# Patient Record
Sex: FEMALE | Race: WHITE | NOT HISPANIC OR LATINO | Employment: OTHER | URBAN - METROPOLITAN AREA
[De-identification: names, ages, dates, MRNs, and addresses within clinical notes are randomized per-mention and may not be internally consistent; named-entity substitution may affect disease eponyms.]

---

## 2020-06-10 ENCOUNTER — RECORDS - HEALTHEAST (OUTPATIENT)
Dept: LAB | Facility: CLINIC | Age: 65
End: 2020-06-10

## 2020-06-10 LAB
ALBUMIN SERPL-MCNC: 3.1 G/DL (ref 3.5–5)
ANION GAP SERPL CALCULATED.3IONS-SCNC: 10 MMOL/L (ref 5–18)
BUN SERPL-MCNC: 22 MG/DL (ref 8–22)
CALCIUM SERPL-MCNC: 8.8 MG/DL (ref 8.5–10.5)
CHLORIDE BLD-SCNC: 108 MMOL/L (ref 98–107)
CO2 SERPL-SCNC: 22 MMOL/L (ref 22–31)
CREAT SERPL-MCNC: 1.52 MG/DL (ref 0.6–1.1)
GFR SERPL CREATININE-BSD FRML MDRD: 34 ML/MIN/1.73M2
GLUCOSE BLD-MCNC: 105 MG/DL (ref 70–125)
PHOSPHATE SERPL-MCNC: 3.7 MG/DL (ref 2.5–4.5)
POTASSIUM BLD-SCNC: 4.4 MMOL/L (ref 3.5–5)
SODIUM SERPL-SCNC: 140 MMOL/L (ref 136–145)

## 2020-06-23 ENCOUNTER — RECORDS - HEALTHEAST (OUTPATIENT)
Dept: LAB | Facility: CLINIC | Age: 65
End: 2020-06-23

## 2020-06-23 LAB
ALBUMIN SERPL-MCNC: 3.6 G/DL (ref 3.5–5)
ANION GAP SERPL CALCULATED.3IONS-SCNC: 15 MMOL/L (ref 5–18)
BUN SERPL-MCNC: 31 MG/DL (ref 8–22)
CALCIUM SERPL-MCNC: 9.5 MG/DL (ref 8.5–10.5)
CHLORIDE BLD-SCNC: 108 MMOL/L (ref 98–107)
CO2 SERPL-SCNC: 18 MMOL/L (ref 22–31)
CREAT SERPL-MCNC: 1.4 MG/DL (ref 0.6–1.1)
GFR SERPL CREATININE-BSD FRML MDRD: 38 ML/MIN/1.73M2
GLUCOSE BLD-MCNC: 152 MG/DL (ref 70–125)
PHOSPHATE SERPL-MCNC: 4.2 MG/DL (ref 2.5–4.5)
POTASSIUM BLD-SCNC: 4.3 MMOL/L (ref 3.5–5)
SODIUM SERPL-SCNC: 141 MMOL/L (ref 136–145)

## 2020-12-16 ENCOUNTER — RECORDS - HEALTHEAST (OUTPATIENT)
Dept: LAB | Facility: CLINIC | Age: 65
End: 2020-12-16

## 2020-12-16 LAB
ALBUMIN SERPL-MCNC: 4.1 G/DL (ref 3.5–5)
ANION GAP SERPL CALCULATED.3IONS-SCNC: 14 MMOL/L (ref 5–18)
BUN SERPL-MCNC: 32 MG/DL (ref 8–22)
CALCIUM SERPL-MCNC: 9.7 MG/DL (ref 8.5–10.5)
CHLORIDE BLD-SCNC: 106 MMOL/L (ref 98–107)
CO2 SERPL-SCNC: 20 MMOL/L (ref 22–31)
CREAT SERPL-MCNC: 1.72 MG/DL (ref 0.6–1.1)
CREAT UR-MCNC: 100.8 MG/DL
CREAT UR-MCNC: 100.8 MG/DL
GFR SERPL CREATININE-BSD FRML MDRD: 30 ML/MIN/1.73M2
GLUCOSE BLD-MCNC: 158 MG/DL (ref 70–125)
MICROALBUMIN UR-MCNC: 50.52 MG/DL (ref 0–1.99)
MICROALBUMIN/CREAT UR: 501.2 MG/G
PHOSPHATE SERPL-MCNC: 4.6 MG/DL (ref 2.5–4.5)
POTASSIUM BLD-SCNC: 5 MMOL/L (ref 3.5–5)
PROTEIN, RANDOM URINE - HISTORICAL: 91 MG/DL
PROTEIN/CREAT RATIO, RANDOM UR: 0.9
PTH-INTACT SERPL-MCNC: 115 PG/ML (ref 10–86)
SODIUM SERPL-SCNC: 140 MMOL/L (ref 136–145)

## 2020-12-17 LAB — 25(OH)D3 SERPL-MCNC: 27.4 NG/ML (ref 30–80)

## 2021-04-15 ENCOUNTER — RECORDS - HEALTHEAST (OUTPATIENT)
Dept: LAB | Facility: CLINIC | Age: 66
End: 2021-04-15

## 2021-04-15 LAB
CHOLEST SERPL-MCNC: 181 MG/DL
FASTING STATUS PATIENT QL REPORTED: ABNORMAL
HDLC SERPL-MCNC: 39 MG/DL
LDLC SERPL CALC-MCNC: 99 MG/DL
TRIGL SERPL-MCNC: 214 MG/DL

## 2021-04-29 ENCOUNTER — RECORDS - HEALTHEAST (OUTPATIENT)
Dept: LAB | Facility: CLINIC | Age: 66
End: 2021-04-29

## 2021-04-29 LAB
ALBUMIN SERPL-MCNC: 3.8 G/DL (ref 3.5–5)
ANION GAP SERPL CALCULATED.3IONS-SCNC: 15 MMOL/L (ref 5–18)
BUN SERPL-MCNC: 27 MG/DL (ref 8–22)
CALCIUM SERPL-MCNC: 9.6 MG/DL (ref 8.5–10.5)
CHLORIDE BLD-SCNC: 107 MMOL/L (ref 98–107)
CO2 SERPL-SCNC: 18 MMOL/L (ref 22–31)
CREAT SERPL-MCNC: 1.23 MG/DL (ref 0.6–1.1)
CREAT UR-MCNC: 95.7 MG/DL
GFR SERPL CREATININE-BSD FRML MDRD: 44 ML/MIN/1.73M2
GLUCOSE BLD-MCNC: 117 MG/DL (ref 70–125)
PHOSPHATE SERPL-MCNC: 4.2 MG/DL (ref 2.5–4.5)
POTASSIUM BLD-SCNC: 4.5 MMOL/L (ref 3.5–5)
PROTEIN, RANDOM URINE - HISTORICAL: 104 MG/DL
PROTEIN/CREAT RATIO, RANDOM UR: 1.09
PTH-INTACT SERPL-MCNC: 76 PG/ML (ref 10–86)
SODIUM SERPL-SCNC: 140 MMOL/L (ref 136–145)

## 2021-04-30 LAB — 25(OH)D3 SERPL-MCNC: 30.7 NG/ML (ref 30–80)

## 2022-05-17 ENCOUNTER — LAB REQUISITION (OUTPATIENT)
Dept: LAB | Facility: CLINIC | Age: 67
End: 2022-05-17

## 2022-05-17 ENCOUNTER — TRANSFERRED RECORDS (OUTPATIENT)
Dept: HEALTH INFORMATION MANAGEMENT | Facility: CLINIC | Age: 67
End: 2022-05-17

## 2022-05-17 DIAGNOSIS — E78.2 MIXED HYPERLIPIDEMIA: ICD-10-CM

## 2022-05-17 DIAGNOSIS — I10 ESSENTIAL (PRIMARY) HYPERTENSION: ICD-10-CM

## 2022-05-17 LAB
ALBUMIN SERPL-MCNC: 3.9 G/DL (ref 3.5–5)
ALP SERPL-CCNC: 110 U/L (ref 45–120)
ALT SERPL W P-5'-P-CCNC: 14 U/L (ref 0–45)
ANION GAP SERPL CALCULATED.3IONS-SCNC: 8 MMOL/L (ref 5–18)
AST SERPL W P-5'-P-CCNC: 20 U/L (ref 0–40)
BILIRUB SERPL-MCNC: 0.5 MG/DL (ref 0–1)
BUN SERPL-MCNC: 33 MG/DL (ref 8–22)
CALCIUM SERPL-MCNC: 9.7 MG/DL (ref 8.5–10.5)
CHLORIDE BLD-SCNC: 108 MMOL/L (ref 98–107)
CHOLEST SERPL-MCNC: 218 MG/DL
CO2 SERPL-SCNC: 24 MMOL/L (ref 22–31)
CREAT SERPL-MCNC: 1.71 MG/DL (ref 0.6–1.1)
GFR SERPL CREATININE-BSD FRML MDRD: 32 ML/MIN/1.73M2
GLUCOSE BLD-MCNC: 135 MG/DL (ref 70–125)
HDLC SERPL-MCNC: 39 MG/DL
LDLC SERPL CALC-MCNC: 130 MG/DL
POTASSIUM BLD-SCNC: 5.3 MMOL/L (ref 3.5–5)
PROT SERPL-MCNC: 7.3 G/DL (ref 6–8)
SODIUM SERPL-SCNC: 140 MMOL/L (ref 136–145)
TRIGL SERPL-MCNC: 244 MG/DL

## 2022-05-17 PROCEDURE — 80061 LIPID PANEL: CPT | Performed by: PHYSICIAN ASSISTANT

## 2022-05-17 PROCEDURE — 80053 COMPREHEN METABOLIC PANEL: CPT | Performed by: PHYSICIAN ASSISTANT

## 2023-01-19 ENCOUNTER — TRANSFERRED RECORDS (OUTPATIENT)
Dept: HEALTH INFORMATION MANAGEMENT | Facility: CLINIC | Age: 68
End: 2023-01-19
Payer: COMMERCIAL

## 2023-01-19 ENCOUNTER — LAB REQUISITION (OUTPATIENT)
Dept: LAB | Facility: CLINIC | Age: 68
End: 2023-01-19

## 2023-01-19 DIAGNOSIS — N18.31 CHRONIC KIDNEY DISEASE, STAGE 3A (H): ICD-10-CM

## 2023-01-19 DIAGNOSIS — R60.0 LOCALIZED EDEMA: ICD-10-CM

## 2023-01-19 LAB
ALBUMIN SERPL BCG-MCNC: 3.9 G/DL (ref 3.5–5.2)
ALP SERPL-CCNC: 138 U/L (ref 35–104)
ALT SERPL W P-5'-P-CCNC: 11 U/L (ref 10–35)
ANION GAP SERPL CALCULATED.3IONS-SCNC: 17 MMOL/L (ref 7–15)
AST SERPL W P-5'-P-CCNC: 19 U/L (ref 10–35)
BASOPHILS # BLD AUTO: 0.1 10E3/UL (ref 0–0.2)
BASOPHILS NFR BLD AUTO: 1 %
BILIRUB SERPL-MCNC: 0.5 MG/DL
BUN SERPL-MCNC: 16 MG/DL (ref 8–23)
CALCIUM SERPL-MCNC: 9.4 MG/DL (ref 8.8–10.2)
CHLORIDE SERPL-SCNC: 109 MMOL/L (ref 98–107)
CREAT SERPL-MCNC: 1.62 MG/DL (ref 0.51–0.95)
DEPRECATED HCO3 PLAS-SCNC: 17 MMOL/L (ref 22–29)
EOSINOPHIL # BLD AUTO: 0.2 10E3/UL (ref 0–0.7)
EOSINOPHIL NFR BLD AUTO: 2 %
ERYTHROCYTE [DISTWIDTH] IN BLOOD BY AUTOMATED COUNT: 15.9 % (ref 10–15)
GFR SERPL CREATININE-BSD FRML MDRD: 34 ML/MIN/1.73M2
GLUCOSE SERPL-MCNC: 81 MG/DL (ref 70–99)
HCT VFR BLD AUTO: 36.2 % (ref 35–47)
HGB BLD-MCNC: 10.5 G/DL (ref 11.7–15.7)
IMM GRANULOCYTES # BLD: 0 10E3/UL
IMM GRANULOCYTES NFR BLD: 0 %
LYMPHOCYTES # BLD AUTO: 1.2 10E3/UL (ref 0.8–5.3)
LYMPHOCYTES NFR BLD AUTO: 11 %
MCH RBC QN AUTO: 26.1 PG (ref 26.5–33)
MCHC RBC AUTO-ENTMCNC: 29 G/DL (ref 31.5–36.5)
MCV RBC AUTO: 90 FL (ref 78–100)
MONOCYTES # BLD AUTO: 1 10E3/UL (ref 0–1.3)
MONOCYTES NFR BLD AUTO: 9 %
NEUTROPHILS # BLD AUTO: 8.1 10E3/UL (ref 1.6–8.3)
NEUTROPHILS NFR BLD AUTO: 77 %
NRBC # BLD AUTO: 0 10E3/UL
NRBC BLD AUTO-RTO: 0 /100
NT-PROBNP SERPL-MCNC: 4666 PG/ML (ref 0–900)
PLATELET # BLD AUTO: 413 10E3/UL (ref 150–450)
POTASSIUM SERPL-SCNC: 4.6 MMOL/L (ref 3.4–5.3)
PROT SERPL-MCNC: 6.6 G/DL (ref 6.4–8.3)
RBC # BLD AUTO: 4.02 10E6/UL (ref 3.8–5.2)
SODIUM SERPL-SCNC: 143 MMOL/L (ref 136–145)
WBC # BLD AUTO: 10.6 10E3/UL (ref 4–11)

## 2023-01-19 PROCEDURE — 85025 COMPLETE CBC W/AUTO DIFF WBC: CPT | Performed by: FAMILY MEDICINE

## 2023-01-19 PROCEDURE — 83880 ASSAY OF NATRIURETIC PEPTIDE: CPT | Performed by: FAMILY MEDICINE

## 2023-01-19 PROCEDURE — 80053 COMPREHEN METABOLIC PANEL: CPT | Performed by: FAMILY MEDICINE

## 2023-01-26 ENCOUNTER — TRANSFERRED RECORDS (OUTPATIENT)
Dept: HEALTH INFORMATION MANAGEMENT | Facility: CLINIC | Age: 68
End: 2023-01-26
Payer: COMMERCIAL

## 2023-01-30 ENCOUNTER — TRANSFERRED RECORDS (OUTPATIENT)
Dept: HEALTH INFORMATION MANAGEMENT | Facility: CLINIC | Age: 68
End: 2023-01-30
Payer: COMMERCIAL

## 2023-01-30 ENCOUNTER — MEDICAL CORRESPONDENCE (OUTPATIENT)
Dept: HEALTH INFORMATION MANAGEMENT | Facility: CLINIC | Age: 68
End: 2023-01-30
Payer: COMMERCIAL

## 2023-01-30 LAB — EJECTION FRACTION: NORMAL %

## 2023-01-31 ENCOUNTER — MEDICAL CORRESPONDENCE (OUTPATIENT)
Dept: HEALTH INFORMATION MANAGEMENT | Facility: CLINIC | Age: 68
End: 2023-01-31
Payer: COMMERCIAL

## 2023-02-01 ENCOUNTER — TRANSCRIBE ORDERS (OUTPATIENT)
Dept: OTHER | Age: 68
End: 2023-02-01

## 2023-02-01 DIAGNOSIS — K82.8 PORCELAIN GALLBLADDER: Primary | ICD-10-CM

## 2023-02-08 ENCOUNTER — PREP FOR PROCEDURE (OUTPATIENT)
Dept: CARDIOLOGY | Facility: CLINIC | Age: 68
End: 2023-02-08

## 2023-02-08 ENCOUNTER — HOSPITAL ENCOUNTER (INPATIENT)
Facility: HOSPITAL | Age: 68
LOS: 4 days | Discharge: HOME | DRG: 286 | End: 2023-02-12
Attending: INTERNAL MEDICINE | Admitting: HOSPITALIST
Payer: COMMERCIAL

## 2023-02-08 ENCOUNTER — OFFICE VISIT (OUTPATIENT)
Dept: CARDIOLOGY | Facility: CLINIC | Age: 68
End: 2023-02-08
Payer: COMMERCIAL

## 2023-02-08 VITALS
SYSTOLIC BLOOD PRESSURE: 120 MMHG | WEIGHT: 239 LBS | HEART RATE: 74 BPM | DIASTOLIC BLOOD PRESSURE: 60 MMHG | RESPIRATION RATE: 16 BRPM

## 2023-02-08 DIAGNOSIS — I27.20 PULMONARY HYPERTENSION (H): ICD-10-CM

## 2023-02-08 DIAGNOSIS — E66.09 CLASS 2 OBESITY DUE TO EXCESS CALORIES WITHOUT SERIOUS COMORBIDITY IN ADULT, UNSPECIFIED BMI: ICD-10-CM

## 2023-02-08 DIAGNOSIS — N18.31 STAGE 3A CHRONIC KIDNEY DISEASE (H): ICD-10-CM

## 2023-02-08 DIAGNOSIS — I50.31 ACUTE HEART FAILURE WITH PRESERVED EJECTION FRACTION (HFPEF) (H): ICD-10-CM

## 2023-02-08 DIAGNOSIS — I50.31 ACUTE HEART FAILURE WITH PRESERVED EJECTION FRACTION (HFPEF) (H): Primary | ICD-10-CM

## 2023-02-08 DIAGNOSIS — I34.2 NONRHEUMATIC MITRAL VALVE STENOSIS: ICD-10-CM

## 2023-02-08 DIAGNOSIS — I10 BENIGN ESSENTIAL HYPERTENSION: Primary | ICD-10-CM

## 2023-02-08 DIAGNOSIS — E11.00 TYPE 2 DIABETES MELLITUS WITH HYPEROSMOLARITY WITHOUT COMA, WITH LONG-TERM CURRENT USE OF INSULIN (H): ICD-10-CM

## 2023-02-08 DIAGNOSIS — Z79.4 TYPE 2 DIABETES MELLITUS WITH HYPEROSMOLARITY WITHOUT COMA, WITH LONG-TERM CURRENT USE OF INSULIN (H): ICD-10-CM

## 2023-02-08 DIAGNOSIS — I35.0 NONRHEUMATIC AORTIC (VALVE) STENOSIS: ICD-10-CM

## 2023-02-08 DIAGNOSIS — G47.33 OSA (OBSTRUCTIVE SLEEP APNEA): ICD-10-CM

## 2023-02-08 DIAGNOSIS — E66.812 CLASS 2 OBESITY DUE TO EXCESS CALORIES WITHOUT SERIOUS COMORBIDITY IN ADULT, UNSPECIFIED BMI: ICD-10-CM

## 2023-02-08 DIAGNOSIS — I10 BENIGN ESSENTIAL HYPERTENSION: ICD-10-CM

## 2023-02-08 PROBLEM — R60.0 PERIPHERAL EDEMA: Status: ACTIVE | Noted: 2023-02-08

## 2023-02-08 PROBLEM — E11.9 TYPE 2 DIABETES MELLITUS, WITH LONG-TERM CURRENT USE OF INSULIN (H): Status: ACTIVE | Noted: 2023-02-08

## 2023-02-08 PROBLEM — Z98.890 STATUS POST CORONARY ANGIOGRAM: Status: ACTIVE | Noted: 2023-02-08

## 2023-02-08 LAB
ABO/RH(D): NORMAL
ANION GAP SERPL CALCULATED.3IONS-SCNC: 13 MMOL/L (ref 7–15)
ANTIBODY SCREEN: NEGATIVE
ATRIAL RATE - MUSE: 72 BPM
BASE EXCESS BLDA CALC-SCNC: 4.5 MMOL/L
BASE EXCESS BLDV CALC-SCNC: 5.4 MMOL/L
BASE EXCESS BLDV CALC-SCNC: 5.7 MMOL/L
BUN SERPL-MCNC: 16.8 MG/DL (ref 8–23)
CALCIUM SERPL-MCNC: 9.1 MG/DL (ref 8.8–10.2)
CHLORIDE SERPL-SCNC: 105 MMOL/L (ref 98–107)
COHGB MFR BLD: 92.5 % (ref 95–96)
CREAT SERPL-MCNC: 1.68 MG/DL (ref 0.51–0.95)
DEPRECATED HCO3 PLAS-SCNC: 25 MMOL/L (ref 22–29)
DIASTOLIC BLOOD PRESSURE - MUSE: NORMAL MMHG
ERYTHROCYTE [DISTWIDTH] IN BLOOD BY AUTOMATED COUNT: 16.1 % (ref 10–15)
GFR SERPL CREATININE-BSD FRML MDRD: 33 ML/MIN/1.73M2
GLUCOSE BLDC GLUCOMTR-MCNC: 73 MG/DL (ref 70–99)
GLUCOSE BLDC GLUCOMTR-MCNC: 88 MG/DL (ref 70–99)
GLUCOSE SERPL-MCNC: 91 MG/DL (ref 70–99)
HBA1C MFR BLD: 6.2 %
HCO3 BLDA-SCNC: 28 MMOL/L (ref 23–29)
HCO3 BLDV-SCNC: 28 MMOL/L (ref 24–30)
HCO3 BLDV-SCNC: 29 MMOL/L (ref 24–30)
HCT VFR BLD AUTO: 33.2 % (ref 35–47)
HGB BLD-MCNC: 10.4 G/DL (ref 11.7–15.7)
INTERPRETATION ECG - MUSE: NORMAL
MCH RBC QN AUTO: 26.3 PG (ref 26.5–33)
MCHC RBC AUTO-ENTMCNC: 31.3 G/DL (ref 31.5–36.5)
MCV RBC AUTO: 84 FL (ref 78–100)
P AXIS - MUSE: 48 DEGREES
PCO2 BLDA: 35 MM HG (ref 35–45)
PCO2 BLDV: 39 MM HG (ref 35–50)
PCO2 BLDV: 39 MM HG (ref 35–50)
PH BLDA: 7.5 [PH] (ref 7.37–7.44)
PH BLDV: 7.48 [PH] (ref 7.35–7.45)
PH BLDV: 7.48 [PH] (ref 7.35–7.45)
PLATELET # BLD AUTO: 358 10E3/UL (ref 150–450)
PO2 BLDA: 58 MM HG (ref 75–85)
PO2 BLDV: 32 MM HG (ref 25–47)
PO2 BLDV: 32 MM HG (ref 25–47)
POTASSIUM SERPL-SCNC: 3.6 MMOL/L (ref 3.4–5.3)
PR INTERVAL - MUSE: 196 MS
QRS DURATION - MUSE: 86 MS
QT - MUSE: 450 MS
QTC - MUSE: 492 MS
R AXIS - MUSE: 58 DEGREES
RBC # BLD AUTO: 3.96 10E6/UL (ref 3.8–5.2)
SATV LHE POCT: 61.7 % (ref 70–75)
SATV LHE POCT: 63.4 % (ref 70–75)
SODIUM SERPL-SCNC: 143 MMOL/L (ref 136–145)
SPECIMEN EXPIRATION DATE: NORMAL
SYSTOLIC BLOOD PRESSURE - MUSE: NORMAL MMHG
T AXIS - MUSE: 41 DEGREES
VENTRICULAR RATE- MUSE: 72 BPM
WBC # BLD AUTO: 10.2 10E3/UL (ref 4–11)

## 2023-02-08 PROCEDURE — 99205 OFFICE O/P NEW HI 60 MIN: CPT | Performed by: INTERNAL MEDICINE

## 2023-02-08 PROCEDURE — 250N000011 HC RX IP 250 OP 636: Performed by: HOSPITALIST

## 2023-02-08 PROCEDURE — 4A023N8 MEASUREMENT OF CARDIAC SAMPLING AND PRESSURE, BILATERAL, PERCUTANEOUS APPROACH: ICD-10-PCS | Performed by: INTERNAL MEDICINE

## 2023-02-08 PROCEDURE — 250N000013 HC RX MED GY IP 250 OP 250 PS 637: Performed by: INTERNAL MEDICINE

## 2023-02-08 PROCEDURE — C1769 GUIDE WIRE: HCPCS | Performed by: INTERNAL MEDICINE

## 2023-02-08 PROCEDURE — 83036 HEMOGLOBIN GLYCOSYLATED A1C: CPT | Performed by: HOSPITALIST

## 2023-02-08 PROCEDURE — 99223 1ST HOSP IP/OBS HIGH 75: CPT | Mod: AI | Performed by: HOSPITALIST

## 2023-02-08 PROCEDURE — 36415 COLL VENOUS BLD VENIPUNCTURE: CPT | Performed by: INTERNAL MEDICINE

## 2023-02-08 PROCEDURE — C1894 INTRO/SHEATH, NON-LASER: HCPCS | Performed by: INTERNAL MEDICINE

## 2023-02-08 PROCEDURE — 272N000001 HC OR GENERAL SUPPLY STERILE: Performed by: INTERNAL MEDICINE

## 2023-02-08 PROCEDURE — 210N000001 HC R&B IMCU HEART CARE

## 2023-02-08 PROCEDURE — 86901 BLOOD TYPING SEROLOGIC RH(D): CPT | Performed by: INTERNAL MEDICINE

## 2023-02-08 PROCEDURE — 86850 RBC ANTIBODY SCREEN: CPT | Performed by: INTERNAL MEDICINE

## 2023-02-08 PROCEDURE — 93453 R&L HRT CATH W/VENTRICLGRPHY: CPT | Mod: 26 | Performed by: INTERNAL MEDICINE

## 2023-02-08 PROCEDURE — 93453 R&L HRT CATH W/VENTRICLGRPHY: CPT | Performed by: INTERNAL MEDICINE

## 2023-02-08 PROCEDURE — 82805 BLOOD GASES W/O2 SATURATION: CPT

## 2023-02-08 PROCEDURE — C1751 CATH, INF, PER/CENT/MIDLINE: HCPCS | Performed by: INTERNAL MEDICINE

## 2023-02-08 PROCEDURE — 80048 BASIC METABOLIC PNL TOTAL CA: CPT | Performed by: INTERNAL MEDICINE

## 2023-02-08 PROCEDURE — 93010 ELECTROCARDIOGRAM REPORT: CPT | Performed by: INTERNAL MEDICINE

## 2023-02-08 PROCEDURE — 258N000003 HC RX IP 258 OP 636: Performed by: INTERNAL MEDICINE

## 2023-02-08 PROCEDURE — 250N000011 HC RX IP 250 OP 636: Performed by: INTERNAL MEDICINE

## 2023-02-08 PROCEDURE — 85014 HEMATOCRIT: CPT | Performed by: INTERNAL MEDICINE

## 2023-02-08 PROCEDURE — 93005 ELECTROCARDIOGRAM TRACING: CPT

## 2023-02-08 RX ORDER — NYSTATIN AND TRIAMCINOLONE ACETONIDE 100000; 1 [USP'U]/G; MG/G
CREAM TOPICAL 4 TIMES DAILY
Status: DISCONTINUED | OUTPATIENT
Start: 2023-02-08 | End: 2023-02-12 | Stop reason: HOSPADM

## 2023-02-08 RX ORDER — TRAZODONE HYDROCHLORIDE 100 MG/1
100 TABLET ORAL AT BEDTIME
COMMUNITY

## 2023-02-08 RX ORDER — FENTANYL CITRATE 50 UG/ML
25 INJECTION, SOLUTION INTRAMUSCULAR; INTRAVENOUS
Status: DISCONTINUED | OUTPATIENT
Start: 2023-02-08 | End: 2023-02-08

## 2023-02-08 RX ORDER — HYDRALAZINE HYDROCHLORIDE 50 MG/1
50 TABLET, FILM COATED ORAL 3 TIMES DAILY
Status: ON HOLD | COMMUNITY
Start: 2021-05-05 | End: 2023-02-12

## 2023-02-08 RX ORDER — OXYCODONE HYDROCHLORIDE 5 MG/1
5 TABLET ORAL EVERY 4 HOURS PRN
Status: DISCONTINUED | OUTPATIENT
Start: 2023-02-08 | End: 2023-02-12 | Stop reason: HOSPADM

## 2023-02-08 RX ORDER — ALPRAZOLAM 0.5 MG
0.5 TABLET ORAL SEE ADMIN INSTRUCTIONS
COMMUNITY
Start: 2023-02-07 | End: 2023-06-21

## 2023-02-08 RX ORDER — ONDANSETRON 4 MG/1
4 TABLET, ORALLY DISINTEGRATING ORAL EVERY 6 HOURS PRN
Status: DISCONTINUED | OUTPATIENT
Start: 2023-02-08 | End: 2023-02-12 | Stop reason: HOSPADM

## 2023-02-08 RX ORDER — NALOXONE HYDROCHLORIDE 0.4 MG/ML
0.4 INJECTION, SOLUTION INTRAMUSCULAR; INTRAVENOUS; SUBCUTANEOUS
Status: ACTIVE | OUTPATIENT
Start: 2023-02-08 | End: 2023-02-08

## 2023-02-08 RX ORDER — PANTOPRAZOLE SODIUM 40 MG/1
40 TABLET, DELAYED RELEASE ORAL
Status: DISCONTINUED | OUTPATIENT
Start: 2023-02-09 | End: 2023-02-12 | Stop reason: HOSPADM

## 2023-02-08 RX ORDER — ASPIRIN 325 MG
325 TABLET ORAL ONCE
Status: CANCELLED | OUTPATIENT
Start: 2023-02-08 | End: 2023-02-08

## 2023-02-08 RX ORDER — DEXTROSE MONOHYDRATE 25 G/50ML
25-50 INJECTION, SOLUTION INTRAVENOUS
Status: DISCONTINUED | OUTPATIENT
Start: 2023-02-08 | End: 2023-02-12 | Stop reason: HOSPADM

## 2023-02-08 RX ORDER — ACETAMINOPHEN 325 MG/1
650 TABLET ORAL EVERY 6 HOURS PRN
Status: DISCONTINUED | OUTPATIENT
Start: 2023-02-08 | End: 2023-02-12 | Stop reason: HOSPADM

## 2023-02-08 RX ORDER — NICOTINE POLACRILEX 4 MG
15-30 LOZENGE BUCCAL
Status: DISCONTINUED | OUTPATIENT
Start: 2023-02-08 | End: 2023-02-12 | Stop reason: HOSPADM

## 2023-02-08 RX ORDER — METOPROLOL SUCCINATE 200 MG/1
200 TABLET, EXTENDED RELEASE ORAL DAILY
Status: ON HOLD | COMMUNITY
End: 2023-12-08

## 2023-02-08 RX ORDER — FENTANYL CITRATE 50 UG/ML
25 INJECTION, SOLUTION INTRAMUSCULAR; INTRAVENOUS
Status: CANCELLED | OUTPATIENT
Start: 2023-02-08

## 2023-02-08 RX ORDER — OXYCODONE HYDROCHLORIDE 5 MG/1
10 TABLET ORAL EVERY 4 HOURS PRN
Status: DISCONTINUED | OUTPATIENT
Start: 2023-02-08 | End: 2023-02-12 | Stop reason: HOSPADM

## 2023-02-08 RX ORDER — ASPIRIN 325 MG
325 TABLET ORAL ONCE
Status: COMPLETED | OUTPATIENT
Start: 2023-02-08 | End: 2023-02-08

## 2023-02-08 RX ORDER — ATROPINE SULFATE 0.1 MG/ML
0.5 INJECTION INTRAVENOUS
Status: ACTIVE | OUTPATIENT
Start: 2023-02-08 | End: 2023-02-08

## 2023-02-08 RX ORDER — LOPERAMIDE HCL 2 MG
2 CAPSULE ORAL
Status: ON HOLD | COMMUNITY
End: 2023-12-08

## 2023-02-08 RX ORDER — SODIUM CHLORIDE 9 MG/ML
INJECTION, SOLUTION INTRAVENOUS CONTINUOUS
Status: CANCELLED | OUTPATIENT
Start: 2023-02-08

## 2023-02-08 RX ORDER — ACETAMINOPHEN 325 MG/1
650 TABLET ORAL EVERY 4 HOURS PRN
Status: DISCONTINUED | OUTPATIENT
Start: 2023-02-08 | End: 2023-02-08

## 2023-02-08 RX ORDER — TRAZODONE HYDROCHLORIDE 50 MG/1
100 TABLET, FILM COATED ORAL
Status: DISCONTINUED | OUTPATIENT
Start: 2023-02-08 | End: 2023-02-12 | Stop reason: HOSPADM

## 2023-02-08 RX ORDER — AMLODIPINE BESYLATE 10 MG/1
10 TABLET ORAL DAILY
Status: ON HOLD | COMMUNITY
End: 2023-02-12

## 2023-02-08 RX ORDER — METOPROLOL SUCCINATE 100 MG/1
200 TABLET, EXTENDED RELEASE ORAL DAILY
Status: DISCONTINUED | OUTPATIENT
Start: 2023-02-09 | End: 2023-02-12 | Stop reason: HOSPADM

## 2023-02-08 RX ORDER — ACETAMINOPHEN 650 MG/1
650 SUPPOSITORY RECTAL EVERY 6 HOURS PRN
Status: DISCONTINUED | OUTPATIENT
Start: 2023-02-08 | End: 2023-02-12 | Stop reason: HOSPADM

## 2023-02-08 RX ORDER — LANOLIN ALCOHOL/MO/W.PET/CERES
6 CREAM (GRAM) TOPICAL
COMMUNITY

## 2023-02-08 RX ORDER — CHLORAL HYDRATE 500 MG
1000 CAPSULE ORAL DAILY
COMMUNITY

## 2023-02-08 RX ORDER — DOCUSATE SODIUM 100 MG/1
100 CAPSULE, LIQUID FILLED ORAL 2 TIMES DAILY PRN
Status: DISCONTINUED | OUTPATIENT
Start: 2023-02-08 | End: 2023-02-12 | Stop reason: HOSPADM

## 2023-02-08 RX ORDER — ISOSORBIDE DINITRATE 10 MG/1
20 TABLET ORAL
Status: DISCONTINUED | OUTPATIENT
Start: 2023-02-08 | End: 2023-02-09

## 2023-02-08 RX ORDER — HEPARIN SODIUM 5000 [USP'U]/.5ML
5000 INJECTION, SOLUTION INTRAVENOUS; SUBCUTANEOUS EVERY 12 HOURS
Status: DISCONTINUED | OUTPATIENT
Start: 2023-02-08 | End: 2023-02-12 | Stop reason: HOSPADM

## 2023-02-08 RX ORDER — ESCITALOPRAM OXALATE 10 MG/1
20 TABLET ORAL DAILY
Status: DISCONTINUED | OUTPATIENT
Start: 2023-02-09 | End: 2023-02-12 | Stop reason: HOSPADM

## 2023-02-08 RX ORDER — ESCITALOPRAM OXALATE 20 MG/1
20 TABLET ORAL DAILY
COMMUNITY

## 2023-02-08 RX ORDER — ONDANSETRON 2 MG/ML
4 INJECTION INTRAMUSCULAR; INTRAVENOUS EVERY 6 HOURS PRN
Status: DISCONTINUED | OUTPATIENT
Start: 2023-02-08 | End: 2023-02-12 | Stop reason: HOSPADM

## 2023-02-08 RX ORDER — SODIUM CHLORIDE 9 MG/ML
INJECTION, SOLUTION INTRAVENOUS CONTINUOUS
Status: DISCONTINUED | OUTPATIENT
Start: 2023-02-08 | End: 2023-02-08 | Stop reason: HOSPADM

## 2023-02-08 RX ORDER — HYDRALAZINE HYDROCHLORIDE 50 MG/1
100 TABLET, FILM COATED ORAL 3 TIMES DAILY
Status: DISCONTINUED | OUTPATIENT
Start: 2023-02-08 | End: 2023-02-12 | Stop reason: HOSPADM

## 2023-02-08 RX ORDER — ASPIRIN 81 MG/1
243 TABLET, CHEWABLE ORAL ONCE
Status: CANCELLED | OUTPATIENT
Start: 2023-02-08

## 2023-02-08 RX ORDER — BUMETANIDE 1 MG/1
3 TABLET ORAL
Status: ON HOLD | COMMUNITY
End: 2023-02-12

## 2023-02-08 RX ORDER — FENTANYL CITRATE 50 UG/ML
25 INJECTION, SOLUTION INTRAMUSCULAR; INTRAVENOUS
Status: DISCONTINUED | OUTPATIENT
Start: 2023-02-08 | End: 2023-02-08 | Stop reason: HOSPADM

## 2023-02-08 RX ORDER — MULTIVITAMIN
1 TABLET ORAL DAILY
COMMUNITY

## 2023-02-08 RX ORDER — CALCIUM CARBONATE 500 MG/1
1000 TABLET, CHEWABLE ORAL 4 TIMES DAILY PRN
Status: DISCONTINUED | OUTPATIENT
Start: 2023-02-08 | End: 2023-02-12 | Stop reason: HOSPADM

## 2023-02-08 RX ORDER — LIDOCAINE 40 MG/G
CREAM TOPICAL
Status: DISCONTINUED | OUTPATIENT
Start: 2023-02-08 | End: 2023-02-12 | Stop reason: HOSPADM

## 2023-02-08 RX ORDER — ATORVASTATIN CALCIUM 40 MG/1
80 TABLET, FILM COATED ORAL DAILY
Status: DISCONTINUED | OUTPATIENT
Start: 2023-02-09 | End: 2023-02-12 | Stop reason: HOSPADM

## 2023-02-08 RX ORDER — ESCITALOPRAM OXALATE 10 MG/1
10 TABLET ORAL DAILY
Status: ON HOLD | COMMUNITY
End: 2023-02-08

## 2023-02-08 RX ORDER — HYDRALAZINE HYDROCHLORIDE 20 MG/ML
10 INJECTION INTRAMUSCULAR; INTRAVENOUS
Status: DISCONTINUED | OUTPATIENT
Start: 2023-02-08 | End: 2023-02-12 | Stop reason: HOSPADM

## 2023-02-08 RX ORDER — BUMETANIDE 0.25 MG/ML
1 INJECTION INTRAMUSCULAR; INTRAVENOUS EVERY 12 HOURS
Status: DISCONTINUED | OUTPATIENT
Start: 2023-02-08 | End: 2023-02-09

## 2023-02-08 RX ORDER — VITAMIN B COMPLEX
25 TABLET ORAL DAILY
Status: DISCONTINUED | OUTPATIENT
Start: 2023-02-09 | End: 2023-02-12 | Stop reason: HOSPADM

## 2023-02-08 RX ORDER — INSULIN GLARGINE 100 [IU]/ML
20 INJECTION, SOLUTION SUBCUTANEOUS EVERY MORNING
Status: ON HOLD | COMMUNITY
End: 2023-12-08

## 2023-02-08 RX ORDER — FLUMAZENIL 0.1 MG/ML
0.2 INJECTION, SOLUTION INTRAVENOUS
Status: ACTIVE | OUTPATIENT
Start: 2023-02-08 | End: 2023-02-08

## 2023-02-08 RX ORDER — BISACODYL 10 MG
10 SUPPOSITORY, RECTAL RECTAL DAILY PRN
Status: DISCONTINUED | OUTPATIENT
Start: 2023-02-08 | End: 2023-02-12 | Stop reason: HOSPADM

## 2023-02-08 RX ORDER — LIDOCAINE 40 MG/G
CREAM TOPICAL
Status: CANCELLED | OUTPATIENT
Start: 2023-02-08

## 2023-02-08 RX ORDER — CARBOXYMETHYLCELLULOSE SODIUM 5 MG/ML
1 SOLUTION/ DROPS OPHTHALMIC 3 TIMES DAILY PRN
Status: DISCONTINUED | OUTPATIENT
Start: 2023-02-08 | End: 2023-02-12 | Stop reason: HOSPADM

## 2023-02-08 RX ORDER — TRAZODONE HYDROCHLORIDE 50 MG/1
50 TABLET, FILM COATED ORAL
Status: ON HOLD | COMMUNITY
End: 2023-02-08

## 2023-02-08 RX ORDER — ASPIRIN 81 MG/1
243 TABLET, CHEWABLE ORAL ONCE
Status: COMPLETED | OUTPATIENT
Start: 2023-02-08 | End: 2023-02-08

## 2023-02-08 RX ORDER — NALOXONE HYDROCHLORIDE 0.4 MG/ML
0.2 INJECTION, SOLUTION INTRAMUSCULAR; INTRAVENOUS; SUBCUTANEOUS
Status: ACTIVE | OUTPATIENT
Start: 2023-02-08 | End: 2023-02-08

## 2023-02-08 RX ORDER — ATORVASTATIN CALCIUM 80 MG/1
80 TABLET, FILM COATED ORAL DAILY
COMMUNITY

## 2023-02-08 RX ORDER — LIDOCAINE 40 MG/G
CREAM TOPICAL
Status: DISCONTINUED | OUTPATIENT
Start: 2023-02-08 | End: 2023-02-08 | Stop reason: HOSPADM

## 2023-02-08 RX ADMIN — ASPIRIN 325 MG ORAL TABLET 325 MG: 325 PILL ORAL at 13:20

## 2023-02-08 RX ADMIN — HYDRALAZINE HYDROCHLORIDE 100 MG: 50 TABLET, FILM COATED ORAL at 20:34

## 2023-02-08 RX ADMIN — HEPARIN SODIUM 5000 UNITS: 10000 INJECTION, SOLUTION INTRAVENOUS; SUBCUTANEOUS at 20:37

## 2023-02-08 RX ADMIN — BUMETANIDE 1 MG: 0.25 INJECTION INTRAMUSCULAR; INTRAVENOUS at 20:37

## 2023-02-08 RX ADMIN — NYSTATIN, TRIAMCINOLONE ACETONIDE: 100000; 1 CREAM TOPICAL at 20:43

## 2023-02-08 RX ADMIN — SODIUM CHLORIDE: 9 INJECTION, SOLUTION INTRAVENOUS at 13:21

## 2023-02-08 RX ADMIN — ISOSORBIDE DINITRATE 20 MG: 10 TABLET ORAL at 20:34

## 2023-02-08 ASSESSMENT — ACTIVITIES OF DAILY LIVING (ADL)
WALKING_OR_CLIMBING_STAIRS_DIFFICULTY: YES
HEARING_DIFFICULTY_OR_DEAF: NO
ADLS_ACUITY_SCORE: 35
ADLS_ACUITY_SCORE: 31
ADLS_ACUITY_SCORE: 35
TOILETING_ISSUES: NO
DIFFICULTY_COMMUNICATING: NO
WEAR_GLASSES_OR_BLIND: YES
TRANSFERRING: 0-->ASSISTANCE NEEDED (DEVELOPMENTALLY APPROPRIATE)
VISION_MANAGEMENT: GLASSES
DOING_ERRANDS_INDEPENDENTLY_DIFFICULTY: NO
CHANGE_IN_FUNCTIONAL_STATUS_SINCE_ONSET_OF_CURRENT_ILLNESS/INJURY: NO
CONCENTRATING,_REMEMBERING_OR_MAKING_DECISIONS_DIFFICULTY: NO
WALKING_OR_CLIMBING_STAIRS: AMBULATION DIFFICULTY, ASSISTANCE 1 PERSON;STAIR CLIMBING DIFFICULTY, ASSISTANCE 1 PERSON;TRANSFERRING DIFFICULTY, ASSISTANCE 1 PERSON
TRANSFERRING: 1-->ASSISTANCE (EQUIPMENT/PERSON) NEEDED
DRESSING/BATHING_DIFFICULTY: NO
ADLS_ACUITY_SCORE: 22
DIFFICULTY_EATING/SWALLOWING: NO
ADLS_ACUITY_SCORE: 35
FALL_HISTORY_WITHIN_LAST_SIX_MONTHS: NO
ADLS_ACUITY_SCORE: 35

## 2023-02-08 ASSESSMENT — EJECTION FRACTION: EF_VALUE: .3

## 2023-02-08 NOTE — LETTER
2/8/2023    Flora Betts MD  [address] [place] [address]    RE: Indu Felix       Dear Colleague,     I had the pleasure of seeing Indu Felix in the [facility].    [facility] Office Consult     Assessment:   (I50.31) Acute heart failure with preserved ejection fraction (HFpEF) (H)  (primary encounter diagnosis)  Comment: Based on history acute heart failure in the setting of preserved ejection fraction of 60 to 65%.  Now on Bumex 2 mg by mouth twice a day but concerned with chronic kidney disease.  Ideally would like to know volume status while undergoing aggressive diuretic, right heart cath with left ventricular end-diastolic pressure would be very reasonable.  Would like to set this up as well as monitor pulmonary hypertension, suspect admission to hospital would be the best thing to do and will try to arrange that for today with aggressive IV diuresis.  N-terminal proBNP is 4666.    (I34.2) Nonrheumatic mitral valve stenosis  Comment: Mild to moderate with mean gradient of 6.5 mmHg.  Suspect this is a chronic, slow developing process, would simply recheck echo in a year.    (I35.0) Nonrheumatic aortic (valve) stenosis  Comment: Mild, peak velocity 1.7 m/s with mean gradient of 16 mmHg.  Not an urgent issue as mitral stenosis but would recheck echo in a year.    (N18.31) Stage 3a chronic kidney disease (H)  Comment: GFR diminished to 34 with a creatinine of 1.62 down from 1.71 in May.  Suspect it might improve with diuretic but ideally would like to give IV diuresis while monitoring renal function, thus reason for hospitalization.    (I10) Benign essential hypertension  Comment: Elevated today but came down once I checked it, on hydralazine and metoprolol and amlodipine.    (E11.00,  Z79.4) Type 2 diabetes mellitus with hyperosmolarity without coma, with long-term current use of insulin (H)  Comment: No hemoglobin A1c available.    (E66.09) Class 2 obesity due to  excess calories without serious comorbidity in adult, unspecified BMI  Comment: Needs to work on weight loss, in addition strongly recommend ruling out sleep apnea.    Pulm hypertension-at least moderate with estimated pressures of 66 mmHg.  Could be WHO class II secondary to heart failure but also could be WHO class III secondary to sleep apnea.  May benefit from bosentan but would work on diuresis and right heart cath as above.     Plan:   1.  Admit to the hospital possible with IV diuretic and right heart catheterization.  2.  Monitor renal function based on this.  3.  Yearly echocardiogram for mitral and aortic stenosis.  4.  Outpatient sleep evaluation as well as sleep study.  5.  Work on weight loss.  6.  Follow-up with me thereafter.    History of Present Illness:   Thank you for asking the [facility] Care team to see Indu Felix a 68 year old  female  in consultation  to evaluate heart failure.   Patient has been in her usual state of health till the last 2 to 4 weeks when apparently she has developed increased bipedal edema as well as abdominal bloating as well as upper extremity bloating.  She does not check her weight but it seems her weight is up.  She is also noted to have some shortness of breath with some PND and orthopnea requiring her to sleep in a recliner.  She was seen by her primary, underwent echocardiogram showing preserved ejection fraction with mild mitral and aortic gnosis and was referred to the rapid access clinic.    Past Medical History:   Chronic kidney disease  Diabetes mellitus on insulin  Benign essential hypertension  Obesity    Past history is negative for cancer, tuberculosis, myocardial infarction,  rheumatic fever, cerebrovascular accident, chronic kidney disease, peptic ulcer disease, chronic obstructive pulmonary disease, or thyroid disorder  and no lipid disorder.      Past Surgical History:   No past surgical history on file.    Family History:   Family history  negative for coronary artery disease    Social History:   She lives at home independently with her , is retired , denies any alcohol use.  Reports that she has never smoked. She has never used smokeless tobacco. The primary care physician is Flora Betts    Meds:   Scheduled Meds:  Current Outpatient Medications   Medication Sig Dispense Refill     ALPRAZolam (XANAX) 0.5 MG tablet 1 tab(s) orally 30min -1 hr prior to appointment       amLODIPine (NORVASC) 10 MG tablet 1 tablet Orally Once a day       atorvastatin (LIPITOR) 80 MG tablet 1 tablet Orally Once a day       blood glucose (CONTOUR NEXT TEST) test strip USE TO TEST TWICE DAILY for 75       bumetanide (BUMEX) 1 MG tablet 3 tablet Orally 2 times a day for 30 days       cholecalciferol 25 MCG (1000 UT) TABS Take 1 tablet by mouth       escitalopram (LEXAPRO) 10 MG tablet Take 10 mg by mouth       escitalopram (LEXAPRO) 20 MG tablet Take 1 tablet by mouth daily       fish oil-omega-3 fatty acids 1000 MG capsule Take 1,000 mg by mouth       hydrALAZINE (APRESOLINE) 50 MG tablet 1 tablet with food Orally Three times a day       insulin glargine (LANTUS SOLOSTAR) 100 UNIT/ML pen INJECT 16 UNITS SUBCUTANEOUSLY EVERY MORNING for 90       loperamide (IMODIUM) 2 MG capsule 1 cap(s) orally 1-2 times daily for 30       melatonin 3 MG tablet 2 tabs orally once a day (at bedtime)       metoprolol succinate ER (TOPROL XL) 200 MG 24 hr tablet 1 tablet Orally Once a day       multivitamin (ONE-DAILY) tablet Take 1 tablet by mouth       omeprazole (PRILOSEC) 20 MG DR capsule 1 cap(s) orally once a day       traZODone (DESYREL) 100 MG tablet TAKE 1 TABLET BYMOUTH AT BEDTIME ONCE A DAY       traZODone (DESYREL) 50 MG tablet Take 50 mg by mouth         PRN Meds:.    Allergies:   Lisinopril    Objective:      Physical Exam  108.4 kg (239 lb)     There is no height or weight on file to calculate BMI.  /60 (BP Location: Left arm, Patient Position:  Sitting, Cuff Size: Adult Large)   Pulse 74   Resp 16   Wt 108.4 kg (239 lb)     General Appearance:   Alert, cooperative and in no acute distress.   HEENT:  No scleral icterus; the mucous membranes were pink and moist.   Neck: JVP to jaw at 90 degrees. No thyromegaly. No HJR   Chest: The spine was straight. The chest was symmetric.   Lungs:   Respirations unlabored; the lungs are clear to auscultation.   Cardiovascular:   S1 and S2 without murmur, clicks or rubs. Brachial, radial, carotid and posterior tibial pulses are intact and symetrical.  No carotid bruits noted   Abdomen:  No organomegaly, masses, bruits, or tenderness. Bowels sounds are present   Extremities: No cyanosis, clubbing, 3/4 bipedal edema.   Skin: No xanthelasma.   Neurologic: Mood and affect are appropriate.         Lab Reviewed Personally by myself  Lab Results   Component Value Date     01/19/2023    CO2 17 01/19/2023    CO2 24 05/17/2022    BUN 16.0 01/19/2023    BUN 33 05/17/2022     Lab Results   Component Value Date    WBC 10.6 01/19/2023    HGB 10.5 01/19/2023    HCT 36.2 01/19/2023    MCV 90 01/19/2023     01/19/2023     Lab Results   Component Value Date    CHOL 218 05/17/2022    TRIG 244 05/17/2022    HDL 39 05/17/2022     No results found for: BNP    ECG personally reviewed by myself shows not available         Review of Systems:     Review of Systems:   Enc Vitals  BP: 120/60  Pulse: 74  Resp: 16  Weight: 108.4 kg (239 lb)              Thank you for allowing me to participate in the care of your patient.      Sincerely,     ZACH SMART MD     [facility]  cc:   No referring provider defined for this encounter.

## 2023-02-08 NOTE — PROGRESS NOTES
[facility] Care Office Consult     Assessment:   (I50.31) Acute heart failure with preserved ejection fraction (HFpEF) (H)  (primary encounter diagnosis)  Comment: Based on history acute heart failure in the setting of preserved ejection fraction of 60 to 65%.  Now on Bumex 2 mg by mouth twice a day but concerned with chronic kidney disease.  Ideally would like to know volume status while undergoing aggressive diuretic, right heart cath with left ventricular end-diastolic pressure would be very reasonable.  Would like to set this up as well as monitor pulmonary hypertension, suspect admission to hospital would be the best thing to do and will try to arrange that for today with aggressive IV diuresis.  N-terminal proBNP is 4666.    (I34.2) Nonrheumatic mitral valve stenosis  Comment: Mild to moderate with mean gradient of 6.5 mmHg.  Suspect this is a chronic, slow developing process, would simply recheck echo in a year.    (I35.0) Nonrheumatic aortic (valve) stenosis  Comment: Mild, peak velocity 1.7 m/s with mean gradient of 16 mmHg.  Not an urgent issue as mitral stenosis but would recheck echo in a year.    (N18.31) Stage 3a chronic kidney disease (H)  Comment: GFR diminished to 34 with a creatinine of 1.62 down from 1.71 in May.  Suspect it might improve with diuretic but ideally would like to give IV diuresis while monitoring renal function, thus reason for hospitalization.    (I10) Benign essential hypertension  Comment: Elevated today but came down once I checked it, on hydralazine and metoprolol and amlodipine.    (E11.00,  Z79.4) Type 2 diabetes mellitus with hyperosmolarity without coma, with long-term current use of insulin (H)  Comment: No hemoglobin A1c available.    (E66.09) Class 2 obesity due to excess calories without serious comorbidity in adult, unspecified BMI  Comment: Needs to work on weight loss, in addition strongly recommend ruling out sleep apnea.    Pulm hypertension-at least  moderate with estimated pressures of 66 mmHg.  Could be WHO class II secondary to heart failure but also could be WHO class III secondary to sleep apnea.  May benefit from bosentan but would work on diuresis and right heart cath as above.     Plan:   1.  Admit to the hospital possible with IV diuretic and right heart catheterization.  2.  Monitor renal function based on this.  3.  Yearly echocardiogram for mitral and aortic stenosis.  4.  Outpatient sleep evaluation as well as sleep study.  5.  Work on weight loss.  6.  Follow-up with me thereafter.    History of Present Illness:   Thank you for asking the [facility] Care team to see Indu Felix a 68 year old  female  in consultation  to evaluate heart failure.   Patient has been in her usual state of health till the last 2 to 4 weeks when apparently she has developed increased bipedal edema as well as abdominal bloating as well as upper extremity bloating.  She does not check her weight but it seems her weight is up.  She is also noted to have some shortness of breath with some PND and orthopnea requiring her to sleep in a recliner.  She was seen by her primary, underwent echocardiogram showing preserved ejection fraction with mild mitral and aortic gnosis and was referred to the rapid access clinic.    Past Medical History:   Chronic kidney disease  Diabetes mellitus on insulin  Benign essential hypertension  Obesity    Past history is negative for cancer, tuberculosis, myocardial infarction,  rheumatic fever, cerebrovascular accident, chronic kidney disease, peptic ulcer disease, chronic obstructive pulmonary disease, or thyroid disorder  and no lipid disorder.      Past Surgical History:   No past surgical history on file.    Family History:   Family history negative for coronary artery disease    Social History:   She lives at home independently with her , is retired , denies any alcohol use.  Reports that she has never  smoked. She has never used smokeless tobacco. The primary care physician is Flora Betts    Meds:   Scheduled Meds:  Current Outpatient Medications   Medication Sig Dispense Refill     ALPRAZolam (XANAX) 0.5 MG tablet 1 tab(s) orally 30min -1 hr prior to appointment       amLODIPine (NORVASC) 10 MG tablet 1 tablet Orally Once a day       atorvastatin (LIPITOR) 80 MG tablet 1 tablet Orally Once a day       blood glucose (CONTOUR NEXT TEST) test strip USE TO TEST TWICE DAILY for 75       bumetanide (BUMEX) 1 MG tablet 3 tablet Orally 2 times a day for 30 days       cholecalciferol 25 MCG (1000 UT) TABS Take 1 tablet by mouth       escitalopram (LEXAPRO) 10 MG tablet Take 10 mg by mouth       escitalopram (LEXAPRO) 20 MG tablet Take 1 tablet by mouth daily       fish oil-omega-3 fatty acids 1000 MG capsule Take 1,000 mg by mouth       hydrALAZINE (APRESOLINE) 50 MG tablet 1 tablet with food Orally Three times a day       insulin glargine (LANTUS SOLOSTAR) 100 UNIT/ML pen INJECT 16 UNITS SUBCUTANEOUSLY EVERY MORNING for 90       loperamide (IMODIUM) 2 MG capsule 1 cap(s) orally 1-2 times daily for 30       melatonin 3 MG tablet 2 tabs orally once a day (at bedtime)       metoprolol succinate ER (TOPROL XL) 200 MG 24 hr tablet 1 tablet Orally Once a day       multivitamin (ONE-DAILY) tablet Take 1 tablet by mouth       omeprazole (PRILOSEC) 20 MG DR capsule 1 cap(s) orally once a day       traZODone (DESYREL) 100 MG tablet TAKE 1 TABLET BYMOUTH AT BEDTIME ONCE A DAY       traZODone (DESYREL) 50 MG tablet Take 50 mg by mouth         PRN Meds:.    Allergies:   Lisinopril    Objective:      Physical Exam  108.4 kg (239 lb)     There is no height or weight on file to calculate BMI.  /60 (BP Location: Left arm, Patient Position: Sitting, Cuff Size: Adult Large)   Pulse 74   Resp 16   Wt 108.4 kg (239 lb)     General Appearance:   Alert, cooperative and in no acute distress.   HEENT:  No scleral icterus; the mucous  membranes were pink and moist.   Neck: JVP to jaw at 90 degrees. No thyromegaly. No HJR   Chest: The spine was straight. The chest was symmetric.   Lungs:   Respirations unlabored; the lungs are clear to auscultation.   Cardiovascular:   S1 and S2 without murmur, clicks or rubs. Brachial, radial, carotid and posterior tibial pulses are intact and symetrical.  No carotid bruits noted   Abdomen:  No organomegaly, masses, bruits, or tenderness. Bowels sounds are present   Extremities: No cyanosis, clubbing, 3/4 bipedal edema.   Skin: No xanthelasma.   Neurologic: Mood and affect are appropriate.         Lab Reviewed Personally by myself  Lab Results   Component Value Date     01/19/2023    CO2 17 01/19/2023    CO2 24 05/17/2022    BUN 16.0 01/19/2023    BUN 33 05/17/2022     Lab Results   Component Value Date    WBC 10.6 01/19/2023    HGB 10.5 01/19/2023    HCT 36.2 01/19/2023    MCV 90 01/19/2023     01/19/2023     Lab Results   Component Value Date    CHOL 218 05/17/2022    TRIG 244 05/17/2022    HDL 39 05/17/2022     No results found for: BNP    ECG personally reviewed by myself shows not available         Review of Systems:     Review of Systems:   Enc Vitals  BP: 120/60  Pulse: 74  Resp: 16  Weight: 108.4 kg (239 lb)

## 2023-02-08 NOTE — PHARMACY-ADMISSION MEDICATION HISTORY
Pharmacy Note - Admission Medication History    Pertinent Provider Information: Escitalopram and trazodone doses recently increased.   ______________________________________________________________________    Prior To Admission (PTA) med list completed and updated in EMR.       PTA Med List   Medication Sig Last Dose     ALPRAZolam (XANAX) 0.5 MG tablet Take 0.5 mg by mouth See Admin Instructions One tab orally 30 min - 1 hour prior to appointments Unknown     amLODIPine (NORVASC) 10 MG tablet Take 10 mg by mouth daily 2/8/2023     atorvastatin (LIPITOR) 80 MG tablet Take 80 mg by mouth daily 2/8/2023     bumetanide (BUMEX) 1 MG tablet Take 3 mg by mouth 2 times daily 2/7/2023     cholecalciferol 25 MCG (1000 UT) TABS Take 1 tablet by mouth daily 2/8/2023     escitalopram (LEXAPRO) 20 MG tablet Take 20 mg by mouth daily 2/8/2023 at am     fish oil-omega-3 fatty acids 1000 MG capsule Take 1,000 mg by mouth daily 2/7/2023     hydrALAZINE (APRESOLINE) 50 MG tablet Take 50 mg by mouth 3 times daily 2/8/2023     insulin glargine (LANTUS SOLOSTAR) 100 UNIT/ML pen 16 Units every morning 2/8/2023     loperamide (IMODIUM) 2 MG capsule Take 2 mg by mouth 2 times daily as needed for diarrhea 2/8/2023     melatonin 3 MG tablet Take 6 mg by mouth nightly as needed for sleep Unknown     metoprolol succinate ER (TOPROL XL) 200 MG 24 hr tablet 200 mg daily 2/8/2023     multivitamin (ONE-DAILY) tablet Take 1 tablet by mouth daily 2/7/2023     omeprazole (PRILOSEC) 20 MG DR capsule 20 mg daily before breakfast 2/8/2023     traZODone (DESYREL) 100 MG tablet Take 100 mg by mouth At Bedtime 2/7/2023       Information source(s): Patient and CareEverywhere/SureScripts    Method of interview communication: in-person    Patient was asked about OTC/herbal products specifically.  PTA med list reflects this.    Based on the pharmacist's assessment, the PTA med list information appears reliable    Medication Affordability: no issues reported        Allergies were reviewed, assessed, and updated with the patient.      Patient does not use any multi-dose medications prior to admission.     Thank you for the opportunity to participate in the care of this patient.      Taina Mendoza RPH     2/8/2023     5:46 PM

## 2023-02-08 NOTE — PRE-PROCEDURE
GENERAL PRE-PROCEDURE:   Procedure:  Right and left heart catheterization  Date/Time:  2/8/2023 12:54 PM    Written consent obtained?: Yes    Risks and benefits: Risks, benefits and alternatives were discussed    Consent given by:  Patient  Patient states understanding of procedure being performed: Yes    Patient's understanding of procedure matches consent: Yes    Procedure consent matches procedure scheduled: Yes    Expected level of sedation:  Moderate  Appropriately NPO:  Yes  ASA Class:  4 (HFpEF, nonrheumatic mitral valve stenosis, nonrheumatic aortic valve stenosis, HTN, DM type II, CKD stage IIIa, class III obesity; BMI 43.90 kg/m , moderate pulmonary hypertension; WHO class II)  Mallampati  :  Grade 3- soft palate visible, posterior pharyngeal wall not visible  Lungs:  Lungs clear with good breath sounds bilaterally  Heart:  Systolic murmur  History & Physical reviewed:  History and physical reviewed and no updates needed  Statement of review:  I have reviewed the lab findings, diagnostic data, medications, and the plan for sedation

## 2023-02-08 NOTE — H&P
[facility]    History and Physical - Hospitalist Service       Date of Admission:  2023  Indu Felix,  1955, MRN [id number]  PCP: Flora Betts    Assessment & Plan      Indu Felix is a 68 year old female admitted on 2023. She has history of obesity, hypertension, CKD 3, diabetes, mitral stenosis, aortic stenosis, pulmonary hypertension, CHF presents for admission, she was sent in urgently from the cardiology clinic for right heart cath    #Acute diastolic heart failure  Patient has recently been started on oral diuretics with benefit, but continues with orthopnea, significant edema up to her abdomen.  Seen today in rapid access cardiology clinic, concerned that she urgently needs IV diuresis with monitoring of renal function given CKD.  She underwent right heart cath today to assess volume status better.  Seems grossly volume overloaded.  Currently with IV Bumex ordered twice daily.  Monitor intake and output.  Daily weights.  Cardiology managing, appreciate assistance  Needs outpatient sleep study- high likelihood of untreated DIANA contributing to cardiac issues    #Peripheral edema  2+ edema up to her abdomen.  Having some scaly/weeping skin secondary to skin tension  Patient finds compression stockings too tight to apply.  May benefit from referral to the lymphedema clinic at discharge  We will ask lymphedema therapist to see her here    #Chronic diarrhea  Patient has urgent loose bowel movements after almost every meal.  Has been restricting her oral intake due to this.  She is not sure if she is willing to work with physical therapy since she is worried she will have stool incontinence.  Imodium as needed  Daughter reports patient had benefit from Metamucil in the past.  We will restart her on Metamucil.    #Deconditioning  PT and OT  Strongly encouraged patient to work with rehab services    #CKD 3  Cr today is 1.68, similar to recent baseline  Monitor Cr with  "diuresis    #IDDM  Home regimen is Lantus 16 units QAM  Lantus 10 units for now and novolog sliding scale and monitor BG trend  Check A1c as none available in our system    #HTN  Home amlodipine, hydralazine, metoprolol    #Mitral stenosis  #Aortic stenosis  Cardiology following and recommending yearly echo    #Anxiety, home Xanax  #Mood, home Lexapro  #HLD, home atorvastatin  #GERD, home PPI  #Insomnia, home trazodone         DVT Prophylaxis: Moderate risk. SQH  Diet: Advance Diet as Tolerated: Clear Liquid Diet    Holley Catheter: Not present  Lines: None     Cardiac Monitoring: ACTIVE order. Indication: Acute decompensated heart failure (48 hours)  Code Status: Full Code    Clinically Significant Risk Factors Present on Admission                  # Hypertension: home medication list includes antihypertensive(s)   # Acute Respiratory Failure: based on blood gas results.  Continue supplemental oxygen as needed     # Severe Obesity: Estimated body mass index is 43.9 kg/m  as calculated from the following:    Height as of this encounter: 1.575 m (5' 2\").    Weight as of this encounter: 108.9 kg (240 lb).           Disposition Plan      Expected Discharge Date: 02/10/2023                The patient's care was discussed with the Patient and Patient's Family.    Martina Sales MD  Hospitalist Service  [facility]  Securely message with [employer] (more info)  Text page via [employer] Paging/Directory     ______________________________________________________________________    Chief Complaint   Heart failure    History is obtained from the patient and her daughter Solange    History of Present Illness   Indu Felix is a 68 year old female who was seen today in the cardiology rapid access clinic for progressive orthopnea and edema despite initiation of oral diuresis.  Patient reports she was recently started on Bumex, due to inadequate symptom control dose was increased to 3 mg twice daily.  She has been " noticing improvement in her edema with the higher dose.  Today she was seen by cardiology who are concerned about her volume status and sent her in for right heart cath and initiation of IV diuresis.  Patient notes she has been struggling to take diuretic, she will skip doses when she has appointments etc.  Continues to have significant tense skin edema of the lower extremity up into her abdomen.  Abdominal edema has limited her ability to get in and out of the car and lean forward in her chair etc.  Overall she has been extremely sedentary.  She is also sedentary due to chronic issues with incontinent stool.  Daughter notes that patient used to take Metamucil with benefit, patient is reluctant to resume this but we discussed she absolutely needs to participate with therapy during hospital stay to avoid deconditioning and weakness.      Past Medical History    Past Medical History:   Diagnosis Date     Aortic stenosis      Benign essential hypertension      Chronic heart failure with preserved ejection fraction (HFpEF) (H)      CKD (chronic kidney disease) stage 3, GFR 30-59 ml/min (H)      Diabetes mellitus (H)      Mitral stenosis      Obesity      Pulmonary hypertension (H)        Past Surgical History   History reviewed. No pertinent surgical history.    Prior to Admission Medications   Prior to Admission Medications   Prescriptions Last Dose Informant Patient Reported? Taking?   ALPRAZolam (XANAX) 0.5 MG tablet Unknown  Yes Yes   Si tab(s) orally 30min -1 hr prior to appointment   amLODIPine (NORVASC) 10 MG tablet 2023  Yes Yes   Si tablet Orally Once a day   atorvastatin (LIPITOR) 80 MG tablet 2023  Yes Yes   Si tablet Orally Once a day   blood glucose (CONTOUR NEXT TEST) test strip   Yes No   Sig: USE TO TEST TWICE DAILY for 75   bumetanide (BUMEX) 1 MG tablet 2023  Yes Yes   Sig: 3 tablet Orally 2 times a day for 30 days   cholecalciferol 25 MCG (1000 UT) TABS 2023  Yes Yes   Sig:  Take 1 tablet by mouth   escitalopram (LEXAPRO) 10 MG tablet 2023  Yes Yes   Sig: Take 10 mg by mouth   escitalopram (LEXAPRO) 20 MG tablet   Yes No   Sig: Take 1 tablet by mouth daily   fish oil-omega-3 fatty acids 1000 MG capsule 2023  Yes Yes   Sig: Take 1,000 mg by mouth   hydrALAZINE (APRESOLINE) 50 MG tablet 2023  Yes Yes   Si tablet with food Orally Three times a day   insulin glargine (LANTUS SOLOSTAR) 100 UNIT/ML pen 2023  Yes Yes   Sig: INJECT 16 UNITS SUBCUTANEOUSLY EVERY MORNING for 90   loperamide (IMODIUM) 2 MG capsule 2023  Yes Yes   Si cap(s) orally 1-2 times daily for 30   melatonin 3 MG tablet   Yes No   Si tabs orally once a day (at bedtime)   metoprolol succinate ER (TOPROL XL) 200 MG 24 hr tablet 2023  Yes Yes   Si tablet Orally Once a day   multivitamin (ONE-DAILY) tablet 2023  Yes Yes   Sig: Take 1 tablet by mouth   omeprazole (PRILOSEC) 20 MG DR capsule 2023  Yes Yes   Si cap(s) orally once a day   traZODone (DESYREL) 100 MG tablet 2023  Yes Yes   Sig: TAKE 1 TABLET BYMOUTH AT BEDTIME ONCE A DAY   traZODone (DESYREL) 50 MG tablet   Yes No   Sig: Take 50 mg by mouth      Facility-Administered Medications: None        Physical Exam   Vital Signs: Temp: 98.2  F (36.8  C) Temp src: Oral BP: (!) 155/68 Pulse: 77   Resp: 20 SpO2: 96 % O2 Device: Nasal cannula Oxygen Delivery: 2 LPM  Weight: 240 lbs 0 oz  General: in no apparent distress, non-toxic and alert female lying in hospital bed oriented x3  HEENT: Head normocephalic atraumatic, oral mucosa moist. Sclerae anicteric  CV: Regular rhythm, normal rate, systolic crescendo/decrescendo murmur heard throughout the precordium  Resp: No wheezes, no rales or rhonchi, no focal consolidations  GI: Belly soft, nondistended, nontender, bowel sounds present  Skin: scaly skin BLE  Extremities: 2+ pitting edema bilateral ankles  Psych: Normal affect, mood euthymic  Neuro: CNII-XII grossly intact, moving  all 4 extremities    Medical Decision Making               Data   Imaging results reviewed over the past 24 hrs:   Recent Results (from the past 24 hour(s))   Cardiac Catheterization    Narrative      Exertional dyspnea and acute heart failure in an obese diabetic woman   with renal insufficiency    Elevated filling pressures extending back from the left ventricle, see   numbers below.    AV gradient 14 mmHg by pullback    No significant LV-PCWP gradient        Recent Results (from the past 12 hour(s))   Basic metabolic panel    Collection Time: 02/08/23  1:12 PM   Result Value Ref Range    Sodium 143 136 - 145 mmol/L    Potassium 3.6 3.4 - 5.3 mmol/L    Chloride 105 98 - 107 mmol/L    Carbon Dioxide (CO2) 25 22 - 29 mmol/L    Anion Gap 13 7 - 15 mmol/L    Urea Nitrogen 16.8 8.0 - 23.0 mg/dL    Creatinine 1.68 (H) 0.51 - 0.95 mg/dL    Calcium 9.1 8.8 - 10.2 mg/dL    Glucose 91 70 - 99 mg/dL    GFR Estimate 33 (L) >60 mL/min/1.73m2   CBC with platelets    Collection Time: 02/08/23  1:12 PM   Result Value Ref Range    WBC Count 10.2 4.0 - 11.0 10e3/uL    RBC Count 3.96 3.80 - 5.20 10e6/uL    Hemoglobin 10.4 (L) 11.7 - 15.7 g/dL    Hematocrit 33.2 (L) 35.0 - 47.0 %    MCV 84 78 - 100 fL    MCH 26.3 (L) 26.5 - 33.0 pg    MCHC 31.3 (L) 31.5 - 36.5 g/dL    RDW 16.1 (H) 10.0 - 15.0 %    Platelet Count 358 150 - 450 10e3/uL   Adult Type and Screen    Collection Time: 02/08/23  1:12 PM   Result Value Ref Range    ABO/RH(D) A POS     Antibody Screen Negative Negative    SPECIMEN EXPIRATION DATE [id number]    ECG 12-Lead with [facility]inger - Hospital locations:  [facility], [place]    Collection Time: 02/08/23  1:34 PM   Result Value Ref Range    Systolic Blood Pressure  mmHg    Diastolic Blood Pressure  mmHg    Ventricular Rate 72 BPM    Atrial Rate 72 BPM    SC Interval 196 ms    QRS Duration 86 ms     ms    QTc 492 ms    P Axis 48 degrees    R AXIS 58 degrees    T Axis 41 degrees    Interpretation ECG       Sinus rhythm  Prolonged  QT  Abnormal ECG  No previous ECGs available     Blood Gas Arterial POCT    Collection Time: 02/08/23  2:19 PM   Result Value Ref Range    pH Arterial POCT 7.50 (H) 7.37 - 7.44    pCO2 Arterial POCT 35 35 - 45 mm Hg    pO2 Arterial POCT 58 (L) 75 - 85 mm Hg    Bicarbonate Arterial POCT 28 23 - 29 mmol/L    Base Excess/Deficit (+/-) POCT 4.5   mmol/L    O2 Sat, Arterial POCT 92.5 (L) 95.0 - 96.0 %   Blood gas venous POCT    Collection Time: 02/08/23  2:21 PM   Result Value Ref Range    pH Venous POCT 7.48 (H) 7.35 - 7.45    pCO2 Venous POCT 39 35 - 50 mm Hg    pO2 Venous POCT 32 25 - 47 mm Hg    Bicarbonate Venous POCT 28 24 - 30 mmol/L    Base Excess/Deficit (+/-) POCT 5.4   mmol/L    O2 Sat, Venous POCT 63.4 (L) 70.0 - 75.0 %   Blood gas venous POCT    Collection Time: 02/08/23  2:25 PM   Result Value Ref Range    pH Venous POCT 7.48 (H) 7.35 - 7.45    pCO2 Venous POCT 39 35 - 50 mm Hg    pO2 Venous POCT 32 25 - 47 mm Hg    Bicarbonate Venous POCT 29 24 - 30 mmol/L    Base Excess/Deficit (+/-) POCT 5.7   mmol/L    O2 Sat, Venous POCT 61.7 (L) 70.0 - 75.0 %

## 2023-02-09 ENCOUNTER — APPOINTMENT (OUTPATIENT)
Dept: PHYSICAL THERAPY | Facility: HOSPITAL | Age: 68
DRG: 286 | End: 2023-02-09
Attending: HOSPITALIST
Payer: COMMERCIAL

## 2023-02-09 ENCOUNTER — APPOINTMENT (OUTPATIENT)
Dept: OCCUPATIONAL THERAPY | Facility: HOSPITAL | Age: 68
DRG: 286 | End: 2023-02-09
Attending: HOSPITALIST
Payer: COMMERCIAL

## 2023-02-09 LAB
ANION GAP SERPL CALCULATED.3IONS-SCNC: 10 MMOL/L (ref 7–15)
BUN SERPL-MCNC: 13.8 MG/DL (ref 8–23)
CALCIUM SERPL-MCNC: 8.6 MG/DL (ref 8.8–10.2)
CHLORIDE SERPL-SCNC: 107 MMOL/L (ref 98–107)
CREAT SERPL-MCNC: 1.57 MG/DL (ref 0.51–0.95)
DEPRECATED HCO3 PLAS-SCNC: 26 MMOL/L (ref 22–29)
ERYTHROCYTE [DISTWIDTH] IN BLOOD BY AUTOMATED COUNT: 16.2 % (ref 10–15)
GFR SERPL CREATININE-BSD FRML MDRD: 35 ML/MIN/1.73M2
GLUCOSE BLDC GLUCOMTR-MCNC: 66 MG/DL (ref 70–99)
GLUCOSE BLDC GLUCOMTR-MCNC: 66 MG/DL (ref 70–99)
GLUCOSE BLDC GLUCOMTR-MCNC: 82 MG/DL (ref 70–99)
GLUCOSE BLDC GLUCOMTR-MCNC: 87 MG/DL (ref 70–99)
GLUCOSE BLDC GLUCOMTR-MCNC: 89 MG/DL (ref 70–99)
GLUCOSE BLDC GLUCOMTR-MCNC: 94 MG/DL (ref 70–99)
GLUCOSE SERPL-MCNC: 71 MG/DL (ref 70–99)
HCT VFR BLD AUTO: 30.9 % (ref 35–47)
HGB BLD-MCNC: 9.2 G/DL (ref 11.7–15.7)
MAGNESIUM SERPL-MCNC: 1.8 MG/DL (ref 1.7–2.3)
MCH RBC QN AUTO: 26 PG (ref 26.5–33)
MCHC RBC AUTO-ENTMCNC: 29.8 G/DL (ref 31.5–36.5)
MCV RBC AUTO: 87 FL (ref 78–100)
PLATELET # BLD AUTO: 301 10E3/UL (ref 150–450)
POTASSIUM SERPL-SCNC: 3.3 MMOL/L (ref 3.4–5.3)
RBC # BLD AUTO: 3.54 10E6/UL (ref 3.8–5.2)
SODIUM SERPL-SCNC: 143 MMOL/L (ref 136–145)
WBC # BLD AUTO: 8.1 10E3/UL (ref 4–11)

## 2023-02-09 PROCEDURE — 80048 BASIC METABOLIC PNL TOTAL CA: CPT | Performed by: INTERNAL MEDICINE

## 2023-02-09 PROCEDURE — 36415 COLL VENOUS BLD VENIPUNCTURE: CPT | Performed by: INTERNAL MEDICINE

## 2023-02-09 PROCEDURE — 99233 SBSQ HOSP IP/OBS HIGH 50: CPT | Performed by: INTERNAL MEDICINE

## 2023-02-09 PROCEDURE — 250N000011 HC RX IP 250 OP 636: Performed by: INTERNAL MEDICINE

## 2023-02-09 PROCEDURE — 250N000013 HC RX MED GY IP 250 OP 250 PS 637: Performed by: INTERNAL MEDICINE

## 2023-02-09 PROCEDURE — 85027 COMPLETE CBC AUTOMATED: CPT | Performed by: INTERNAL MEDICINE

## 2023-02-09 PROCEDURE — 250N000012 HC RX MED GY IP 250 OP 636 PS 637: Performed by: HOSPITALIST

## 2023-02-09 PROCEDURE — 97116 GAIT TRAINING THERAPY: CPT | Mod: GP

## 2023-02-09 PROCEDURE — 97165 OT EVAL LOW COMPLEX 30 MIN: CPT | Mod: GO

## 2023-02-09 PROCEDURE — 97530 THERAPEUTIC ACTIVITIES: CPT | Mod: GP

## 2023-02-09 PROCEDURE — 97535 SELF CARE MNGMENT TRAINING: CPT | Mod: GO

## 2023-02-09 PROCEDURE — 97162 PT EVAL MOD COMPLEX 30 MIN: CPT | Mod: GP

## 2023-02-09 PROCEDURE — 210N000001 HC R&B IMCU HEART CARE

## 2023-02-09 PROCEDURE — 83735 ASSAY OF MAGNESIUM: CPT | Performed by: INTERNAL MEDICINE

## 2023-02-09 PROCEDURE — 250N000013 HC RX MED GY IP 250 OP 250 PS 637: Performed by: HOSPITALIST

## 2023-02-09 PROCEDURE — 99207 PR CDG-CUT & PASTE-POTENTIAL IMPACT ON LEVEL: CPT | Performed by: INTERNAL MEDICINE

## 2023-02-09 PROCEDURE — 250N000011 HC RX IP 250 OP 636: Performed by: HOSPITALIST

## 2023-02-09 RX ORDER — POTASSIUM CHLORIDE 1500 MG/1
40 TABLET, EXTENDED RELEASE ORAL ONCE
Status: COMPLETED | OUTPATIENT
Start: 2023-02-09 | End: 2023-02-09

## 2023-02-09 RX ORDER — BUMETANIDE 0.25 MG/ML
1 INJECTION INTRAMUSCULAR; INTRAVENOUS EVERY 8 HOURS
Status: COMPLETED | OUTPATIENT
Start: 2023-02-09 | End: 2023-02-11

## 2023-02-09 RX ORDER — ISOSORBIDE DINITRATE 10 MG/1
40 TABLET ORAL
Status: DISCONTINUED | OUTPATIENT
Start: 2023-02-09 | End: 2023-02-12 | Stop reason: HOSPADM

## 2023-02-09 RX ADMIN — NYSTATIN, TRIAMCINOLONE ACETONIDE: 100000; 1 CREAM TOPICAL at 12:05

## 2023-02-09 RX ADMIN — Medication 25 MCG: at 08:05

## 2023-02-09 RX ADMIN — PANTOPRAZOLE SODIUM 40 MG: 40 TABLET, DELAYED RELEASE ORAL at 06:11

## 2023-02-09 RX ADMIN — ATORVASTATIN CALCIUM 80 MG: 40 TABLET, FILM COATED ORAL at 08:04

## 2023-02-09 RX ADMIN — BUMETANIDE 1 MG: 0.25 INJECTION INTRAMUSCULAR; INTRAVENOUS at 15:12

## 2023-02-09 RX ADMIN — POTASSIUM CHLORIDE 40 MEQ: 1500 TABLET, EXTENDED RELEASE ORAL at 08:28

## 2023-02-09 RX ADMIN — BUMETANIDE 1 MG: 0.25 INJECTION INTRAMUSCULAR; INTRAVENOUS at 21:10

## 2023-02-09 RX ADMIN — ACETAMINOPHEN 650 MG: 325 TABLET ORAL at 21:10

## 2023-02-09 RX ADMIN — BUMETANIDE 1 MG: 0.25 INJECTION INTRAMUSCULAR; INTRAVENOUS at 06:11

## 2023-02-09 RX ADMIN — TRAZODONE HYDROCHLORIDE 100 MG: 50 TABLET ORAL at 21:10

## 2023-02-09 RX ADMIN — SALINE NASAL SPRAY 1 SPRAY: 1.5 SOLUTION NASAL at 08:28

## 2023-02-09 RX ADMIN — ISOSORBIDE DINITRATE 40 MG: 10 TABLET ORAL at 12:05

## 2023-02-09 RX ADMIN — NYSTATIN, TRIAMCINOLONE ACETONIDE: 100000; 1 CREAM TOPICAL at 17:05

## 2023-02-09 RX ADMIN — HYDRALAZINE HYDROCHLORIDE 100 MG: 50 TABLET, FILM COATED ORAL at 21:10

## 2023-02-09 RX ADMIN — ISOSORBIDE DINITRATE 40 MG: 10 TABLET ORAL at 17:05

## 2023-02-09 RX ADMIN — ACETAMINOPHEN 650 MG: 325 TABLET ORAL at 08:05

## 2023-02-09 RX ADMIN — Medication 1 MG: at 21:10

## 2023-02-09 RX ADMIN — HEPARIN SODIUM 5000 UNITS: 10000 INJECTION, SOLUTION INTRAVENOUS; SUBCUTANEOUS at 06:11

## 2023-02-09 RX ADMIN — ESCITALOPRAM 20 MG: 10 TABLET, FILM COATED ORAL at 08:05

## 2023-02-09 RX ADMIN — SALINE NASAL SPRAY 1 SPRAY: 1.5 SOLUTION NASAL at 15:22

## 2023-02-09 RX ADMIN — METOPROLOL SUCCINATE 200 MG: 100 TABLET, EXTENDED RELEASE ORAL at 08:05

## 2023-02-09 RX ADMIN — HEPARIN SODIUM 5000 UNITS: 10000 INJECTION, SOLUTION INTRAVENOUS; SUBCUTANEOUS at 19:17

## 2023-02-09 RX ADMIN — EMPAGLIFLOZIN 10 MG: 10 TABLET, FILM COATED ORAL at 12:05

## 2023-02-09 RX ADMIN — HYDRALAZINE HYDROCHLORIDE 100 MG: 50 TABLET, FILM COATED ORAL at 08:04

## 2023-02-09 RX ADMIN — NYSTATIN, TRIAMCINOLONE ACETONIDE: 100000; 1 CREAM TOPICAL at 09:19

## 2023-02-09 RX ADMIN — INSULIN GLARGINE 10 UNITS: 100 INJECTION, SOLUTION SUBCUTANEOUS at 09:19

## 2023-02-09 RX ADMIN — ISOSORBIDE DINITRATE 20 MG: 10 TABLET ORAL at 08:04

## 2023-02-09 RX ADMIN — HYDRALAZINE HYDROCHLORIDE 100 MG: 50 TABLET, FILM COATED ORAL at 15:12

## 2023-02-09 RX ADMIN — NYSTATIN, TRIAMCINOLONE ACETONIDE: 100000; 1 CREAM TOPICAL at 21:10

## 2023-02-09 ASSESSMENT — ACTIVITIES OF DAILY LIVING (ADL)
ADLS_ACUITY_SCORE: 22
PREVIOUS_RESPONSIBILITIES: SHOPPING;MEDICATION MANAGEMENT;FINANCES
ADLS_ACUITY_SCORE: 22

## 2023-02-09 NOTE — PROGRESS NOTES
"  HEART CARE CONSULTATON NOTE        Assessment/Recommendations   Assessment:  1.  Acute on chronic heart failure with preserved ejection fraction  2.  Pulmonary hypertension who class II  3.  Valvular heart disease with mild aortic stenosis and moderate mitral stenosis  4.  Chronic kidney disease  5.  Morbid obesity    Plan:  1.  Start Jardiance  2.  Continue IV Lasix  3.  Strict I's and O's, daily weight  4.  Outpatient sleep study evaluation  5.  Consider adding spironolactone       History of Present Illness/Subjective    Responding very well to IV diuresis still very volume overloaded       Physical Examination  Review of Systems   VITALS: /64 (BP Location: Right arm)   Pulse 70   Temp 98  F (36.7  C) (Oral)   Resp 20   Ht 1.575 m (5' 2\")   Wt 108.5 kg (239 lb 3.2 oz)   SpO2 95%   BMI 43.75 kg/m    BMI: Body mass index is 43.75 kg/m .  Wt Readings from Last 3 Encounters:   02/09/23 108.5 kg (239 lb 3.2 oz)   02/08/23 108.4 kg (239 lb)       Intake/Output Summary (Last 24 hours) at 2/9/2023 1245  Last data filed at 2/9/2023 1000  Gross per 24 hour   Intake 790 ml   Output 1750 ml   Net -960 ml     General Appearance:   no distress, normal body habitus   ENT/Mouth: membranes moist, no oral lesions or bleeding gums.      EYES:  no scleral icterus, normal conjunctivae   Neck: no carotid bruits or thyromegaly   Chest/Lungs:    Decreased breath sounds at the bases   Cardiovascular:   Regular. Normal first and second heart sounds with no murmurs  +++ edema bilaterally        Extremities: no cyanosis or clubbing   Skin: no xanthelasma, warm.    Neurologic: normal  bilateral, no tremors     Psychiatric: alert and oriented x3, calm     Review Of Systems  Skin: negative  Eyes: negative  Ears/Nose/Throat: negative  Respiratory: No shortness of breath, dyspnea on exertion, cough, or hemoptysis  Cardiovascular: negative  Gastrointestinal: negative  Genitourinary: negative  Musculoskeletal: " negative  Neurologic: negative  Psychiatric: negative  Hematologic/Lymphatic/Immunologic: negative  Endocrine: negative          Lab Results    Chemistry/lipid CBC Cardiac Enzymes/BNP/TSH/INR   Recent Labs   Lab Test 05/17/22  1417   CHOL 218*   HDL 39*   *   TRIG 244*     Recent Labs   Lab Test 05/17/22  1417 04/15/21  1029   * 99     Recent Labs   Lab Test 02/09/23  1145 02/09/23  0800 02/09/23  0527   NA  --   --  143   POTASSIUM  --   --  3.3*   CHLORIDE  --   --  107   CO2  --   --  26   GLC 94   < > 71   BUN  --   --  13.8   CR  --   --  1.57*   GFRESTIMATED  --   --  35*   JARETT  --   --  8.6*    < > = values in this interval not displayed.     Recent Labs   Lab Test 02/09/23  0527 02/08/23  1312 01/19/23  1403   CR 1.57* 1.68* 1.62*     Recent Labs   Lab Test 02/08/23  1312   A1C 6.2*          Recent Labs   Lab Test 02/09/23 0527   WBC 8.1   HGB 9.2*   HCT 30.9*   MCV 87        Recent Labs   Lab Test 02/09/23  0527 02/08/23  1312 01/19/23  1403   HGB 9.2* 10.4* 10.5*    No results for input(s): TROPONINI in the last 24650 hours.  Recent Labs   Lab Test 01/19/23  1403   NTBNP 4,666*     No results for input(s): TSH in the last 29357 hours.  No results for input(s): INR in the last 11546 hours.     Medical History  Surgical History Family History Social History   Past Medical History:   Diagnosis Date     Aortic stenosis      Benign essential hypertension      Chronic heart failure with preserved ejection fraction (HFpEF) (H)      CKD (chronic kidney disease) stage 3, GFR 30-59 ml/min (H)      Diabetes mellitus (H)      Mitral stenosis      Obesity      Pulmonary hypertension (H)      History reviewed. No pertinent surgical history.  History reviewed. No pertinent family history.     Social History     Socioeconomic History     Marital status:      Spouse name: Not on file     Number of children: Not on file     Years of education: Not on file     Highest education level: Not on file    Occupational History     Not on file   Tobacco Use     Smoking status: Never     Smokeless tobacco: Never   Substance and Sexual Activity     Alcohol use: Never     Drug use: Never     Sexual activity: Not Currently   Other Topics Concern     Not on file   Social History Narrative     Not on file     Social Determinants of Health     Financial Resource Strain: Not on file   Food Insecurity: Not on file   Transportation Needs: Not on file   Physical Activity: Not on file   Stress: Not on file   Social Connections: Not on file   Intimate Partner Violence: Not on file   Housing Stability: Not on file         Medications  Allergies   No current outpatient medications on file.        Allergies   Allergen Reactions     Lisinopril Other (See Comments) and Unknown         Amanda Pedro MD

## 2023-02-09 NOTE — PROGRESS NOTES
Physical Therapy Discharge Summary    Reason for therapy discharge:    Pt demonstrates functional mobility with SBA/CGA. Will defer further mobility to OT per CHF protocol.    Progress towards therapy goal(s). See goals on Care Plan in [facility] electronic health record for goal details.  Goals met       02/09/23 9810   Appointment Info   Signing Clinician's Name / Credentials (PT) Catherine Mckinney DPT   Living Environment   People in Home spouse   Current Living Arrangements house   Home Accessibility stairs to enter home;stairs within home   Number of Stairs, Main Entrance 1   Stair Railings, Main Entrance none   Number of Stairs, Within Home, Primary four   Stair Railings, Within Home, Primary railings safe and in good condition   Transportation Anticipated family or friend will provide;health plan transportation   Self-Care   Usual Activity Tolerance moderate   Current Activity Tolerance moderate   Equipment Currently Used at Home none   General Information   Onset of Illness/Injury or Date of Surgery 02/08/23   Referring Physician Martina Sales MD   Patient/Family Therapy Goals Statement (PT) return home   Pertinent History of Current Problem (include personal factors and/or comorbidities that impact the POC) 68 year old female admitted on 2/8/2023. She has history of obesity, hypertension, CKD 3, diabetes, mitral stenosis, aortic stenosis, pulmonary hypertension, CHF presents for admission, she was sent in urgently from the cardiology clinic for right heart cath   Strength (Manual Muscle Testing)   Strength (Manual Muscle Testing) strength is WFL   Bed Mobility   Bed Mobility sit-supine   Sit-Supine [place] (Bed Mobility) supervision   Assistive Device (Bed Mobility) bed rails   Transfers   Transfers sit-stand transfer   Sit-Stand Transfer   Sit-Stand [place] (Transfers) supervision;verbal cues   Assistive Device (Sit-Stand Transfers) walker, front-wheeled   Gait/Stairs (Locomotion)   [place] Level  (Gait) contact guard   Assistive Device (Gait) walker, front-wheeled   Distance in Feet 50'   Distance in Feet (Gait) 125'   Pattern (Gait) step-through   Deviations/Abnormal Patterns (Gait) gait speed decreased   Clinical Impression   Criteria for Skilled Therapeutic Intervention Yes, treatment indicated   PT Diagnosis (PT) Impaired functional mobility   Influenced by the following impairments Fatigue, SOB   Functional limitations due to impairments Impaired transfers, gait   Clinical Presentation (PT Evaluation Complexity) Stable/Uncomplicated   Clinical Presentation Rationale Presents as diagnosed   Clinical Decision Making (Complexity) moderate complexity   Planned Therapy Interventions (PT) bed mobility training;gait training;transfer training;stair training   Anticipated Equipment Needs at Discharge (PT) walker, rolling   Risk & Benefits of therapy have been explained patient   PT Total Evaluation Time   PT Eval, Moderate Complexity Minutes (91791) 10   Physical Therapy Goals   PT Frequency One time eval and treatment only   PT Predicted Duration/Target Date for Goal Attainment 02/09/23   PT Goals Bed Mobility;Transfers;Gait   PT: Bed Mobility Supervision/stand-by assist;Supine to/from sit;Goal Met   PT: Transfers Supervision/stand-by assist;Sit to/from stand;Bed to/from chair;Assistive device;Goal Met   PT: Gait Rolling walker;150 feet;Goal Met  (CGA)   Interventions   Interventions Quick Adds Gait Training;Therapeutic Activity   Therapeutic Activity   Therapeutic Activities: dynamic activities to improve functional performance Minutes (13218) 8   Symptoms Noted During/After Treatment Fatigue   Treatment Detail/Skilled Intervention Pt performed toilet transfer and derik-cares with SBA. Cues for safety and use of grab bar. Educated pt on PLB technique prior to ambulation due to SOB with mobility.   Gait Training   Gait Training Minutes (59823) 10   Symptoms Noted During/After Treatment (Gait Training)  fatigue;shortness of breath   Treatment Detail/Skilled Intervention Pt dem slow, but steady gait with FWW. Cues for safety during turns. Sats 86% on 2L, HR 77.   [place] Level (Gait Training) contact guard   Physical Assistance Level (Gait Training) verbal cues;1 person assist   Weight Bearing (Gait Training) weight-bearing as tolerated   Assistive Device (Gait Training) rolling walker   Gait Analysis Deviations decreased petey   Impairments (Gait Analysis/Training)   (endurance)   PT Discharge Planning   PT Plan Will defer to OT per CHF protocol   PT Discharge Recommendation (DC Rec) home with assist   PT Rationale for DC Rec Pt demonstrated functional mobility with SBA/CGA. Will defer further mobiltiy to OT per CHF protocol.   PT Brief overview of current status Amb 175' with CGA and FWW.   Total Session Time   Timed Code Treatment Minutes 18   Total Session Time (sum of timed and untimed services) 28       Catherine Mckinney, PT, DPT  2/9/2023

## 2023-02-09 NOTE — PLAN OF CARE
"Goal Outcome Evaluation:      Plan of Care Reviewed With: patient           BP (!) 150/77 (BP Location: Right arm)   Pulse 61   Temp 98.2  F (36.8  C) (Oral)   Resp 20   Ht 1.575 m (5' 2\")   Wt 108.9 kg (240 lb)   SpO2 92%   BMI 43.90 kg/m       Denies pain, right heart cath, groin sites are CDI no hematoma noted. IV bumex. Cards following. On 2 liters of oxygen tried to wean and unable to. Bilat leg edema. Red in the folds. NSR. Refused to have juice and snack. Sonia Jackson RN   "

## 2023-02-09 NOTE — PLAN OF CARE
Goal Outcome Evaluation:      Plan of Care Reviewed With: patient        Vitals:    02/08/23 1700 02/08/23 1730 02/08/23 1830 02/08/23 1932   BP: (!) 151/60 (!) 147/67 (!) 159/69 (!) 168/74   BP Location: Right arm   Right arm   Patient Position: Supine      Pulse: 74 71 70 74   Resp:    20   Temp:    97.8  F (36.6  C)   TempSrc:    Oral   SpO2: (!) 89%  92% 92%   Weight:       Height:               On 2 liters nasal cannula. Denies pain. Nystatin ordered for folds clean and applied. A-1. Blood sugar 88. Right groin site CDI no hematoma. IV bumex. Sonia Jackson RN

## 2023-02-09 NOTE — PROGRESS NOTES
Daily Progress Note        CODE STATUS:  Full Code    02/09/23  Assessment/Plan:  Indu Felix is a 68 year old female admitted on 2/8/2023. She has history of obesity, hypertension, CKD 3, diabetes, mitral stenosis, aortic stenosis, pulmonary hypertension, CHF presents for admission, she was sent in urgently from the cardiology clinic for right heart cath     #Acute diastolic heart failure  -- Patient has recently been started on oral diuretics with benefit, but continues with orthopnea, significant edema up to her abdomen.  Seen on 2/8/23 in rapid access cardiology clinic, concerned that she urgently needs IV diuresis with monitoring of renal function given CKD.    --S/P right heart cath 2/8/23 to assess volume status. Seems grossly volume overloaded.  -- Currently on IV Bumex ordered twice daily.  Monitor intake and output.  Daily weights.  -- Cardiology managing, appreciate assistance  -- Jardiance added per cardiology  -- Needs outpatient sleep study- high likelihood of untreated DIANA contributing to cardiac issues     #Peripheral edema  -- 2+ edema up to her abdomen.  Having some scaly/weeping skin secondary to skin tension  -- Lymphedema PT consult. Legs are wrapped today.     #Chronic diarrhea  -- Patient has urgent loose bowel movements after almost every meal.  Has been restricting her oral intake due to this.  She is not sure if she is willing to work with physical therapy since she is worried she will have stool incontinence.  Imodium as needed  -- Daughter reports patient had benefit from Metamucil in the past. Restarted her on Metamucil.     #Deconditioning  -- PT and OT  -- Strongly encouraged patient to work with rehab services     #CKD 3  -- Cr 1.68 on admission, similar to recent baseline  -- Monitor Cr with diuresis     #IDDM  -- Home regimen is Lantus 16 units QAM. Lantus 10 units for now and novolog sliding scale and monitor BG trend  -- A1c 6.2     #HTN  -- Home amlodipine, hydralazine,  metoprolol     #Mitral stenosis  #Aortic stenosis  -- Cardiology following and recommending yearly echo     #Anxiety, home Xanax  #Mood, home Lexapro  #HLD, home atorvastatin  #GERD, home PPI  #Insomnia, home trazodone     DVT Prophylaxis: Moderate risk. SQH  Diet: Advance Diet as Tolerated: Clear Liquid Diet    Holley Catheter: Not present  Lines: None     Cardiac Monitoring: ACTIVE order. Indication: Acute decompensated heart failure (48 hours)  Code Status: Full Code      Disposition; TBD  Barrier to discharge; IV diuresis     LOS: 1 day     Subjective:  Interval History: Patient seen and examined. Notes, labs, imaging reports personally reviewed. Patient is new to me today. Doing a little better. States she was unable to sleep in bed at home due to orthopnea. Used to sleep in a recliner.     Review of Systems:   As mentioned in subjective.    Patient Active Problem List   Diagnosis     Acute heart failure with preserved ejection fraction (HFpEF) (H)     Stage 3a chronic kidney disease (H)     Benign essential hypertension     Type 2 diabetes mellitus, with long-term current use of insulin (H)     Class 2 obesity due to excess calories in adult     Nonrheumatic mitral valve stenosis     Nonrheumatic aortic (valve) stenosis     Status post coronary angiogram     Peripheral edema     Pulmonary hypertension (H)       Scheduled Meds:    atorvastatin  80 mg Oral Daily     bumetanide  1 mg Intravenous Q8H     empagliflozin  10 mg Oral Daily     escitalopram  20 mg Oral Daily     heparin ANTICOAGULANT  5,000 Units Subcutaneous Q12H     hydrALAZINE  100 mg Oral TID     insulin aspart  1-7 Units Subcutaneous TID AC     insulin glargine  10 Units Subcutaneous QAM AC     isosorbide dinitrate  40 mg Oral TID     metoprolol succinate ER  200 mg Oral Daily     nystatin-triamcinolone   Topical 4x Daily     pantoprazole  40 mg Oral QAM AC     psyllium  1 packet Oral Daily     sodium chloride (PF)  3 mL Intracatheter Q8H      Vitamin D3  25 mcg Oral Daily     Continuous Infusions:  PRN Meds:.acetaminophen **OR** acetaminophen, bisacodyl, calcium carbonate, carboxymethylcellulose PF, glucose **OR** dextrose **OR** glucagon, docusate sodium, HOLD MEDICATION, hydrALAZINE, lidocaine 4%, lidocaine (buffered or not buffered), melatonin, ondansetron **OR** ondansetron, oxyCODONE **OR** oxyCODONE, sodium chloride, sodium chloride (PF), traZODone    Objective:  Vital signs in last 24 hours:  Temp:  [97.6  F (36.4  C)-98.2  F (36.8  C)] 97.6  F (36.4  C)  Pulse:  [59-75] 67  Resp:  [20] 20  BP: (136-173)/(60-77) 140/65  SpO2:  [89 %-95 %] 92 %        Intake/Output Summary (Last 24 hours) at 2/9/2023 1627  Last data filed at 2/9/2023 1622  Gross per 24 hour   Intake 790 ml   Output 1950 ml   Net -1160 ml       Physical Exam:    General: Not in obvious distress. Obese  HEENT: NC, AT   Chest: Diminished breath sounds bilaterally  Heart: S1S2 normal, regular. No M/R/G  Abdomen: Soft. NT, ND. Bowel sounds- active.  Extremities: 2+ edema. Legs wrapped  Neuro: Alert and awake, grossly non-focal      Lab Results:(I have personally reviewed the results)    Recent Results (from the past 24 hour(s))   Glucose by meter    Collection Time: 02/08/23  5:32 PM   Result Value Ref Range    GLUCOSE BY METER POCT 73 70 - 99 mg/dL   Glucose by meter    Collection Time: 02/08/23  8:46 PM   Result Value Ref Range    GLUCOSE BY METER POCT 88 70 - 99 mg/dL   Basic metabolic panel    Collection Time: 02/09/23  5:27 AM   Result Value Ref Range    Sodium 143 136 - 145 mmol/L    Potassium 3.3 (L) 3.4 - 5.3 mmol/L    Chloride 107 98 - 107 mmol/L    Carbon Dioxide (CO2) 26 22 - 29 mmol/L    Anion Gap 10 7 - 15 mmol/L    Urea Nitrogen 13.8 8.0 - 23.0 mg/dL    Creatinine 1.57 (H) 0.51 - 0.95 mg/dL    Calcium 8.6 (L) 8.8 - 10.2 mg/dL    Glucose 71 70 - 99 mg/dL    GFR Estimate 35 (L) >60 mL/min/1.73m2   CBC with platelets    Collection Time: 02/09/23  5:27 AM   Result Value Ref  Range    WBC Count 8.1 4.0 - 11.0 10e3/uL    RBC Count 3.54 (L) 3.80 - 5.20 10e6/uL    Hemoglobin 9.2 (L) 11.7 - 15.7 g/dL    Hematocrit 30.9 (L) 35.0 - 47.0 %    MCV 87 78 - 100 fL    MCH 26.0 (L) 26.5 - 33.0 pg    MCHC 29.8 (L) 31.5 - 36.5 g/dL    RDW 16.2 (H) 10.0 - 15.0 %    Platelet Count 301 150 - 450 10e3/uL   Magnesium    Collection Time: 02/09/23  5:27 AM   Result Value Ref Range    Magnesium 1.8 1.7 - 2.3 mg/dL   Glucose by meter    Collection Time: 02/09/23  8:00 AM   Result Value Ref Range    GLUCOSE BY METER POCT 66 (L) 70 - 99 mg/dL   Glucose by meter    Collection Time: 02/09/23  9:41 AM   Result Value Ref Range    GLUCOSE BY METER POCT 87 70 - 99 mg/dL   Glucose by meter    Collection Time: 02/09/23 11:45 AM   Result Value Ref Range    GLUCOSE BY METER POCT 94 70 - 99 mg/dL       All laboratory and imaging data in the past 24 hours reviewed  Serum Glucose range:   Recent Labs   Lab 02/09/23  1145 02/09/23  0941 02/09/23  0800 02/09/23  0527   GLC 94 87 66* 71     ABG:   Recent Labs   Lab 02/08/23  1419   PH 7.50*   PCO2 35   PO2 58*   HCO3 28     CBC:   Recent Labs   Lab 02/09/23  0527 02/08/23  1312   WBC 8.1 10.2   HGB 9.2* 10.4*   HCT 30.9* 33.2*   MCV 87 84    358     Chemistry:   Recent Labs   Lab 02/09/23  0527 02/08/23  1312    143   POTASSIUM 3.3* 3.6   CHLORIDE 107 105   CO2 26 25   BUN 13.8 16.8   CR 1.57* 1.68*   GFRESTIMATED 35* 33*   JARETT 8.6* 9.1   MAG 1.8  --      Coags:  No results for input(s): INR, PROTIME, PTT in the last 168 hours.    Invalid input(s): APTT  Cardiac Markers:  No results for input(s): CKTOTAL, TROPONINI in the last 168 hours.       Cardiac Catheterization    Result Date: 2/8/2023    Exertional dyspnea and acute heart failure in an obese diabetic woman with renal insufficiency   Elevated filling pressures extending back from the left ventricle, see numbers below.   AV gradient 14 mmHg by pullback   No significant LV-PCWP gradient        Latest radiology  report personally reviewed.    Note created using dragon voice recognition software so sounds alike errors may have escaped editing.      02/09/2023   Donnell Rosenberg MD  Hospitalist, [facility]  Pager: [phone]

## 2023-02-09 NOTE — CONSULTS
NUTRITION EDUCATION      REASON FOR ASSESSMENT:  Consulted to educate on heart healthy diet    NUTRITION HISTORY:  Information obtained from pt    Pt states she eats small meals.  Her  does the cooking. She does occasionally have ham or 1 sausage link. She otherwise states she does not eat more than 3-4 CHO choices with meals    CURRENT DIET:  Low saturated fat < 2400 mg Na    NUTRITION DIAGNOSIS:  Food- and nutrition-related knowledge deficit R/t mitral and aortic stenosis and acute diastolic heart failure as evidenced by need for therapeutic diet    INTERVENTIONS:    Nutrition Prescription:  Heart healthy (consistent CHO, low sodium)    Implementation:      *  Nutrition Education (Content):   A)  Provided handout Food choice guidelines for heart healthy eating with diabetes,heart healthy eating nutrition therapy and placemat ( food groups and serving sizes)   B)  Discussed decreasing total fat, saturate fat and trans fats, increasing monounsat fat and omega 3 Fatty acids, decreasing simple CHO, getting adequate fiber, low sodium and consistent CHO.      *  Nutrition Education (Application):   A)  Discussed current eating habits and recommended alternative food choices      *  Anticipate good compliance      *  Diet Education - refer to Education Flowsheet    Goals:      *  Patient will verbalize understanding of diet met:  Pt asked a number of good questions      *  All of the above goals met during the education session    Follow Up/Monitoring:      *  Provided RD contact information for future questions      *  Recommended Out-Patient Nutrition Referral, if further diet instructions are needed

## 2023-02-09 NOTE — PLAN OF CARE
Problem: Plan of Care - These are the overarching goals to be used throughout the patient stay.    Goal: Optimal Comfort and Wellbeing  Outcome: Progressing   Goal Outcome Evaluation:    Up to chair, calm and pleasant, participating in therapy. Complained of nose congestion, saline nasal spray ordered. IV bumex started.

## 2023-02-09 NOTE — PROGRESS NOTES
Lymphedema Services       02/09/23 0900   Appointment Info   Signing Clinician's Name / Credentials (OT) Lilia Murray OTR/L   Quick Adds   Quick Adds Edema   Living Environment   Current Living Arrangements house   Living Environment Comments Patient reports being independent at baseline   Self-Care   Equipment Currently Used at Home none   Fall history within last six months no   General Information   Onset of Illness/Injury or Date of Surgery 02/08/23   Referring Physician Martina Sales MD   Patient/Family Therapy Goal Statement (OT) none stated   Cognitive Status Examination   Orientation Status orientation to person, place and time   Edema General Information   Onset of Edema   (Reports swelling for ~1 year)   Affected Body Part(s) Left LE;Right LE   Edema Etiology   (Heart Failure, kidney disease)   Edema Precautions Cardiac Edema/CHF   General Comments/Previous Edema Treatment/Edema Equipment Educated patient on lymphedema services and risk of continue edema with wounds, infection, etc   Edema Examination/Assessment   Skin Condition Pitting;Hemosiderin deposits;Dryness  (very dry)   Ulcerations No   Stemmer Sign Positive   Pitting Assessment 2+   Fibrosis Assessment Legs are fibrotic, pitting in some areas   Edema Assessment Comments Patient reports she was trying to wrap her legs at home (has not recieved home care or outpatient lymphedema services) did not tolerate wrapping self as wraps were too tight (sounds like patient was uisng ACE wraps)   Clinical Impression   Criteria for Skilled Therapeutic Interventions Met (OT) Yes, treatment indicated   Edema: Patient Presentation Edema   Edema: Planned Interventions Fit for compression garment;Precautions to prevent infection/exacerbation;Education   Clinical Decision Making Complexity (OT) low complexity   Risk & Benefits of therapy have been explained evaluation/treatment results reviewed;care plan/treatment goals reviewed   OT Goals   Therapy Frequency  (OT) Daily   OT Predicted Duration/Target Date for Goal Attainment 02/16/23   OT Goals Edema   OT: Edema education to increase ability to manage edema after discharge from the hospital Patient;Verbalize;wear schedule;garrnet/bandage care;signs/symptoms of intolerance   OT: Management of edema bandages Patient;Verbalize;quick wrap   OT: Functional edema exercise program to reduce limb volume, increase activity tolerance and improve independence with ADL Patient;[place];HEP   Self-Care/Home Management   Self-Care/Home Mgmt/ADL, Compensatory, Meal Prep Minutes ([id number]) 24   Treatment Detail/Skilled Intervention Lymph: Washed/dried/applied lotion to bilateral lower extremities. Wrapped with SSB, 25-50% overlap, medium compression, herringbone technique.(artiflex/stockinette base layers) Educated on indications for removal w/ patient and RN.   OT Discharge Planning   OT Plan Lymph: rewrap, if tolerate may go longer between checks, may not need to be seen on weekend?   OT Discharge Recommendation (DC Rec)   (patient will likely  need continued lymphedema care at discharge)   OT Brief overview of current status Patient currently with bilateral lower extremity edema, would benefit from continued lymphedema management to reduce edema and provide education for long-term compression use.   Total Session Time   Timed Code Treatment Minutes 24   Total Session Time (sum of timed and untimed services) 24

## 2023-02-09 NOTE — PLAN OF CARE
Goal Outcome Evaluation:                    Pt is alert and oriented x4 and able to make needs known.  Pt is is on bedrest and verbalizing understanding.  Clear liquid diet advanced to low fat, low sodium diet.  Groin site is wdl, cms intact.  Pt nervous about her bowel movements and urine habits.  Reassured patient to use her call light and we will get her to the bathroom with her stating being nervous if she has an accident in her bed, again reassured that she would be ok.  She is resting in bed, appears comfortable, will continue to monitor.

## 2023-02-10 ENCOUNTER — APPOINTMENT (OUTPATIENT)
Dept: OCCUPATIONAL THERAPY | Facility: HOSPITAL | Age: 68
DRG: 286 | End: 2023-02-10
Payer: COMMERCIAL

## 2023-02-10 LAB
ANION GAP SERPL CALCULATED.3IONS-SCNC: 11 MMOL/L (ref 7–15)
BUN SERPL-MCNC: 12.5 MG/DL (ref 8–23)
CALCIUM SERPL-MCNC: 8.7 MG/DL (ref 8.8–10.2)
CHLORIDE SERPL-SCNC: 105 MMOL/L (ref 98–107)
CREAT SERPL-MCNC: 1.55 MG/DL (ref 0.51–0.95)
DEPRECATED HCO3 PLAS-SCNC: 29 MMOL/L (ref 22–29)
GFR SERPL CREATININE-BSD FRML MDRD: 36 ML/MIN/1.73M2
GLUCOSE BLDC GLUCOMTR-MCNC: 104 MG/DL (ref 70–99)
GLUCOSE BLDC GLUCOMTR-MCNC: 79 MG/DL (ref 70–99)
GLUCOSE BLDC GLUCOMTR-MCNC: 91 MG/DL (ref 70–99)
GLUCOSE BLDC GLUCOMTR-MCNC: 96 MG/DL (ref 70–99)
GLUCOSE SERPL-MCNC: 93 MG/DL (ref 70–99)
POTASSIUM SERPL-SCNC: 3.8 MMOL/L (ref 3.4–5.3)
SODIUM SERPL-SCNC: 145 MMOL/L (ref 136–145)

## 2023-02-10 PROCEDURE — 250N000013 HC RX MED GY IP 250 OP 250 PS 637: Performed by: INTERNAL MEDICINE

## 2023-02-10 PROCEDURE — 210N000001 HC R&B IMCU HEART CARE

## 2023-02-10 PROCEDURE — 80048 BASIC METABOLIC PNL TOTAL CA: CPT | Performed by: INTERNAL MEDICINE

## 2023-02-10 PROCEDURE — 36415 COLL VENOUS BLD VENIPUNCTURE: CPT | Performed by: INTERNAL MEDICINE

## 2023-02-10 PROCEDURE — 250N000011 HC RX IP 250 OP 636: Performed by: HOSPITALIST

## 2023-02-10 PROCEDURE — 250N000013 HC RX MED GY IP 250 OP 250 PS 637: Performed by: HOSPITALIST

## 2023-02-10 PROCEDURE — 250N000011 HC RX IP 250 OP 636: Performed by: INTERNAL MEDICINE

## 2023-02-10 PROCEDURE — 99207 PR CDG-CUT & PASTE-POTENTIAL IMPACT ON LEVEL: CPT | Performed by: INTERNAL MEDICINE

## 2023-02-10 PROCEDURE — 97110 THERAPEUTIC EXERCISES: CPT | Mod: GO

## 2023-02-10 PROCEDURE — 99231 SBSQ HOSP IP/OBS SF/LOW 25: CPT | Performed by: INTERNAL MEDICINE

## 2023-02-10 PROCEDURE — 97535 SELF CARE MNGMENT TRAINING: CPT | Mod: GO

## 2023-02-10 PROCEDURE — 99233 SBSQ HOSP IP/OBS HIGH 50: CPT | Performed by: INTERNAL MEDICINE

## 2023-02-10 RX ORDER — SPIRONOLACTONE 25 MG/1
25 TABLET ORAL DAILY
Status: DISCONTINUED | OUTPATIENT
Start: 2023-02-10 | End: 2023-02-12 | Stop reason: HOSPADM

## 2023-02-10 RX ADMIN — BUMETANIDE 1 MG: 0.25 INJECTION INTRAMUSCULAR; INTRAVENOUS at 06:40

## 2023-02-10 RX ADMIN — Medication 25 MCG: at 08:28

## 2023-02-10 RX ADMIN — EMPAGLIFLOZIN 10 MG: 10 TABLET, FILM COATED ORAL at 08:28

## 2023-02-10 RX ADMIN — Medication 1 MG: at 21:01

## 2023-02-10 RX ADMIN — TRAZODONE HYDROCHLORIDE 100 MG: 50 TABLET ORAL at 21:01

## 2023-02-10 RX ADMIN — ISOSORBIDE DINITRATE 40 MG: 10 TABLET ORAL at 16:13

## 2023-02-10 RX ADMIN — ISOSORBIDE DINITRATE 40 MG: 10 TABLET ORAL at 08:28

## 2023-02-10 RX ADMIN — BUMETANIDE 1 MG: 0.25 INJECTION INTRAMUSCULAR; INTRAVENOUS at 13:59

## 2023-02-10 RX ADMIN — HEPARIN SODIUM 5000 UNITS: 10000 INJECTION, SOLUTION INTRAVENOUS; SUBCUTANEOUS at 18:24

## 2023-02-10 RX ADMIN — HYDRALAZINE HYDROCHLORIDE 100 MG: 50 TABLET, FILM COATED ORAL at 20:46

## 2023-02-10 RX ADMIN — NYSTATIN, TRIAMCINOLONE ACETONIDE: 100000; 1 CREAM TOPICAL at 08:29

## 2023-02-10 RX ADMIN — NYSTATIN, TRIAMCINOLONE ACETONIDE: 100000; 1 CREAM TOPICAL at 20:47

## 2023-02-10 RX ADMIN — ISOSORBIDE DINITRATE 40 MG: 10 TABLET ORAL at 11:17

## 2023-02-10 RX ADMIN — HEPARIN SODIUM 5000 UNITS: 10000 INJECTION, SOLUTION INTRAVENOUS; SUBCUTANEOUS at 06:44

## 2023-02-10 RX ADMIN — HYDRALAZINE HYDROCHLORIDE 100 MG: 50 TABLET, FILM COATED ORAL at 08:28

## 2023-02-10 RX ADMIN — PANTOPRAZOLE SODIUM 40 MG: 40 TABLET, DELAYED RELEASE ORAL at 06:48

## 2023-02-10 RX ADMIN — SPIRONOLACTONE 25 MG: 25 TABLET, FILM COATED ORAL at 11:17

## 2023-02-10 RX ADMIN — ESCITALOPRAM 20 MG: 10 TABLET, FILM COATED ORAL at 08:28

## 2023-02-10 RX ADMIN — NYSTATIN, TRIAMCINOLONE ACETONIDE: 100000; 1 CREAM TOPICAL at 16:13

## 2023-02-10 RX ADMIN — PSYLLIUM HUSK 1 PACKET: 3.4 POWDER ORAL at 08:29

## 2023-02-10 RX ADMIN — METOPROLOL SUCCINATE 200 MG: 100 TABLET, EXTENDED RELEASE ORAL at 08:28

## 2023-02-10 RX ADMIN — NYSTATIN, TRIAMCINOLONE ACETONIDE: 100000; 1 CREAM TOPICAL at 13:58

## 2023-02-10 RX ADMIN — ATORVASTATIN CALCIUM 80 MG: 40 TABLET, FILM COATED ORAL at 08:28

## 2023-02-10 RX ADMIN — BUMETANIDE 1 MG: 0.25 INJECTION INTRAMUSCULAR; INTRAVENOUS at 20:53

## 2023-02-10 RX ADMIN — HYDRALAZINE HYDROCHLORIDE 100 MG: 50 TABLET, FILM COATED ORAL at 13:59

## 2023-02-10 RX ADMIN — INSULIN GLARGINE 10 UNITS: 100 INJECTION, SOLUTION SUBCUTANEOUS at 08:29

## 2023-02-10 ASSESSMENT — ACTIVITIES OF DAILY LIVING (ADL)
ADLS_ACUITY_SCORE: 22
ADLS_ACUITY_SCORE: 23
ADLS_ACUITY_SCORE: 26
ADLS_ACUITY_SCORE: 28
ADLS_ACUITY_SCORE: 28
ADLS_ACUITY_SCORE: 22
ADLS_ACUITY_SCORE: 28
ADLS_ACUITY_SCORE: 22
ADLS_ACUITY_SCORE: 22
ADLS_ACUITY_SCORE: 28

## 2023-02-10 NOTE — PROGRESS NOTES
Care Management Follow Up    Length of Stay (days): 2    Expected Discharge Date: 02/10/2023     Concerns to be Addressed:       Patient plan of care discussed at interdisciplinary rounds: Yes    Anticipated Discharge Disposition:  Home     Anticipated Discharge Services:  None  Anticipated Discharge DME:  Per treatment team    Patient/family educated on Medicare website which has current facility and service quality ratings:  NA  Education Provided on the Discharge Plan:  Yes  Patient/Family in Agreement with the Plan:  Yes    Referrals Placed by CM/SW:    Private pay costs discussed: Not applicable    Additional Information:  Patient lives with  and is independent at baseline.Therapy recommends home with assist.     Damaris Joya

## 2023-02-10 NOTE — PLAN OF CARE
Problem: Heart Failure Comorbidity  Goal: Maintenance of Heart Failure Symptom Control  Outcome: Progressing    Problem: Heart Failure  Goal: Fluid and Electrolyte Balance  Outcome: Progressing      Goal Outcome Evaluation:    Vital signs stable. Requiring 0.5L O2 to maintain saturations >90%. Denied pain. Up SBA to bathroom. Voiding without difficulty. Lymph wraps changed today. Continue with IV Bumex BID.

## 2023-02-10 NOTE — PROGRESS NOTES
"  HEART CARE CONSULTATON NOTE        Assessment/Recommendations   Assessment:  1.  Acute on chronic heart failure with preserved ejection fraction responding well to diuresis  2.  Pulmonary hypertension who class II  3.  Valvular heart disease with mild aortic stenosis and moderate mitral stenosis  4.  Chronic kidney disease  5.  Morbid obesity     Plan:  1.  Continue Jardiance  2.  Continue IV Lasix  3.  Strict I's and O's, daily weight  4.  Outpatient sleep study evaluation  5.   Start spironolactone       History of Present Illness/Subjective    Feeling better with IV diuresis on Bumex.  Lower extremity edema improving.  Less short of breath     Physical Examination  Review of Systems   VITALS: BP (!) 151/68 (BP Location: Right arm, Patient Position: Semi-Anne's)   Pulse 70   Temp 98.1  F (36.7  C) (Oral)   Resp 18   Ht 1.575 m (5' 2\")   Wt 107.3 kg (236 lb 8.9 oz)   SpO2 93%   BMI 43.27 kg/m    BMI: Body mass index is 43.27 kg/m .  Wt Readings from Last 3 Encounters:   02/10/23 107.3 kg (236 lb 8.9 oz)   02/08/23 108.4 kg (239 lb)       Intake/Output Summary (Last 24 hours) at 2/10/2023 1302  Last data filed at 2/10/2023 1022  Gross per 24 hour   Intake --   Output 2450 ml   Net -2450 ml     General Appearance:   no distress, normal body habitus   ENT/Mouth: membranes moist, no oral lesions or bleeding gums.      EYES:  no scleral icterus, normal conjunctivae   Neck: no carotid bruits or thyromegaly   Chest/Lungs:   lungs are clear to auscultation   Cardiovascular:   Regular. Normal first and second heart sounds with no murmurs ++ edema   Abdomen:  no organomegaly, masses, bruits, or tenderness; bowel sounds are present   Extremities: no cyanosis or clubbing   Skin: no xanthelasma, warm.    Neurologic: normal  bilateral, no tremors     Psychiatric: alert and oriented x3, calm     Review Of Systems  Skin: negative  Eyes: negative  Ears/Nose/Throat: negative  Respiratory: No shortness of breath, " dyspnea on exertion, cough, or hemoptysis  Cardiovascular: negative  Gastrointestinal: negative  Genitourinary: negative  Musculoskeletal: negative  Neurologic: negative  Psychiatric: negative  Hematologic/Lymphatic/Immunologic: negative  Endocrine: negative          Lab Results    Chemistry/lipid CBC Cardiac Enzymes/BNP/TSH/INR   Recent Labs   Lab Test 05/17/22  1417   CHOL 218*   HDL 39*   *   TRIG 244*     Recent Labs   Lab Test 05/17/22  1417 04/15/21  1029   * 99     Recent Labs   Lab Test 02/10/23  0740 02/10/23  0450   NA  --  145   POTASSIUM  --  3.8   CHLORIDE  --  105   CO2  --  29   GLC 96 93   BUN  --  12.5   CR  --  1.55*   GFRESTIMATED  --  36*   JARETT  --  8.7*     Recent Labs   Lab Test 02/10/23  0450 02/09/23  0527 02/08/23  1312   CR 1.55* 1.57* 1.68*     Recent Labs   Lab Test 02/08/23  1312   A1C 6.2*          Recent Labs   Lab Test 02/09/23 0527   WBC 8.1   HGB 9.2*   HCT 30.9*   MCV 87        Recent Labs   Lab Test 02/09/23  0527 02/08/23  1312 01/19/23  1403   HGB 9.2* 10.4* 10.5*    No results for input(s): TROPONINI in the last 44118 hours.  Recent Labs   Lab Test 01/19/23  1403   NTBNP 4,666*     No results for input(s): TSH in the last 09804 hours.  No results for input(s): INR in the last 86008 hours.     Medical History  Surgical History Family History Social History   Past Medical History:   Diagnosis Date     Aortic stenosis      Benign essential hypertension      Chronic heart failure with preserved ejection fraction (HFpEF) (H)      CKD (chronic kidney disease) stage 3, GFR 30-59 ml/min (H)      Diabetes mellitus (H)      Mitral stenosis      Obesity      Pulmonary hypertension (H)      Past Surgical History:   Procedure Laterality Date     CV LEFT HEART CATH N/A 2/8/2023    Procedure: Left Heart Catheterization;  Surgeon: Yolanda Ramirez MD;  Location: [facility] CATH LAB CV     CV RIGHT HEART CATH MEASUREMENTS RECORDED N/A 2/8/2023    Procedure: Right Heart  Catheterization;  Surgeon: Yolanda Ramirez MD;  Location: [facility] LAB CV     History reviewed. No pertinent family history.     Social History     Socioeconomic History     Marital status:      Spouse name: Not on file     Number of children: Not on file     Years of education: Not on file     Highest education level: Not on file   Occupational History     Not on file   Tobacco Use     Smoking status: Never     Smokeless tobacco: Never   Substance and Sexual Activity     Alcohol use: Never     Drug use: Never     Sexual activity: Not Currently   Other Topics Concern     Not on file   Social History Narrative     Not on file     Social Determinants of Health     Financial Resource Strain: Not on file   Food Insecurity: Not on file   Transportation Needs: Not on file   Physical Activity: Not on file   Stress: Not on file   Social Connections: Not on file   Intimate Partner Violence: Not on file   Housing Stability: Not on file         Medications  Allergies   No current outpatient medications on file.        Allergies   Allergen Reactions     Lisinopril Other (See Comments) and Unknown         Amanda Pedro MD

## 2023-02-10 NOTE — PROGRESS NOTES
Daily Progress Note        CODE STATUS:  Full Code    02/09/23  Assessment/Plan:  Indu Felix is a 68 year old female admitted on 2/8/2023. She has history of obesity, hypertension, CKD 3, diabetes, mitral stenosis, aortic stenosis, pulmonary hypertension, CHF presents for admission, she was sent in urgently from the cardiology clinic for right heart cath     #Acute diastolic heart failure  -- Patient has recently been started on oral diuretics with benefit, but continues with orthopnea, significant edema up to her abdomen.  Seen on 2/8/23 in rapid access cardiology clinic, concerned that she urgently needs IV diuresis with monitoring of renal function given CKD.    --S/P right heart cath 2/8/23 to assess volume status. Seems grossly volume overloaded.  -- Currently on IV Bumex ordered twice daily.  Monitor intake and output.  Daily weights.  -- Cardiology managing, appreciate assistance  -- Jardiance added per cardiology  -- Needs outpatient sleep study- high likelihood of untreated DIANA contributing to cardiac issues     #Peripheral edema  -- 2+ edema up to her abdomen.  Having some scaly/weeping skin secondary to skin tension  -- Lymphedema PT consult. Legs are wrapped 2/9/23     #Chronic diarrhea  -- Patient has urgent loose bowel movements after almost every meal.  Has been restricting her oral intake due to this.  She is not sure if she is willing to work with physical therapy since she is worried she will have stool incontinence.  Imodium as needed  -- Daughter reports patient had benefit from Metamucil in the past. Restarted her on Metamucil.     #Deconditioning  -- PT and OT  -- Strongly encouraged patient to work with rehab services     #CKD 3  -- Cr 1.68 on admission, similar to recent baseline  -- Monitor Cr with diuresis     #IDDM  -- Home regimen is Lantus 16 units QAM. Lantus 10 units for now and novolog sliding scale and monitor BG trend  -- A1c 6.2     #HTN  -- Home amlodipine, hydralazine,  metoprolol     #Mitral stenosis  #Aortic stenosis  -- Cardiology following and recommending yearly echo     #Anxiety, home Xanax  #Mood, home Lexapro  #HLD, home atorvastatin  #GERD, home PPI  #Insomnia, home trazodone     DVT Prophylaxis: Moderate risk. SQH  Diet: Advance Diet as Tolerated: Clear Liquid Diet    Holley Catheter: Not present  Lines: None     Cardiac Monitoring: ACTIVE order. Indication: Acute decompensated heart failure (48 hours)  Code Status: Full Code      Disposition; TBD  Barrier to discharge; IV diuresis     LOS: 1 day     Subjective:  Interval History: Patient reports doing better. Voiding a lot. States she could sleep in bed for the first time in many months. No chest pain. No shortness of breath at rest.     Review of Systems:   As mentioned in subjective.    Patient Active Problem List   Diagnosis     Acute heart failure with preserved ejection fraction (HFpEF) (H)     Stage 3a chronic kidney disease (H)     Benign essential hypertension     Type 2 diabetes mellitus, with long-term current use of insulin (H)     Class 2 obesity due to excess calories in adult     Nonrheumatic mitral valve stenosis     Nonrheumatic aortic (valve) stenosis     Status post coronary angiogram     Peripheral edema     Pulmonary hypertension (H)       Scheduled Meds:    atorvastatin  80 mg Oral Daily     bumetanide  1 mg Intravenous Q8H     empagliflozin  10 mg Oral Daily     escitalopram  20 mg Oral Daily     heparin ANTICOAGULANT  5,000 Units Subcutaneous Q12H     hydrALAZINE  100 mg Oral TID     insulin aspart  1-7 Units Subcutaneous TID AC     insulin glargine  10 Units Subcutaneous QAM AC     isosorbide dinitrate  40 mg Oral TID     metoprolol succinate ER  200 mg Oral Daily     nystatin-triamcinolone   Topical 4x Daily     pantoprazole  40 mg Oral QAM AC     psyllium  1 packet Oral Daily     sodium chloride (PF)  3 mL Intracatheter Q8H     spironolactone  25 mg Oral Daily     Vitamin D3  25 mcg Oral Daily      Continuous Infusions:  PRN Meds:.acetaminophen **OR** acetaminophen, bisacodyl, calcium carbonate, carboxymethylcellulose PF, glucose **OR** dextrose **OR** glucagon, docusate sodium, HOLD MEDICATION, hydrALAZINE, lidocaine 4%, lidocaine (buffered or not buffered), melatonin, ondansetron **OR** ondansetron, oxyCODONE **OR** oxyCODONE, sodium chloride, sodium chloride (PF), traZODone    Objective:  Vital signs in last 24 hours:  Temp:  [97.7  F (36.5  C)-98.1  F (36.7  C)] 98.1  F (36.7  C)  Pulse:  [69-74] 70  Resp:  [18-20] 18  BP: (144-165)/(63-74) 147/64  SpO2:  [85 %-95 %] 93 %        Intake/Output Summary (Last 24 hours) at 2/9/2023 1627  Last data filed at 2/9/2023 1622  Gross per 24 hour   Intake 790 ml   Output 1950 ml   Net -1160 ml       Physical Exam:    General: Not in obvious distress. Obese  HEENT: NC, AT   Chest: Diminished breath sounds bilaterally  Heart: S1S2 normal, regular. No M/R/G  Abdomen: Soft. NT, ND. Bowel sounds- active.  Extremities: 2+ edema. Legs wrapped  Neuro: Alert and awake, grossly non-focal      Lab Results:(I have personally reviewed the results)    Recent Results (from the past 24 hour(s))   Glucose by meter    Collection Time: 02/09/23  4:46 PM   Result Value Ref Range    GLUCOSE BY METER POCT 66 (L) 70 - 99 mg/dL   Glucose by meter    Collection Time: 02/09/23  5:56 PM   Result Value Ref Range    GLUCOSE BY METER POCT 82 70 - 99 mg/dL   Glucose by meter    Collection Time: 02/09/23  8:37 PM   Result Value Ref Range    GLUCOSE BY METER POCT 89 70 - 99 mg/dL   Basic metabolic panel    Collection Time: 02/10/23  4:50 AM   Result Value Ref Range    Sodium 145 136 - 145 mmol/L    Potassium 3.8 3.4 - 5.3 mmol/L    Chloride 105 98 - 107 mmol/L    Carbon Dioxide (CO2) 29 22 - 29 mmol/L    Anion Gap 11 7 - 15 mmol/L    Urea Nitrogen 12.5 8.0 - 23.0 mg/dL    Creatinine 1.55 (H) 0.51 - 0.95 mg/dL    Calcium 8.7 (L) 8.8 - 10.2 mg/dL    Glucose 93 70 - 99 mg/dL    GFR Estimate 36 (L)  >60 mL/min/1.73m2   Glucose by meter    Collection Time: 02/10/23  7:40 AM   Result Value Ref Range    GLUCOSE BY METER POCT 96 70 - 99 mg/dL   Glucose by meter    Collection Time: 02/10/23  1:47 PM   Result Value Ref Range    GLUCOSE BY METER POCT 91 70 - 99 mg/dL       All laboratory and imaging data in the past 24 hours reviewed  Serum Glucose range:   Recent Labs   Lab 02/10/23  1347 02/10/23  0740 02/10/23  0450 02/09/23  2037   GLC 91 96 93 89     ABG:   Recent Labs   Lab 02/08/23  1419   PH 7.50*   PCO2 35   PO2 58*   HCO3 28     CBC:   Recent Labs   Lab 02/09/23  0527 02/08/23  1312   WBC 8.1 10.2   HGB 9.2* 10.4*   HCT 30.9* 33.2*   MCV 87 84    358     Chemistry:   Recent Labs   Lab 02/10/23  0450 02/09/23  0527 02/08/23  1312    143 143   POTASSIUM 3.8 3.3* 3.6   CHLORIDE 105 107 105   CO2 29 26 25   BUN 12.5 13.8 16.8   CR 1.55* 1.57* 1.68*   GFRESTIMATED 36* 35* 33*   JARETT 8.7* 8.6* 9.1   MAG  --  1.8  --      Coags:  No results for input(s): INR, PROTIME, PTT in the last 168 hours.    Invalid input(s): APTT  Cardiac Markers:  No results for input(s): CKTOTAL, TROPONINI in the last 168 hours.       Cardiac Catheterization    Result Date: 2/8/2023    Exertional dyspnea and acute heart failure in an obese diabetic woman with renal insufficiency   Elevated filling pressures extending back from the left ventricle, see numbers below.   AV gradient 14 mmHg by pullback   No significant LV-PCWP gradient        Latest radiology report personally reviewed.    Note created using dragon voice recognition software so sounds alike errors may have escaped editing.      02/10/2023   Donnell Rosenberg MD  Hospitalist, [facility]  Pager: [phone]

## 2023-02-10 NOTE — PLAN OF CARE
Problem: Plan of Care - These are the overarching goals to be used throughout the patient stay.    Goal: Optimal Comfort and Wellbeing  2/9/2023 2221 by Pawel Horvath, RN  Outcome: Progressing  2/9/2023 1504 by Pawel Horvath, RN  Outcome: Progressing   Goal Outcome Evaluation:    Calm and pleasant, family visiting. NSR. On 1L oxygen via nasal cannula. IV Bumex given. Purewick in place at night, due to Bumex being given. Otherwise, she has been standby assist to bathroom.

## 2023-02-10 NOTE — PLAN OF CARE
Heart Failure Care Map  GOALS TO BE MET BEFORE DISCHARGE:    1. Decrease congestion and/or edema with diuretic therapy to achieve near optimal volume status.     Dyspnea improved: yes.   Edema improved: Has lymphedema wraps on.        Last 24 hour I/O:   Intake/Output Summary (Last 24 hours) at 2/10/2023 0522  Last data filed at 2/10/2023 0418  Gross per 24 hour   Intake 490 ml   Output 2650 ml   Net -2160 ml           Net I/O and Weights since admission:   01/11 0700 - 02/10 0659  In: 790 [P.O.:790]  Out: 3150 [Urine:3150]  Net: -2360     Vitals:    02/08/23 1245 02/09/23 0600   Weight: 108.9 kg (240 lb) 108.5 kg (239 lb 3.2 oz)       2.  O2 sats > 90% on room air, or at prior home O2 therapy level.      Able to wean O2 this shift to keep sats above 90%?: No, desats to mid 80's on room air while sleeping.   Does patient use Home O2? No          Current oxygenation status:   SpO2: 95 %     O2 Device: Nasal cannula, Oxygen Delivery: 1 LPM    3.  Tolerates ambulation and mobility near baseline.     Ambulation: Not applicable this shift.   Times patient ambulated with staff this shift: 0         Getting IV vumex. Has purewick in place for overnight.    Please review the Heart Failure Care Map for additional HF goal outcomes.    Melissa Portillo RN  2/10/2023                           No

## 2023-02-11 ENCOUNTER — APPOINTMENT (OUTPATIENT)
Dept: OCCUPATIONAL THERAPY | Facility: HOSPITAL | Age: 68
DRG: 286 | End: 2023-02-11
Payer: COMMERCIAL

## 2023-02-11 LAB
ANION GAP SERPL CALCULATED.3IONS-SCNC: 9 MMOL/L (ref 7–15)
BUN SERPL-MCNC: 13.2 MG/DL (ref 8–23)
CALCIUM SERPL-MCNC: 8.7 MG/DL (ref 8.8–10.2)
CHLORIDE SERPL-SCNC: 105 MMOL/L (ref 98–107)
CREAT SERPL-MCNC: 1.72 MG/DL (ref 0.51–0.95)
DEPRECATED HCO3 PLAS-SCNC: 31 MMOL/L (ref 22–29)
GFR SERPL CREATININE-BSD FRML MDRD: 32 ML/MIN/1.73M2
GLUCOSE BLDC GLUCOMTR-MCNC: 106 MG/DL (ref 70–99)
GLUCOSE BLDC GLUCOMTR-MCNC: 89 MG/DL (ref 70–99)
GLUCOSE BLDC GLUCOMTR-MCNC: 91 MG/DL (ref 70–99)
GLUCOSE BLDC GLUCOMTR-MCNC: 97 MG/DL (ref 70–99)
GLUCOSE SERPL-MCNC: 87 MG/DL (ref 70–99)
PLATELET # BLD AUTO: 289 10E3/UL (ref 150–450)
POTASSIUM SERPL-SCNC: 3.6 MMOL/L (ref 3.4–5.3)
SODIUM SERPL-SCNC: 145 MMOL/L (ref 136–145)

## 2023-02-11 PROCEDURE — 210N000001 HC R&B IMCU HEART CARE

## 2023-02-11 PROCEDURE — 250N000011 HC RX IP 250 OP 636: Performed by: HOSPITALIST

## 2023-02-11 PROCEDURE — 99207 PR CDG-CUT & PASTE-POTENTIAL IMPACT ON LEVEL: CPT | Performed by: INTERNAL MEDICINE

## 2023-02-11 PROCEDURE — 250N000013 HC RX MED GY IP 250 OP 250 PS 637: Performed by: INTERNAL MEDICINE

## 2023-02-11 PROCEDURE — 250N000013 HC RX MED GY IP 250 OP 250 PS 637: Performed by: HOSPITALIST

## 2023-02-11 PROCEDURE — 80048 BASIC METABOLIC PNL TOTAL CA: CPT | Performed by: INTERNAL MEDICINE

## 2023-02-11 PROCEDURE — 85049 AUTOMATED PLATELET COUNT: CPT | Performed by: HOSPITALIST

## 2023-02-11 PROCEDURE — 250N000011 HC RX IP 250 OP 636: Performed by: INTERNAL MEDICINE

## 2023-02-11 PROCEDURE — 97110 THERAPEUTIC EXERCISES: CPT | Mod: GO

## 2023-02-11 PROCEDURE — 36415 COLL VENOUS BLD VENIPUNCTURE: CPT | Performed by: HOSPITALIST

## 2023-02-11 PROCEDURE — 99231 SBSQ HOSP IP/OBS SF/LOW 25: CPT | Performed by: INTERNAL MEDICINE

## 2023-02-11 PROCEDURE — 97535 SELF CARE MNGMENT TRAINING: CPT | Mod: GO

## 2023-02-11 PROCEDURE — 99232 SBSQ HOSP IP/OBS MODERATE 35: CPT | Performed by: INTERNAL MEDICINE

## 2023-02-11 RX ORDER — BUMETANIDE 2 MG/1
4 TABLET ORAL
Status: DISCONTINUED | OUTPATIENT
Start: 2023-02-12 | End: 2023-02-12 | Stop reason: HOSPADM

## 2023-02-11 RX ADMIN — ISOSORBIDE DINITRATE 40 MG: 10 TABLET ORAL at 12:42

## 2023-02-11 RX ADMIN — ISOSORBIDE DINITRATE 40 MG: 10 TABLET ORAL at 08:27

## 2023-02-11 RX ADMIN — Medication 25 MCG: at 08:27

## 2023-02-11 RX ADMIN — ATORVASTATIN CALCIUM 80 MG: 40 TABLET, FILM COATED ORAL at 08:27

## 2023-02-11 RX ADMIN — INSULIN GLARGINE 10 UNITS: 100 INJECTION, SOLUTION SUBCUTANEOUS at 08:28

## 2023-02-11 RX ADMIN — BUMETANIDE 1 MG: 0.25 INJECTION INTRAMUSCULAR; INTRAVENOUS at 07:09

## 2023-02-11 RX ADMIN — HYDRALAZINE HYDROCHLORIDE 100 MG: 50 TABLET, FILM COATED ORAL at 14:48

## 2023-02-11 RX ADMIN — HYDRALAZINE HYDROCHLORIDE 100 MG: 50 TABLET, FILM COATED ORAL at 20:25

## 2023-02-11 RX ADMIN — ESCITALOPRAM 20 MG: 10 TABLET, FILM COATED ORAL at 08:27

## 2023-02-11 RX ADMIN — HEPARIN SODIUM 5000 UNITS: 10000 INJECTION, SOLUTION INTRAVENOUS; SUBCUTANEOUS at 18:07

## 2023-02-11 RX ADMIN — METOPROLOL SUCCINATE 200 MG: 100 TABLET, EXTENDED RELEASE ORAL at 08:26

## 2023-02-11 RX ADMIN — ISOSORBIDE DINITRATE 40 MG: 10 TABLET ORAL at 17:00

## 2023-02-11 RX ADMIN — PANTOPRAZOLE SODIUM 40 MG: 40 TABLET, DELAYED RELEASE ORAL at 07:06

## 2023-02-11 RX ADMIN — HEPARIN SODIUM 5000 UNITS: 10000 INJECTION, SOLUTION INTRAVENOUS; SUBCUTANEOUS at 07:06

## 2023-02-11 RX ADMIN — NYSTATIN, TRIAMCINOLONE ACETONIDE: 100000; 1 CREAM TOPICAL at 08:35

## 2023-02-11 RX ADMIN — HYDRALAZINE HYDROCHLORIDE 100 MG: 50 TABLET, FILM COATED ORAL at 08:26

## 2023-02-11 RX ADMIN — BUMETANIDE 1 MG: 0.25 INJECTION INTRAMUSCULAR; INTRAVENOUS at 14:48

## 2023-02-11 RX ADMIN — PSYLLIUM HUSK 1 PACKET: 3.4 POWDER ORAL at 08:27

## 2023-02-11 RX ADMIN — SPIRONOLACTONE 25 MG: 25 TABLET, FILM COATED ORAL at 08:27

## 2023-02-11 RX ADMIN — EMPAGLIFLOZIN 10 MG: 10 TABLET, FILM COATED ORAL at 08:27

## 2023-02-11 ASSESSMENT — ACTIVITIES OF DAILY LIVING (ADL)
ADLS_ACUITY_SCORE: 28
ADLS_ACUITY_SCORE: 28
ADLS_ACUITY_SCORE: 25
ADLS_ACUITY_SCORE: 28
ADLS_ACUITY_SCORE: 28
ADLS_ACUITY_SCORE: 25
ADLS_ACUITY_SCORE: 28
ADLS_ACUITY_SCORE: 25
ADLS_ACUITY_SCORE: 28
ADLS_ACUITY_SCORE: 28
ADLS_ACUITY_SCORE: 25
ADLS_ACUITY_SCORE: 28

## 2023-02-11 NOTE — PLAN OF CARE
Patient denied pain and was able to sleep for most of the night. Tele NSR. Tried to wean off oxygen but she drops quickly below 84%; re-applied 0.5L oxygen to keep above 90%. Potential for discharge today however is still getting IV bumex and has not been transitioned to PO.    Vitals:    02/10/23 2359 02/11/23 0006 02/11/23 0011 02/11/23 0347   BP: (!) 157/70   (!) 164/68   BP Location: Right arm   Right arm   Patient Position:       Cuff Size:       Pulse:    70   Resp:    18   Temp:    98  F (36.7  C)   TempSrc:    Oral   SpO2: 94% (!) 85% (!) 88% 92%   Weight:    103.4 kg (227 lb 15.3 oz)   Height:            Problem: Heart Failure  Goal: Effective Oxygenation and Ventilation  Outcome: Not Progressing  Goal: Effective Breathing Pattern During Sleep  Outcome: Not Progressing  Intervention: Monitor and Manage Obstructive Sleep Apnea  Recent Flowsheet Documentation  Taken 2/11/2023 0000 by Shayy Campos RN  Medication Review/Management: medications reviewed     Problem: Plan of Care - These are the overarching goals to be used throughout the patient stay.    Goal: Optimal Comfort and Wellbeing  Outcome: Progressing     Problem: Cardiac Catheterization (Diagnostic/Interventional)  Goal: Acceptable Pain Control  Outcome: Progressing   Goal Outcome Evaluation:

## 2023-02-11 NOTE — PLAN OF CARE
Problem: Heart Failure  Goal: Optimal Coping  Outcome: Progressing   Goal Outcome Evaluation:    A&O x4. Forgetful at times. No c/o pain. Up to the bathroom with SBA and gaitbelt. The daughter and granddaughter visited this evening and supportive. This writer was not able to wean the pt off of 02 this shift- she would desat into the 80's. Purewick in place.  Tele- NSR

## 2023-02-11 NOTE — PROGRESS NOTES
"  HEART CARE CONSULTATON NOTE        Assessment/Recommendations   Assessment:  1.  Acute on chronic heart failure with preserved ejection fraction responding well to diuresis  2.  Pulmonary hypertension who class II  3.  Valvular heart disease with mild aortic stenosis and moderate mitral stenosis  4.  Chronic kidney disease  5.  Morbid obesity     Plan:  1.  Continue Jardiance  2.  We will continue IV Lasix through today and change to p.o. tomorrow 4 mg twice daily (had been on 3 mg twice daily previously prior to discharge)  3.  Strict I's and O's, daily weight  4.  Outpatient sleep study evaluation  5.   Continue spironolactone  Possible discharge tomorrow     History of Present Illness/Subjective    Continues to diurese well.  Breathing improving.  No chest pain       Physical Examination  Review of Systems   VITALS: /65 (BP Location: Left arm)   Pulse 66   Temp 97.9  F (36.6  C) (Oral)   Resp 18   Ht 1.575 m (5' 2\")   Wt 103.4 kg (227 lb 15.3 oz)   SpO2 93%   BMI 41.69 kg/m    BMI: Body mass index is 41.69 kg/m .  Wt Readings from Last 3 Encounters:   02/11/23 103.4 kg (227 lb 15.3 oz)   02/08/23 108.4 kg (239 lb)       Intake/Output Summary (Last 24 hours) at 2/11/2023 1151  Last data filed at 2/11/2023 1014  Gross per 24 hour   Intake 480 ml   Output 3350 ml   Net -2870 ml     General Appearance:   no distress, normal body habitus   ENT/Mouth: membranes moist, no oral lesions or bleeding gums.      EYES:  no scleral icterus, normal conjunctivae   Neck: no carotid bruits or thyromegaly   Chest/Lungs:   lungs are clear to auscultation   Cardiovascular:   Regular + edema bilaterally        Extremities: no cyanosis or clubbing   Skin: no xanthelasma, warm.    Neurologic: normal  bilateral, no tremors     Psychiatric: alert and oriented x3, calm     Review Of Systems  Skin: negative  Eyes: negative  Ears/Nose/Throat: negative  Respiratory: No shortness of breath, dyspnea on exertion, cough, or " hemoptysis  Cardiovascular: negative  Gastrointestinal: negative  Genitourinary: negative  Musculoskeletal: negative  Neurologic: negative  Psychiatric: negative  Hematologic/Lymphatic/Immunologic: negative  Endocrine: negative          Lab Results    Chemistry/lipid CBC Cardiac Enzymes/BNP/TSH/INR   Recent Labs   Lab Test 05/17/22  1417   CHOL 218*   HDL 39*   *   TRIG 244*     Recent Labs   Lab Test 05/17/22  1417 04/15/21  1029   * 99     Recent Labs   Lab Test 02/11/23  0517      POTASSIUM 3.6   CHLORIDE 105   CO2 31*   GLC 87   BUN 13.2   CR 1.72*   GFRESTIMATED 32*   JARETT 8.7*     Recent Labs   Lab Test 02/11/23  0517 02/10/23  0450 02/09/23  0527   CR 1.72* 1.55* 1.57*     Recent Labs   Lab Test 02/08/23  1312   A1C 6.2*          Recent Labs   Lab Test 02/11/23  0517 02/09/23  0527   WBC  --  8.1   HGB  --  9.2*   HCT  --  30.9*   MCV  --  87    301     Recent Labs   Lab Test 02/09/23  0527 02/08/23  1312 01/19/23  1403   HGB 9.2* 10.4* 10.5*    No results for input(s): TROPONINI in the last 70556 hours.  Recent Labs   Lab Test 01/19/23  1403   NTBNP 4,666*     No results for input(s): TSH in the last 49856 hours.  No results for input(s): INR in the last 27700 hours.     Medical History  Surgical History Family History Social History   Past Medical History:   Diagnosis Date     Aortic stenosis      Benign essential hypertension      Chronic heart failure with preserved ejection fraction (HFpEF) (H)      CKD (chronic kidney disease) stage 3, GFR 30-59 ml/min (H)      Diabetes mellitus (H)      Mitral stenosis      Obesity      Pulmonary hypertension (H)      Past Surgical History:   Procedure Laterality Date     CV LEFT HEART CATH N/A 2/8/2023    Procedure: Left Heart Catheterization;  Surgeon: Yolanda Ramirez MD;  Location: [facility] CATH LAB CV     CV RIGHT HEART CATH MEASUREMENTS RECORDED N/A 2/8/2023    Procedure: Right Heart Catheterization;  Surgeon: Yolanda Ramirez MD;   Location: [facility] LAB CV     No family history of heart disease   Social History     Socioeconomic History     Marital status:      Spouse name: Not on file     Number of children: Not on file     Years of education: Not on file     Highest education level: Not on file   Occupational History     Not on file   Tobacco Use     Smoking status: Never     Smokeless tobacco: Never   Substance and Sexual Activity     Alcohol use: Never     Drug use: Never     Sexual activity: Not Currently   Other Topics Concern     Not on file   Social History Narrative     Not on file     Social Determinants of Health     Financial Resource Strain: Not on file   Food Insecurity: Not on file   Transportation Needs: Not on file   Physical Activity: Not on file   Stress: Not on file   Social Connections: Not on file   Intimate Partner Violence: Not on file   Housing Stability: Not on file         Medications  Allergies   No current outpatient medications on file.        Allergies   Allergen Reactions     Lisinopril Other (See Comments) and Unknown         Amanda Pedro MD

## 2023-02-11 NOTE — PLAN OF CARE
Goal Outcome Evaluation:    Heart Failure Care Map  GOALS TO BE MET BEFORE DISCHARGE:    1. Decrease congestion and/or edema with diuretic therapy to achieve near optimal volume status.     Dyspnea improved: Yes, satisfactory for discharge.   Edema improved: Yes, satisfactory for discharge.        Last 24 hour I/O:   Intake/Output Summary (Last 24 hours) at 2/11/2023 1414  Last data filed at 2/11/2023 1246  Gross per 24 hour   Intake 360 ml   Output 3350 ml   Net -2990 ml           Net I/O and Weights since admission:   01/12 1500 - 02/11 1459  In: 1510 [P.O.:1510]  Out: 7750 [Urine:7750]  Net: -6240     Vitals:    02/08/23 1245 02/09/23 0600 02/10/23 0649 02/11/23 0347   Weight: 108.9 kg (240 lb) 108.5 kg (239 lb 3.2 oz) 107.3 kg (236 lb 8.9 oz) 103.4 kg (227 lb 15.3 oz)       2.  O2 sats > 90% on room air, or at prior home O2 therapy level.      Able to wean O2 this shift to keep sats above 90%?: No, further care required to meet this goal. Please explain requiring 0.5L O2   Does patient use Home O2? No          Current oxygenation status:   SpO2: 93 %     O2 Device: Nasal cannula, Oxygen Delivery: 1/2 LPM    3.  Tolerates ambulation and mobility near baseline.     Ambulation: Yes, satisfactory for discharge.   Times patient ambulated with staff this shift: 1    Please review the Heart Failure Care Map for additional HF goal outcomes.    Vital signs stable. Still requiring 0.5L O2. Drops to 85% on room air. Denied pain. Up SBA. Lymph wraps in place. Plan to switch to PO bumex tomorrow.     Mitzi Au RN  2/11/2023

## 2023-02-11 NOTE — PROGRESS NOTES
Daily Progress Note        CODE STATUS:  Full Code    02/09/23  Assessment/Plan:  Indu Felix is a 68 year old female admitted on 2/8/2023. She has history of obesity, hypertension, CKD 3, diabetes, mitral stenosis, aortic stenosis, pulmonary hypertension, CHF presents for admission, she was sent in urgently from the cardiology clinic for right heart cath     #Acute diastolic heart failure  -- Patient has recently been started on oral diuretics with benefit, but continues with orthopnea, significant edema up to her abdomen.  Seen on 2/8/23 in rapid access cardiology clinic, concerned that she urgently needs IV diuresis with monitoring of renal function given CKD.    --S/P right heart cath 2/8/23 to assess volume status. Seems grossly volume overloaded.  -- Currently on IV Bumex.  Monitor intake and output.  Daily weights.  -- Cardiology managing, appreciate assistance  -- Jardiance added per cardiology  -- Needs outpatient sleep study- high likelihood of untreated DIANA contributing to cardiac issues     #Peripheral edema  -- 2+ edema up to her abdomen.  Having some scaly/weeping skin secondary to skin tension  -- Lymphedema PT consult. Legs are wrapped 2/9/23     #Chronic diarrhea  -- Patient has urgent loose bowel movements after almost every meal.  Has been restricting her oral intake due to this.  She is not sure if she is willing to work with physical therapy since she is worried she will have stool incontinence.  Imodium as needed  -- Daughter reports patient had benefit from Metamucil in the past. Restarted her on Metamucil.     #Deconditioning  -- PT and OT  -- Strongly encouraged patient to work with rehab services     #CKD 3  -- Cr 1.68 on admission, similar to recent baseline  -- Monitor Cr with diuresis     #IDDM  -- Home regimen is Lantus 16 units QAM. Lantus 10 units for now and novolog sliding scale and monitor BG trend  -- A1c 6.2     #HTN  -- Home amlodipine, hydralazine, metoprolol     #Mitral  stenosis  #Aortic stenosis  -- Cardiology following and recommending yearly echo     #Anxiety, home Xanax  #Mood, home Lexapro  #HLD, home atorvastatin  #GERD, home PPI  #Insomnia, home trazodone     DVT Prophylaxis: Moderate risk. SQH  Diet: Advance Diet as Tolerated: Clear Liquid Diet    Holley Catheter: Not present  Lines: None     Cardiac Monitoring: ACTIVE order. Indication: Acute decompensated heart failure (48 hours)  Code Status: Full Code      Disposition; Likely tomorrow if ok with cardiology  Barrier to discharge; IV diuresis     LOS: 1 day     Subjective:  Interval History: No new issues overnight. Diuresing well. U/O of 3250ml/24h noted.     Review of Systems:   As mentioned in subjective.    Patient Active Problem List   Diagnosis     Acute heart failure with preserved ejection fraction (HFpEF) (H)     Stage 3a chronic kidney disease (H)     Benign essential hypertension     Type 2 diabetes mellitus, with long-term current use of insulin (H)     Class 2 obesity due to excess calories in adult     Nonrheumatic mitral valve stenosis     Nonrheumatic aortic (valve) stenosis     Status post coronary angiogram     Peripheral edema     Pulmonary hypertension (H)       Scheduled Meds:    atorvastatin  80 mg Oral Daily     bumetanide  1 mg Intravenous Q8H     [START ON 2/12/2023] bumetanide  4 mg Oral BID     empagliflozin  10 mg Oral Daily     escitalopram  20 mg Oral Daily     heparin ANTICOAGULANT  5,000 Units Subcutaneous Q12H     hydrALAZINE  100 mg Oral TID     insulin aspart  1-7 Units Subcutaneous TID AC     insulin glargine  10 Units Subcutaneous QAM AC     isosorbide dinitrate  40 mg Oral TID     metoprolol succinate ER  200 mg Oral Daily     nystatin-triamcinolone   Topical 4x Daily     pantoprazole  40 mg Oral QAM AC     psyllium  1 packet Oral Daily     sodium chloride (PF)  3 mL Intracatheter Q8H     spironolactone  25 mg Oral Daily     Vitamin D3  25 mcg Oral Daily     Continuous Infusions:  PRN  Meds:.acetaminophen **OR** acetaminophen, bisacodyl, calcium carbonate, carboxymethylcellulose PF, glucose **OR** dextrose **OR** glucagon, docusate sodium, HOLD MEDICATION, hydrALAZINE, lidocaine 4%, lidocaine (buffered or not buffered), melatonin, ondansetron **OR** ondansetron, oxyCODONE **OR** oxyCODONE, sodium chloride, sodium chloride (PF), traZODone    Objective:  Vital signs in last 24 hours:  Temp:  [97.7  F (36.5  C)-98.1  F (36.7  C)] 97.9  F (36.6  C)  Pulse:  [66-73] 66  Resp:  [18-20] 18  BP: (138-164)/(65-74) 159/74  SpO2:  [85 %-95 %] 93 %        Intake/Output Summary (Last 24 hours) at 2/9/2023 1627  Last data filed at 2/9/2023 1622  Gross per 24 hour   Intake 790 ml   Output 1950 ml   Net -1160 ml       Physical Exam:    General: Not in obvious distress. Obese  HEENT: NC, AT   Chest: Diminished breath sounds bilaterally  Heart: S1S2 normal, regular. No M/R/G  Abdomen: Soft. NT, ND. Bowel sounds- active.  Extremities: 2+ edema. Legs wrapped  Neuro: Alert and awake, grossly non-focal      Lab Results:(I have personally reviewed the results)    Recent Results (from the past 24 hour(s))   Glucose by meter    Collection Time: 02/10/23  4:59 PM   Result Value Ref Range    GLUCOSE BY METER POCT 79 70 - 99 mg/dL   Glucose by meter    Collection Time: 02/10/23  8:51 PM   Result Value Ref Range    GLUCOSE BY METER POCT 104 (H) 70 - 99 mg/dL   Platelet count    Collection Time: 02/11/23  5:17 AM   Result Value Ref Range    Platelet Count 289 150 - 450 10e3/uL   Basic metabolic panel    Collection Time: 02/11/23  5:17 AM   Result Value Ref Range    Sodium 145 136 - 145 mmol/L    Potassium 3.6 3.4 - 5.3 mmol/L    Chloride 105 98 - 107 mmol/L    Carbon Dioxide (CO2) 31 (H) 22 - 29 mmol/L    Anion Gap 9 7 - 15 mmol/L    Urea Nitrogen 13.2 8.0 - 23.0 mg/dL    Creatinine 1.72 (H) 0.51 - 0.95 mg/dL    Calcium 8.7 (L) 8.8 - 10.2 mg/dL    Glucose 87 70 - 99 mg/dL    GFR Estimate 32 (L) >60 mL/min/1.73m2   Glucose by  meter    Collection Time: 02/11/23 12:00 PM   Result Value Ref Range    GLUCOSE BY METER POCT 106 (H) 70 - 99 mg/dL       All laboratory and imaging data in the past 24 hours reviewed  Serum Glucose range:   Recent Labs   Lab 02/11/23  1200 02/11/23  0517 02/10/23  2051 02/10/23  1659   * 87 104* 79     ABG:   Recent Labs   Lab 02/08/23  1419   PH 7.50*   PCO2 35   PO2 58*   HCO3 28     CBC:   Recent Labs   Lab 02/11/23  0517 02/09/23  0527 02/08/23  1312   WBC  --  8.1 10.2   HGB  --  9.2* 10.4*   HCT  --  30.9* 33.2*   MCV  --  87 84    301 358     Chemistry:   Recent Labs   Lab 02/11/23  0517 02/10/23  0450 02/09/23  0527    145 143   POTASSIUM 3.6 3.8 3.3*   CHLORIDE 105 105 107   CO2 31* 29 26   BUN 13.2 12.5 13.8   CR 1.72* 1.55* 1.57*   GFRESTIMATED 32* 36* 35*   JARETT 8.7* 8.7* 8.6*   MAG  --   --  1.8     Coags:  No results for input(s): INR, PROTIME, PTT in the last 168 hours.    Invalid input(s): APTT  Cardiac Markers:  No results for input(s): CKTOTAL, TROPONINI in the last 168 hours.       Cardiac Catheterization    Result Date: 2/8/2023    Exertional dyspnea and acute heart failure in an obese diabetic woman with renal insufficiency   Elevated filling pressures extending back from the left ventricle, see numbers below.   AV gradient 14 mmHg by pullback   No significant LV-PCWP gradient        Latest radiology report personally reviewed.    Note created using dragon voice recognition software so sounds alike errors may have escaped editing.      02/11/2023   Donnell Rosenberg MD  Hospitalist, [facility]  Pager: [phone]

## 2023-02-11 NOTE — PLAN OF CARE
Heart Failure Care Map  GOALS TO BE MET BEFORE DISCHARGE:    1. Decrease congestion and/or edema with diuretic therapy to achieve near optimal volume status.     Dyspnea improved: Yes, satisfactory for discharge.   Edema improved: No, further care required to meet this goal. Please explain   2+ bilateral edema        Last 24 hour I/O:   Intake/Output Summary (Last 24 hours) at 2/11/2023 1734  Last data filed at 2/11/2023 1700  Gross per 24 hour   Intake 360 ml   Output 3550 ml   Net -3190 ml           Net I/O and Weights since admission:   01/12 2300 - 02/11 2259  In: 1510 [P.O.:1510]  Out: 8450 [Urine:8450]  Net: -6940     Vitals:    02/08/23 1245 02/09/23 0600 02/10/23 0649 02/11/23 0347   Weight: 108.9 kg (240 lb) 108.5 kg (239 lb 3.2 oz) 107.3 kg (236 lb 8.9 oz) 103.4 kg (227 lb 15.3 oz)       2.  O2 sats > 90% on room air, or at prior home O2 therapy level.      Able to wean O2 this shift to keep sats above 90%?: No, further care required to meet this goal. Please explain Still requires 0.5L  NC O2 to keep sats above 90%   Does patient use Home O2? No          Current oxygenation status:   SpO2: 94 %     O2 Device: Nasal cannula, Oxygen Delivery: 1/2 LPM    3.  Tolerates ambulation and mobility near baseline.     Ambulation: Yes, satisfactory for discharge.   Times patient ambulated with staff this shift: 1    A&O x4. No c/o pain. O2 sats are in the mid. 90's on 0.5L NC.  Not able to wean pt off O2 this shift- Desats to mid 80's at rest, and mid 70's while active. Purewick in place. Tele-NSR    Please review the Heart Failure Care Map for additional HF goal outcomes.    Aria Barry RN  2/11/2023

## 2023-02-12 ENCOUNTER — APPOINTMENT (OUTPATIENT)
Dept: OCCUPATIONAL THERAPY | Facility: HOSPITAL | Age: 68
DRG: 286 | End: 2023-02-12
Payer: COMMERCIAL

## 2023-02-12 VITALS
HEIGHT: 62 IN | SYSTOLIC BLOOD PRESSURE: 181 MMHG | BODY MASS INDEX: 41.38 KG/M2 | TEMPERATURE: 97.8 F | RESPIRATION RATE: 20 BRPM | DIASTOLIC BLOOD PRESSURE: 74 MMHG | WEIGHT: 224.87 LBS | OXYGEN SATURATION: 94 % | HEART RATE: 80 BPM

## 2023-02-12 LAB
ANION GAP SERPL CALCULATED.3IONS-SCNC: 12 MMOL/L (ref 7–15)
BUN SERPL-MCNC: 11.9 MG/DL (ref 8–23)
CALCIUM SERPL-MCNC: 9 MG/DL (ref 8.8–10.2)
CHLORIDE SERPL-SCNC: 102 MMOL/L (ref 98–107)
CREAT SERPL-MCNC: 1.64 MG/DL (ref 0.51–0.95)
DEPRECATED HCO3 PLAS-SCNC: 29 MMOL/L (ref 22–29)
GFR SERPL CREATININE-BSD FRML MDRD: 34 ML/MIN/1.73M2
GLUCOSE BLDC GLUCOMTR-MCNC: 101 MG/DL (ref 70–99)
GLUCOSE BLDC GLUCOMTR-MCNC: 90 MG/DL (ref 70–99)
GLUCOSE SERPL-MCNC: 83 MG/DL (ref 70–99)
POTASSIUM SERPL-SCNC: 3.4 MMOL/L (ref 3.4–5.3)
SODIUM SERPL-SCNC: 143 MMOL/L (ref 136–145)

## 2023-02-12 PROCEDURE — 250N000013 HC RX MED GY IP 250 OP 250 PS 637: Performed by: INTERNAL MEDICINE

## 2023-02-12 PROCEDURE — 99232 SBSQ HOSP IP/OBS MODERATE 35: CPT | Performed by: INTERNAL MEDICINE

## 2023-02-12 PROCEDURE — 36415 COLL VENOUS BLD VENIPUNCTURE: CPT | Performed by: INTERNAL MEDICINE

## 2023-02-12 PROCEDURE — 250N000011 HC RX IP 250 OP 636: Performed by: HOSPITALIST

## 2023-02-12 PROCEDURE — 99239 HOSP IP/OBS DSCHRG MGMT >30: CPT | Performed by: INTERNAL MEDICINE

## 2023-02-12 PROCEDURE — 80048 BASIC METABOLIC PNL TOTAL CA: CPT | Performed by: INTERNAL MEDICINE

## 2023-02-12 PROCEDURE — 99207 PR NO CHARGE LOS: CPT | Performed by: INTERNAL MEDICINE

## 2023-02-12 PROCEDURE — 97535 SELF CARE MNGMENT TRAINING: CPT | Mod: GO

## 2023-02-12 PROCEDURE — 250N000013 HC RX MED GY IP 250 OP 250 PS 637: Performed by: HOSPITALIST

## 2023-02-12 RX ORDER — AMLODIPINE BESYLATE 5 MG/1
5 TABLET ORAL DAILY
Qty: 30 TABLET | Refills: 0 | Status: SHIPPED | OUTPATIENT
Start: 2023-02-13

## 2023-02-12 RX ORDER — BUMETANIDE 2 MG/1
4 TABLET ORAL
Qty: 60 TABLET | Refills: 0 | Status: SHIPPED | OUTPATIENT
Start: 2023-02-12 | End: 2023-03-23

## 2023-02-12 RX ORDER — ISOSORBIDE MONONITRATE 60 MG/1
60 TABLET, EXTENDED RELEASE ORAL 2 TIMES DAILY
Qty: 60 TABLET | Refills: 0 | Status: SHIPPED | OUTPATIENT
Start: 2023-02-12

## 2023-02-12 RX ORDER — HYDRALAZINE HYDROCHLORIDE 100 MG/1
100 TABLET, FILM COATED ORAL 3 TIMES DAILY
Qty: 90 TABLET | Refills: 0 | Status: SHIPPED | OUTPATIENT
Start: 2023-02-12 | End: 2023-06-21

## 2023-02-12 RX ORDER — ISOSORBIDE DINITRATE 40 MG/1
40 TABLET ORAL
Qty: 90 TABLET | Refills: 0 | Status: SHIPPED | OUTPATIENT
Start: 2023-02-12 | End: 2023-02-12

## 2023-02-12 RX ORDER — AMLODIPINE BESYLATE 5 MG/1
5 TABLET ORAL DAILY
Status: DISCONTINUED | OUTPATIENT
Start: 2023-02-12 | End: 2023-02-12 | Stop reason: HOSPADM

## 2023-02-12 RX ORDER — AMLODIPINE BESYLATE 2.5 MG/1
2.5 TABLET ORAL DAILY
Status: DISCONTINUED | OUTPATIENT
Start: 2023-02-12 | End: 2023-02-12

## 2023-02-12 RX ORDER — SPIRONOLACTONE 25 MG/1
25 TABLET ORAL DAILY
Qty: 30 TABLET | Refills: 0 | Status: SHIPPED | OUTPATIENT
Start: 2023-02-13 | End: 2023-06-05

## 2023-02-12 RX ADMIN — INSULIN GLARGINE 10 UNITS: 100 INJECTION, SOLUTION SUBCUTANEOUS at 08:37

## 2023-02-12 RX ADMIN — ATORVASTATIN CALCIUM 80 MG: 40 TABLET, FILM COATED ORAL at 08:39

## 2023-02-12 RX ADMIN — HYDRALAZINE HYDROCHLORIDE 100 MG: 50 TABLET, FILM COATED ORAL at 14:11

## 2023-02-12 RX ADMIN — ISOSORBIDE DINITRATE 40 MG: 10 TABLET ORAL at 08:38

## 2023-02-12 RX ADMIN — SPIRONOLACTONE 25 MG: 25 TABLET, FILM COATED ORAL at 08:39

## 2023-02-12 RX ADMIN — PANTOPRAZOLE SODIUM 40 MG: 40 TABLET, DELAYED RELEASE ORAL at 07:23

## 2023-02-12 RX ADMIN — Medication 25 MCG: at 08:39

## 2023-02-12 RX ADMIN — METOPROLOL SUCCINATE 200 MG: 100 TABLET, EXTENDED RELEASE ORAL at 08:39

## 2023-02-12 RX ADMIN — HYDRALAZINE HYDROCHLORIDE 100 MG: 50 TABLET, FILM COATED ORAL at 08:38

## 2023-02-12 RX ADMIN — ISOSORBIDE DINITRATE 40 MG: 10 TABLET ORAL at 14:10

## 2023-02-12 RX ADMIN — PSYLLIUM HUSK 1 PACKET: 3.4 POWDER ORAL at 08:38

## 2023-02-12 RX ADMIN — HEPARIN SODIUM 5000 UNITS: 10000 INJECTION, SOLUTION INTRAVENOUS; SUBCUTANEOUS at 07:25

## 2023-02-12 RX ADMIN — EMPAGLIFLOZIN 10 MG: 10 TABLET, FILM COATED ORAL at 08:39

## 2023-02-12 RX ADMIN — ESCITALOPRAM 20 MG: 10 TABLET, FILM COATED ORAL at 08:38

## 2023-02-12 RX ADMIN — AMLODIPINE BESYLATE 5 MG: 5 TABLET ORAL at 10:28

## 2023-02-12 RX ADMIN — SALINE NASAL SPRAY 1 SPRAY: 1.5 SOLUTION NASAL at 08:44

## 2023-02-12 RX ADMIN — BUMETANIDE 4 MG: 2 TABLET ORAL at 08:39

## 2023-02-12 ASSESSMENT — ACTIVITIES OF DAILY LIVING (ADL)
ADLS_ACUITY_SCORE: 24
ADLS_ACUITY_SCORE: 28
ADLS_ACUITY_SCORE: 28
ADLS_ACUITY_SCORE: 24
ADLS_ACUITY_SCORE: 24

## 2023-02-12 NOTE — PROGRESS NOTES
Care Management Discharge Note    Discharge Date: 02/12/2023       Discharge Disposition: Home    Discharge Services: None    Discharge DME:  (Per treatment team)    Discharge Transportation: family or friend will provide, health plan transportation    Private pay costs discussed: Not applicable    PAS Confirmation Code:    Patient/family educated on Medicare website which has current facility and service quality ratings:  (NA)    Education Provided on the Discharge Plan:    Persons Notified of Discharge Plans: MD, RN, SW, patient  Patient/Family in Agreement with the Plan:      Handoff Referral Completed: Yes    Additional Information:  Patient from home with , independent at baseline. Patient will discharge home, spouse to transport.         Damaris Joya

## 2023-02-12 NOTE — PROGRESS NOTES
"  HEART CARE CONSULTATON NOTE        Assessment/Recommendations   Assessment:  1.  Acute on chronic heart failure with preserved ejection fraction responding well to diuresis  2.  Pulmonary hypertension who class II  3.  Valvular heart disease with mild aortic stenosis and moderate mitral stenosis  4.  Chronic kidney disease  5.  Morbid obesity  6.  Hypertension: Poorly controlled     Plan:  1.  Continue Jardiance  2.  Continue on Bumex 4 mg twice daily  3.  Continue on spironolactone  4.  Amlodipine increased for better blood pressure control, continue metoprolol, isosorbide, hydralazine  5.  Possible discharge today.  O2 requirements being evaluated further  Needs outpatient sleep study evaluation and follow-up in our heart failure clinic.  May be a candidate for [facility]       History of Present Illness/Subjective    Patient reports that she is feeling much better and is anxious to go home       Physical Examination  Review of Systems   VITALS: BP (!) 163/70   Pulse 73   Temp 97.6  F (36.4  C) (Oral)   Resp 18   Ht 1.575 m (5' 2\")   Wt 102 kg (224 lb 13.9 oz)   SpO2 95%   BMI 41.13 kg/m    BMI: Body mass index is 41.13 kg/m .  Wt Readings from Last 3 Encounters:   02/12/23 102 kg (224 lb 13.9 oz)   02/08/23 108.4 kg (239 lb)       Intake/Output Summary (Last 24 hours) at 2/12/2023 1049  Last data filed at 2/12/2023 1026  Gross per 24 hour   Intake 280 ml   Output 2600 ml   Net -2320 ml     General Appearance:   no distress, normal body habitus   ENT/Mouth: membranes moist, no oral lesions or bleeding gums.      EYES:  no scleral icterus, normal conjunctivae   Neck: no carotid bruits or thyromegaly   Chest/Lungs:   lungs are clear to auscultation   Cardiovascular:   Regular. Normal first and second heart sounds with no murmurs ++ edema bilaterally    Abdomen:  no organomegaly, masses, bruits, or tenderness; bowel sounds are present   Extremities: no cyanosis or clubbing   Skin: no xanthelasma, warm.  "   Neurologic: normal  bilateral, no tremors     Psychiatric: alert and oriented x3, calm     Review Of Systems  Skin: negative  Eyes: negative  Ears/Nose/Throat: negative  Respiratory: No shortness of breath, dyspnea on exertion, cough, or hemoptysis  Cardiovascular: negative  Gastrointestinal: negative  Genitourinary: negative  Musculoskeletal: negative  Neurologic: negative  Psychiatric: negative  Hematologic/Lymphatic/Immunologic: negative  Endocrine: negative          Lab Results    Chemistry/lipid CBC Cardiac Enzymes/BNP/TSH/INR   Recent Labs   Lab Test 05/17/22  1417   CHOL 218*   HDL 39*   *   TRIG 244*     Recent Labs   Lab Test 05/17/22  1417 04/15/21  1029   * 99     Recent Labs   Lab Test 02/12/23  0750 02/12/23  0432   NA  --  143   POTASSIUM  --  3.4   CHLORIDE  --  102   CO2  --  29   GLC 90 83   BUN  --  11.9   CR  --  1.64*   GFRESTIMATED  --  34*   JARETT  --  9.0     Recent Labs   Lab Test 02/12/23  0432 02/11/23  0517 02/10/23  0450   CR 1.64* 1.72* 1.55*     Recent Labs   Lab Test 02/08/23  1312   A1C 6.2*          Recent Labs   Lab Test 02/11/23  0517 02/09/23  0527   WBC  --  8.1   HGB  --  9.2*   HCT  --  30.9*   MCV  --  87    301     Recent Labs   Lab Test 02/09/23  0527 02/08/23  1312 01/19/23  1403   HGB 9.2* 10.4* 10.5*    No results for input(s): TROPONINI in the last 76922 hours.  Recent Labs   Lab Test 01/19/23  1403   NTBNP 4,666*     No results for input(s): TSH in the last 24079 hours.  No results for input(s): INR in the last 53627 hours.     Medical History  Surgical History Family History Social History   Past Medical History:   Diagnosis Date     Aortic stenosis      Benign essential hypertension      Chronic heart failure with preserved ejection fraction (HFpEF) (H)      CKD (chronic kidney disease) stage 3, GFR 30-59 ml/min (H)      Diabetes mellitus (H)      Mitral stenosis      Obesity      Pulmonary hypertension (H)      Past Surgical History:    Procedure Laterality Date     CV LEFT HEART CATH N/A 2/8/2023    Procedure: Left Heart Catheterization;  Surgeon: Yolanda Ramirez MD;  Location: [facility] CATH LAB CV     CV RIGHT HEART CATH MEASUREMENTS RECORDED N/A 2/8/2023    Procedure: Right Heart Catheterization;  Surgeon: Yolanda Ramirez MD;  Location: [facility] CATH LAB CV     No family history of heart disease   Social History     Socioeconomic History     Marital status:      Spouse name: Not on file     Number of children: Not on file     Years of education: Not on file     Highest education level: Not on file   Occupational History     Not on file   Tobacco Use     Smoking status: Never     Smokeless tobacco: Never   Substance and Sexual Activity     Alcohol use: Never     Drug use: Never     Sexual activity: Not Currently   Other Topics Concern     Not on file   Social History Narrative     Not on file     Social Determinants of Health     Financial Resource Strain: Not on file   Food Insecurity: Not on file   Transportation Needs: Not on file   Physical Activity: Not on file   Stress: Not on file   Social Connections: Not on file   Intimate Partner Violence: Not on file   Housing Stability: Not on file         Medications  Allergies   Current Outpatient Medications   Medication Sig Dispense Refill     [START ON 2/13/2023] amLODIPine (NORVASC) 5 MG tablet Take 1 tablet (5 mg) by mouth daily 30 tablet 0     bumetanide (BUMEX) 2 MG tablet Take 2 tablets (4 mg) by mouth 2 times daily 60 tablet 0     [START ON 2/13/2023] empagliflozin (JARDIANCE) 10 MG TABS tablet Take 1 tablet (10 mg) by mouth daily 30 tablet 0     hydrALAZINE (APRESOLINE) 100 MG tablet Take 1 tablet (100 mg) by mouth 3 times daily 90 tablet 0     isosorbide dinitrate (ISORDIL) 40 MG TABS Take 1 tablet (40 mg) by mouth 3 times daily 90 tablet 0     [START ON 2/13/2023] spironolactone (ALDACTONE) 25 MG tablet Take 1 tablet (25 mg) by mouth daily 30 tablet 0        Allergies    Allergen Reactions     Lisinopril Other (See Comments) and Unknown         Amanda Pedro MD

## 2023-02-12 NOTE — PLAN OF CARE
2Occupational Therapy Discharge Summary    Reason for therapy discharge:    Discharged to home with home therapy.    Progress towards therapy goal(s). See goals on Care Plan in [facility] electronic health record for goal details.  Goals met    Therapy recommendation(s):    Continued therapy is recommended.  Rationale/Recommendations:  recommend Home OT.

## 2023-02-12 NOTE — PLAN OF CARE
Goal Outcome Evaluation:    Heart Failure Care Map  GOALS TO BE MET BEFORE DISCHARGE:    1. Decrease congestion and/or edema with diuretic therapy to achieve near optimal volume status.     Dyspnea improved: Yes, satisfactory for discharge.   Edema improved: Yes, satisfactory for discharge.        Last 24 hour I/O:   Intake/Output Summary (Last 24 hours) at 2/12/2023 1341  Last data filed at 2/12/2023 1229  Gross per 24 hour   Intake 280 ml   Output 2800 ml   Net -2520 ml           Net I/O and Weights since admission:   01/13 1500 - 02/12 1459  In: 1790 [P.O.:1790]  Out: 95333 [Urine:31487]  Net: -8760     Vitals:    02/08/23 1245 02/09/23 0600 02/10/23 0649 02/11/23 0347   Weight: 108.9 kg (240 lb) 108.5 kg (239 lb 3.2 oz) 107.3 kg (236 lb 8.9 oz) 103.4 kg (227 lb 15.3 oz)    02/12/23 0644   Weight: 102 kg (224 lb 13.9 oz)       2.  O2 sats > 90% on room air, or at prior home O2 therapy level.      Able to wean O2 this shift to keep sats above 90%?: Yes, satisfactory for discharge.   Does patient use Home O2? No          Current oxygenation status:   SpO2: 94 %     O2 Device: None (Room air), Oxygen Delivery: 1/2 LPM    3.  Tolerates ambulation and mobility near baseline.     Ambulation: Yes, satisfactory for discharge.   Times patient ambulated with staff this shift: 1    Please review the Heart Failure Care Map for additional HF goal outcomes.    BP elevated today, adjustments made to BP meds. Weaned to room air. Started on PO bumex. Up SBA.     Discharge instructions given and reviewed with patient and daughter. Heart failure folder provided. Educated on importance of daily weights. Prescriptions sent to patient's preferred pharmacy. Patient's pharmacy notified her that it would cost $1200 for the isosorbide dinitrate. Cardiology notified and prescription changed to isosorbide mononitrate. Patient discharging home with family to transport.    Mitzi Au RN  2/12/2023

## 2023-02-12 NOTE — DISCHARGE SUMMARY
[facility] MEDICINE  DISCHARGE SUMMARY     Primary Care Physician: Flora Betts  Admission Date: 2/8/2023   Discharge Provider: JOSEY Sprague Discharge Date: 2/12/2023   Diet: as below   Code Status: Full Code   Activity: DCACTIVITY: Activity as tolerated        Condition at Discharge: Stable     REASON FOR PRESENTATION(See Admission Note for Details)   CHF exacerbation    PRINCIPAL & ACTIVE DISCHARGE DIAGNOSES     Principal Problem:    Status post coronary angiogram  Active Problems:    Acute heart failure with preserved ejection fraction (HFpEF) (H)    Stage 3a chronic kidney disease (H)    Benign essential hypertension    Type 2 diabetes mellitus, with long-term current use of insulin (H)    Class 2 obesity due to excess calories in adult    Nonrheumatic mitral valve stenosis    Nonrheumatic aortic (valve) stenosis    Peripheral edema    Pulmonary hypertension (H)      PENDING LABS     Unresulted Labs Ordered in the Past 30 Days of this Admission     No orders found from 1/9/2023 to 2/9/2023.            PROCEDURES ( this hospitalization only)      Procedure(s):  Right Heart Catheterization  Left Heart Catheterization    RECOMMENDATIONS TO OUTPATIENT PROVIDER FOR F/U VISIT     Follow-up Appointments     Add follow up information to the AVS prior to printing      Follow-up and recommended labs and tests       Follow up with primary care provider, Flora Betts, within 3-5 days for   hospital follow- up.  The following labs/tests are recommended: BMP.                 DISPOSITION     Home with home care    SUMMARY OF HOSPITAL COURSE:      Indu Felix is a 68 year old female admitted on 2/8/2023. She has history of obesity, hypertension, CKD 3, diabetes, mitral stenosis, aortic stenosis, pulmonary hypertension, CHF presents for admission, she was sent in urgently from the cardiology clinic for right heart cath, and admitted for IV diuresis     #Acute diastolic heart  failure  -- Patient has recently been started on oral diuretics with benefit, but continued with orthopnea, significant edema up to her abdomen.  Seen on 2/8/23 in rapid access cardiology clinic, concerned that she urgently needs IV diuresis with monitoring of renal function given CKD.    --S/P right heart cath 2/8/23 to assess volume status. Seems grossly volume overloaded.  -- Diuresed with IV Bumex.  She diuresed about 25 lbs while here. Bumex switched to 4mg PO BID (was on 3mg BID at home).  -- Cardiology managing, appreciate assistance  -- Jardiance added per cardiology  -- Needs outpatient sleep study- high likelihood of untreated DIANA contributing to cardiac issues  -- Cardiology okayed for discharge home today. Advised close follow up with PCP and CHF clinic.     #Peripheral edema  -- 2+ edema up to her abdomen.  Having some scaly/weeping skin secondary to skin tension  -- Lymphedema PT consult. Legs are wrapped 2/9/23. Follow up in the lymphedema clinic     #Chronic diarrhea  -- Patient has urgent loose bowel movements after almost every meal.  Has been restricting her oral intake due to this.  She is not sure if she is willing to work with physical therapy since she is worried she will have stool incontinence.  Imodium as needed  -- Daughter reports patient had benefit from Metamucil in the past. Restarted her on Metamucil.     #Deconditioning  -- PT and OT  -- Strongly encouraged patient to work with rehab services     #CKD 3  -- Cr 1.68 on admission, similar to recent baseline  -- Monitor Cr with diuresis     #IDDM  -- Home regimen is Lantus 16 units QAM. Lantus 10 units for now and novolog sliding scale and monitor BG trend  -- A1c 6.2     #HTN  -- Uncontrolled. Home amlodipine dose decreased; Hydralazine dose increased. Isordil and spironolactone added. Cont with the same dose of metoprolol     #Mitral stenosis  #Aortic stenosis  -- Cardiology following and recommending yearly echo     #Anxiety, home  Xanax  #Mood, home Lexapro  #HLD, home atorvastatin  #GERD, home PPI  #Insomnia, home trazodone    I attempted calling patient's daughter, Solange for update per request. She didn't  my phone. Left a voice mail with a number to call me back.     Discharge Medications with Med changes:     Current Discharge Medication List      START taking these medications    Details   empagliflozin (JARDIANCE) 10 MG TABS tablet Take 1 tablet (10 mg) by mouth daily  Qty: 30 tablet, Refills: 0    Associated Diagnoses: Acute heart failure with preserved ejection fraction (HFpEF) (H)      Imdur 60mg PO BID Take 1 tablet (60 mg) by mouth 2 times daily  Qty: 60 tablet, Refills: 0    Associated Diagnoses: Acute heart failure with preserved ejection fraction (HFpEF) (H)      spironolactone (ALDACTONE) 25 MG tablet Take 1 tablet (25 mg) by mouth daily  Qty: 30 tablet, Refills: 0    Associated Diagnoses: Acute heart failure with preserved ejection fraction (HFpEF) (H)         CONTINUE these medications which have CHANGED    Details   amLODIPine (NORVASC) 5 MG tablet Take 1 tablet (5 mg) by mouth daily  Qty: 30 tablet, Refills: 0    Associated Diagnoses: Benign essential hypertension      bumetanide (BUMEX) 2 MG tablet Take 2 tablets (4 mg) by mouth 2 times daily  Qty: 60 tablet, Refills: 0    Associated Diagnoses: Acute heart failure with preserved ejection fraction (HFpEF) (H)      hydrALAZINE (APRESOLINE) 100 MG tablet Take 1 tablet (100 mg) by mouth 3 times daily  Qty: 90 tablet, Refills: 0    Associated Diagnoses: Acute heart failure with preserved ejection fraction (HFpEF) (H)         CONTINUE these medications which have NOT CHANGED    Details   ALPRAZolam (XANAX) 0.5 MG tablet Take 0.5 mg by mouth See Admin Instructions One tab orally 30 min - 1 hour prior to appointments      atorvastatin (LIPITOR) 80 MG tablet Take 80 mg by mouth daily      cholecalciferol 25 MCG (1000 UT) TABS Take 1 tablet by mouth daily      escitalopram  (LEXAPRO) 20 MG tablet Take 20 mg by mouth daily      fish oil-omega-3 fatty acids 1000 MG capsule Take 1,000 mg by mouth daily      insulin glargine (LANTUS SOLOSTAR) 100 UNIT/ML pen 16 Units every morning      loperamide (IMODIUM) 2 MG capsule Take 2 mg by mouth 2 times daily as needed for diarrhea      melatonin 3 MG tablet Take 6 mg by mouth nightly as needed for sleep      metoprolol succinate ER (TOPROL XL) 200 MG 24 hr tablet 200 mg daily      multivitamin (ONE-DAILY) tablet Take 1 tablet by mouth daily      omeprazole (PRILOSEC) 20 MG DR capsule 20 mg daily before breakfast      traZODone (DESYREL) 100 MG tablet Take 100 mg by mouth At Bedtime      blood glucose (CONTOUR NEXT TEST) test strip USE TO TEST TWICE DAILY for 75                   Rationale for medication changes:      Please see above.        Consults   Cardiology       Immunizations given this encounter     Most Recent Immunizations   Administered Date(s) Administered     COVID-19 Vaccine 12+ (Pfizer 2022) 05/18/2022     Influenza Vaccine 65+ (Fluzone HD) 10/05/2022           Anticoagulation Information      Recent INR results: No results for input(s): INR in the last 168 hours.  Warfarin doses (if applicable) or name of other anticoagulant: NA      SIGNIFICANT IMAGING FINDINGS     No results found for this visit on 02/08/23.    SIGNIFICANT LABORATORY FINDINGS     Most Recent 3 CBC's:Recent Labs   Lab Test 02/11/23  0517 02/09/23  0527 02/08/23  1312 01/19/23  1403   WBC  --  8.1 10.2 10.6   HGB  --  9.2* 10.4* 10.5*   MCV  --  87 84 90    301 358 413     Most Recent 3 BMP's:Recent Labs   Lab Test 02/12/23  0750 02/12/23  0432 02/11/23  2115 02/11/23  0807 02/11/23  0517 02/10/23  0740 02/10/23  0450   NA  --  143  --   --  145  --  145   POTASSIUM  --  3.4  --   --  3.6  --  3.8   CHLORIDE  --  102  --   --  105  --  105   CO2  --  29  --   --  31*  --  29   BUN  --  11.9  --   --  13.2  --  12.5   CR  --  1.64*  --   --  1.72*  --  1.55*  "  ANIONGAP  --  12  --   --  9  --  11   JARETT  --  9.0  --   --  8.7*  --  8.7*   GLC 90 83 91   < > 87   < > 93    < > = values in this interval not displayed.     Most Recent 2 LFT's:Recent Labs   Lab Test 01/19/23  1403 05/17/22  1417   AST 19 20   ALT 11 14   ALKPHOS 138* 110   BILITOTAL 0.5 0.5     Most Recent 3 INR's:No lab results found.      Discharge Orders        Follow-Up with Cardiology      Follow-Up with Cardiology [facility] Heart Failure Discharge      Home Care Referral      Reason for your hospital stay    Shortness of breath     Follow-up and recommended labs and tests     Follow up with primary care provider, Flora Betts, within 3-5 days for hospital follow- up.  The following labs/tests are recommended: BMP.     Activity    Your activity upon discharge: activity as tolerated     Monitor and record    Weigh yourself every morning     When to contact your care team    Contact your care team If symptoms get worse, If increased shortness of breath, If waking up at night with difficulty breathing, If unable to lie down for sleep due to symptoms, If weight gain of 2 pounds a day for 2 days in a row OR 5 pounds in 1 week., Increased swelling in your ankles or legs, and Dizziness or lightheadedness     Discharge Follow Up - with Primary Care clinic within 3-5 days (RN to schedule prior to d/c for HE/[facility] primary care     Add follow up information to the AVS prior to printing     Diet    Follow this diet upon discharge: Orders Placed This Encounter      Combination Diet Low Saturated Fat Diet, Low Saturated Fat Na <2400mg Diet       Examination   BP (!) 163/70   Pulse 73   Temp 97.6  F (36.4  C) (Oral)   Resp 18   Ht 1.575 m (5' 2\")   Wt 102 kg (224 lb 13.9 oz)   SpO2 95%   BMI 41.13 kg/m    General: Not in obvious distress. Obese  HEENT: NC, AT   Chest: Diminished breath sounds bilaterally  Heart: S1S2 normal, regular. No M/R/G  Abdomen: Soft. NT, ND. Bowel sounds- active.  Extremities: 2+ edema. Legs " wrapped  Neuro: Alert and awake, grossly non-focal    Please see EMR for more detailed significant labs, imaging, consultant notes etc.    I, JOSEY Sprague, personally saw the patient today and spent greater than 30 minutes discharging this patient.    JOSEY Sprague  [facility]    CC:Flora Betts

## 2023-02-12 NOTE — PLAN OF CARE
Problem: Plan of Care - These are the overarching goals to be used throughout the patient stay.    Goal: Optimal Comfort and Wellbeing  Outcome: Progressing     Problem: Cardiac Catheterization (Diagnostic/Interventional)  Goal: Stable Heart Rate and Rhythm  Outcome: Progressing     Problem: Heart Failure  Goal: Optimal Cardiac Output  Outcome: Progressing   Goal Outcome Evaluation:    Pt restful, comfortable over noc. Denies pain, denies shortness of breath. Weight trending down. Change to oral diuretics today. O2 remains at 0.5L nasal cannula. Falls precautions in place.

## 2023-02-13 ENCOUNTER — TELEPHONE (OUTPATIENT)
Dept: CARDIOLOGY | Facility: CLINIC | Age: 68
End: 2023-02-13
Payer: COMMERCIAL

## 2023-02-13 DIAGNOSIS — I50.31 ACUTE HEART FAILURE WITH PRESERVED EJECTION FRACTION (HFPEF) (H): Primary | ICD-10-CM

## 2023-02-13 NOTE — PLAN OF CARE
Lymphedema Discharge Summary    Reason for therapy discharge:    Discharged to home.    Progress towards therapy goal(s). See goals on Care Plan in [facility] electronic health record for goal details.  Goals met    Therapy recommendation(s):    No further therapy is recommended.

## 2023-02-13 NOTE — PROGRESS NOTES
02/09/23 0900   Appointment Info   Signing Clinician's Name / Credentials (OT) Lilia Murray, OTR/L   Quick Adds   Quick Adds Edema   Living Environment   Current Living Arrangements house   Living Environment Comments Patient reports being independent at baseline   Self-Care   Equipment Currently Used at Home none   Fall history within last six months no   General Information   Onset of Illness/Injury or Date of Surgery 02/08/23   Referring Physician Martina Sales MD   Patient/Family Therapy Goal Statement (OT) none stated   Cognitive Status Examination   Orientation Status orientation to person, place and time   Edema General Information   Onset of Edema   (Reports swelling for ~1 year)   Affected Body Part(s) Left LE;Right LE   Edema Etiology   (Heart Failure, kidney disease)   Edema Precautions Cardiac Edema/CHF   General Comments/Previous Edema Treatment/Edema Equipment Educated patient on lymphedema services and risk of continue edema with wounds, infection, etc   Edema Examination/Assessment   Skin Condition Pitting;Hemosiderin deposits;Dryness  (very dry)   Ulcerations No   Stemmer Sign Positive   Pitting Assessment 2+   Fibrosis Assessment Legs are fibrotic, pitting in some areas   Edema Assessment Comments Patient reports she was trying to wrap her legs at home (has not recieved home care or outpatient lymphedema services) did not tolerate wrapping self as wraps were too tight (sounds like patient was uisng ACE wraps)   Clinical Impression   Criteria for Skilled Therapeutic Interventions Met (OT) Yes, treatment indicated   Edema: Patient Presentation Edema   Edema: Planned Interventions Fit for compression garment;Precautions to prevent infection/exacerbation;Education   Clinical Decision Making Complexity (OT) low complexity   Risk & Benefits of therapy have been explained evaluation/treatment results reviewed;care plan/treatment goals reviewed   OT Total Evaluation Time   OT Eval, Low Complexity  Minutes (16974) 15   OT Goals   Therapy Frequency (OT) Daily   OT Predicted Duration/Target Date for Goal Attainment 02/16/23   OT Goals Edema   OT: Edema education to increase ability to manage edema after discharge from the hospital Patient;Verbalize;wear schedule;garrnet/bandage care;signs/symptoms of intolerance   OT: Management of edema bandages Patient;Verbalize;quick wrap   OT: Functional edema exercise program to reduce limb volume, increase activity tolerance and improve independence with ADL Patient;Ferron;HEP   Self-Care/Home Management   Self-Care/Home Mgmt/ADL, Compensatory, Meal Prep Minutes (15411) 24   Treatment Detail/Skilled Intervention Lymph: Washed/dried/applied lotion to bilateral lower extremities. Wrapped with SSB, 25-50% overlap, medium compression, herringbone technique.(artiflex/stockinette base layers) Educated on indications for removal w/ patient and RN.   OT Discharge Planning   OT Plan Lymph: rewrap, if tolerate may go longer between checks, may not need to be seen on weekend?   OT Discharge Recommendation (DC Rec)   (patient will likely  need continued lymphedema care at discharge)   OT Brief overview of current status Patient currently with bilateral lower extremity edema, would benefit from continued lymphedema management to reduce edema and provide education for long-term compression use.   Total Session Time   Timed Code Treatment Minutes 24   Total Session Time (sum of timed and untimed services) 39      [facility]  OUTPATIENT OCCUPATIONAL THERAPY  EVALUATION  PLAN OF TREATMENT FOR OUTPATIENT REHABILITATION  (COMPLETE FOR INITIAL CLAIMS ONLY)  Patient's Last Name, First Name, M.I.  YOB: 1955  Indu Felix                          Provider's Name  [facility] Medical Record No.  [id number]                             Onset Date:  02/08/23   Start of Care Date:      Type:     ___PT   _X_OT    ___SLP Medical Diagnosis:                       OT Diagnosis:    Visits from SOC:  1     See note for plan of treatment, functional goals and certification details    I CERTIFY THE NEED FOR THESE SERVICES FURNISHED UNDER        THIS PLAN OF TREATMENT AND WHILE UNDER MY CARE     (Physician co-signature of this document indicates review and certification of the therapy plan).

## 2023-02-13 NOTE — TELEPHONE ENCOUNTER
----- Message from Amanda Pedro MD sent at 2/12/2023 10:53 AM CST -----  This patient needs close follow-up in heart failure clinic in next 1 to 2-week.  Being discharged either today or tomorrow.

## 2023-02-21 NOTE — TELEPHONE ENCOUNTER
Noted sched LM 2-13-23 - appt still pending scheduling - msg sent to sched team with request to make another attempt.  mg

## 2023-02-27 ENCOUNTER — LAB REQUISITION (OUTPATIENT)
Dept: LAB | Facility: CLINIC | Age: 68
End: 2023-02-27

## 2023-02-27 DIAGNOSIS — N18.31 CHRONIC KIDNEY DISEASE, STAGE 3A (H): ICD-10-CM

## 2023-02-27 LAB
ANION GAP SERPL CALCULATED.3IONS-SCNC: 18 MMOL/L (ref 7–15)
BUN SERPL-MCNC: 30.7 MG/DL (ref 8–23)
CALCIUM SERPL-MCNC: 10.3 MG/DL (ref 8.8–10.2)
CHLORIDE SERPL-SCNC: 99 MMOL/L (ref 98–107)
CREAT SERPL-MCNC: 1.85 MG/DL (ref 0.51–0.95)
DEPRECATED HCO3 PLAS-SCNC: 23 MMOL/L (ref 22–29)
GFR SERPL CREATININE-BSD FRML MDRD: 29 ML/MIN/1.73M2
GLUCOSE SERPL-MCNC: 223 MG/DL (ref 70–99)
POTASSIUM SERPL-SCNC: 3.8 MMOL/L (ref 3.4–5.3)
SODIUM SERPL-SCNC: 140 MMOL/L (ref 136–145)

## 2023-02-27 PROCEDURE — 80048 BASIC METABOLIC PNL TOTAL CA: CPT | Performed by: FAMILY MEDICINE

## 2023-03-07 ENCOUNTER — OFFICE VISIT (OUTPATIENT)
Dept: SURGERY | Facility: CLINIC | Age: 68
End: 2023-03-07
Attending: FAMILY MEDICINE
Payer: COMMERCIAL

## 2023-03-07 DIAGNOSIS — K82.8 PORCELAIN GALLBLADDER: ICD-10-CM

## 2023-03-07 PROCEDURE — 99203 OFFICE O/P NEW LOW 30 MIN: CPT | Performed by: SPECIALIST

## 2023-03-07 NOTE — LETTER
3/7/2023         RE: Indu Felix  [address] [facility] [facility] [address] [facility] [address]        Dear Colleague,    Thank you for referring your patient, Indu Felix, to the [facility] [place]. Please see a copy of my visit note below.    CC: Gallbladder Problems    HPI:  Patient presents for evaluation of a porcelain gallbladder found incidentally on CT scan.  The CT scan was done because of shortness of breath.  They noted a stone in the gallbladder with calcification of the gallbladder wall.  The patient denies any abdominal pain.  She has no pain when she eats.  She does have loose stools when she eats.  Incidentally on the CT scan they did note significant edema in her tissues consistent with severe congestive heart failure.  Of note, the patient has lost over 50 pounds in the last couple months since that scan with proper treatment of her congestive heart failure.    Please see Chart Review for PMH, medication list, allergies, FH and social history.  Past Medical History:   Diagnosis Date     Aortic stenosis      Benign essential hypertension      Chronic heart failure with preserved ejection fraction (HFpEF) (H)      CKD (chronic kidney disease) stage 3, GFR 30-59 ml/min (H)      Diabetes mellitus (H)      Mitral stenosis      Obesity      Pulmonary hypertension (H)      Past Surgical History:   Procedure Laterality Date     CV LEFT HEART CATH N/A 2/8/2023    Procedure: Left Heart Catheterization;  Surgeon: Yolanda Ramirez MD;  Location: [facility]     CV RIGHT HEART CATH MEASUREMENTS RECORDED N/A 2/8/2023    Procedure: Right Heart Catheterization;  Surgeon: Yolanda Ramirez MD;  Location: [facility] CV         Current Outpatient Medications:      ALPRAZolam (XANAX) 0.5 MG tablet, Take 0.5 mg by mouth See Admin Instructions One tab orally 30 min - 1 hour prior to appointments, Disp: , Rfl:      amLODIPine (NORVASC) 5 MG tablet, Take 1 tablet (5 mg) by  mouth daily, Disp: 30 tablet, Rfl: 0     atorvastatin (LIPITOR) 80 MG tablet, Take 80 mg by mouth daily, Disp: , Rfl:      blood glucose (CONTOUR NEXT TEST) test strip, USE TO TEST TWICE DAILY for 75, Disp: , Rfl:      bumetanide (BUMEX) 2 MG tablet, Take 2 tablets (4 mg) by mouth 2 times daily, Disp: 60 tablet, Rfl: 0     cholecalciferol 25 MCG (1000 UT) TABS, Take 1 tablet by mouth daily, Disp: , Rfl:      empagliflozin (JARDIANCE) 10 MG TABS tablet, Take 1 tablet (10 mg) by mouth daily, Disp: 30 tablet, Rfl: 0     escitalopram (LEXAPRO) 20 MG tablet, Take 20 mg by mouth daily, Disp: , Rfl:      fish oil-omega-3 fatty acids 1000 MG capsule, Take 1,000 mg by mouth daily, Disp: , Rfl:      hydrALAZINE (APRESOLINE) 100 MG tablet, Take 1 tablet (100 mg) by mouth 3 times daily, Disp: 90 tablet, Rfl: 0     insulin glargine (LANTUS SOLOSTAR) 100 UNIT/ML pen, 16 Units every morning, Disp: , Rfl:      isosorbide mononitrate (IMDUR) 60 MG 24 hr tablet, Take 1 tablet (60 mg) by mouth 2 times daily, Disp: 60 tablet, Rfl: 0     loperamide (IMODIUM) 2 MG capsule, Take 2 mg by mouth 2 times daily as needed for diarrhea, Disp: , Rfl:      melatonin 3 MG tablet, Take 6 mg by mouth nightly as needed for sleep, Disp: , Rfl:      metoprolol succinate ER (TOPROL XL) 200 MG 24 hr tablet, 200 mg daily, Disp: , Rfl:      multivitamin (ONE-DAILY) tablet, Take 1 tablet by mouth daily, Disp: , Rfl:      omeprazole (PRILOSEC) 20 MG DR capsule, 20 mg daily before breakfast, Disp: , Rfl:      spironolactone (ALDACTONE) 25 MG tablet, Take 1 tablet (25 mg) by mouth daily, Disp: 30 tablet, Rfl: 0     traZODone (DESYREL) 100 MG tablet, Take 100 mg by mouth At Bedtime, Disp: , Rfl:        Allergies   Allergen Reactions     Lisinopril Other (See Comments) and Unknown       Social History     Socioeconomic History     Marital status:    Tobacco Use     Smoking status: Never     Smokeless tobacco: Never   Substance and Sexual Activity      Alcohol use: Never     Drug use: Never     Sexual activity: Not Currently         Pertinent items are noted in HPI.    Physical Exam:  There were no vitals taken for this visit.    General:alert, appears stated age, cooperative and moderately obese  Eyes: negative  Lungs: clear to auscultation bilaterally  CV: Regular with a 2-3 systolic murmur.  Abdomen:soft, non-tender; bowel sounds normal; no masses,  no organomegaly  Neuro: Grossly normal    Studies:  Lab Results   Component Value Date    WBC 8.1 02/09/2023    HGB 9.2 (L) 02/09/2023    HCT 30.9 (L) 02/09/2023    MCV 87 02/09/2023     02/11/2023     Lab Results   Component Value Date    ALT 11 01/19/2023    AST 19 01/19/2023    ALKPHOS 138 (H) 01/19/2023     CT scan of the abdomen shows evidence of a possible porcelain gallbladder with a large stone at the neck of the gallbladder.  Also noted is significant body wall edema consistent with somebody with severe congestive heart failure.    Impression:    Gallstones with a porcelain gallbladder.  I did tell her I would tend to recommend laparoscopic cholecystectomy despite her lack of symptoms.  Explained that this could be a precursor to a gallbladder cancer and although gallbladder cancer is very rare, it is also a bad cancer to get so I would tend to recommend surgery.  She asked if he could be put off for few months and I do not think there is any harm in doing that.  Risks of surgery including beeding, infection, bile leak and common bile duct injury were explained along with potential benefits. Appropriate questions were asked and answered and they wish to proceed, but again would like to wait till after the summer.  Although this is typically an outpatient procedure, because of her other medical problems I will schedule it at the hospital.    Plan:  Laparoscopic Cholecystectomy.  Follow-up with me after surgery can be as needed if there are any questions or concerns.  We have encouraged the patient to  call if they have any questions.  Typically is a follow-up call from her nurses also.          Again, thank you for allowing me to participate in the care of your patient.        Sincerely,        Rossy Davison MD

## 2023-03-07 NOTE — PROGRESS NOTES
CC: Gallbladder Problems    HPI:  Patient presents for evaluation of a porcelain gallbladder found incidentally on CT scan.  The CT scan was done because of shortness of breath.  They noted a stone in the gallbladder with calcification of the gallbladder wall.  The patient denies any abdominal pain.  She has no pain when she eats.  She does have loose stools when she eats.  Incidentally on the CT scan they did note significant edema in her tissues consistent with severe congestive heart failure.  Of note, the patient has lost over 50 pounds in the last couple months since that scan with proper treatment of her congestive heart failure.    Please see Chart Review for PMH, medication list, allergies, FH and social history.  Past Medical History:   Diagnosis Date     Aortic stenosis      Benign essential hypertension      Chronic heart failure with preserved ejection fraction (HFpEF) (H)      CKD (chronic kidney disease) stage 3, GFR 30-59 ml/min (H)      Diabetes mellitus (H)      Mitral stenosis      Obesity      Pulmonary hypertension (H)      Past Surgical History:   Procedure Laterality Date     CV LEFT HEART CATH N/A 2/8/2023    Procedure: Left Heart Catheterization;  Surgeon: Yolanda Ramirez MD;  Location: [facility] CATH LAB CV     CV RIGHT HEART CATH MEASUREMENTS RECORDED N/A 2/8/2023    Procedure: Right Heart Catheterization;  Surgeon: Yolanda Ramirez MD;  Location: [facility] CATH LAB CV         Current Outpatient Medications:      ALPRAZolam (XANAX) 0.5 MG tablet, Take 0.5 mg by mouth See Admin Instructions One tab orally 30 min - 1 hour prior to appointments, Disp: , Rfl:      amLODIPine (NORVASC) 5 MG tablet, Take 1 tablet (5 mg) by mouth daily, Disp: 30 tablet, Rfl: 0     atorvastatin (LIPITOR) 80 MG tablet, Take 80 mg by mouth daily, Disp: , Rfl:      blood glucose (CONTOUR NEXT TEST) test strip, USE TO TEST TWICE DAILY for 75, Disp: , Rfl:      bumetanide (BUMEX) 2 MG tablet, Take 2 tablets (4 mg) by  mouth 2 times daily, Disp: 60 tablet, Rfl: 0     cholecalciferol 25 MCG (1000 UT) TABS, Take 1 tablet by mouth daily, Disp: , Rfl:      empagliflozin (JARDIANCE) 10 MG TABS tablet, Take 1 tablet (10 mg) by mouth daily, Disp: 30 tablet, Rfl: 0     escitalopram (LEXAPRO) 20 MG tablet, Take 20 mg by mouth daily, Disp: , Rfl:      fish oil-omega-3 fatty acids 1000 MG capsule, Take 1,000 mg by mouth daily, Disp: , Rfl:      hydrALAZINE (APRESOLINE) 100 MG tablet, Take 1 tablet (100 mg) by mouth 3 times daily, Disp: 90 tablet, Rfl: 0     insulin glargine (LANTUS SOLOSTAR) 100 UNIT/ML pen, 16 Units every morning, Disp: , Rfl:      isosorbide mononitrate (IMDUR) 60 MG 24 hr tablet, Take 1 tablet (60 mg) by mouth 2 times daily, Disp: 60 tablet, Rfl: 0     loperamide (IMODIUM) 2 MG capsule, Take 2 mg by mouth 2 times daily as needed for diarrhea, Disp: , Rfl:      melatonin 3 MG tablet, Take 6 mg by mouth nightly as needed for sleep, Disp: , Rfl:      metoprolol succinate ER (TOPROL XL) 200 MG 24 hr tablet, 200 mg daily, Disp: , Rfl:      multivitamin (ONE-DAILY) tablet, Take 1 tablet by mouth daily, Disp: , Rfl:      omeprazole (PRILOSEC) 20 MG DR capsule, 20 mg daily before breakfast, Disp: , Rfl:      spironolactone (ALDACTONE) 25 MG tablet, Take 1 tablet (25 mg) by mouth daily, Disp: 30 tablet, Rfl: 0     traZODone (DESYREL) 100 MG tablet, Take 100 mg by mouth At Bedtime, Disp: , Rfl:        Allergies   Allergen Reactions     Lisinopril Other (See Comments) and Unknown       Social History     Socioeconomic History     Marital status:    Tobacco Use     Smoking status: Never     Smokeless tobacco: Never   Substance and Sexual Activity     Alcohol use: Never     Drug use: Never     Sexual activity: Not Currently         Pertinent items are noted in HPI.    Physical Exam:  There were no vitals taken for this visit.    General:alert, appears stated age, cooperative and moderately obese  Eyes: negative  Lungs: clear to  auscultation bilaterally  CV: Regular with a 2-3 systolic murmur.  Abdomen:soft, non-tender; bowel sounds normal; no masses,  no organomegaly  Neuro: Grossly normal    Studies:  Lab Results   Component Value Date    WBC 8.1 02/09/2023    HGB 9.2 (L) 02/09/2023    HCT 30.9 (L) 02/09/2023    MCV 87 02/09/2023     02/11/2023     Lab Results   Component Value Date    ALT 11 01/19/2023    AST 19 01/19/2023    ALKPHOS 138 (H) 01/19/2023     CT scan of the abdomen shows evidence of a possible porcelain gallbladder with a large stone at the neck of the gallbladder.  Also noted is significant body wall edema consistent with somebody with severe congestive heart failure.    Impression:    Gallstones with a porcelain gallbladder.  I did tell her I would tend to recommend laparoscopic cholecystectomy despite her lack of symptoms.  Explained that this could be a precursor to a gallbladder cancer and although gallbladder cancer is very rare, it is also a bad cancer to get so I would tend to recommend surgery.  She asked if he could be put off for few months and I do not think there is any harm in doing that.  Risks of surgery including beeding, infection, bile leak and common bile duct injury were explained along with potential benefits. Appropriate questions were asked and answered and they wish to proceed, but again would like to wait till after the summer.  Although this is typically an outpatient procedure, because of her other medical problems I will schedule it at the hospital.    Plan:  Laparoscopic Cholecystectomy.  Follow-up with me after surgery can be as needed if there are any questions or concerns.  We have encouraged the patient to call if they have any questions.  Typically is a follow-up call from her nurses also.

## 2023-03-15 ENCOUNTER — LAB REQUISITION (OUTPATIENT)
Dept: LAB | Facility: CLINIC | Age: 68
End: 2023-03-15

## 2023-03-15 DIAGNOSIS — I13.0 HYPERTENSIVE HEART AND CHRONIC KIDNEY DISEASE WITH HEART FAILURE AND STAGE 1 THROUGH STAGE 4 CHRONIC KIDNEY DISEASE, OR UNSPECIFIED CHRONIC KIDNEY DISEASE (H): ICD-10-CM

## 2023-03-15 LAB
ANION GAP SERPL CALCULATED.3IONS-SCNC: 20 MMOL/L (ref 7–15)
BUN SERPL-MCNC: 57.1 MG/DL (ref 8–23)
CALCIUM SERPL-MCNC: 10 MG/DL (ref 8.8–10.2)
CHLORIDE SERPL-SCNC: 100 MMOL/L (ref 98–107)
CREAT SERPL-MCNC: 2.25 MG/DL (ref 0.51–0.95)
DEPRECATED HCO3 PLAS-SCNC: 18 MMOL/L (ref 22–29)
GFR SERPL CREATININE-BSD FRML MDRD: 23 ML/MIN/1.73M2
GLUCOSE SERPL-MCNC: 174 MG/DL (ref 70–99)
POTASSIUM SERPL-SCNC: 4.4 MMOL/L (ref 3.4–5.3)
SODIUM SERPL-SCNC: 138 MMOL/L (ref 136–145)

## 2023-03-15 PROCEDURE — 80048 BASIC METABOLIC PNL TOTAL CA: CPT | Performed by: FAMILY MEDICINE

## 2023-03-23 ENCOUNTER — OFFICE VISIT (OUTPATIENT)
Dept: CARDIOLOGY | Facility: CLINIC | Age: 68
End: 2023-03-23
Payer: COMMERCIAL

## 2023-03-23 VITALS
HEART RATE: 73 BPM | SYSTOLIC BLOOD PRESSURE: 128 MMHG | BODY MASS INDEX: 34.04 KG/M2 | DIASTOLIC BLOOD PRESSURE: 64 MMHG | HEIGHT: 62 IN | WEIGHT: 185 LBS | OXYGEN SATURATION: 96 % | RESPIRATION RATE: 16 BRPM

## 2023-03-23 DIAGNOSIS — I50.32 CHRONIC HEART FAILURE WITH PRESERVED EJECTION FRACTION (H): Primary | ICD-10-CM

## 2023-03-23 DIAGNOSIS — I10 BENIGN ESSENTIAL HYPERTENSION: ICD-10-CM

## 2023-03-23 DIAGNOSIS — G47.33 OSA (OBSTRUCTIVE SLEEP APNEA): ICD-10-CM

## 2023-03-23 DIAGNOSIS — N18.31 STAGE 3A CHRONIC KIDNEY DISEASE (H): ICD-10-CM

## 2023-03-23 PROBLEM — I50.31 ACUTE HEART FAILURE WITH PRESERVED EJECTION FRACTION (HFPEF) (H): Status: RESOLVED | Noted: 2023-02-08 | Resolved: 2023-03-23

## 2023-03-23 PROCEDURE — 99214 OFFICE O/P EST MOD 30 MIN: CPT | Performed by: NURSE PRACTITIONER

## 2023-03-23 RX ORDER — BUMETANIDE 2 MG/1
2 TABLET ORAL
Qty: 180 TABLET | Refills: 1 | Status: ON HOLD
Start: 2023-03-23 | End: 2023-12-08

## 2023-03-23 NOTE — PATIENT INSTRUCTIONS
Indu Felix,    It was a pleasure to see you today at [facility] HEART [facility].     My recommendations after this visit include:  - Please follow up with Dr Mondragon in 2 months   - Please follow up with Juanita Catherine in 4 months  - Continue current medications    Juanita Catherine, CNP

## 2023-03-23 NOTE — LETTER
3/23/2023    Flora Betts MD  [address] [place] [address]    RE: Indu Felix       Dear Colleague,     I had the pleasure of seeing Indu Felix in the [facility].        Assessment/Recommendations   Assessment:    1.  Heart failure with preserved ejection fraction, NYHA class II: Compensated.  She states she is feeling well.  She has lost 55 pounds since admission to the hospital in early February.  We discussed the results of her echocardiogram, monitoring symptoms, following a low-sodium diet and monitoring daily weights.  2.  Hypertension: Controlled.  Blood pressure 128/64  3.  Chronic kidney disease stage IIIb: She had labs a week ago and her creatinine increased to 2.25.  Her primary decreased her Bumex to 2 mg twice a day.  Her weight has remained stable.  She will get blood work next week.  4.  DIANA: She will be obtaining a CPAP next week    Plan:  1.  Continue current medications  2.  Continue monitoring daily weights and follow low-sodium diet  3.  Obtain blood work next week at primary's office  4. Obtain CPAP next week    Indu Felix will follow up with Dr Mondragon in 2 months and in the heart failure clinic in August.     History of Present Illness/Subjective    Ms. Indu Felix is a 68 year old female seen at [facility] heart failure clinic today for continued follow-up.  Her son accompanies her today.  She follows up for her with preserved ejection fraction.  She was hospitalized February 8 to February 12 with acute heart failure.  She was directly admitted from the heart clinic.  She had a right heart catheterization which showed severely elevated right and left filling pressures.  She was diuresed during hospitalization and lost approximately 25 pounds.  She had had an echocardiogram through [facility] around the end of January which ejection fraction of 60 to 65% with moderate tricuspid regurgitation.  She has a past medical history significant for hypertension,  "pulmonary hypertension, DIANA, chronic kidney disease, diabetes mellitus type 2.    Today, she states she is feeling well.  She has mild fatigue and dyspnea on exertion.  She denies lightheadedness, shortness of breath, orthopnea, PND, palpitations, chest pain, abdominal fullness/bloating and lower extremity edema.    She is monitoring home weights which are stable between 185-187 pounds.  She is following a low sodium diet.       ECHOCARDIOGRAM: Rolanda 1/30/2023  See results in cardiology tab     Physical Examination Review of Systems   /64 (BP Location: Right arm, Patient Position: Sitting, Cuff Size: Adult Regular)   Pulse 73   Resp 16   Ht 1.575 m (5' 2\")   Wt 83.9 kg (185 lb)   SpO2 96%   BMI 33.84 kg/m    Body mass index is 33.84 kg/m .  Wt Readings from Last 3 Encounters:   03/23/23 83.9 kg (185 lb)   02/12/23 102 kg (224 lb 13.9 oz)   02/08/23 108.4 kg (239 lb)       General Appearance:   no acute distress   ENT/Mouth: Wearing mask   EYES:  no scleral icterus, normal conjunctivae   Neck: no thyromegaly   Chest/Lungs:   lungs are clear to auscultation, no rales or wheezing, equal chest wall expansion    Cardiovascular:   Regular. Normal first and second heart sounds with no murmurs, rubs, or gallops, no edema bilaterally    Abdomen:  bowel sounds are present   Extremities: no cyanosis or clubbing   Skin: no xanthelasma, warm.    Neurologic: normal  bilateral, no tremors     Psychiatric: alert and oriented x3    Enc Vitals  BP: 128/64  Pulse: 73  Resp: 16  SpO2: 96 %  Weight: 83.9 kg (185 lb) (shoes on)  Height: 157.5 cm (5' 2\")                                         Medical History  Surgical History Family History Social History   Past Medical History:   Diagnosis Date     Aortic stenosis      Benign essential hypertension      Chronic heart failure with preserved ejection fraction (H) 3/23/2023     Chronic heart failure with preserved ejection fraction (HFpEF) (H)      Chronic kidney disease  "     CKD (chronic kidney disease) stage 3, GFR 30-59 ml/min (H)      Congestive heart failure (H)      Diabetes mellitus (H)      Mitral stenosis      Obesity      Pulmonary hypertension (H)      Stage 3a chronic kidney disease (H) 2/8/2023     Tricuspid valve disorders, non-rheumatic     Past Surgical History:   Procedure Laterality Date     CV LEFT HEART CATH N/A 2/8/2023    Procedure: Left Heart Catheterization;  Surgeon: Yolanda Ramirez MD;  Location: [facility] CATH LAB CV     CV RIGHT HEART CATH MEASUREMENTS RECORDED N/A 2/8/2023    Procedure: Right Heart Catheterization;  Surgeon: Yolanda Ramirez MD;  Location: [facility] CATH LAB CV    No family history on file. Social History     Socioeconomic History     Marital status:      Spouse name: Not on file     Number of children: Not on file     Years of education: Not on file     Highest education level: Not on file   Occupational History     Not on file   Tobacco Use     Smoking status: Never     Smokeless tobacco: Never   Substance and Sexual Activity     Alcohol use: Never     Drug use: Never     Sexual activity: Not Currently   Other Topics Concern     Not on file   Social History Narrative     Not on file     Social Determinants of Health     Financial Resource Strain: Not on file   Food Insecurity: Not on file   Transportation Needs: Not on file   Physical Activity: Not on file   Stress: Not on file   Social Connections: Not on file   Intimate Partner Violence: Not on file   Housing Stability: Not on file          Medications  Allergies   Current Outpatient Medications   Medication Sig Dispense Refill     amLODIPine (NORVASC) 5 MG tablet Take 1 tablet (5 mg) by mouth daily 30 tablet 0     atorvastatin (LIPITOR) 80 MG tablet Take 80 mg by mouth daily       blood glucose (CONTOUR NEXT TEST) test strip USE TO TEST TWICE DAILY for 75       bumetanide (BUMEX) 2 MG tablet Take 1 tablet (2 mg) by mouth 2 times daily 180 tablet 1     cholecalciferol 25 MCG  (1000 UT) TABS Take 1 tablet by mouth daily       empagliflozin (JARDIANCE) 10 MG TABS tablet Take 1 tablet (10 mg) by mouth daily 30 tablet 0     escitalopram (LEXAPRO) 20 MG tablet Take 20 mg by mouth daily       fish oil-omega-3 fatty acids 1000 MG capsule Take 1,000 mg by mouth daily       hydrALAZINE (APRESOLINE) 100 MG tablet Take 1 tablet (100 mg) by mouth 3 times daily 90 tablet 0     insulin glargine (LANTUS SOLOSTAR) 100 UNIT/ML pen 18 Units every morning       isosorbide mononitrate (IMDUR) 60 MG 24 hr tablet Take 1 tablet (60 mg) by mouth 2 times daily 60 tablet 0     loperamide (IMODIUM) 2 MG capsule Take 2 mg by mouth 3 times daily       melatonin 3 MG tablet Take 6 mg by mouth nightly as needed for sleep Patient takes 10 mg tablets       metoprolol succinate ER (TOPROL XL) 200 MG 24 hr tablet 200 mg daily       multivitamin (ONE-DAILY) tablet Take 1 tablet by mouth daily       omeprazole (PRILOSEC) 20 MG DR capsule 20 mg daily before breakfast       spironolactone (ALDACTONE) 25 MG tablet Take 1 tablet (25 mg) by mouth daily 30 tablet 0     traZODone (DESYREL) 100 MG tablet Take 100 mg by mouth At Bedtime       ALPRAZolam (XANAX) 0.5 MG tablet Take 0.5 mg by mouth See Admin Instructions One tab orally 30 min - 1 hour prior to appointments (Patient not taking: Reported on 3/23/2023)      Allergies   Allergen Reactions     Lisinopril Other (See Comments) and Unknown         Lab Results    Chemistry/lipid CBC Cardiac Enzymes/BNP/TSH/INR   Lab Results   Component Value Date    CHOL 218 (H) 05/17/2022    HDL 39 (L) 05/17/2022    TRIG 244 (H) 05/17/2022    BUN 57.1 (H) 03/15/2023     03/15/2023    CO2 18 (L) 03/15/2023    Lab Results   Component Value Date    WBC 8.1 02/09/2023    HGB 9.2 (L) 02/09/2023    HCT 30.9 (L) 02/09/2023    MCV 87 02/09/2023     02/11/2023    No results found for: CKTOTAL, CKMB, TROPONINI, BNP, TSH, INR          This note has been dictated using voice recognition  software. Any grammatical, typographical, or context distortions are unintentional and inherent to the software    30 minutes spent on the date of encounter doing chart review, review of outside records, review of test results, interpretation with above tests, patient visit, documentation and discussion with family.                  Thank you for allowing me to participate in the care of your patient.      Sincerely,     KETURAH [facility]  cc:   Amanda Pedro MD  [address]  [place] [address]

## 2023-03-23 NOTE — PROGRESS NOTES
Assessment/Recommendations   Assessment:    1.  Heart failure with preserved ejection fraction, NYHA class II: Compensated.  She states she is feeling well.  She has lost 55 pounds since admission to the hospital in early February.  We discussed the results of her echocardiogram, monitoring symptoms, following a low-sodium diet and monitoring daily weights.  2.  Hypertension: Controlled.  Blood pressure 128/64  3.  Chronic kidney disease stage IIIb: She had labs a week ago and her creatinine increased to 2.25.  Her primary decreased her Bumex to 2 mg twice a day.  Her weight has remained stable.  She will get blood work next week.  4.  DIANA: She will be obtaining a CPAP next week    Plan:  1.  Continue current medications  2.  Continue monitoring daily weights and follow low-sodium diet  3.  Obtain blood work next week at primary's office  4. Obtain CPAP next week    Indu Felix will follow up with Dr Mondragon in 2 months and in the heart failure clinic in August.     History of Present Illness/Subjective    Ms. Indu Felix is a 68 year old female seen at [facility] heart failure clinic today for continued follow-up.  Her son accompanies her today.  She follows up for her with preserved ejection fraction.  She was hospitalized February 8 to February 12 with acute heart failure.  She was directly admitted from the heart clinic.  She had a right heart catheterization which showed severely elevated right and left filling pressures.  She was diuresed during hospitalization and lost approximately 25 pounds.  She had had an echocardiogram through [facility] around the end of January which ejection fraction of 60 to 65% with moderate tricuspid regurgitation.  She has a past medical history significant for hypertension, pulmonary hypertension, DIANA, chronic kidney disease, diabetes mellitus type 2.    Today, she states she is feeling well.  She has mild fatigue and dyspnea on exertion.  She denies lightheadedness,  "shortness of breath, orthopnea, PND, palpitations, chest pain, abdominal fullness/bloating and lower extremity edema.    She is monitoring home weights which are stable between 185-187 pounds.  She is following a low sodium diet.       ECHOCARDIOGRAM: Rolanda 1/30/2023  See results in cardiology tab     Physical Examination Review of Systems   /64 (BP Location: Right arm, Patient Position: Sitting, Cuff Size: Adult Regular)   Pulse 73   Resp 16   Ht 1.575 m (5' 2\")   Wt 83.9 kg (185 lb)   SpO2 96%   BMI 33.84 kg/m    Body mass index is 33.84 kg/m .  Wt Readings from Last 3 Encounters:   03/23/23 83.9 kg (185 lb)   02/12/23 102 kg (224 lb 13.9 oz)   02/08/23 108.4 kg (239 lb)       General Appearance:   no acute distress   ENT/Mouth: Wearing mask   EYES:  no scleral icterus, normal conjunctivae   Neck: no thyromegaly   Chest/Lungs:   lungs are clear to auscultation, no rales or wheezing, equal chest wall expansion    Cardiovascular:   Regular. Normal first and second heart sounds with no murmurs, rubs, or gallops, no edema bilaterally    Abdomen:  bowel sounds are present   Extremities: no cyanosis or clubbing   Skin: no xanthelasma, warm.    Neurologic: normal  bilateral, no tremors     Psychiatric: alert and oriented x3    Enc Vitals  BP: 128/64  Pulse: 73  Resp: 16  SpO2: 96 %  Weight: 83.9 kg (185 lb) (shoes on)  Height: 157.5 cm (5' 2\")                                         Medical History  Surgical History Family History Social History   Past Medical History:   Diagnosis Date     Aortic stenosis      Benign essential hypertension      Chronic heart failure with preserved ejection fraction (H) 3/23/2023     Chronic heart failure with preserved ejection fraction (HFpEF) (H)      Chronic kidney disease      CKD (chronic kidney disease) stage 3, GFR 30-59 ml/min (H)      Congestive heart failure (H)      Diabetes mellitus (H)      Mitral stenosis      Obesity      Pulmonary hypertension (H)      " Stage 3a chronic kidney disease (H) 2/8/2023     Tricuspid valve disorders, non-rheumatic     Past Surgical History:   Procedure Laterality Date     CV LEFT HEART CATH N/A 2/8/2023    Procedure: Left Heart Catheterization;  Surgeon: Yolanda Ramirez MD;  Location: [facility] CATH LAB CV     CV RIGHT HEART CATH MEASUREMENTS RECORDED N/A 2/8/2023    Procedure: Right Heart Catheterization;  Surgeon: Yolanda Ramirez MD;  Location: [facility] CATH LAB CV    No family history on file. Social History     Socioeconomic History     Marital status:      Spouse name: Not on file     Number of children: Not on file     Years of education: Not on file     Highest education level: Not on file   Occupational History     Not on file   Tobacco Use     Smoking status: Never     Smokeless tobacco: Never   Substance and Sexual Activity     Alcohol use: Never     Drug use: Never     Sexual activity: Not Currently   Other Topics Concern     Not on file   Social History Narrative     Not on file     Social Determinants of Health     Financial Resource Strain: Not on file   Food Insecurity: Not on file   Transportation Needs: Not on file   Physical Activity: Not on file   Stress: Not on file   Social Connections: Not on file   Intimate Partner Violence: Not on file   Housing Stability: Not on file          Medications  Allergies   Current Outpatient Medications   Medication Sig Dispense Refill     amLODIPine (NORVASC) 5 MG tablet Take 1 tablet (5 mg) by mouth daily 30 tablet 0     atorvastatin (LIPITOR) 80 MG tablet Take 80 mg by mouth daily       blood glucose (CONTOUR NEXT TEST) test strip USE TO TEST TWICE DAILY for 75       bumetanide (BUMEX) 2 MG tablet Take 1 tablet (2 mg) by mouth 2 times daily 180 tablet 1     cholecalciferol 25 MCG (1000 UT) TABS Take 1 tablet by mouth daily       empagliflozin (JARDIANCE) 10 MG TABS tablet Take 1 tablet (10 mg) by mouth daily 30 tablet 0     escitalopram (LEXAPRO) 20 MG tablet Take 20 mg by  mouth daily       fish oil-omega-3 fatty acids 1000 MG capsule Take 1,000 mg by mouth daily       hydrALAZINE (APRESOLINE) 100 MG tablet Take 1 tablet (100 mg) by mouth 3 times daily 90 tablet 0     insulin glargine (LANTUS SOLOSTAR) 100 UNIT/ML pen 18 Units every morning       isosorbide mononitrate (IMDUR) 60 MG 24 hr tablet Take 1 tablet (60 mg) by mouth 2 times daily 60 tablet 0     loperamide (IMODIUM) 2 MG capsule Take 2 mg by mouth 3 times daily       melatonin 3 MG tablet Take 6 mg by mouth nightly as needed for sleep Patient takes 10 mg tablets       metoprolol succinate ER (TOPROL XL) 200 MG 24 hr tablet 200 mg daily       multivitamin (ONE-DAILY) tablet Take 1 tablet by mouth daily       omeprazole (PRILOSEC) 20 MG DR capsule 20 mg daily before breakfast       spironolactone (ALDACTONE) 25 MG tablet Take 1 tablet (25 mg) by mouth daily 30 tablet 0     traZODone (DESYREL) 100 MG tablet Take 100 mg by mouth At Bedtime       ALPRAZolam (XANAX) 0.5 MG tablet Take 0.5 mg by mouth See Admin Instructions One tab orally 30 min - 1 hour prior to appointments (Patient not taking: Reported on 3/23/2023)      Allergies   Allergen Reactions     Lisinopril Other (See Comments) and Unknown         Lab Results    Chemistry/lipid CBC Cardiac Enzymes/BNP/TSH/INR   Lab Results   Component Value Date    CHOL 218 (H) 05/17/2022    HDL 39 (L) 05/17/2022    TRIG 244 (H) 05/17/2022    BUN 57.1 (H) 03/15/2023     03/15/2023    CO2 18 (L) 03/15/2023    Lab Results   Component Value Date    WBC 8.1 02/09/2023    HGB 9.2 (L) 02/09/2023    HCT 30.9 (L) 02/09/2023    MCV 87 02/09/2023     02/11/2023    No results found for: CKTOTAL, CKMB, TROPONINI, BNP, TSH, INR          This note has been dictated using voice recognition software. Any grammatical, typographical, or context distortions are unintentional and inherent to the software    30 minutes spent on the date of encounter doing chart review, review of outside  records, review of test results, interpretation with above tests, patient visit, documentation and discussion with family.

## 2023-04-19 ENCOUNTER — LAB REQUISITION (OUTPATIENT)
Dept: LAB | Facility: CLINIC | Age: 68
End: 2023-04-19

## 2023-04-19 ENCOUNTER — TRANSFERRED RECORDS (OUTPATIENT)
Dept: HEALTH INFORMATION MANAGEMENT | Facility: CLINIC | Age: 68
End: 2023-04-19

## 2023-04-19 DIAGNOSIS — E78.2 MIXED HYPERLIPIDEMIA: ICD-10-CM

## 2023-04-19 DIAGNOSIS — N18.32 CHRONIC KIDNEY DISEASE, STAGE 3B (H): ICD-10-CM

## 2023-04-19 LAB
ALBUMIN SERPL BCG-MCNC: 4.3 G/DL (ref 3.5–5.2)
ALP SERPL-CCNC: 123 U/L (ref 35–104)
ALT SERPL W P-5'-P-CCNC: 13 U/L (ref 10–35)
ANION GAP SERPL CALCULATED.3IONS-SCNC: 17 MMOL/L (ref 7–15)
AST SERPL W P-5'-P-CCNC: 18 U/L (ref 10–35)
BILIRUB SERPL-MCNC: 0.5 MG/DL
BUN SERPL-MCNC: 56.9 MG/DL (ref 8–23)
CALCIUM SERPL-MCNC: 10 MG/DL (ref 8.8–10.2)
CHLORIDE SERPL-SCNC: 99 MMOL/L (ref 98–107)
CHOLEST SERPL-MCNC: 189 MG/DL
CREAT SERPL-MCNC: 2.67 MG/DL (ref 0.51–0.95)
DEPRECATED HCO3 PLAS-SCNC: 21 MMOL/L (ref 22–29)
GFR SERPL CREATININE-BSD FRML MDRD: 19 ML/MIN/1.73M2
GLUCOSE SERPL-MCNC: 149 MG/DL (ref 70–99)
HDLC SERPL-MCNC: 37 MG/DL
LDLC SERPL CALC-MCNC: 99 MG/DL
NONHDLC SERPL-MCNC: 152 MG/DL
POTASSIUM SERPL-SCNC: 5 MMOL/L (ref 3.4–5.3)
PROT SERPL-MCNC: 7.7 G/DL (ref 6.4–8.3)
PTH-INTACT SERPL-MCNC: 116 PG/ML (ref 15–65)
SODIUM SERPL-SCNC: 137 MMOL/L (ref 136–145)
TRIGL SERPL-MCNC: 264 MG/DL

## 2023-04-19 PROCEDURE — 80053 COMPREHEN METABOLIC PANEL: CPT | Performed by: FAMILY MEDICINE

## 2023-04-19 PROCEDURE — 83970 ASSAY OF PARATHORMONE: CPT | Performed by: FAMILY MEDICINE

## 2023-04-19 PROCEDURE — 80061 LIPID PANEL: CPT | Performed by: FAMILY MEDICINE

## 2023-05-10 ENCOUNTER — LAB REQUISITION (OUTPATIENT)
Dept: LAB | Facility: CLINIC | Age: 68
End: 2023-05-10

## 2023-05-10 DIAGNOSIS — N18.32 CHRONIC KIDNEY DISEASE, STAGE 3B (H): ICD-10-CM

## 2023-05-10 LAB
ANION GAP SERPL CALCULATED.3IONS-SCNC: 17 MMOL/L (ref 7–15)
BUN SERPL-MCNC: 57.4 MG/DL (ref 8–23)
CALCIUM SERPL-MCNC: 9.9 MG/DL (ref 8.8–10.2)
CHLORIDE SERPL-SCNC: 103 MMOL/L (ref 98–107)
CREAT SERPL-MCNC: 2.56 MG/DL (ref 0.51–0.95)
DEPRECATED HCO3 PLAS-SCNC: 19 MMOL/L (ref 22–29)
GFR SERPL CREATININE-BSD FRML MDRD: 20 ML/MIN/1.73M2
GLUCOSE SERPL-MCNC: 122 MG/DL (ref 70–99)
POTASSIUM SERPL-SCNC: 4.8 MMOL/L (ref 3.4–5.3)
SODIUM SERPL-SCNC: 139 MMOL/L (ref 136–145)

## 2023-05-10 PROCEDURE — 82310 ASSAY OF CALCIUM: CPT | Performed by: FAMILY MEDICINE

## 2023-05-22 ENCOUNTER — LAB REQUISITION (OUTPATIENT)
Dept: LAB | Facility: CLINIC | Age: 68
End: 2023-05-22

## 2023-05-22 ENCOUNTER — TRANSFERRED RECORDS (OUTPATIENT)
Dept: HEALTH INFORMATION MANAGEMENT | Facility: CLINIC | Age: 68
End: 2023-05-22
Payer: COMMERCIAL

## 2023-05-22 DIAGNOSIS — N18.32 CHRONIC KIDNEY DISEASE, STAGE 3B (H): ICD-10-CM

## 2023-05-22 PROCEDURE — 80048 BASIC METABOLIC PNL TOTAL CA: CPT | Performed by: FAMILY MEDICINE

## 2023-05-23 LAB
ANION GAP SERPL CALCULATED.3IONS-SCNC: 17 MMOL/L (ref 7–15)
BUN SERPL-MCNC: 48.2 MG/DL (ref 8–23)
CALCIUM SERPL-MCNC: 9.6 MG/DL (ref 8.8–10.2)
CHLORIDE SERPL-SCNC: 104 MMOL/L (ref 98–107)
CREAT SERPL-MCNC: 2.5 MG/DL (ref 0.51–0.95)
DEPRECATED HCO3 PLAS-SCNC: 16 MMOL/L (ref 22–29)
GFR SERPL CREATININE-BSD FRML MDRD: 20 ML/MIN/1.73M2
GLUCOSE SERPL-MCNC: 74 MG/DL (ref 70–99)
POTASSIUM SERPL-SCNC: 4.6 MMOL/L (ref 3.4–5.3)
SODIUM SERPL-SCNC: 137 MMOL/L (ref 136–145)

## 2023-06-05 ENCOUNTER — OFFICE VISIT (OUTPATIENT)
Dept: CARDIOLOGY | Facility: CLINIC | Age: 68
End: 2023-06-05
Attending: NURSE PRACTITIONER
Payer: COMMERCIAL

## 2023-06-05 VITALS
HEIGHT: 62 IN | WEIGHT: 188 LBS | HEART RATE: 70 BPM | BODY MASS INDEX: 34.6 KG/M2 | DIASTOLIC BLOOD PRESSURE: 56 MMHG | RESPIRATION RATE: 16 BRPM | SYSTOLIC BLOOD PRESSURE: 124 MMHG

## 2023-06-05 DIAGNOSIS — E11.00 TYPE 2 DIABETES MELLITUS WITH HYPEROSMOLARITY WITHOUT COMA, WITH LONG-TERM CURRENT USE OF INSULIN (H): ICD-10-CM

## 2023-06-05 DIAGNOSIS — I10 BENIGN ESSENTIAL HYPERTENSION: ICD-10-CM

## 2023-06-05 DIAGNOSIS — I27.20 PULMONARY HYPERTENSION (H): ICD-10-CM

## 2023-06-05 DIAGNOSIS — N18.31 STAGE 3A CHRONIC KIDNEY DISEASE (H): ICD-10-CM

## 2023-06-05 DIAGNOSIS — I35.0 NONRHEUMATIC AORTIC (VALVE) STENOSIS: ICD-10-CM

## 2023-06-05 DIAGNOSIS — Z79.4 TYPE 2 DIABETES MELLITUS WITH HYPEROSMOLARITY WITHOUT COMA, WITH LONG-TERM CURRENT USE OF INSULIN (H): ICD-10-CM

## 2023-06-05 DIAGNOSIS — I34.2 NONRHEUMATIC MITRAL VALVE STENOSIS: ICD-10-CM

## 2023-06-05 DIAGNOSIS — E66.01 CLASS 2 SEVERE OBESITY DUE TO EXCESS CALORIES WITH SERIOUS COMORBIDITY IN ADULT, UNSPECIFIED BMI (H): ICD-10-CM

## 2023-06-05 DIAGNOSIS — G47.33 OSA (OBSTRUCTIVE SLEEP APNEA): ICD-10-CM

## 2023-06-05 DIAGNOSIS — E66.812 CLASS 2 SEVERE OBESITY DUE TO EXCESS CALORIES WITH SERIOUS COMORBIDITY IN ADULT, UNSPECIFIED BMI (H): ICD-10-CM

## 2023-06-05 DIAGNOSIS — I50.32 CHRONIC HEART FAILURE WITH PRESERVED EJECTION FRACTION (H): Primary | ICD-10-CM

## 2023-06-05 PROBLEM — Z98.890 STATUS POST CORONARY ANGIOGRAM: Status: RESOLVED | Noted: 2023-02-08 | Resolved: 2023-06-05

## 2023-06-05 PROCEDURE — 99214 OFFICE O/P EST MOD 30 MIN: CPT | Performed by: INTERNAL MEDICINE

## 2023-06-05 NOTE — PROGRESS NOTES
[facility]  Heart Care Clinic Follow-up Note    Assessment & Plan        (I50.32) Chronic heart failure with preserved ejection fraction (H)  (primary encounter diagnosis)  Comment: No significant signs or symptoms currently, lost 55 pounds from hospitalization.  This in the setting of preserved ejection fraction of 60 to 65%.  I have asked her to try to change to Bumex from 2 mg twice daily to alternating 2 mg twice daily with 2 mg daily, this week she might tolerate her CPAP more.  They will watch for fluid accumulation, increased weight, shortness of breath.    (I34.2) Nonrheumatic mitral valve stenosis  Comment: Mean gradient 6.5 mmHg.  Recheck echo in January a year out from prior.    (I35.0) Nonrheumatic aortic (valve) stenosis  Comment: Mild, mean gradient of 16 mmHg, recheck echo in January 2024.    (I10) Benign essential hypertension  Comment: Resistant, on metoprolol, amlodipine as well as hydralazine.  Medications per nephrology.    (I27.20) Pulmonary hypertension (H)  Comment: Mild, estimated pressure 46 mmHg systolic pulmonary, most likely secondary to mitral stenosis, WHO class II.    (G47.33) DIANA (obstructive sleep apnea)  Comment: Somewhat intolerant of nasal CPAP, I have asked her to try to take the Bumex only 2 mg on certain days, hopefully she can wear nasal CPAP more, if not recommend she is discussed with the sleep specialist, possibly oral appliance.    (E66.01) Class 2 severe obesity due to excess calories with serious comorbidity in adult, unspecified BMI (H)  Comment: Work on weight loss.    (E11.00,  Z79.4) Type 2 diabetes mellitus with hyperosmolarity without coma, with long-term current use of insulin (H)  Comment: So noted with 20 units of insulin, hemoglobin A1c per primary clinic.    (N18.31) Stage 3a chronic kidney disease (H)  Comment: GFR 20 with a creatinine of 2.5.  Sees [facility].    Plan  1.  Try changing Bumex from 2 mg twice daily to 2 mg twice daily alternate 2 mg once a  day.  If volume increase will need to go back to 2 mg twice daily.  2.  Follow-up me in about 3 months or so given all these issues.  3.  Arrange for repeat echo around January 2024.    Subjective  CC: 68-year-old white female here for a post hospital discharge visit.  I saw her, admitted to the hospital, she diuresed about 55 pounds.  She is here today with her daughter who lives downstairs.  For the most part patient does walk around with a cane, is able to go for walks without any shortness of breath, PND, orthopnea, syncope, dizziness, chest pains, palpitations.  Does have some mild bipedal edema and is wearing compression stockings.    Medications  Current Outpatient Medications   Medication Sig Dispense Refill     amLODIPine (NORVASC) 5 MG tablet Take 1 tablet (5 mg) by mouth daily 30 tablet 0     atorvastatin (LIPITOR) 80 MG tablet Take 80 mg by mouth daily       blood glucose (CONTOUR NEXT TEST) test strip USE TO TEST TWICE DAILY for 75       bumetanide (BUMEX) 2 MG tablet Take 1 tablet (2 mg) by mouth 2 times daily 180 tablet 1     cholecalciferol 25 MCG (1000 UT) TABS Take 1 tablet by mouth daily       escitalopram (LEXAPRO) 20 MG tablet Take 20 mg by mouth daily       fish oil-omega-3 fatty acids 1000 MG capsule Take 1,000 mg by mouth daily       hydrALAZINE (APRESOLINE) 100 MG tablet Take 1 tablet (100 mg) by mouth 3 times daily 90 tablet 0     insulin glargine (LANTUS SOLOSTAR) 100 UNIT/ML pen 20 Units every morning       isosorbide mononitrate (IMDUR) 60 MG 24 hr tablet Take 1 tablet (60 mg) by mouth 2 times daily 60 tablet 0     loperamide (IMODIUM) 2 MG capsule Take 2 mg by mouth 2 times daily (before meals)       melatonin 3 MG tablet Take 6 mg by mouth nightly as needed for sleep Patient takes 10 mg tablets       metoprolol succinate ER (TOPROL XL) 200 MG 24 hr tablet 200 mg daily       multivitamin (ONE-DAILY) tablet Take 1 tablet by mouth daily       omeprazole (PRILOSEC) 20 MG DR capsule 20 mg  "daily before breakfast       traZODone (DESYREL) 100 MG tablet Take 100 mg by mouth At Bedtime       ALPRAZolam (XANAX) 0.5 MG tablet Take 0.5 mg by mouth See Admin Instructions One tab orally 30 min - 1 hour prior to appointments (Patient not taking: Reported on 3/23/2023)         Objective  /56 (BP Location: Left arm, Patient Position: Sitting, Cuff Size: Adult Regular)   Pulse 70   Resp 16   Ht 1.575 m (5' 2\")   Wt 85.3 kg (188 lb)   BMI 34.39 kg/m      General Appearance:    Alert, cooperative, no distress, appears stated age   Head:    Normocephalic, without obvious abnormality, atraumatic   Throat:   Lips, mucosa, and tongue normal; teeth and gums normal   Neck:   Supple, symmetrical, trachea midline, no adenopathy;        thyroid:  No enlargement/tenderness/nodules; no carotid    bruit or JVD   Back:     Symmetric, no curvature, ROM normal, no CVA tenderness   Lungs:     Clear to auscultation bilaterally, respirations unlabored   Chest wall:    No tenderness or deformity   Heart:    Regular rate and rhythm, S1 and S2 normal, 2/6 systolic ejection murmur, no rub   or gallop   Abdomen:     Soft, non-tender, bowel sounds active all four quadrants,     no masses, no organomegaly   Extremities:   Normal, atraumatic, no cyanosis, 1/4 bipedal edema   Pulses:   2+ and symmetric all extremities   Skin:   Skin color, texture, turgor normal, no rashes or lesions     Results    Lab Results personally reviewed   Lab Results   Component Value Date    CHOL 189 04/19/2023    CHOL 218 (H) 05/17/2022     Lab Results   Component Value Date    HDL 37 (L) 04/19/2023    HDL 39 (L) 05/17/2022     No components found for: LDLCALC  Lab Results   Component Value Date    TRIG 264 (H) 04/19/2023    TRIG 244 (H) 05/17/2022     Lab Results   Component Value Date    WBC 8.1 02/09/2023    HGB 9.2 (L) 02/09/2023    HCT 30.9 (L) 02/09/2023     02/11/2023     Lab Results   Component Value Date    BUN 48.2 (H) 05/22/2023    NA " 137 05/22/2023    CO2 16 (L) 05/22/2023

## 2023-06-05 NOTE — PATIENT INSTRUCTIONS
Ms Indu Felix,  I enjoyed visiting with you again today.  I am glad to hear you are doing well.  Per our conversation let us try the BUMEX 2 mg twice a day alternating with 2 mg once a day and watch for fluid retention or swelling or weight increase or shortness of breath and if any of these happen go back to twice a day period.  Hopefully this will help with CPAP but if not find a sleep doc and talk alternatives. Consider Dr Jessica Buck.  I will plan on seeing you September and I will recheck Echo around January.  Zoltan Mondragon

## 2023-06-05 NOTE — LETTER
6/5/2023    Flora Betts MD  [address] [place] [address]    RE: Indu Felix       Dear Colleague,     I had the pleasure of seeing Indu Felix in the [facility].      [facility]  Heart Care Clinic Follow-up Note    Assessment & Plan        (I50.32) Chronic heart failure with preserved ejection fraction (H)  (primary encounter diagnosis)  Comment: No significant signs or symptoms currently, lost 55 pounds from hospitalization.  This in the setting of preserved ejection fraction of 60 to 65%.  I have asked her to try to change to Bumex from 2 mg twice daily to alternating 2 mg twice daily with 2 mg daily, this week she might tolerate her CPAP more.  They will watch for fluid accumulation, increased weight, shortness of breath.    (I34.2) Nonrheumatic mitral valve stenosis  Comment: Mean gradient 6.5 mmHg.  Recheck echo in January a year out from prior.    (I35.0) Nonrheumatic aortic (valve) stenosis  Comment: Mild, mean gradient of 16 mmHg, recheck echo in January 2024.    (I10) Benign essential hypertension  Comment: Resistant, on metoprolol, amlodipine as well as hydralazine.  Medications per nephrology.    (I27.20) Pulmonary hypertension (H)  Comment: Mild, estimated pressure 46 mmHg systolic pulmonary, most likely secondary to mitral stenosis, WHO class II.    (G47.33) DIANA (obstructive sleep apnea)  Comment: Somewhat intolerant of nasal CPAP, I have asked her to try to take the Bumex only 2 mg on certain days, hopefully she can wear nasal CPAP more, if not recommend she is discussed with the sleep specialist, possibly oral appliance.    (E66.01) Class 2 severe obesity due to excess calories with serious comorbidity in adult, unspecified BMI (H)  Comment: Work on weight loss.    (E11.00,  Z79.4) Type 2 diabetes mellitus with hyperosmolarity without coma, with long-term current use of insulin (H)  Comment: So noted with 20 units of insulin, hemoglobin A1c per primary  clinic.    (N18.31) Stage 3a chronic kidney disease (H)  Comment: GFR 20 with a creatinine of 2.5.  Sees [facility].    Plan  1.  Try changing Bumex from 2 mg twice daily to 2 mg twice daily alternate 2 mg once a day.  If volume increase will need to go back to 2 mg twice daily.  2.  Follow-up me in about 3 months or so given all these issues.  3.  Arrange for repeat echo around January 2024.    Subjective  CC: 68-year-old white female here for a post hospital discharge visit.  I saw her, admitted to the hospital, she diuresed about 55 pounds.  She is here today with her daughter who lives downstairs.  For the most part patient does walk around with a cane, is able to go for walks without any shortness of breath, PND, orthopnea, syncope, dizziness, chest pains, palpitations.  Does have some mild bipedal edema and is wearing compression stockings.    Medications  Current Outpatient Medications   Medication Sig Dispense Refill    amLODIPine (NORVASC) 5 MG tablet Take 1 tablet (5 mg) by mouth daily 30 tablet 0    atorvastatin (LIPITOR) 80 MG tablet Take 80 mg by mouth daily      blood glucose (CONTOUR NEXT TEST) test strip USE TO TEST TWICE DAILY for 75      bumetanide (BUMEX) 2 MG tablet Take 1 tablet (2 mg) by mouth 2 times daily 180 tablet 1    cholecalciferol 25 MCG (1000 UT) TABS Take 1 tablet by mouth daily      escitalopram (LEXAPRO) 20 MG tablet Take 20 mg by mouth daily      fish oil-omega-3 fatty acids 1000 MG capsule Take 1,000 mg by mouth daily      hydrALAZINE (APRESOLINE) 100 MG tablet Take 1 tablet (100 mg) by mouth 3 times daily 90 tablet 0    insulin glargine (LANTUS SOLOSTAR) 100 UNIT/ML pen 20 Units every morning      isosorbide mononitrate (IMDUR) 60 MG 24 hr tablet Take 1 tablet (60 mg) by mouth 2 times daily 60 tablet 0    loperamide (IMODIUM) 2 MG capsule Take 2 mg by mouth 2 times daily (before meals)      melatonin 3 MG tablet Take 6 mg by mouth nightly as needed for sleep Patient takes 10 mg tablets   "    metoprolol succinate ER (TOPROL XL) 200 MG 24 hr tablet 200 mg daily      multivitamin (ONE-DAILY) tablet Take 1 tablet by mouth daily      omeprazole (PRILOSEC) 20 MG DR capsule 20 mg daily before breakfast      traZODone (DESYREL) 100 MG tablet Take 100 mg by mouth At Bedtime      ALPRAZolam (XANAX) 0.5 MG tablet Take 0.5 mg by mouth See Admin Instructions One tab orally 30 min - 1 hour prior to appointments (Patient not taking: Reported on 3/23/2023)         Objective  /56 (BP Location: Left arm, Patient Position: Sitting, Cuff Size: Adult Regular)   Pulse 70   Resp 16   Ht 1.575 m (5' 2\")   Wt 85.3 kg (188 lb)   BMI 34.39 kg/m      General Appearance:    Alert, cooperative, no distress, appears stated age   Head:    Normocephalic, without obvious abnormality, atraumatic   Throat:   Lips, mucosa, and tongue normal; teeth and gums normal   Neck:   Supple, symmetrical, trachea midline, no adenopathy;        thyroid:  No enlargement/tenderness/nodules; no carotid    bruit or JVD   Back:     Symmetric, no curvature, ROM normal, no CVA tenderness   Lungs:     Clear to auscultation bilaterally, respirations unlabored   Chest wall:    No tenderness or deformity   Heart:    Regular rate and rhythm, S1 and S2 normal, 2/6 systolic ejection murmur, no rub   or gallop   Abdomen:     Soft, non-tender, bowel sounds active all four quadrants,     no masses, no organomegaly   Extremities:   Normal, atraumatic, no cyanosis, 1/4 bipedal edema   Pulses:   2+ and symmetric all extremities   Skin:   Skin color, texture, turgor normal, no rashes or lesions     Results    Lab Results personally reviewed   Lab Results   Component Value Date    CHOL 189 04/19/2023    CHOL 218 (H) 05/17/2022     Lab Results   Component Value Date    HDL 37 (L) 04/19/2023    HDL 39 (L) 05/17/2022     No components found for: LDLCALC  Lab Results   Component Value Date    TRIG 264 (H) 04/19/2023    TRIG 244 (H) 05/17/2022     Lab Results "   Component Value Date    WBC 8.1 02/09/2023    HGB 9.2 (L) 02/09/2023    HCT 30.9 (L) 02/09/2023     02/11/2023     Lab Results   Component Value Date    BUN 48.2 (H) 05/22/2023     05/22/2023    CO2 16 (L) 05/22/2023               Thank you for allowing me to participate in the care of your patient.      Sincerely,     ZACH SMART MD     [facility]  cc:   Juanita Catherine, APRN CNP  [address], [address]  [place] [address]

## 2023-06-06 ENCOUNTER — TELEPHONE (OUTPATIENT)
Dept: CARDIOLOGY | Facility: CLINIC | Age: 68
End: 2023-06-06
Payer: COMMERCIAL

## 2023-06-06 NOTE — TELEPHONE ENCOUNTER
Health Call Center    Phone Message    May a detailed message be left on voicemail: yes     Reason for Call: Other: Daughter calling to see if we can send lab orders from Dr. Mondragon to [facility] in [facility].     Action Taken: Other: cardiology    Travel Screening: Not Applicable   Thank you!  [facility]

## 2023-06-07 NOTE — TELEPHONE ENCOUNTER
Lab Services - [facility] ([place])  ADDRESS  [address] [place] [address]    PHONE  General: [phone]  Fax: [phone]  HOURS  Monday - Friday:  7:15 am - 5 pm    Call for holiday hours        ==Faxed orders to Lab services with [facility]. Called Lab and they take outpatient appts. Updated Solange and she will call to make an appt there. BMP to be done in 2 weeks. -[facility]

## 2023-06-13 ENCOUNTER — TELEPHONE (OUTPATIENT)
Dept: CARDIOLOGY | Facility: CLINIC | Age: 68
End: 2023-06-13
Payer: COMMERCIAL

## 2023-06-13 NOTE — TELEPHONE ENCOUNTER
Health Call Center    Phone Message    May a detailed message be left on voicemail: no     Reason for Call: Other: Solange called to report her mom Indu had a stroke on Sunday 6/11/23 and was taken to [facility] in [place]. Solange stated they will not willingly provide the notes, however, Dr. Mondragon can request them due to continuity of care. Please reach out to Solange with any questions, she can be reached at [phone].     Action Taken: Other: [facility]    Travel Screening: Not Applicable

## 2023-06-13 NOTE — TELEPHONE ENCOUNTER
Noted noted; records viewable in care everywhere. Follow up with [facility] scheduled. This was to notify us of records for continuing care; no call placed. -[facility]

## 2023-06-19 ENCOUNTER — LAB REQUISITION (OUTPATIENT)
Dept: LAB | Facility: CLINIC | Age: 68
End: 2023-06-19

## 2023-06-19 ENCOUNTER — TRANSFERRED RECORDS (OUTPATIENT)
Dept: HEALTH INFORMATION MANAGEMENT | Facility: CLINIC | Age: 68
End: 2023-06-19
Payer: COMMERCIAL

## 2023-06-19 DIAGNOSIS — N18.32 CHRONIC KIDNEY DISEASE, STAGE 3B (H): ICD-10-CM

## 2023-06-19 PROCEDURE — 84520 ASSAY OF UREA NITROGEN: CPT | Performed by: FAMILY MEDICINE

## 2023-06-20 LAB
ANION GAP SERPL CALCULATED.3IONS-SCNC: 21 MMOL/L (ref 7–15)
BUN SERPL-MCNC: 54.4 MG/DL (ref 8–23)
CALCIUM SERPL-MCNC: 9.7 MG/DL (ref 8.8–10.2)
CHLORIDE SERPL-SCNC: 107 MMOL/L (ref 98–107)
CREAT SERPL-MCNC: 2.83 MG/DL (ref 0.51–0.95)
DEPRECATED HCO3 PLAS-SCNC: 14 MMOL/L (ref 22–29)
GFR SERPL CREATININE-BSD FRML MDRD: 17 ML/MIN/1.73M2
GLUCOSE SERPL-MCNC: 108 MG/DL (ref 70–99)
POTASSIUM SERPL-SCNC: 4.1 MMOL/L (ref 3.4–5.3)
SODIUM SERPL-SCNC: 142 MMOL/L (ref 136–145)

## 2023-06-21 ENCOUNTER — APPOINTMENT (OUTPATIENT)
Dept: MRI IMAGING | Facility: HOSPITAL | Age: 68
End: 2023-06-21
Attending: EMERGENCY MEDICINE
Payer: COMMERCIAL

## 2023-06-21 ENCOUNTER — HOSPITAL ENCOUNTER (OUTPATIENT)
Facility: HOSPITAL | Age: 68
Setting detail: OBSERVATION
Discharge: ANOTHER HOSPITAL | End: 2023-06-22
Attending: EMERGENCY MEDICINE | Admitting: EMERGENCY MEDICINE
Payer: COMMERCIAL

## 2023-06-21 ENCOUNTER — MEDICAL CORRESPONDENCE (OUTPATIENT)
Dept: HEALTH INFORMATION MANAGEMENT | Facility: CLINIC | Age: 68
End: 2023-06-21

## 2023-06-21 ENCOUNTER — TRANSCRIBE ORDERS (OUTPATIENT)
Dept: OTHER | Age: 68
End: 2023-06-21

## 2023-06-21 DIAGNOSIS — I63.9 STROKE (H): Primary | ICD-10-CM

## 2023-06-21 DIAGNOSIS — N39.0 URINARY TRACT INFECTION WITHOUT HEMATURIA, SITE UNSPECIFIED: ICD-10-CM

## 2023-06-21 DIAGNOSIS — Z86.73 HISTORY OF CVA (CEREBROVASCULAR ACCIDENT): ICD-10-CM

## 2023-06-21 PROBLEM — N18.4 STAGE 4 CHRONIC KIDNEY DISEASE (H): Status: ACTIVE | Noted: 2023-06-11

## 2023-06-21 LAB
ALBUMIN SERPL BCG-MCNC: 3.6 G/DL (ref 3.5–5.2)
ALBUMIN UR-MCNC: 20 MG/DL
ALP SERPL-CCNC: 121 U/L (ref 35–104)
ALT SERPL W P-5'-P-CCNC: 15 U/L (ref 0–50)
ANION GAP SERPL CALCULATED.3IONS-SCNC: 15 MMOL/L (ref 7–15)
APPEARANCE UR: CLEAR
AST SERPL W P-5'-P-CCNC: 25 U/L (ref 0–45)
BACTERIA #/AREA URNS HPF: ABNORMAL /HPF
BASOPHILS # BLD AUTO: 0.1 10E3/UL (ref 0–0.2)
BASOPHILS NFR BLD AUTO: 1 %
BILIRUB SERPL-MCNC: 0.4 MG/DL
BILIRUB UR QL STRIP: NEGATIVE
BUN SERPL-MCNC: 40.1 MG/DL (ref 8–23)
CALCIUM SERPL-MCNC: 9.8 MG/DL (ref 8.8–10.2)
CHLORIDE SERPL-SCNC: 104 MMOL/L (ref 98–107)
COLOR UR AUTO: ABNORMAL
CREAT SERPL-MCNC: 2.04 MG/DL (ref 0.51–0.95)
DEPRECATED HCO3 PLAS-SCNC: 20 MMOL/L (ref 22–29)
EOSINOPHIL # BLD AUTO: 0.3 10E3/UL (ref 0–0.7)
EOSINOPHIL NFR BLD AUTO: 2 %
ERYTHROCYTE [DISTWIDTH] IN BLOOD BY AUTOMATED COUNT: 13.8 % (ref 10–15)
GFR SERPL CREATININE-BSD FRML MDRD: 26 ML/MIN/1.73M2
GLUCOSE BLDC GLUCOMTR-MCNC: 95 MG/DL (ref 70–99)
GLUCOSE SERPL-MCNC: 126 MG/DL (ref 70–99)
GLUCOSE UR STRIP-MCNC: NEGATIVE MG/DL
HCT VFR BLD AUTO: 32 % (ref 35–47)
HGB BLD-MCNC: 10.9 G/DL (ref 11.7–15.7)
HGB UR QL STRIP: NEGATIVE
HYALINE CASTS: 1 /LPF
IMM GRANULOCYTES # BLD: 0.1 10E3/UL
IMM GRANULOCYTES NFR BLD: 0 %
KETONES UR STRIP-MCNC: NEGATIVE MG/DL
LEUKOCYTE ESTERASE UR QL STRIP: ABNORMAL
LYMPHOCYTES # BLD AUTO: 2.1 10E3/UL (ref 0.8–5.3)
LYMPHOCYTES NFR BLD AUTO: 17 %
MCH RBC QN AUTO: 29.6 PG (ref 26.5–33)
MCHC RBC AUTO-ENTMCNC: 34.1 G/DL (ref 31.5–36.5)
MCV RBC AUTO: 87 FL (ref 78–100)
MONOCYTES # BLD AUTO: 1.2 10E3/UL (ref 0–1.3)
MONOCYTES NFR BLD AUTO: 10 %
NEUTROPHILS # BLD AUTO: 8.6 10E3/UL (ref 1.6–8.3)
NEUTROPHILS NFR BLD AUTO: 70 %
NITRATE UR QL: POSITIVE
NRBC # BLD AUTO: 0 10E3/UL
NRBC BLD AUTO-RTO: 0 /100
PH UR STRIP: 6 [PH] (ref 5–7)
PLATELET # BLD AUTO: 352 10E3/UL (ref 150–450)
POTASSIUM SERPL-SCNC: 4.2 MMOL/L (ref 3.4–5.3)
PROT SERPL-MCNC: 7.4 G/DL (ref 6.4–8.3)
RBC # BLD AUTO: 3.68 10E6/UL (ref 3.8–5.2)
RBC URINE: 0 /HPF
SODIUM SERPL-SCNC: 139 MMOL/L (ref 136–145)
SP GR UR STRIP: 1.01 (ref 1–1.03)
SQUAMOUS EPITHELIAL: <1 /HPF
TRANSITIONAL EPI: <1 /HPF
UROBILINOGEN UR STRIP-MCNC: <2 MG/DL
WBC # BLD AUTO: 12.3 10E3/UL (ref 4–11)
WBC URINE: 24 /HPF

## 2023-06-21 PROCEDURE — 87086 URINE CULTURE/COLONY COUNT: CPT | Performed by: EMERGENCY MEDICINE

## 2023-06-21 PROCEDURE — 85025 COMPLETE CBC W/AUTO DIFF WBC: CPT | Performed by: EMERGENCY MEDICINE

## 2023-06-21 PROCEDURE — 83036 HEMOGLOBIN GLYCOSYLATED A1C: CPT | Performed by: HOSPITALIST

## 2023-06-21 PROCEDURE — 70551 MRI BRAIN STEM W/O DYE: CPT

## 2023-06-21 PROCEDURE — 250N000011 HC RX IP 250 OP 636: Mod: JZ | Performed by: EMERGENCY MEDICINE

## 2023-06-21 PROCEDURE — 80053 COMPREHEN METABOLIC PANEL: CPT | Performed by: EMERGENCY MEDICINE

## 2023-06-21 PROCEDURE — 99222 1ST HOSP IP/OBS MODERATE 55: CPT | Performed by: HOSPITALIST

## 2023-06-21 PROCEDURE — 36415 COLL VENOUS BLD VENIPUNCTURE: CPT | Performed by: EMERGENCY MEDICINE

## 2023-06-21 PROCEDURE — 99285 EMERGENCY DEPT VISIT HI MDM: CPT | Mod: 25

## 2023-06-21 PROCEDURE — G0378 HOSPITAL OBSERVATION PER HR: HCPCS

## 2023-06-21 PROCEDURE — 81001 URINALYSIS AUTO W/SCOPE: CPT | Performed by: EMERGENCY MEDICINE

## 2023-06-21 PROCEDURE — 82962 GLUCOSE BLOOD TEST: CPT

## 2023-06-21 PROCEDURE — 96365 THER/PROPH/DIAG IV INF INIT: CPT

## 2023-06-21 RX ORDER — ACETAMINOPHEN 650 MG/1
650 SUPPOSITORY RECTAL EVERY 6 HOURS PRN
Status: DISCONTINUED | OUTPATIENT
Start: 2023-06-21 | End: 2023-06-22 | Stop reason: HOSPADM

## 2023-06-21 RX ORDER — ONDANSETRON 4 MG/1
4 TABLET, ORALLY DISINTEGRATING ORAL EVERY 6 HOURS PRN
Status: DISCONTINUED | OUTPATIENT
Start: 2023-06-21 | End: 2023-06-22 | Stop reason: HOSPADM

## 2023-06-21 RX ORDER — VITAMIN B COMPLEX
25 TABLET ORAL DAILY
Status: DISCONTINUED | OUTPATIENT
Start: 2023-06-22 | End: 2023-06-22 | Stop reason: HOSPADM

## 2023-06-21 RX ORDER — AMLODIPINE BESYLATE 5 MG/1
5 TABLET ORAL DAILY
Status: DISCONTINUED | OUTPATIENT
Start: 2023-06-22 | End: 2023-06-22 | Stop reason: HOSPADM

## 2023-06-21 RX ORDER — MULTIVITAMIN
1 TABLET ORAL DAILY
Status: DISCONTINUED | OUTPATIENT
Start: 2023-06-22 | End: 2023-06-21

## 2023-06-21 RX ORDER — LANOLIN ALCOHOL/MO/W.PET/CERES
6 CREAM (GRAM) TOPICAL
Status: DISCONTINUED | OUTPATIENT
Start: 2023-06-21 | End: 2023-06-22 | Stop reason: HOSPADM

## 2023-06-21 RX ORDER — CEFTRIAXONE 1 G/1
1 INJECTION, POWDER, FOR SOLUTION INTRAMUSCULAR; INTRAVENOUS ONCE
Status: COMPLETED | OUTPATIENT
Start: 2023-06-21 | End: 2023-06-21

## 2023-06-21 RX ORDER — LOPERAMIDE HCL 2 MG
2 CAPSULE ORAL 2 TIMES DAILY PRN
Status: DISCONTINUED | OUTPATIENT
Start: 2023-06-21 | End: 2023-06-22 | Stop reason: HOSPADM

## 2023-06-21 RX ORDER — BUMETANIDE 2 MG/1
2 TABLET ORAL
Status: DISCONTINUED | OUTPATIENT
Start: 2023-06-22 | End: 2023-06-22 | Stop reason: HOSPADM

## 2023-06-21 RX ORDER — HYDRALAZINE HYDROCHLORIDE 50 MG/1
50 TABLET, FILM COATED ORAL 3 TIMES DAILY
Status: DISCONTINUED | OUTPATIENT
Start: 2023-06-22 | End: 2023-06-22 | Stop reason: HOSPADM

## 2023-06-21 RX ORDER — TRAZODONE HYDROCHLORIDE 50 MG/1
100 TABLET, FILM COATED ORAL AT BEDTIME
Status: DISCONTINUED | OUTPATIENT
Start: 2023-06-22 | End: 2023-06-22 | Stop reason: HOSPADM

## 2023-06-21 RX ORDER — DONEPEZIL HYDROCHLORIDE 10 MG/1
10 TABLET, FILM COATED ORAL AT BEDTIME
COMMUNITY
End: 2024-04-15

## 2023-06-21 RX ORDER — CEFTRIAXONE 1 G/1
1 INJECTION, POWDER, FOR SOLUTION INTRAMUSCULAR; INTRAVENOUS EVERY 24 HOURS
Status: DISCONTINUED | OUTPATIENT
Start: 2023-06-22 | End: 2023-06-22 | Stop reason: HOSPADM

## 2023-06-21 RX ORDER — AMOXICILLIN 250 MG
2 CAPSULE ORAL 2 TIMES DAILY PRN
Status: DISCONTINUED | OUTPATIENT
Start: 2023-06-21 | End: 2023-06-22 | Stop reason: HOSPADM

## 2023-06-21 RX ORDER — CEPHALEXIN 500 MG/1
500 CAPSULE ORAL ONCE
Status: DISCONTINUED | OUTPATIENT
Start: 2023-06-21 | End: 2023-06-21

## 2023-06-21 RX ORDER — HYDRALAZINE HYDROCHLORIDE 50 MG/1
50 TABLET, FILM COATED ORAL 3 TIMES DAILY
Status: ON HOLD | COMMUNITY
End: 2023-12-08

## 2023-06-21 RX ORDER — METOPROLOL SUCCINATE 50 MG/1
200 TABLET, EXTENDED RELEASE ORAL DAILY
Status: DISCONTINUED | OUTPATIENT
Start: 2023-06-22 | End: 2023-06-22 | Stop reason: HOSPADM

## 2023-06-21 RX ORDER — MULTIVITAMIN,THERAPEUTIC
1 TABLET ORAL DAILY
Status: DISCONTINUED | OUTPATIENT
Start: 2023-06-22 | End: 2023-06-22 | Stop reason: HOSPADM

## 2023-06-21 RX ORDER — PANTOPRAZOLE SODIUM 40 MG/1
40 TABLET, DELAYED RELEASE ORAL
Status: DISCONTINUED | OUTPATIENT
Start: 2023-06-22 | End: 2023-06-22 | Stop reason: HOSPADM

## 2023-06-21 RX ORDER — ISOSORBIDE MONONITRATE 30 MG/1
60 TABLET, EXTENDED RELEASE ORAL 2 TIMES DAILY
Status: DISCONTINUED | OUTPATIENT
Start: 2023-06-22 | End: 2023-06-22 | Stop reason: HOSPADM

## 2023-06-21 RX ORDER — ONDANSETRON 2 MG/ML
4 INJECTION INTRAMUSCULAR; INTRAVENOUS EVERY 6 HOURS PRN
Status: DISCONTINUED | OUTPATIENT
Start: 2023-06-21 | End: 2023-06-22 | Stop reason: HOSPADM

## 2023-06-21 RX ORDER — AMOXICILLIN 250 MG
1 CAPSULE ORAL 2 TIMES DAILY PRN
Status: DISCONTINUED | OUTPATIENT
Start: 2023-06-21 | End: 2023-06-22 | Stop reason: HOSPADM

## 2023-06-21 RX ORDER — DEXTROSE MONOHYDRATE 25 G/50ML
25-50 INJECTION, SOLUTION INTRAVENOUS
Status: DISCONTINUED | OUTPATIENT
Start: 2023-06-21 | End: 2023-06-22 | Stop reason: HOSPADM

## 2023-06-21 RX ORDER — ASPIRIN 81 MG/1
81 TABLET ORAL DAILY
COMMUNITY

## 2023-06-21 RX ORDER — ATORVASTATIN CALCIUM 40 MG/1
80 TABLET, FILM COATED ORAL DAILY
Status: DISCONTINUED | OUTPATIENT
Start: 2023-06-22 | End: 2023-06-22 | Stop reason: HOSPADM

## 2023-06-21 RX ORDER — ACETAMINOPHEN 325 MG/1
650 TABLET ORAL EVERY 6 HOURS PRN
Status: DISCONTINUED | OUTPATIENT
Start: 2023-06-21 | End: 2023-06-22 | Stop reason: HOSPADM

## 2023-06-21 RX ORDER — NICOTINE POLACRILEX 4 MG
15-30 LOZENGE BUCCAL
Status: DISCONTINUED | OUTPATIENT
Start: 2023-06-21 | End: 2023-06-22 | Stop reason: HOSPADM

## 2023-06-21 RX ORDER — DONEPEZIL HYDROCHLORIDE 5 MG/1
10 TABLET, FILM COATED ORAL AT BEDTIME
Status: DISCONTINUED | OUTPATIENT
Start: 2023-06-22 | End: 2023-06-22 | Stop reason: HOSPADM

## 2023-06-21 RX ORDER — ASPIRIN 81 MG/1
81 TABLET ORAL DAILY
Status: DISCONTINUED | OUTPATIENT
Start: 2023-06-22 | End: 2023-06-22 | Stop reason: HOSPADM

## 2023-06-21 RX ORDER — ESCITALOPRAM OXALATE 20 MG/1
20 TABLET ORAL DAILY
Status: DISCONTINUED | OUTPATIENT
Start: 2023-06-22 | End: 2023-06-22 | Stop reason: HOSPADM

## 2023-06-21 RX ADMIN — CEFTRIAXONE SODIUM 1 G: 1 INJECTION, POWDER, FOR SOLUTION INTRAMUSCULAR; INTRAVENOUS at 21:06

## 2023-06-21 ASSESSMENT — ACTIVITIES OF DAILY LIVING (ADL)
ADLS_ACUITY_SCORE: 35

## 2023-06-21 NOTE — ED TRIAGE NOTES
Pt did fall of of the toilet on Monday, breaking a towel rack. Pt denies neck or back pain, denies hitting head or passing out after fall. Getting differing stories from pt and family. Pt says she did not fall, just leaned on the towel rack and broke it off. Not on thinners   Triage Assessment     Row Name 06/21/23 9601       Triage Assessment (Adult)    Airway WDL WDL       Respiratory WDL    Respiratory WDL WDL       Skin Circulation/Temperature WDL    Skin Circulation/Temperature WDL WDL       Cardiac WDL    Cardiac WDL WDL       Peripheral/Neurovascular WDL    Peripheral Neurovascular WDL X  right hand pointer and ring finger tingling for 2 days, goes up arm intemittently       Cognitive/Neuro/Behavioral WDL    Cognitive/Neuro/Behavioral WDL WDL

## 2023-06-21 NOTE — ED PROVIDER NOTES
"EMERGENCY DEPARTMENT NOTE     Name: Indu Felix    Age/Sex: 68 year old female   MRN: [id number]   Evaluation Date & Time:  6/21/2023  4:18 PM    PCP:    Flora Betts   ED Provider: Efraín Rowley D.O.       CHIEF COMPLAINT    increased confusion, tingling in right hand        DIAGNOSIS & DISPOSITION/MEDICAL DECISION MAKING     1. Urinary tract infection without hematuria, site unspecified    2. History of CVA (cerebrovascular accident)        Indu Felix is a 68 year old female with relevant past history of stage 3 chronic kidney disease, CHF, hypertension, type II diabetes mellitus, obesity who presents to the emergency department for evaluation of increased confusion and tingling in right hand.    The patient stated that her daughter and son have been noticing she is weak and confused, however she doesn't agree. She stated that she just watched TV all day and is not feeling sick. She had a stroke on the 6/11/23 and was discharged on 6/12/23 and since has felt that everyone is mean to her and her kids stated that \"her attitude is bad\". After she was discharged from her stroke, she \"didn't know where she was\". She has also been experiencing tingling in her right hand for the past two days. Her daughter stated that when they checked her blood sugar on Monday, it was at 267. She is a diabetic. She has been taking 1 aspirin a day to help with her right hand pain.    Differential  diagnosis  considered included but not limited to acute CVA, electrolyte derangement, urinary tract infection or pneumonia, DKA    Medical Decision Making  Patient on exam alert oriented x3 without focal neurologic deficit.  MRI showed no acute CVA.  CBC and comprehensive metabolic profile within normal limits except glucose 126 with normal CO2 and anion gap, creatinine 2 stable for chronic kidney disease and WBC 12.3.  Urinalysis did show pyuria and bacteriuria.  Patient received ceftriaxone.  No pulmonary symptoms to suggest pneumonia " "and chest x-ray deferred  I talked to son about the results and stated that the patient might have a UTI causing her weakness. Her kids stated that she has home health services available, however can't accomplish ADL's without fulltime assistanceassistant. They are requesting admission for possible TCU placement.  Will admit for observation, treatment of UTI and determination of disposition.  Case was discussed with the hospitalist service    Interventions: IV ceftriaxone  Discharge Vital Signs:/56   Pulse 72   Temp 98.6  F (37  C) (Oral)   Resp 16   Ht 1.575 m (5' 2\")   Wt 83.5 kg (184 lb)   SpO2 98%   BMI 33.65 kg/m      DISPOSITION: [facility] observation/[facility]    Diagnostic studies:  Imaging:  MR Brain w/o Contrast   Final Result   IMPRESSION:   1.  Mild left occipitoparietal cortical/subcortical restricted diffusion with intermediate to elevated ADC values and FLAIR hyperintensity, most compatible with a late subacute infarct. No significant mass effect or acute intracranial hemorrhage.   2.  Additional chronic changes including chronic bilateral cerebral and cerebellar hemisphere infarcts, as detailed.         Lab:  Labs Ordered and Resulted from Time of ED Arrival to Time of ED Departure   COMPREHENSIVE METABOLIC PANEL - Abnormal       Result Value    Sodium 139      Potassium 4.2      Chloride 104      Carbon Dioxide (CO2) 20 (*)     Anion Gap 15      Urea Nitrogen 40.1 (*)     Creatinine 2.04 (*)     Calcium 9.8      Glucose 126 (*)     Alkaline Phosphatase 121 (*)     AST 25      ALT 15      Protein Total 7.4      Albumin 3.6      Bilirubin Total 0.4      GFR Estimate 26 (*)    ROUTINE UA WITH MICROSCOPIC REFLEX TO CULTURE - Abnormal    Color Urine Light Yellow      Appearance Urine Clear      Glucose Urine Negative      Bilirubin Urine Negative      Ketones Urine Negative      Specific Gravity Urine 1.010      Blood Urine Negative      pH Urine 6.0      Protein Albumin Urine 20 (*)     " Urobilinogen Urine <2.0      Nitrite Urine Positive (*)     Leukocyte Esterase Urine 500 [place]/uL (*)     Bacteria Urine Few (*)     RBC Urine 0      WBC Urine 24 (*)     Squamous Epithelials Urine <1      Transitional Epithelials Urine <1      Hyaline Casts Urine 1     CBC WITH PLATELETS AND DIFFERENTIAL - Abnormal    WBC Count 12.3 (*)     RBC Count 3.68 (*)     Hemoglobin 10.9 (*)     Hematocrit 32.0 (*)     MCV 87      MCH 29.6      MCHC 34.1      RDW 13.8      Platelet Count 352      % Neutrophils 70      % Lymphocytes 17      % Monocytes 10      % Eosinophils 2      % Basophils 1      % Immature Granulocytes 0      NRBCs per 100 WBC 0      Absolute Neutrophils 8.6 (*)     Absolute Lymphocytes 2.1      Absolute Monocytes 1.2      Absolute Eosinophils 0.3      Absolute Basophils 0.1      Absolute Immature Granulocytes 0.1      Absolute NRBCs 0.0     URINE CULTURE              Triage note reviewed:    Pt did fall of of the toilet on Monday, breaking a towel rack. Pt denies neck or back pain, denies hitting head or passing out after fall. Getting differing stories from pt and family. Pt says she did not fall, just leaned on the towel rack and broke it off. Not on thinners   Triage Assessment     Row Name 06/21/23 1601       Triage Assessment (Adult)    Airway WDL WDL       Respiratory WDL    Respiratory WDL WDL       Skin Circulation/Temperature WDL    Skin Circulation/Temperature WDL WDL       Cardiac WDL    Cardiac WDL WDL       Peripheral/Neurovascular WDL    Peripheral Neurovascular WDL X - right hand pointer and ring finger tingling for 2 days, goes up arm intemittently       Cognitive/Neuro/Behavioral WDL    Cognitive/Neuro/Behavioral WDL WDL                Hx of stroke on 6//23 in [place], dc'd on 6/12/23 presents with right hand (ring and middle finger) tingling for 2 days (intermittently goes up arm too). Family concerned also that bs elevated on Monday (257), but just before arriving was 147. Family  concerned that speech is still slow and pt confused at times. Pt is AAO x 3. Pt is strong x 4, speech clear, no residual facial droop noted     Triage Assessment     Row Name 06/21/23 1601       Triage Assessment (Adult)    Airway WDL WDL       Respiratory WDL    Respiratory WDL WDL       Skin Circulation/Temperature WDL    Skin Circulation/Temperature WDL WDL       Cardiac WDL    Cardiac WDL WDL       Peripheral/Neurovascular WDL    Peripheral Neurovascular WDL X  right hand pointer and ring finger tingling for 2 days, goes up arm intemittently       Cognitive/Neuro/Behavioral WDL    Cognitive/Neuro/Behavioral WDL WDL                History:    Supplemental history from: N/A    External Record(s) reviewed: Inpatient Record: Admission 6/11/23 - 6/12/23    Work Up:    Chart documentation includes differential considered and any EKGs or imaging independently interpreted by provider, where specified.  In additional to work up documented, I considered the following work up: X-ray but no current pulmonary symptoms and deferred  External consultation:    Discussion of management with another provider: Documented in chart, if applicable    Complicating factors:    Care impacted by chronic illness: Throat, heart Disease, Chronic Kidney Disease, diabetes mellitus type II  Care affected by social determinants of health: Support systems at home  Disposition considerations: Admit    At the conclusion of the encounter I discussed the results of all of the tests and the disposition. The questions were answered. The patient or family acknowledged understanding and was agreeable with the care plan.    TOTAL CRITICAL CARE TIME (EXCLUDING PROCEDURES): Not applicable    PROCEDURES:   None    EMERGENCY DEPARTMENT COURSE   4:37 PM I met with the patient to gather history and to perform my initial exam.  We discussed treatment options and the plan for care while in the Emergency Department.  8:12 PM I spoke with the patient and updated  them on the labs and imaging results.    ED INTERVENTIONS     Medications   cefTRIAXone (ROCEPHIN) 1 g vial to attach to  mL bag for ADULTS or NS 50 mL bag for PEDS (has no administration in time range)       DISCHARGE MEDICATIONS        Review of your medicines      UNREVIEWED medicines. Ask your doctor about these medicines      Dose / Directions   ALPRAZolam 0.5 MG tablet  Commonly known as: XANAX      Dose: 0.5 mg  Take 0.5 mg by mouth See Admin Instructions One tab orally 30 min - 1 hour prior to appointments  Refills: 0     amLODIPine 5 MG tablet  Commonly known as: NORVASC  Used for: Benign essential hypertension      Dose: 5 mg  Take 1 tablet (5 mg) by mouth daily  Quantity: 30 tablet  Refills: 0     atorvastatin 80 MG tablet  Commonly known as: LIPITOR      Dose: 80 mg  Take 80 mg by mouth daily  Refills: 0     bumetanide 2 MG tablet  Commonly known as: BUMEX      Dose: 2 mg  Take 1 tablet (2 mg) by mouth 2 times daily  Quantity: 180 tablet  Refills: 1     escitalopram 20 MG tablet  Commonly known as: LEXAPRO      Dose: 20 mg  Take 20 mg by mouth daily  Refills: 0     hydrALAZINE 100 MG tablet  Commonly known as: APRESOLINE  Used for: Acute heart failure with preserved ejection fraction (HFpEF) (H)      Dose: 100 mg  Take 1 tablet (100 mg) by mouth 3 times daily  Quantity: 90 tablet  Refills: 0     isosorbide mononitrate 60 MG 24 hr tablet  Commonly known as: IMDUR  Used for: Benign essential hypertension, Pulmonary hypertension (H)      Dose: 60 mg  Take 1 tablet (60 mg) by mouth 2 times daily  Quantity: 60 tablet  Refills: 0     Lantus SoloStar 100 UNIT/ML pen  Generic drug: insulin glargine      Dose: 20 Units  20 Units every morning  Refills: 0     loperamide 2 MG capsule  Commonly known as: IMODIUM      Dose: 2 mg  Take 2 mg by mouth 2 times daily (before meals)  Refills: 0     metoprolol succinate  MG 24 hr tablet  Commonly known as: TOPROL XL      Dose: 200 mg  200 mg daily  Refills: 0    "  multivitamin tablet      Dose: 1 tablet  Take 1 tablet by mouth daily  Refills: 0     omeprazole 20 MG DR capsule  Commonly known as: priLOSEC      Dose: 20 mg  20 mg daily before breakfast  Refills: 0     traZODone 100 MG tablet  Commonly known as: DESYREL      Dose: 100 mg  Take 100 mg by mouth At Bedtime  Refills: 0     Vitamin D3 25 mcg (1000 units) tablet  Commonly known as: CHOLECALCIFEROL      Dose: 1 tablet  Take 1 tablet by mouth daily  Refills: 0        CONTINUE these medicines which have NOT CHANGED      Dose / Directions   Contour Next Test test strip  Generic drug: blood glucose      USE TO TEST TWICE DAILY for 75  Refills: 0     fish oil-omega-3 fatty acids 1000 MG capsule      Dose: 1,000 mg  Take 1,000 mg by mouth daily  Refills: 0     melatonin 3 MG tablet      Dose: 6 mg  Take 6 mg by mouth nightly as needed for sleep Patient takes 10 mg tablets  Refills: 0              INFORMATION SOURCE AND LIMITATIONS    History/Exam limitations: N/A  Patient information was obtained from: patient  Use of : N/A    HISTORY OF PRESENT ILLNESS   Indu Felix is a 68 year old year old female with a relevant past history of stage 3 chronic kidney disease, CHF, hypertension, type II diabetes mellitus, obesity , who presents to this ED by private car for evaluation of increased confusion and tingling in right hand.    The patient stated that her daughter and son have been noticing she is weak and confused, however she doesn't agree. She stated that she just watched TV all day and is not feeling sick. She had a stroke on the 6/11/23 and was discharged on 6/12/23 and since has felt that everyone is mean to her and her kids stated that \"her attitude is bad\". After she was discharged from her stroke, she \"didn't know where she was\". She has also been experiencing tingling in her right hand for the past two days. Her daughter stated that when they checked her blood sugar on Monday, it was at 267. She is a " diabetic. She has been taking 1 aspirin a day to help with her right hand pain.    The patient denies vomiting, diarrhea, fever, cough, or consumption of alcohol. The patient states she has been frequently urinating. No other acute symptoms presented.     Per chart review, the patient presented herself to [facility] from 6/11/23-6/12/23. Patient was observed overnight and did quite well. She got an MRI brain without contrast in the morning that confirmed a left-sided parietal lobe infarct. Neurology recommended home with aspirin, as well as cardiac monitoring in the outpatient setting. Overall discharge plan discussed with the patient.      REVIEW OF SYSTEMS:   All other systems reviewed and are negative except as noted above in HPI.    PATIENT HISTORY     Past Medical History:   Diagnosis Date     Aortic stenosis      Benign essential hypertension      Chronic heart failure with preserved ejection fraction (H) 3/23/2023     Chronic heart failure with preserved ejection fraction (HFpEF) (H)      Chronic kidney disease      CKD (chronic kidney disease) stage 3, GFR 30-59 ml/min (H)      Congestive heart failure (H)      Diabetes mellitus (H)      Mitral stenosis      Obesity      DIANA (obstructive sleep apnea) 3/23/2023     Pulmonary hypertension (H)      Stage 3a chronic kidney disease (H) 2/8/2023     Tricuspid valve disorders, non-rheumatic      Patient Active Problem List   Diagnosis     Stage 3a chronic kidney disease (H)     Benign essential hypertension     Type 2 diabetes mellitus, with long-term current use of insulin (H)     Class 2 obesity due to excess calories in adult     Nonrheumatic mitral valve stenosis     Nonrheumatic aortic (valve) stenosis     Peripheral edema     Pulmonary hypertension (H)     Chronic heart failure with preserved ejection fraction (H)     DIANA (obstructive sleep apnea)     History of CVA (cerebrovascular accident)     Urinary tract infection without hematuria, site  "unspecified     Past Surgical History:   Procedure Laterality Date     CV LEFT HEART CATH N/A 2/8/2023    Procedure: Left Heart Catheterization;  Surgeon: Yolanda Ramirez MD;  Location: [facility] CATH LAB CV     CV RIGHT HEART CATH MEASUREMENTS RECORDED N/A 2/8/2023    Procedure: Right Heart Catheterization;  Surgeon: Yolanda Ramirez MD;  Location: [facility] CATH LAB CV       Allergies   Allergen Reactions     Lisinopril Other (See Comments) and Unknown       OUTPATIENT MEDICATIONS     New Prescriptions    No medications on file      Vitals:    06/21/23 1606 06/21/23 1632   BP: (!) 166/75 112/56   Pulse: 76 72   Resp: 16    Temp: 98.6  F (37  C)    TempSrc: Oral    SpO2: 97% 98%   Weight: 83.5 kg (184 lb)    Height: 1.575 m (5' 2\")        Physical Exam   Constitutional: Oriented to person, place, and time. Appears well-developed and well-nourished.   HEENT:    Head: Atraumatic.   Neck: Normal range of motion. Neck supple.   Cardiovascular: Normal rate, regular rhythm and normal heart sounds.    Pulmonary/Chest: Normal effort  and breath sounds normal.   Abdominal: Soft. Bowel sounds are normal.   Musculoskeletal: Normal range of motion.   Neurological: Alert and oriented to person, place, and time. Normal strength. No sensory deficit. No cranial nerve deficit.  Skin: Skin is warm and dry.   Psychiatric: Normal mood and affect. Behavior is normal. Thought content normal.     DIAGNOSTICS    LABORATORY FINDINGS (REVIEWED AND INTERPRETED):  Labs Ordered and Resulted from Time of ED Arrival to Time of ED Departure   COMPREHENSIVE METABOLIC PANEL - Abnormal       Result Value    Sodium 139      Potassium 4.2      Chloride 104      Carbon Dioxide (CO2) 20 (*)     Anion Gap 15      Urea Nitrogen 40.1 (*)     Creatinine 2.04 (*)     Calcium 9.8      Glucose 126 (*)     Alkaline Phosphatase 121 (*)     AST 25      ALT 15      Protein Total 7.4      Albumin 3.6      Bilirubin Total 0.4      GFR Estimate 26 (*)    ROUTINE UA " WITH MICROSCOPIC REFLEX TO CULTURE - Abnormal    Color Urine Light Yellow      Appearance Urine Clear      Glucose Urine Negative      Bilirubin Urine Negative      Ketones Urine Negative      Specific Gravity Urine 1.010      Blood Urine Negative      pH Urine 6.0      Protein Albumin Urine 20 (*)     Urobilinogen Urine <2.0      Nitrite Urine Positive (*)     Leukocyte Esterase Urine 500 [place]/uL (*)     Bacteria Urine Few (*)     RBC Urine 0      WBC Urine 24 (*)     Squamous Epithelials Urine <1      Transitional Epithelials Urine <1      Hyaline Casts Urine 1     CBC WITH PLATELETS AND DIFFERENTIAL - Abnormal    WBC Count 12.3 (*)     RBC Count 3.68 (*)     Hemoglobin 10.9 (*)     Hematocrit 32.0 (*)     MCV 87      MCH 29.6      MCHC 34.1      RDW 13.8      Platelet Count 352      % Neutrophils 70      % Lymphocytes 17      % Monocytes 10      % Eosinophils 2      % Basophils 1      % Immature Granulocytes 0      NRBCs per 100 WBC 0      Absolute Neutrophils 8.6 (*)     Absolute Lymphocytes 2.1      Absolute Monocytes 1.2      Absolute Eosinophils 0.3      Absolute Basophils 0.1      Absolute Immature Granulocytes 0.1      Absolute NRBCs 0.0     URINE CULTURE         IMAGING (REVIEWED AND INTERPRETED):  MR Brain w/o Contrast   Final Result   IMPRESSION:   1.  Mild left occipitoparietal cortical/subcortical restricted diffusion with intermediate to elevated ADC values and FLAIR hyperintensity, most compatible with a late subacute infarct. No significant mass effect or acute intracranial hemorrhage.   2.  Additional chronic changes including chronic bilateral cerebral and cerebellar hemisphere infarcts, as detailed.              I, Yolanda Schulz , am serving as a scribe to document services personally performed by Efraín Rowley D.O., based on my observation and the provider s statements to me.    I, Efraín Rowley D.O., attest that Yolanda Schulz  is acting in a scribe capacity, has observed my performance of the  services and has documented them in accordance with my direction.    Efraín Rowley D.O.  EMERGENCY MEDICINE   06/21/23  [facility] EMERGENCY DEPARTMENT  [address] [place] [id number] [phone]  Dept: [phone]       Efraín Rowley DO  06/21/23 2025

## 2023-06-21 NOTE — ED TRIAGE NOTES
Hx of stroke on 6//23 in [place], dc'd on 6/12/23 presents with right hand (ring and middle finger) tingling for 2 days (intermittently goes up arm too). Family concerned also that bs elevated on Monday (257), but just before arriving was 147. Family concerned that speech is still slow and pt confused at times. Pt is AAO x 3. Pt is strong x 4, speech clear, no residual facial droop noted     Triage Assessment     Row Name 06/21/23 9550       Triage Assessment (Adult)    Airway WDL WDL       Respiratory WDL    Respiratory WDL WDL       Skin Circulation/Temperature WDL    Skin Circulation/Temperature WDL WDL       Cardiac WDL    Cardiac WDL WDL       Peripheral/Neurovascular WDL    Peripheral Neurovascular WDL X  right hand pointer and ring finger tingling for 2 days, goes up arm intemittently       Cognitive/Neuro/Behavioral WDL    Cognitive/Neuro/Behavioral WDL WDL

## 2023-06-22 ENCOUNTER — APPOINTMENT (OUTPATIENT)
Dept: OCCUPATIONAL THERAPY | Facility: HOSPITAL | Age: 68
End: 2023-06-22
Attending: HOSPITALIST
Payer: COMMERCIAL

## 2023-06-22 ENCOUNTER — HOSPITAL ENCOUNTER (OUTPATIENT)
Facility: CLINIC | Age: 68
Setting detail: OBSERVATION
Discharge: NURSING, REHAB OR LONG-TERM CARE | End: 2023-06-23
Attending: INTERNAL MEDICINE | Admitting: INTERNAL MEDICINE
Payer: COMMERCIAL

## 2023-06-22 ENCOUNTER — APPOINTMENT (OUTPATIENT)
Dept: PHYSICAL THERAPY | Facility: HOSPITAL | Age: 68
End: 2023-06-22
Attending: HOSPITALIST
Payer: COMMERCIAL

## 2023-06-22 ENCOUNTER — APPOINTMENT (OUTPATIENT)
Dept: CARDIOLOGY | Facility: HOSPITAL | Age: 68
End: 2023-06-22
Attending: HOSPITALIST
Payer: COMMERCIAL

## 2023-06-22 VITALS
SYSTOLIC BLOOD PRESSURE: 154 MMHG | OXYGEN SATURATION: 97 % | RESPIRATION RATE: 16 BRPM | TEMPERATURE: 98.6 F | DIASTOLIC BLOOD PRESSURE: 95 MMHG | HEART RATE: 67 BPM | HEIGHT: 62 IN | BODY MASS INDEX: 33.86 KG/M2 | WEIGHT: 184 LBS

## 2023-06-22 DIAGNOSIS — N39.0 URINARY TRACT INFECTION WITHOUT HEMATURIA, SITE UNSPECIFIED: Primary | ICD-10-CM

## 2023-06-22 DIAGNOSIS — Z86.73 HISTORY OF CVA (CEREBROVASCULAR ACCIDENT): ICD-10-CM

## 2023-06-22 LAB
BACTERIA UR CULT: ABNORMAL
GLUCOSE BLDC GLUCOMTR-MCNC: 122 MG/DL (ref 70–99)
GLUCOSE BLDC GLUCOMTR-MCNC: 124 MG/DL (ref 70–99)
GLUCOSE BLDC GLUCOMTR-MCNC: 129 MG/DL (ref 70–99)
GLUCOSE BLDC GLUCOMTR-MCNC: 157 MG/DL (ref 70–99)
GLUCOSE BLDC GLUCOMTR-MCNC: 97 MG/DL (ref 70–99)
HBA1C MFR BLD: 7.7 %
LVEF ECHO: NORMAL

## 2023-06-22 PROCEDURE — 97535 SELF CARE MNGMENT TRAINING: CPT | Mod: GO

## 2023-06-22 PROCEDURE — 82962 GLUCOSE BLOOD TEST: CPT

## 2023-06-22 PROCEDURE — 99207 PR APP CREDIT; MD BILLING SHARED VISIT: CPT | Performed by: STUDENT IN AN ORGANIZED HEALTH CARE EDUCATION/TRAINING PROGRAM

## 2023-06-22 PROCEDURE — G0378 HOSPITAL OBSERVATION PER HR: HCPCS

## 2023-06-22 PROCEDURE — 250N000013 HC RX MED GY IP 250 OP 250 PS 637: Performed by: HOSPITALIST

## 2023-06-22 PROCEDURE — G0379 DIRECT REFER HOSPITAL OBSERV: HCPCS

## 2023-06-22 PROCEDURE — 99233 SBSQ HOSP IP/OBS HIGH 50: CPT | Performed by: INTERNAL MEDICINE

## 2023-06-22 PROCEDURE — 99223 1ST HOSP IP/OBS HIGH 75: CPT | Performed by: PSYCHIATRY & NEUROLOGY

## 2023-06-22 PROCEDURE — 250N000013 HC RX MED GY IP 250 OP 250 PS 637: Performed by: STUDENT IN AN ORGANIZED HEALTH CARE EDUCATION/TRAINING PROGRAM

## 2023-06-22 PROCEDURE — 250N000011 HC RX IP 250 OP 636: Mod: JZ | Performed by: STUDENT IN AN ORGANIZED HEALTH CARE EDUCATION/TRAINING PROGRAM

## 2023-06-22 PROCEDURE — 97166 OT EVAL MOD COMPLEX 45 MIN: CPT | Mod: GO

## 2023-06-22 PROCEDURE — 97162 PT EVAL MOD COMPLEX 30 MIN: CPT | Mod: GP | Performed by: PHYSICAL THERAPIST

## 2023-06-22 PROCEDURE — 999N000226 HC STATISTIC SLP IP EVAL DEFER

## 2023-06-22 PROCEDURE — 97116 GAIT TRAINING THERAPY: CPT | Mod: GP | Performed by: PHYSICAL THERAPIST

## 2023-06-22 PROCEDURE — 96374 THER/PROPH/DIAG INJ IV PUSH: CPT

## 2023-06-22 PROCEDURE — 93306 TTE W/DOPPLER COMPLETE: CPT | Mod: 26 | Performed by: INTERNAL MEDICINE

## 2023-06-22 PROCEDURE — 999N000208 ECHOCARDIOGRAM COMPLETE

## 2023-06-22 PROCEDURE — 250N000012 HC RX MED GY IP 250 OP 636 PS 637: Performed by: HOSPITALIST

## 2023-06-22 RX ORDER — DEXTROSE MONOHYDRATE 25 G/50ML
25-50 INJECTION, SOLUTION INTRAVENOUS
Status: CANCELLED | OUTPATIENT
Start: 2023-06-22

## 2023-06-22 RX ORDER — HYDRALAZINE HYDROCHLORIDE 50 MG/1
50 TABLET, FILM COATED ORAL 3 TIMES DAILY
Status: CANCELLED | OUTPATIENT
Start: 2023-06-22

## 2023-06-22 RX ORDER — AMLODIPINE BESYLATE 5 MG/1
5 TABLET ORAL DAILY
Status: CANCELLED | OUTPATIENT
Start: 2023-06-23

## 2023-06-22 RX ORDER — AMOXICILLIN 250 MG
2 CAPSULE ORAL 2 TIMES DAILY PRN
Status: DISCONTINUED | OUTPATIENT
Start: 2023-06-22 | End: 2023-06-23 | Stop reason: HOSPADM

## 2023-06-22 RX ORDER — ONDANSETRON 2 MG/ML
4 INJECTION INTRAMUSCULAR; INTRAVENOUS EVERY 6 HOURS PRN
Status: DISCONTINUED | OUTPATIENT
Start: 2023-06-22 | End: 2023-06-23 | Stop reason: HOSPADM

## 2023-06-22 RX ORDER — ONDANSETRON 4 MG/1
4 TABLET, ORALLY DISINTEGRATING ORAL EVERY 6 HOURS PRN
Status: CANCELLED | OUTPATIENT
Start: 2023-06-22

## 2023-06-22 RX ORDER — AMLODIPINE BESYLATE 5 MG/1
5 TABLET ORAL DAILY
Status: DISCONTINUED | OUTPATIENT
Start: 2023-06-23 | End: 2023-06-23 | Stop reason: HOSPADM

## 2023-06-22 RX ORDER — MULTIVITAMIN,THERAPEUTIC
1 TABLET ORAL DAILY
Status: DISCONTINUED | OUTPATIENT
Start: 2023-06-23 | End: 2023-06-23 | Stop reason: HOSPADM

## 2023-06-22 RX ORDER — BUMETANIDE 2 MG/1
2 TABLET ORAL
Status: CANCELLED | OUTPATIENT
Start: 2023-06-22

## 2023-06-22 RX ORDER — TRAZODONE HYDROCHLORIDE 100 MG/1
100 TABLET ORAL AT BEDTIME
Status: DISCONTINUED | OUTPATIENT
Start: 2023-06-22 | End: 2023-06-23 | Stop reason: HOSPADM

## 2023-06-22 RX ORDER — CEFTRIAXONE 1 G/1
1 INJECTION, POWDER, FOR SOLUTION INTRAMUSCULAR; INTRAVENOUS EVERY 24 HOURS
Status: CANCELLED | OUTPATIENT
Start: 2023-06-22

## 2023-06-22 RX ORDER — LOPERAMIDE HCL 2 MG
2 CAPSULE ORAL 2 TIMES DAILY PRN
Status: CANCELLED | OUTPATIENT
Start: 2023-06-22

## 2023-06-22 RX ORDER — NICOTINE POLACRILEX 4 MG
15-30 LOZENGE BUCCAL
Status: DISCONTINUED | OUTPATIENT
Start: 2023-06-22 | End: 2023-06-23 | Stop reason: HOSPADM

## 2023-06-22 RX ORDER — ASPIRIN 81 MG/1
81 TABLET ORAL DAILY
Status: CANCELLED | OUTPATIENT
Start: 2023-06-23

## 2023-06-22 RX ORDER — ISOSORBIDE MONONITRATE 30 MG/1
60 TABLET, EXTENDED RELEASE ORAL 2 TIMES DAILY
Status: CANCELLED | OUTPATIENT
Start: 2023-06-22

## 2023-06-22 RX ORDER — ACETAMINOPHEN 325 MG/1
650 TABLET ORAL EVERY 6 HOURS PRN
Status: CANCELLED | OUTPATIENT
Start: 2023-06-22

## 2023-06-22 RX ORDER — HYDRALAZINE HYDROCHLORIDE 25 MG/1
50 TABLET, FILM COATED ORAL 3 TIMES DAILY
Status: CANCELLED | OUTPATIENT
Start: 2023-06-22

## 2023-06-22 RX ORDER — METOPROLOL SUCCINATE 100 MG/1
200 TABLET, EXTENDED RELEASE ORAL DAILY
Status: DISCONTINUED | OUTPATIENT
Start: 2023-06-23 | End: 2023-06-23 | Stop reason: HOSPADM

## 2023-06-22 RX ORDER — VITAMIN B COMPLEX
25 TABLET ORAL DAILY
Status: CANCELLED | OUTPATIENT
Start: 2023-06-23

## 2023-06-22 RX ORDER — DONEPEZIL HYDROCHLORIDE 5 MG/1
10 TABLET, FILM COATED ORAL AT BEDTIME
Status: CANCELLED | OUTPATIENT
Start: 2023-06-22

## 2023-06-22 RX ORDER — PANTOPRAZOLE SODIUM 40 MG/1
40 TABLET, DELAYED RELEASE ORAL
Status: CANCELLED | OUTPATIENT
Start: 2023-06-23

## 2023-06-22 RX ORDER — ASPIRIN 81 MG/1
81 TABLET ORAL DAILY
Status: DISCONTINUED | OUTPATIENT
Start: 2023-06-23 | End: 2023-06-23 | Stop reason: HOSPADM

## 2023-06-22 RX ORDER — ESCITALOPRAM OXALATE 20 MG/1
20 TABLET ORAL DAILY
Status: CANCELLED | OUTPATIENT
Start: 2023-06-23

## 2023-06-22 RX ORDER — LANOLIN ALCOHOL/MO/W.PET/CERES
6 CREAM (GRAM) TOPICAL
Status: DISCONTINUED | OUTPATIENT
Start: 2023-06-22 | End: 2023-06-23 | Stop reason: HOSPADM

## 2023-06-22 RX ORDER — VITAMIN B COMPLEX
25 TABLET ORAL DAILY
Status: DISCONTINUED | OUTPATIENT
Start: 2023-06-23 | End: 2023-06-23 | Stop reason: HOSPADM

## 2023-06-22 RX ORDER — NICOTINE POLACRILEX 4 MG
15-30 LOZENGE BUCCAL
Status: CANCELLED | OUTPATIENT
Start: 2023-06-22

## 2023-06-22 RX ORDER — LANOLIN ALCOHOL/MO/W.PET/CERES
6 CREAM (GRAM) TOPICAL
Status: CANCELLED | OUTPATIENT
Start: 2023-06-22

## 2023-06-22 RX ORDER — ATORVASTATIN CALCIUM 40 MG/1
80 TABLET, FILM COATED ORAL DAILY
Status: CANCELLED | OUTPATIENT
Start: 2023-06-23

## 2023-06-22 RX ORDER — ONDANSETRON 4 MG/1
4 TABLET, ORALLY DISINTEGRATING ORAL EVERY 6 HOURS PRN
Status: DISCONTINUED | OUTPATIENT
Start: 2023-06-22 | End: 2023-06-23 | Stop reason: HOSPADM

## 2023-06-22 RX ORDER — ACETAMINOPHEN 325 MG/1
650 TABLET ORAL EVERY 6 HOURS PRN
Status: DISCONTINUED | OUTPATIENT
Start: 2023-06-22 | End: 2023-06-23 | Stop reason: HOSPADM

## 2023-06-22 RX ORDER — LOPERAMIDE HCL 2 MG
2 CAPSULE ORAL 2 TIMES DAILY PRN
Status: DISCONTINUED | OUTPATIENT
Start: 2023-06-22 | End: 2023-06-23 | Stop reason: HOSPADM

## 2023-06-22 RX ORDER — DONEPEZIL HYDROCHLORIDE 10 MG/1
10 TABLET, FILM COATED ORAL AT BEDTIME
Status: CANCELLED | OUTPATIENT
Start: 2023-06-22

## 2023-06-22 RX ORDER — AMOXICILLIN 250 MG
2 CAPSULE ORAL 2 TIMES DAILY PRN
Status: CANCELLED | OUTPATIENT
Start: 2023-06-22

## 2023-06-22 RX ORDER — ONDANSETRON 2 MG/ML
4 INJECTION INTRAMUSCULAR; INTRAVENOUS EVERY 6 HOURS PRN
Status: CANCELLED | OUTPATIENT
Start: 2023-06-22

## 2023-06-22 RX ORDER — BUMETANIDE 2 MG/1
2 TABLET ORAL
Status: DISCONTINUED | OUTPATIENT
Start: 2023-06-23 | End: 2023-06-23 | Stop reason: HOSPADM

## 2023-06-22 RX ORDER — METOPROLOL SUCCINATE 100 MG/1
200 TABLET, EXTENDED RELEASE ORAL DAILY
Status: CANCELLED | OUTPATIENT
Start: 2023-06-23

## 2023-06-22 RX ORDER — CEFTRIAXONE 1 G/1
1 INJECTION, POWDER, FOR SOLUTION INTRAMUSCULAR; INTRAVENOUS EVERY 24 HOURS
Status: DISCONTINUED | OUTPATIENT
Start: 2023-06-22 | End: 2023-06-23 | Stop reason: HOSPADM

## 2023-06-22 RX ORDER — HYDRALAZINE HYDROCHLORIDE 25 MG/1
50 TABLET, FILM COATED ORAL 3 TIMES DAILY
Status: DISCONTINUED | OUTPATIENT
Start: 2023-06-22 | End: 2023-06-23 | Stop reason: HOSPADM

## 2023-06-22 RX ORDER — METOPROLOL SUCCINATE 50 MG/1
200 TABLET, EXTENDED RELEASE ORAL DAILY
Status: CANCELLED | OUTPATIENT
Start: 2023-06-23

## 2023-06-22 RX ORDER — TRAZODONE HYDROCHLORIDE 100 MG/1
100 TABLET ORAL AT BEDTIME
Status: CANCELLED | OUTPATIENT
Start: 2023-06-22

## 2023-06-22 RX ORDER — HEPARIN SODIUM 5000 [USP'U]/.5ML
5000 INJECTION, SOLUTION INTRAVENOUS; SUBCUTANEOUS EVERY 12 HOURS
Status: DISCONTINUED | OUTPATIENT
Start: 2023-06-22 | End: 2023-06-23 | Stop reason: HOSPADM

## 2023-06-22 RX ORDER — DONEPEZIL HYDROCHLORIDE 10 MG/1
10 TABLET, FILM COATED ORAL AT BEDTIME
Status: DISCONTINUED | OUTPATIENT
Start: 2023-06-22 | End: 2023-06-23 | Stop reason: HOSPADM

## 2023-06-22 RX ORDER — ACETAMINOPHEN 650 MG/1
650 SUPPOSITORY RECTAL EVERY 6 HOURS PRN
Status: DISCONTINUED | OUTPATIENT
Start: 2023-06-22 | End: 2023-06-23 | Stop reason: HOSPADM

## 2023-06-22 RX ORDER — ACETAMINOPHEN 650 MG/1
650 SUPPOSITORY RECTAL EVERY 6 HOURS PRN
Status: CANCELLED | OUTPATIENT
Start: 2023-06-22

## 2023-06-22 RX ORDER — AMOXICILLIN 250 MG
1 CAPSULE ORAL 2 TIMES DAILY PRN
Status: CANCELLED | OUTPATIENT
Start: 2023-06-22

## 2023-06-22 RX ORDER — TRAZODONE HYDROCHLORIDE 50 MG/1
100 TABLET, FILM COATED ORAL AT BEDTIME
Status: CANCELLED | OUTPATIENT
Start: 2023-06-22

## 2023-06-22 RX ORDER — ESCITALOPRAM OXALATE 20 MG/1
20 TABLET ORAL DAILY
Status: DISCONTINUED | OUTPATIENT
Start: 2023-06-23 | End: 2023-06-23 | Stop reason: HOSPADM

## 2023-06-22 RX ORDER — MULTIVITAMIN,THERAPEUTIC
1 TABLET ORAL DAILY
Status: CANCELLED | OUTPATIENT
Start: 2023-06-23

## 2023-06-22 RX ORDER — BUMETANIDE 2 MG/1
2 TABLET ORAL
Status: CANCELLED | OUTPATIENT
Start: 2023-06-23

## 2023-06-22 RX ORDER — DEXTROSE MONOHYDRATE 25 G/50ML
25-50 INJECTION, SOLUTION INTRAVENOUS
Status: DISCONTINUED | OUTPATIENT
Start: 2023-06-22 | End: 2023-06-23 | Stop reason: HOSPADM

## 2023-06-22 RX ORDER — AMOXICILLIN 250 MG
1 CAPSULE ORAL 2 TIMES DAILY PRN
Status: DISCONTINUED | OUTPATIENT
Start: 2023-06-22 | End: 2023-06-23 | Stop reason: HOSPADM

## 2023-06-22 RX ORDER — ATORVASTATIN CALCIUM 40 MG/1
80 TABLET, FILM COATED ORAL DAILY
Status: DISCONTINUED | OUTPATIENT
Start: 2023-06-23 | End: 2023-06-23 | Stop reason: HOSPADM

## 2023-06-22 RX ORDER — PANTOPRAZOLE SODIUM 40 MG/1
40 TABLET, DELAYED RELEASE ORAL
Status: DISCONTINUED | OUTPATIENT
Start: 2023-06-23 | End: 2023-06-23 | Stop reason: HOSPADM

## 2023-06-22 RX ORDER — ISOSORBIDE MONONITRATE 30 MG/1
60 TABLET, EXTENDED RELEASE ORAL 2 TIMES DAILY
Status: DISCONTINUED | OUTPATIENT
Start: 2023-06-22 | End: 2023-06-23 | Stop reason: HOSPADM

## 2023-06-22 RX ADMIN — ISOSORBIDE MONONITRATE 60 MG: 30 TABLET, EXTENDED RELEASE ORAL at 09:36

## 2023-06-22 RX ADMIN — DONEPEZIL HYDROCHLORIDE 10 MG: 5 TABLET, FILM COATED ORAL at 00:19

## 2023-06-22 RX ADMIN — ISOSORBIDE MONONITRATE 60 MG: 30 TABLET, EXTENDED RELEASE ORAL at 23:13

## 2023-06-22 RX ADMIN — TRAZODONE HYDROCHLORIDE 100 MG: 50 TABLET ORAL at 00:19

## 2023-06-22 RX ADMIN — HYDRALAZINE HYDROCHLORIDE 50 MG: 25 TABLET ORAL at 23:12

## 2023-06-22 RX ADMIN — CEFTRIAXONE 1 G: 1 INJECTION, POWDER, FOR SOLUTION INTRAMUSCULAR; INTRAVENOUS at 21:43

## 2023-06-22 RX ADMIN — ATORVASTATIN CALCIUM 80 MG: 40 TABLET, FILM COATED ORAL at 09:36

## 2023-06-22 RX ADMIN — THERA TABS 1 TABLET: TAB at 09:36

## 2023-06-22 RX ADMIN — INSULIN GLARGINE 20 UNITS: 100 INJECTION, SOLUTION SUBCUTANEOUS at 09:43

## 2023-06-22 RX ADMIN — Medication 25 MCG: at 09:36

## 2023-06-22 RX ADMIN — TRAZODONE HYDROCHLORIDE 100 MG: 100 TABLET ORAL at 21:58

## 2023-06-22 RX ADMIN — ESCITALOPRAM OXALATE 20 MG: 20 TABLET, FILM COATED ORAL at 09:35

## 2023-06-22 RX ADMIN — HYDRALAZINE HYDROCHLORIDE 50 MG: 50 TABLET, FILM COATED ORAL at 09:36

## 2023-06-22 RX ADMIN — HYDRALAZINE HYDROCHLORIDE 50 MG: 50 TABLET, FILM COATED ORAL at 14:44

## 2023-06-22 RX ADMIN — METOPROLOL SUCCINATE 200 MG: 50 TABLET, EXTENDED RELEASE ORAL at 09:37

## 2023-06-22 RX ADMIN — BUMETANIDE 2 MG: 2 TABLET ORAL at 16:04

## 2023-06-22 RX ADMIN — DONEPEZIL HYDROCHLORIDE 10 MG: 10 TABLET ORAL at 21:59

## 2023-06-22 RX ADMIN — PANTOPRAZOLE SODIUM 40 MG: 40 TABLET, DELAYED RELEASE ORAL at 09:35

## 2023-06-22 RX ADMIN — AMLODIPINE BESYLATE 5 MG: 5 TABLET ORAL at 09:37

## 2023-06-22 RX ADMIN — BUMETANIDE 2 MG: 2 TABLET ORAL at 09:36

## 2023-06-22 RX ADMIN — Medication 81 MG: at 09:35

## 2023-06-22 ASSESSMENT — ACTIVITIES OF DAILY LIVING (ADL)
ADLS_ACUITY_SCORE: 35
ADLS_ACUITY_SCORE: 31
ADLS_ACUITY_SCORE: 35
ADLS_ACUITY_SCORE: 31

## 2023-06-22 ASSESSMENT — COLUMBIA-SUICIDE SEVERITY RATING SCALE - C-SSRS
5. HAVE YOU STARTED TO WORK OUT OR WORKED OUT THE DETAILS OF HOW TO KILL YOURSELF? DO YOU INTEND TO CARRY OUT THIS PLAN?: NO
1. IN THE PAST MONTH, HAVE YOU WISHED YOU WERE DEAD OR WISHED YOU COULD GO TO SLEEP AND NOT WAKE UP?: NO
4. HAVE YOU HAD THESE THOUGHTS AND HAD SOME INTENTION OF ACTING ON THEM?: NO
3. HAVE YOU BEEN THINKING ABOUT HOW YOU MIGHT KILL YOURSELF?: NO
2. HAVE YOU ACTUALLY HAD ANY THOUGHTS OF KILLING YOURSELF IN THE PAST MONTH?: NO
6. HAVE YOU EVER DONE ANYTHING, STARTED TO DO ANYTHING, OR PREPARED TO DO ANYTHING TO END YOUR LIFE?: NO

## 2023-06-22 NOTE — DISCHARGE SUMMARY
"[facility]  Hospitalist Discharge Summary      Date of Admission:  6/21/2023  Date of Discharge:  6/22/2023  Discharging Provider: Qiana Moore MD  Discharge Service: Hospitalist Service    Discharge Diagnoses   Acute metabolic encephalopathy  Acute cystitis without hematuria  Left occipitoparietal late subacute CVA    Clinically Significant Risk Factors     # DMII: A1C = 7.7 % (Ref range: <5.7 %) within past 6 months  # Obesity: Estimated body mass index is 33.65 kg/m  as calculated from the following:    Height as of this encounter: 1.575 m (5' 2\").    Weight as of this encounter: 83.5 kg (184 lb).       Follow-ups Needed After Discharge       Unresulted Labs Ordered in the Past 30 Days of this Admission     Date and Time Order Name Status Description    6/21/2023  6:51 PM Urine Culture Preliminary       These results will be followed up by [facility] team    Discharge Disposition   Transferred to [facility]  Condition at discharge: Stable    Hospital Course    Indu Felix is a 68 year old female admitted on 6/21/2023. She was brought to the ED for evaluation of right hand third and fourth fingers tingling for 2 days, hyperglycemia, per family intermittent confusion. MRI-brain on 06/21 showed late subacute infarct of left occipitoparietal lobe. Patient is found to have UTI. UCx growing E.coli. sensitivity pending. Patient is oriented to person and place only. Will continue rocephin for now. Patient is to be transferred to [facility]. Details of current management as shown below:    Acute cystitis without hematuria  -Continue IV ceftriaxone  -Follow-up urine culture- E.coli. Sensitivity pending    Acute metabolic encephalopathy  Mild cognitive impairment  -Family reported some intermittent confusion, patient is awake and oriented x2 during my examination  -Continue PTA donepezil  -Delirium prevention and treatment  -PT/OT eval- home with home assist, HC " PT    Left occipitoparietal late subacute CVA  -Woke up with speech difficulties on 6/11/2023, admitted at [facility] in [facility], MRI showed subacute infarct left parietal lobe  -Continue PTA aspirin and atorvastatin  -Echocardiogram to evaluate for cardioembolic etiology done pending official reading.    Heart failure with preserved ejection fraction  Pulmonary hypertension  Mitral and aortic valves stenosis  -Right heart cath 2/8/2023 showed severely elevated filling pressures of the right and left sides, severely elevated pulmonary artery hypertension, normal cardiac output  -Compensated  -Echocardiogram as above  -Continue PTA bumetanide 2 mg twice a day    Essential hypertension  -Continue PTA amlodipine, hydralazine, isosorbide mononitrate, metoprolol    Type 2 diabetes without long-term insulin use, diet controlled  -Insulin sliding scale, lantus    Chronic kidney disease stage IV  Non-anion gap metabolic acidosis  -Stable renal function  -Avoid nephrotoxins  -Renally dose medications    Anemia chronic kidney disease  -Stable hemoglobin    Drug-induced platelet defect  -On PTA aspirin  -Monitor for bleeding    DIANA  -Oxygen via nasal cannula/mask as needed for sleep    Obesity  -BMI 33.65  Patient is clinically and hemodynamically stable for transfer to [facility]. Patient is accepted under Hospitalist service.      Consultations This Hospital Stay   CARE MANAGEMENT / SOCIAL WORK IP CONSULT  PHYSICAL THERAPY ADULT IP CONSULT  OCCUPATIONAL THERAPY ADULT IP CONSULT  PHARMACY IP CONSULT  NEUROLOGY IP STROKE CONSULT  SPEECH LANGUAGE PATH ADULT IP CONSULT    Code Status   Full Code    Time Spent on this Encounter   I, Qiana Moore MD, personally saw the patient today and spent greater than 30 minutes discharging this patient.       Qiana Moore MD  [facility] EMERGENCY DEPARTMENT  [facility] [facility] [address]  Phone:  [phone]  ______________________________________________________________________    Physical Exam   Vital Signs: Temp: 98.6  F (37  C) Temp src: Oral BP: (!) 169/72 Pulse: 63   Resp: 16 SpO2: 96 % O2 Device: None (Room air)    Weight: 184 lbs 0 oz  GEN: Alert and oriented. Not in acute distress.  HEENT: Atraumatic, mucous membrane- moist and pink.  Chest: Bilateral air entry.  CVS: S1S2 regular.   Abdomen: Soft. Non-tender, non-distended. No organomegaly. No guarding or rigidity. Bowel sounds active.   Extremities: No pedal edema.  CNS: No involuntary movements.  Skin: no cyanosis or clubbing.        Primary Care Physician   Flora Betts    Discharge Orders   No discharge procedures on file.    Significant Results and Procedures       Discharge Medications   Current Discharge Medication List      CONTINUE these medications which have NOT CHANGED    Details   amLODIPine (NORVASC) 5 MG tablet Take 1 tablet (5 mg) by mouth daily  Qty: 30 tablet, Refills: 0    Associated Diagnoses: Benign essential hypertension      aspirin 81 MG EC tablet Take 81 mg by mouth daily      atorvastatin (LIPITOR) 80 MG tablet Take 80 mg by mouth daily      bumetanide (BUMEX) 2 MG tablet Take 1 tablet (2 mg) by mouth 2 times daily  Qty: 180 tablet, Refills: 1      cholecalciferol 25 MCG (1000 UT) TABS Take 1 tablet by mouth daily      donepezil (ARICEPT) 5 MG tablet Take 10 mg by mouth At Bedtime      escitalopram (LEXAPRO) 20 MG tablet Take 20 mg by mouth daily      fish oil-omega-3 fatty acids 1000 MG capsule Take 1,000 mg by mouth daily      hydrALAZINE (APRESOLINE) 50 MG tablet Take 50 mg by mouth 3 times daily      insulin glargine (LANTUS SOLOSTAR) 100 UNIT/ML pen 20 Units every morning      isosorbide mononitrate (IMDUR) 60 MG 24 hr tablet Take 1 tablet (60 mg) by mouth 2 times daily  Qty: 60 tablet, Refills: 0    Associated Diagnoses: Benign essential hypertension; Pulmonary hypertension (H)      loperamide (IMODIUM) 2 MG capsule  Take 2 mg by mouth 2 times daily (before meals)      melatonin 3 MG tablet Take 6 mg by mouth nightly as needed for sleep      metoprolol succinate ER (TOPROL XL) 200 MG 24 hr tablet Take 200 mg by mouth daily      multivitamin (ONE-DAILY) tablet Take 1 tablet by mouth daily      omeprazole (PRILOSEC) 20 MG DR capsule 20 mg daily before breakfast      traZODone (DESYREL) 100 MG tablet Take 100 mg by mouth At Bedtime      blood glucose (CONTOUR NEXT TEST) test strip USE TO TEST TWICE DAILY for 75           Allergies   Allergies   Allergen Reactions     Lisinopril Other (See Comments) and Unknown

## 2023-06-22 NOTE — ED NOTES
"Patient A&O x 4. Patient has family at bedside and resting comfortably in bed. She did not want to eat any breakfast because she doesn't \"eat much in the morning\" but said she would eat a banana if there was one available. A banana was brought to patient. Patient denies any pain or urinary issues, Call light within reach.  "

## 2023-06-22 NOTE — CONSULTS
NEUROLOGY CONSULTATION NOTE     nIdu Felix,  1955, MRN [id number] Date: 2023     [facility]   Code status:  Full Code   PCP: Flora Betts, [phone]      ASSESSMENT & PLAN   Diagnosis code: Confusion/right arm numbness/rule out stroke    Confusion/right arm numbness-rule out stroke  UTI  Chronic memory complaints      Previous MRI brain shows subacute infarct in the left parietal lobe    Repeat MRI negative for acute stroke    CT angiogram negative for significant proximal large vessel occlusion/stenosis    Echocardiogram pending/cardiac monitoring    Continue Lipitor 80 mg.    Continue aspirin 81 mg    Suspect worsening of neurological symptoms related to underlying UTI.    Patient is on Aricept for memory problems.    CT scan did show a thyroid mass and this needs to be further investigated.    Check B12/TSH/B1    Physical therapy/Occupational Therapy    Patient has been noncompliant with her diabetes medications at home and would recommend establishing with primary care.       Chief Complaint   Patient presents with     increased confusion, tingling in right hand         HISTORY OF PRESENT ILLNESS     We have been requested by Dr. Sales to evaluate Indu Felix who is a 68 year old  female for confusion/right hand tingling/stroke.  This is a 68-year-old female brought in for numbness of her right arm confusion.  She did undergo left parietal stroke 10 days ago.  She at that time was discharged from the hospital less than 24 hours.  Over the last few days she has been becoming more and more confused and having some generalized weakness.  She was also complaining of some right arm numbness.  Family was concerned that she was having a new stroke and as a result brought her to the hospital.  Her MRI brain did come back negative for new stroke.  She was diagnosed with a UTI and has been put on ceftriaxone.  Her weakness is significantly improved.  She was able to ambulate with physical therapy  this morning.  Feels that the numbness in the right arm is also getting better.  Denies any other ongoing neurological symptoms.  Per the son she has been more frustrated and more angry with people over the last few months.  He is concerned about dementia.     PAST MEDICAL & SURGICAL HISTORY     Medical History  Past Medical History:   Diagnosis Date     Aortic stenosis      Benign essential hypertension      Chronic heart failure with preserved ejection fraction (H) 3/23/2023     Chronic heart failure with preserved ejection fraction (HFpEF) (H)      Chronic kidney disease      CKD (chronic kidney disease) stage 3, GFR 30-59 ml/min (H)      Congestive heart failure (H)      Diabetes mellitus (H)      Mitral stenosis      Obesity      DIANA (obstructive sleep apnea) 3/23/2023     Pulmonary hypertension (H)      Stage 3a chronic kidney disease (H) 2/8/2023     Tricuspid valve disorders, non-rheumatic      Surgical History  Past Surgical History:   Procedure Laterality Date     CV LEFT HEART CATH N/A 2/8/2023    Procedure: Left Heart Catheterization;  Surgeon: Yolanda Ramirez MD;  Location: [facility] CATH LAB CV     CV RIGHT HEART CATH MEASUREMENTS RECORDED N/A 2/8/2023    Procedure: Right Heart Catheterization;  Surgeon: Yolanda Ramirez MD;  Location: [facility] CATH LAB CV        SOCIAL HISTORY     Social History     Tobacco Use     Smoking status: Never     Smokeless tobacco: Never   Substance Use Topics     Alcohol use: Never     Drug use: Never        FAMILY HISTORY     Reviewed, and noncontributory     ALLERGIES     Allergies   Allergen Reactions     Lisinopril Other (See Comments) and Unknown        REVIEW OF SYSTEMS     12 system ROS was done and other than the HPI this was negative for pertinent positives in the HPI     HOME & HOSPITAL MEDICATIONS     Prior to Admission Medications  (Not in a hospital admission)      Hospital Medications    amLODIPine  5 mg Oral Daily     aspirin  81 mg Oral Daily      "atorvastatin  80 mg Oral Daily     bumetanide  2 mg Oral BID     cefTRIAXone  1 g Intravenous Q24H     donepezil  10 mg Oral At Bedtime     escitalopram  20 mg Oral Daily     hydrALAZINE  50 mg Oral TID     insulin aspart  1-7 Units Subcutaneous TID AC     insulin aspart  1-5 Units Subcutaneous At Bedtime     insulin glargine  20 Units Subcutaneous QAM     isosorbide mononitrate  60 mg Oral BID     metoprolol succinate ER  200 mg Oral Daily     multivitamin, therapeutic  1 tablet Oral Daily     pantoprazole  40 mg Oral QAM AC     traZODone  100 mg Oral At Bedtime     Vitamin D3  25 mcg Oral Daily        PHYSICAL EXAM     Vital signs  Pulse:  [61-72] 67  Resp:  [16-27] 16  BP: (100-196)/(56-95) 154/95  SpO2:  [96 %-98 %] 97 %    PHYSICAL EXAMINATION  VITALS: BP (!) 154/95   Pulse 67   Temp 98.6  F (37  C) (Oral)   Resp 16   Ht 1.575 m (5' 2\")   Wt 83.5 kg (184 lb)   SpO2 97%   BMI 33.65 kg/m    GENERAL -Health appearing, No apparent distress  EYES- No scleral icterus, no eyelid droop, Pupils - see Neuro section  HEENT - Normocephalic, atraumatic, Hearing grossly intact; Oral mucosa moist and pink in color. External Ears and nose intact.   Neck - supple with no obvious lymphadenopathy or thyromegaly  PULM - Good spontaneous respiratory effort;  CV- Pedal pulses present with no peripheral edema/ No significant varicosities.  MSK- Gait - see Neuro section; Strength and tone- see Neuro section; Range of motion grossly intact.  PSYCH -cooperative    Neurological  Mental status - Patient is awake and grossly oriented to self, place and time. Attention span is normal. Language is fluent and follows commands appropriately.   Cranial nerves - CN II-XII intact. Pupils are reactive and symmetric; EOMI, VFIT, NLF symmetric  Motor - There is no pronator drift. Motor exam shows 5/5 strength in all extremities.  Tone - Tone is symmetric bilaterally in upper and lower extremities.  Reflexes - Reflexes are " symmetric.  Sensation - Sensory exam is grossly intact to light touch, pain.  Coordination - Finger to nose and heel to shin is without dysmetria.  Gait and station --able to ambulate with physical therapy.  Questionable wide-based gait.  Romberg/tandem could not be tested.     DIAGNOSTIC STUDIES     Pertinent Radiology   MRI Brain  IMPRESSION:   1. Subacute infarct left parietal lobe. No signal mass effect or hemorrhage.     MRI brain  IMPRESSION:  1.  Mild left occipitoparietal cortical/subcortical restricted diffusion with intermediate to elevated ADC values and FLAIR hyperintensity, most compatible with a late subacute infarct. No significant mass effect or acute intracranial hemorrhage.  2.  Additional chronic changes including chronic bilateral cerebral and cerebellar hemisphere infarcts, as detailed.    CTA  IMPRESSION:   1. Acute ischemic stroke of the left parietal lobe involving the P3/P4 segment of the left posterior cerebral artery.   2. No acute intracranial hemorrhage.   3. Large 4.4 cm mass in the right thyroid lobe. Recommend nonemergent dedicated thyroid ultrasound for further characterization.      ECHO 2023      Recent Results (from the past 24 hour(s))   Comprehensive metabolic panel    Collection Time: 06/21/23  6:01 PM   Result Value Ref Range    Sodium 139 136 - 145 mmol/L    Potassium 4.2 3.4 - 5.3 mmol/L    Chloride 104 98 - 107 mmol/L    Carbon Dioxide (CO2) 20 (L) 22 - 29 mmol/L    Anion Gap 15 7 - 15 mmol/L    Urea Nitrogen 40.1 (H) 8.0 - 23.0 mg/dL    Creatinine 2.04 (H) 0.51 - 0.95 mg/dL    Calcium 9.8 8.8 - 10.2 mg/dL    Glucose 126 (H) 70 - 99 mg/dL    Alkaline Phosphatase 121 (H) 35 - 104 U/L    AST 25 0 - 45 U/L    ALT 15 0 - 50 U/L    Protein Total 7.4 6.4 - 8.3 g/dL    Albumin 3.6 3.5 - 5.2 g/dL    Bilirubin Total 0.4 <=1.2 mg/dL    GFR Estimate 26 (L) >60 mL/min/1.73m2   CBC with platelets and differential    Collection Time: 06/21/23  6:01 PM   Result Value Ref Range    WBC  Count 12.3 (H) 4.0 - 11.0 10e3/uL    RBC Count 3.68 (L) 3.80 - 5.20 10e6/uL    Hemoglobin 10.9 (L) 11.7 - 15.7 g/dL    Hematocrit 32.0 (L) 35.0 - 47.0 %    MCV 87 78 - 100 fL    MCH 29.6 26.5 - 33.0 pg    MCHC 34.1 31.5 - 36.5 g/dL    RDW 13.8 10.0 - 15.0 %    Platelet Count 352 150 - 450 10e3/uL    % Neutrophils 70 %    % Lymphocytes 17 %    % Monocytes 10 %    % Eosinophils 2 %    % Basophils 1 %    % Immature Granulocytes 0 %    NRBCs per 100 WBC 0 <1 /100    Absolute Neutrophils 8.6 (H) 1.6 - 8.3 10e3/uL    Absolute Lymphocytes 2.1 0.8 - 5.3 10e3/uL    Absolute Monocytes 1.2 0.0 - 1.3 10e3/uL    Absolute Eosinophils 0.3 0.0 - 0.7 10e3/uL    Absolute Basophils 0.1 0.0 - 0.2 10e3/uL    Absolute Immature Granulocytes 0.1 <=0.4 10e3/uL    Absolute NRBCs 0.0 10e3/uL   Hemoglobin A1c    Collection Time: 06/21/23  6:01 PM   Result Value Ref Range    Hemoglobin A1C 7.7 (H) <5.7 %   UA with Microscopic reflex to Culture    Collection Time: 06/21/23  6:12 PM    Specimen: Urine, Midstream   Result Value Ref Range    Color Urine Light Yellow Colorless, Straw, Light Yellow, Yellow    Appearance Urine Clear Clear    Glucose Urine Negative Negative mg/dL    Bilirubin Urine Negative Negative    Ketones Urine Negative Negative mg/dL    Specific Gravity Urine 1.010 1.001 - 1.030    Blood Urine Negative Negative    pH Urine 6.0 5.0 - 7.0    Protein Albumin Urine 20 (A) Negative mg/dL    Urobilinogen Urine <2.0 <2.0 mg/dL    Nitrite Urine Positive (A) Negative    Leukocyte Esterase Urine 500 Cortney/uL (A) Negative    Bacteria Urine Few (A) None Seen /HPF    RBC Urine 0 <=2 /HPF    WBC Urine 24 (H) <=5 /HPF    Squamous Epithelials Urine <1 <=1 /HPF    Transitional Epithelials Urine <1 <=1 /HPF    Hyaline Casts Urine 1 <=2 /LPF   Urine Culture    Collection Time: 06/21/23  6:12 PM    Specimen: Urine, Midstream   Result Value Ref Range    Culture >100,000 CFU/mL Escherichia coli (A)    Glucose by meter    Collection Time: 06/21/23 10:47  PM   Result Value Ref Range    GLUCOSE BY METER POCT 95 70 - 99 mg/dL   Glucose by meter    Collection Time: 06/22/23  7:42 AM   Result Value Ref Range    GLUCOSE BY METER POCT 97 70 - 99 mg/dL   Glucose by meter    Collection Time: 06/22/23 12:46 PM   Result Value Ref Range    GLUCOSE BY METER POCT 122 (H) 70 - 99 mg/dL       Total time spent for face to face visit, reviewing labs/imaging studies, counseling and coordination of care was: Over 80 min More than 50% of this time was spent on counseling and coordination of care.    Counseling family.  Coordination of care with primary team.  Reviewing chart.    Phillip Tobin MD  Neurologist  [facility]  Tel:- [phone]

## 2023-06-22 NOTE — PHARMACY-ADMISSION MEDICATION HISTORY
Pharmacist Admission Medication History    Admission medication history is complete. The information provided in this note is only as accurate as the sources available at the time of the update.    Medication reconciliation/reorder completed by provider prior to medication history? No    Information Source(s): Patient, Family member, Clinic records, Hospital records and CareEverywhere/SureScripts via in-person    Pertinent Information: none    Changes made to PTA medication list:    Added: donepezil and ASA    Deleted: alprazolam    Changed: hydralazine       Allergies reviewed with patient and updates made in EHR: yes    Medication History Completed By: [facility] 6/21/2023 11:10 PM    Prior to Admission medications    Medication Sig Last Dose Taking? Auth Provider Long Term End Date   amLODIPine (NORVASC) 5 MG tablet Take 1 tablet (5 mg) by mouth daily 6/21/2023 at am Yes Donnell Rosenberg, MBBS Yes    aspirin 81 MG EC tablet Take 81 mg by mouth daily 6/21/2023 at am Yes Unknown, Entered By History     atorvastatin (LIPITOR) 80 MG tablet Take 80 mg by mouth daily 6/21/2023 at am Yes Reported, Patient Yes    bumetanide (BUMEX) 2 MG tablet Take 1 tablet (2 mg) by mouth 2 times daily 6/21/2023 at x 2 Yes Juanita Catherine, APRN CNP Yes    cholecalciferol 25 MCG (1000 UT) TABS Take 1 tablet by mouth daily 6/21/2023 Yes Reported, Patient     donepezil (ARICEPT) 5 MG tablet Take 10 mg by mouth At Bedtime 6/20/2023 at hs Yes Unknown, Entered By History     escitalopram (LEXAPRO) 20 MG tablet Take 20 mg by mouth daily 6/21/2023 at am Yes Reported, Patient Yes    fish oil-omega-3 fatty acids 1000 MG capsule Take 1,000 mg by mouth daily 6/21/2023 at am Yes Reported, Patient     hydrALAZINE (APRESOLINE) 50 MG tablet Take 50 mg by mouth 3 times daily 6/21/2023 at x 2 Yes Unknown, Entered By History No    insulin glargine (LANTUS SOLOSTAR) 100 UNIT/ML pen 20 Units every morning 6/21/2023 at am Yes Reported,  Patient Yes    isosorbide mononitrate (IMDUR) 60 MG 24 hr tablet Take 1 tablet (60 mg) by mouth 2 times daily 6/21/2023 at am Yes Donnell Rosenberg, JOSEY Yes    loperamide (IMODIUM) 2 MG capsule Take 2 mg by mouth 2 times daily (before meals) 6/21/2023 at am Yes Reported, Patient     melatonin 3 MG tablet Take 6 mg by mouth nightly as needed for sleep Past Week Yes Reported, Patient     metoprolol succinate ER (TOPROL XL) 200 MG 24 hr tablet Take 200 mg by mouth daily 6/21/2023 at am Yes Reported, Patient Yes    multivitamin (ONE-DAILY) tablet Take 1 tablet by mouth daily 6/21/2023 at am Yes Reported, Patient     omeprazole (PRILOSEC) 20 MG DR capsule 20 mg daily before breakfast 6/21/2023 at am Yes Reported, Patient     traZODone (DESYREL) 100 MG tablet Take 100 mg by mouth At Bedtime 6/20/2023 at hs Yes Reported, Patient Yes    blood glucose (CONTOUR NEXT TEST) test strip USE TO TEST TWICE DAILY for 75   Reported, Patient

## 2023-06-22 NOTE — CONSULTS
Care Management Initial Consult    General Information  Assessment completed with: Patient, Children,    Type of CM/SW Visit: Initial Assessment    Primary Care Provider verified and updated as needed: Yes   Readmission within the last 30 days:        Reason for Consult: discharge planning  Advance Care Planning:            Communication Assessment  Patient's communication style: spoken language (English or Bilingual)             Cognitive  Cognitive/Neuro/Behavioral: WDL  Level of Consciousness: alert  Arousal Level: opens eyes spontaneously  Orientation: oriented x 4     Best Language: 0 - No aphasia  Speech: clear    Living Environment:   People in home: alone, child(sharron), adult, significant other     Current living Arrangements: house      Able to return to prior arrangements:         Family/Social Support:  Care provided by:    Provides care for:    Marital Status:              Description of Support System:           Current Resources:   Patient receiving home care services:       Community Resources:    Equipment currently used at home: cane, straight, grab bar, toilet, grab bar, tub/shower (4WW(has not used yet),  walk in shower)  Supplies currently used at home:      Employment/Financial:  Employment Status:          Financial Concerns:             Does the patient's insurance plan have a 3 day qualifying hospital stay waiver?  Yes   Will the waiver be used for post-acute placement? Yes              Additional Information:  Initial assessment completed with pt, son and daughter.  Pt recently hospitalized and discharged home with referral to [facility] (PT, OT and Speech).  Pt rehospitalized prior to home care services being able to start.  Pt is  and  is currently staying [place] their [place] in [place].  Pt has a daughter that lives in the house, she is moving out July 1and works outside of the home so is gone during the day.  Discussed recommendation for TCU and pt and  family in agreement.  TCU referrals sent.  Brian fabian also submitted for TCU coverage.  Transport TBD.    CAYETANO Fernandez

## 2023-06-22 NOTE — PLAN OF CARE
Speech Pathology  Chart reviewed and met with patient. She is tolerating PO intake without difficulty. She was set up to receive home care SLP services due to word finding deficits from recent CVA. She reports no new deficits. Per chart review discharge summary is in place for transferring to [facility] today with possible discharge to TCU tomorrow. Will defer speech consult to [facility] or next level of care if discharging tomorrow to address expressive language impairment from previous CVA.

## 2023-06-22 NOTE — PROGRESS NOTES
"   06/22/23 0915   Appointment Info   Signing Clinician's Name / Credentials (PT) Audrey Reid, PT, DPT   Quick Adds   Quick Adds Certification   Living Environment   People in Home spouse;child(sharron), adult  (daughter lives in basement)   Current Living Arrangements house   Home Accessibility stairs to enter home   Number of Stairs, Main Entrance 4   Stair Railings, Main Entrance railings safe and in good condition   Living Environment Comments Pt has laundry in basement. Daughter currently assists but is moving out soon. Pt states spouse will takeover when daughter moves out.   Self-Care   Equipment Currently Used at Home cane, straight  (Has 4WW available at home, daughter just bought one, hasn't used it yet.)   Fall history within last six months no   Activity/Exercise/Self-Care Comment Pt independent with ADLs. Spouse assists with IADLs.   General Information   Onset of Illness/Injury or Date of Surgery 06/21/23   Referring Physician Tam Ngo MD   Patient/Family Therapy Goals Statement (PT) None stated.   Pertinent History of Current Problem (include personal factors and/or comorbidities that impact the POC) Per H&P: \"68 year old female admitted on 6/21/2023. She was brought to the ED for evaluation of right hand third and fourth fingers tingling for 2 days, hyperglycemia, per family intermittent confusion\"   Existing Precautions/Restrictions fall   Range of Motion (ROM)   Range of Motion ROM is WFL   Strength (Manual Muscle Testing)   Strength (Manual Muscle Testing) Deficits observed during functional mobility   Bed Mobility   Bed Mobility supine-sit   Supine-Sit [place] (Bed Mobility) minimum assist (75% patient effort);verbal cues   Impairments Contributing to Impaired Bed Mobility decreased strength   Transfers   Transfers sit-stand transfer;toilet transfer   Transfer Safety Concerns Noted decreased weight-shifting ability   Impairments Contributing to Impaired Transfers impaired " balance;decreased strength   Sit-Stand Transfer   Sit-Stand [place] (Transfers) minimum assist (75% patient effort);verbal cues   Assistive Device (Sit-Stand Transfers) cane, straight   Comment, (Sit-Stand Transfer) Pt unsteady with initial stand, requires assist to stabilize.   Toilet Transfer   [place] Level (Toilet Transfer) contact guard;verbal cues   Assistive Device (Toilet Transfer) cane, straight;grab bars/safety frame   Type (Toilet Transfer) stand-sit;sit-stand   Gait/Stairs (Locomotion)   [place] Level (Gait) contact guard;verbal cues   Assistive Device (Gait) cane, straight   Distance in Feet 30'   Distance in Feet (Gait) additional 50'   Pattern (Gait) step-to   Deviations/Abnormal Patterns (Gait) petey decreased;gait speed decreased   Clinical Impression   Criteria for Skilled Therapeutic Intervention Yes, treatment indicated   PT Diagnosis (PT) impaired functional mobility   Influenced by the following impairments decreased strength, impaired balance   Functional limitations due to impairments transfers, ambulation   Clinical Presentation (PT Evaluation Complexity) Evolving/Changing   Clinical Presentation Rationale Pt presents as medically diagnosed.   Clinical Decision Making (Complexity) moderate complexity   Planned Therapy Interventions (PT) balance training;bed mobility training;gait training;home exercise program;neuromuscular re-education;patient/family education;strengthening;transfer training   Anticipated Equipment Needs at Discharge (PT) walker, rolling  (has 4WW available at home)   Risk & Benefits of therapy have been explained evaluation/treatment results reviewed;participants voiced agreement with care plan;participants included;patient   PT Total Evaluation Time   PT Eval, Moderate Complexity Minutes (71957) 15   Therapy Certification   Start of care date 06/22/23   Certification date from 06/22/23   Certification date to 06/29/23   Medical Diagnosis UTI   Physical  Therapy Goals   PT Frequency Daily   PT Predicted Duration/Target Date for Goal Attainment 06/29/23   PT Goals Bed Mobility;Transfers;Gait;Stairs   PT: Bed Mobility Supervision/stand-by assist;Supine to/from sit   PT: Transfers Supervision/stand-by assist;Sit to/from stand;Assistive device   PT: Gait Supervision/stand-by assist;Assistive device;Rolling walker;Greater than 200 feet   PT: Stairs Minimal assist;3 stairs;Rail on right   Interventions   Interventions Quick Adds Gait Training   Gait Training   Gait Training Minutes (49126) 10   Symptoms Noted During/After Treatment (Gait Training) none   Treatment Detail/Skilled Intervention With cane. Pt mildly unsteady, occasionally reaching out to contralateral hand for railing. No overt LOB. Discussed trial of walker to improve mobility. Patient has a 4WW available at home. Unable to trial this session as neurologist present.   [place] Level (Gait Training) contact guard   Physical Assistance Level (Gait Training) verbal cues;1 person assist   Assistive Device (Gait Training) straight cane   Gait Analysis Deviations decreased petey;decreased step length   Impairments (Gait Analysis/Training) balance impaired;strength decreased   PT Discharge Planning   PT Plan gait, bring 4WW to trial, stairs   PT Discharge Recommendation (DC Rec) home with assist;home with home care physical therapy   PT Rationale for DC Rec Patient moving well with contact guard to min assist of 1. Pt lives with spouse who assists with IADLs. Recommend trial of 4WW for ambulation. Currently recommend 24 hour supervision/assist and home PT for continued strengthening. If family is unable to provide this level of assist, recommend TCU.   PT Brief overview of current status Supine <> sit, min assist of 1. Sit <> stand with cane, min assist of 1. Ambulates 50' with FWW, CGA.   Total Session Time   Timed Code Treatment Minutes 10   Total Session Time (sum of timed and untimed services) 25     M  [facility]  OUTPATIENT PHYSICAL THERAPY EVALUATION  PLAN OF TREATMENT FOR OUTPATIENT REHABILITATION  (COMPLETE FOR INITIAL CLAIMS ONLY)  Patient's Last Name, First Name, M.I.  YOB: 1955  Yousuf Felixa  LENORA                        Provider's Name  [facility] Medical Record No.  [id number]                             Onset Date:  06/21/23   Start of Care Date:  06/22/23   Type:     _X_PT   ___OT   ___SLP Medical Diagnosis:  UTI              PT Diagnosis:  impaired functional mobility Visits from SOC:  1     See note for plan of treatment, functional goals and certification details    I CERTIFY THE NEED FOR THESE SERVICES FURNISHED UNDER        THIS PLAN OF TREATMENT AND WHILE UNDER MY CARE     (Physician co-signature of this document indicates review and certification of the therapy plan).

## 2023-06-22 NOTE — PROGRESS NOTES
Patient refused hospital CPAP. Stated she would wait for her home machine to be brought in by family.     Brian Lindsay, RT

## 2023-06-22 NOTE — PROGRESS NOTES
[facility]  OUTPATIENT OCCUPATIONAL THERAPY  EVALUATION  PLAN OF TREATMENT FOR OUTPATIENT REHABILITATION  (COMPLETE FOR INITIAL CLAIMS ONLY)  Patient's Last Name, First Name, M.I.  YOB: 1955  Indu Felix                          Provider's Name  [facility] Medical Record No.  [id number]                             Onset Date:  06/21/23   Start of Care Date:  06/22/23   Type:     ___PT   _X_OT   ___SLP Medical Diagnosis:  UTI, CVA                    OT Diagnosis:  decreased ADLs Visits from SOC:  1     See note for plan of treatment, functional goals and certification details    I CERTIFY THE NEED FOR THESE SERVICES FURNISHED UNDER        THIS PLAN OF TREATMENT AND WHILE UNDER MY CARE     (Physician co-signature of this document indicates review and certification of the therapy plan).                                06/22/23 1301   Appointment Info   Signing Clinician's Name / Credentials (OT) Mary Reyes,OTR/L   Quick Adds   Quick Adds Certification   Living Environment   People in Home spouse;child(sharron), adult  (dghtr lives in basement)   Current Living Arrangements house   Home Accessibility stairs to enter home   Number of Stairs, Main Entrance 4   Stair Railings, Main Entrance railings safe and in good condition   Living Environment Comments laundry in basement,   Self-Care   Current Activity Tolerance good   Equipment Currently Used at Home cane, straight;grab bar, toilet;grab bar, tub/shower  (4WW(has not used yet),  walk in shower)   Activity/Exercise/Self-Care Comment pt indep. ADLs, spouse cooks, does laundry, drives and cleans, pt has not driven lately   General Information   Onset of Illness/Injury or Date of Surgery 06/21/23   Referring Physician Dr. Tam Ngo   Patient/Family Therapy Goal Statement (OT) none stated   Additional Occupational Profile Info/Pertinent History of Current Problem Indu Felix is a 68 year old  female admitted on 6/21/2023. She was brought to the ED for evaluation of right hand third and fourth fingers tingling for 2 days, hyperglycemia, per family intermittent confusion   Existing Precautions/Restrictions fall   Limitations/Impairments safety/cognitive   Cognitive Status Examination   Orientation Status time;person;place   Affect/Mental Status (Cognitive) WFL   Cognitive Status Comments rec. screening, struggled somewhat w/ date   Visual Perception   Visual Impairment/Limitations corrective lenses full-time   Sensory   Sensory Comments reports R hand has some tingling at tips of fingers   Range of Motion Comprehensive   General Range of Motion no range of motion deficits identified   Strength Comprehensive (MMT)   General Manual Muscle Testing (MMT) Assessment no strength deficits identified   Coordination   Upper Extremity Coordination No deficits were identified   Bed Mobility   Bed Mobility supine-sit;sit-supine   Supine-Sit [place] (Bed Mobility) contact guard   Sit-Supine [place] (Bed Mobility) supervision   Transfers   Transfers toilet transfer;bed-chair transfer;sit-stand transfer   Transfer Skill: Bed to Chair/Chair to Bed   Bed-Chair [place] (Transfers) contact guard  (d/t higher height of bed)   Sit-Stand Transfer   Sit-Stand [place] (Transfers) supervision   Toilet Transfer   Type (Toilet Transfer) stand-sit;sit-stand   [place] Level (Toilet Transfer) supervision   Balance   Balance Assessment standing balance: dynamic   Activities of Daily Living   BADL Assessment/Intervention grooming;toileting;lower body dressing;upper body dressing   Upper Body Dressing Assessment/Training   [place] Level (Upper Body Dressing) minimum assist (75% patient effort)   Lower Body Dressing Assessment/Training   [place] Level (Lower Body Dressing) independent  (able to reach feet/slippers)   Grooming Assessment/Training   [place] Level (Grooming) supervision   Toileting    [place] Level (Toileting) supervision   Clinical Impression   Criteria for Skilled Therapeutic Interventions Met (OT) Yes, treatment indicated   OT Diagnosis decreased ADLs   OT Problem List-Impairments impacting ADL cognition;mobility;strength   Assessment of Occupational Performance 1-3 Performance Deficits   Identified Performance Deficits cognition, trsfs, toileting, g/h   Planned Therapy Interventions (OT) ADL retraining;transfer training;cognition   Clinical Decision Making Complexity (OT) moderate complexity   Anticipated Equipment Needs Upon Discharge (OT)   (cont.to assess)   Risk & Benefits of therapy have been explained evaluation/treatment results reviewed;care plan/treatment goals reviewed;patient   OT Total Evaluation Time   OT Eval, Moderate Complexity Minutes (81205) 8   Therapy Certification   Medical Diagnosis UTI, CVA   Start of Care Date 06/22/23   Certification date from 06/22/23   Certification date to 06/29/23   OT Goals   Therapy Frequency (OT) Daily   OT Predicted Duration/Target Date for Goal Attainment 06/29/23   OT Goals Cognition;Hygiene/Grooming;Toilet Transfer/Toileting;Transfers   OT: Hygiene/Grooming supervision/stand-by assist;while standing   OT: Transfer Supervision/stand-by assist;with assistive device   OT: Toilet Transfer/Toileting Supervision/stand-by assist   OT: Cognitive Patient/caregiver will verbalize understanding of cognitive assessment results/recommendations as needed for safe discharge planning   Interventions   Interventions Quick Adds Self-Care/Home Management   Self-Care/Home Management   Self-Care/Home Mgmt/ADL, Compensatory, Meal Prep Minutes (33904) 10   Symptoms Noted During/After Treatment (Meal Preparation/Planning Training) none   Treatment Detail/Skilled Intervention pt demo supine>sit w/ CGA w/ increased effort d/.t higher height of bed,  trsf bed>toilet w/ cane w/ SBA for safety and grab bar use, amb. w/ CGA w/ cane to/from BR, moving slowly but  stated seems to be at her usual pace, rtn to EOB w/ CGA d/t higher height of bed,   [place] Level (Grooming Training) stand-by assist  (pt washed hands w/ decreased thoroughness)   Assistance (Grooming Training) set-up required;supervision   Lower Body Dressing Training Assistance other (see comments)  (able to reach feet/slippers while reclined in bed,)   [place] Level (Toilet Training) stand-by assist  (cues for cane, left cane behind near toilet, supervision for safety and grab bar use)   Assistance (Toilet Training) set-up required;supervision;verbal cues;1 person assist   Toilet Training Assistance - Assistive Device wall grab bar   OT Discharge Planning   OT Plan SLUMS, g/h at sink, trsfs w/ cane, may need 1 more session   OT Discharge Recommendation (DC Rec) home with assist   OT Rationale for DC Rec Ax1 for ADLs and mobility, appears close to baseline, cognition screening pending   OT Brief overview of current status SBA trsfs, CGA bed mobility, SBA toileting, rec. cog.screening   Total Session Time   Timed Code Treatment Minutes 10   Total Session Time (sum of timed and untimed services) 18

## 2023-06-22 NOTE — H&P
[facility]    History and Physical - Hospitalist Service       Date of Admission:  6/21/2023    Assessment & Plan      Indu Felix is a 68 year old female admitted on 6/21/2023. She was brought to the ED for evaluation of right hand third and fourth fingers tingling for 2 days, hyperglycemia, per family intermittent confusion    #Acute cystitis without hematuria  -Continue IV ceftriaxone  -Follow-up urine culture    #Left occipitoparietal late subacute CVA  -Woke up with speech difficulties on 6/11/2023, admitted at [facility] in [facility], MRI showed subacute infarct left parietal lobe  -Continue PTA aspirin and atorvastatin  -Echocardiogram to evaluate for cardioembolic etiology    #Heart failure with preserved ejection fraction  #Pulmonary hypertension  #Mitral and aortic valves stenosis  -Right heart cath 2/8/2023 showed severely elevated filling pressures of the right and left sides, severely elevated pulmonary artery hypertension, normal cardiac output  -Compensated  -Echocardiogram as above  -Continue PTA bumetanide 2 mg twice a day    #Mild cognitive impairment  -Family reported some intermittent confusion, patient is awake and oriented x3 and denies being confused  -Continue PTA donepezil  -Delirium prevention and treatment  -PT/OT evaluation and treatment, will likely need TCU placement    #Essential hypertension  -Continue PTA amlodipine, hydralazine, isosorbide mononitrate, metoprolol    #Type 2 diabetes without long-term insulin use, diet controlled  -Insulin sliding scale    #Chronic kidney disease stage IV  #Non-anion gap metabolic acidosis  -Stable renal function  -Avoid nephrotoxins  -Renally dose medications    #Anemia chronic kidney disease  -Stable hemoglobin    #Drug-induced platelet defect  -On PTA aspirin  -Monitor for bleeding    #DIANA  -Oxygen via nasal cannula/mask as needed for sleep    #Obesity  -BMI 33.65         Diet: Combination Diet  Moderate Consistent Carb (60 g CHO per Meal) Diet; Low Saturated Fat Na <2400mg Diet    DVT Prophylaxis: Pneumatic Compression Devices  [place]ley Catheter: Not present  Lines: None     Cardiac Monitoring: None  Code Status: Full Code         Disposition Plan      Expected Discharge Date: 06/22/2023                  Tam Ngo MD  Hospitalist Service  [facility]  Securely message with [employer] (more info)  Text page via [employer] Paging/Directory     ______________________________________________________________________    Chief Complaint   Right hand third and fourth fingers intermittent numbness, hyperglycemia, family concerns for intermittent confusion    History is obtained from the patient, electronic health record and emergency department physician    History of Present Illness   Indu Felix is a 68 year old female who was brought to the ED by family for evaluation of above chief complaint.  Patient woke up with slurred speech on 6/11/2023 and was admitted at [facility] in [facility] where an MRI showed subacute infarct left parietal lobe.  Neurology recommended aspirin, cardiac monitor and patient was discharged on 6/12/2023.  Patient lives with with her  and family was concerned about hyperglycemia, ongoing speech difficulties and confusion.  In addition, patient complained of intermittent tingling of the third and fourth right hand fingers for couple days prior to arrival.  Patient feels as though symptoms sometimes radiate up her arm.  She is awake, alert and oriented x3.  Denies fevers, chills, chest pain, shortness of breath or palpitations.  Also denied urinary frequency, dysuria or hematuria.      Past Medical History    Past Medical History:   Diagnosis Date     Aortic stenosis      Benign essential hypertension      Chronic heart failure with preserved ejection fraction (H) 3/23/2023     Chronic heart failure with preserved ejection fraction (HFpEF) (H)       Chronic kidney disease      CKD (chronic kidney disease) stage 3, GFR 30-59 ml/min (H)      Congestive heart failure (H)      Diabetes mellitus (H)      Mitral stenosis      Obesity      DIANA (obstructive sleep apnea) 3/23/2023     Pulmonary hypertension (H)      Stage 3a chronic kidney disease (H) 2/8/2023     Tricuspid valve disorders, non-rheumatic        Past Surgical History   Past Surgical History:   Procedure Laterality Date     CV LEFT HEART CATH N/A 2/8/2023    Procedure: Left Heart Catheterization;  Surgeon: Yolanda Ramirez MD;  Location: [facility] CATH LAB CV     CV RIGHT HEART CATH MEASUREMENTS RECORDED N/A 2/8/2023    Procedure: Right Heart Catheterization;  Surgeon: Yolanda Ramirez MD;  Location: [facility] CATH LAB CV       Prior to Admission Medications   Prior to Admission Medications   Prescriptions Last Dose Informant Patient Reported? Taking?   amLODIPine (NORVASC) 5 MG tablet 6/21/2023 at am  No Yes   Sig: Take 1 tablet (5 mg) by mouth daily   aspirin 81 MG EC tablet 6/21/2023 at am  Yes Yes   Sig: Take 81 mg by mouth daily   atorvastatin (LIPITOR) 80 MG tablet 6/21/2023 at am  Yes Yes   Sig: Take 80 mg by mouth daily   blood glucose (CONTOUR NEXT TEST) test strip   Yes No   Sig: USE TO TEST TWICE DAILY for 75   bumetanide (BUMEX) 2 MG tablet 6/21/2023 at x 2  No Yes   Sig: Take 1 tablet (2 mg) by mouth 2 times daily   cholecalciferol 25 MCG (1000 UT) TABS 6/21/2023  Yes Yes   Sig: Take 1 tablet by mouth daily   donepezil (ARICEPT) 5 MG tablet 6/20/2023 at hs  Yes Yes   Sig: Take 10 mg by mouth At Bedtime   escitalopram (LEXAPRO) 20 MG tablet 6/21/2023 at am  Yes Yes   Sig: Take 20 mg by mouth daily   fish oil-omega-3 fatty acids 1000 MG capsule 6/21/2023 at am  Yes Yes   Sig: Take 1,000 mg by mouth daily   hydrALAZINE (APRESOLINE) 50 MG tablet 6/21/2023 at x 2  Yes Yes   Sig: Take 50 mg by mouth 3 times daily   insulin glargine (LANTUS SOLOSTAR) 100 UNIT/ML pen 6/21/2023 at am  Yes Yes    Si Units every morning   isosorbide mononitrate (IMDUR) 60 MG 24 hr tablet 2023 at am  No Yes   Sig: Take 1 tablet (60 mg) by mouth 2 times daily   loperamide (IMODIUM) 2 MG capsule 2023 at am  Yes Yes   Sig: Take 2 mg by mouth 2 times daily (before meals)   melatonin 3 MG tablet Past Week  Yes Yes   Sig: Take 6 mg by mouth nightly as needed for sleep   metoprolol succinate ER (TOPROL XL) 200 MG 24 hr tablet 2023 at am  Yes Yes   Sig: Take 200 mg by mouth daily   multivitamin (ONE-DAILY) tablet 2023 at am  Yes Yes   Sig: Take 1 tablet by mouth daily   omeprazole (PRILOSEC) 20 MG DR capsule 2023 at am  Yes Yes   Si mg daily before breakfast   traZODone (DESYREL) 100 MG tablet 2023 at hs  Yes Yes   Sig: Take 100 mg by mouth At Bedtime      Facility-Administered Medications: None           Physical Exam   Vital Signs: Temp: 98.6  F (37  C) Temp src: Oral BP: 100/77 Pulse: 61   Resp: 27 SpO2: 96 % O2 Device: None (Room air)    Weight: 184 lbs 0 oz    Constitutional: awake and alert  Respiratory: no increased work of breathing, good air exchange and clear to auscultation  Cardiovascular: regular rate and rhythm and normal S1 and S2  GI: normal bowel sounds and soft  Skin: no bruising or bleeding  Musculoskeletal: no lower extremity pitting edema present  Neurologic: Mental Status Exam:  Level of Alertness:   awake  Orientation:   person, place, time  Motor Exam:  moves all extremities well and symmetrically    Medical Decision Making       MANAGEMENT DISCUSSED with the following over the past 24 hours: Patient       Data     I have personally reviewed the following data over the past 24 hrs:    12.3 (H)  \   10.9 (L)   / 352     139 104 40.1 (H) /  95   4.2 20 (L) 2.04 (H) \       ALT: 15 AST: 25 AP: 121 (H) TBILI: 0.4   ALB: 3.6 TOT PROTEIN: 7.4 LIPASE: N/A       Imaging results reviewed over the past 24 hrs:   Recent Results (from the past 24 hour(s))   MR Brain w/o Contrast     Narrative    EXAM: MR BRAIN W/O CONTRAST  LOCATION: [facility]  DATE: 6/21/2023    INDICATION: Stroke-like symptoms in a patient with recent CVA.  COMPARISON: None.  TECHNIQUE: Routine multiplanar multisequence head MRI without intravenous contrast.    FINDINGS:  INTRACRANIAL CONTENTS: Cortical and subcortical left occipitoparietal hyperintensities on the diffusion-weighted images with intermediate to elevated ADC values and FLAIR hyperintensity. Scattered chronic cortical/subcortical infarcts involving the right   parietal, occipital, and temporal lobes as well as the left centrum semiovale and bilateral cerebellar hemispheres. Subtle curvilinear T1-hyperintense signal within the involved portion of the left occipital lobe, which may represent mineralization or   laminar necrosis. No significant associated mass effect. No midline shift, acute intracranial hemorrhage, or extra-axial fluid collection. A punctate focus of signal loss on the T2*weighted images within the right cerebellar tonsil, which may represent a   chronic microhemorrhage versus calcification. A few additional scattered foci of T2 prolongation within the bilateral cerebral white matter, nonspecific although most likely the sequela of mild underlying chronic microvascular ischemia. Moderate   generalized parenchymal volume loss. No evidence of acute hydrocephalus. Normal position of the cerebellar tonsils.     SELLA: Within technique limitations, no gross abnormality.    OSSEOUS STRUCTURES/SOFT TISSUES: Normal marrow signal. The major intracranial vascular flow voids are maintained.     ORBITS: Within technique limitations, no significant abnormality.     SINUSES/MASTOIDS: No significant paranasal sinus or mastoid mucosal disease.        Impression    IMPRESSION:  1.  Mild left occipitoparietal cortical/subcortical restricted diffusion with intermediate to elevated ADC values and FLAIR hyperintensity, most compatible with a  late subacute infarct. No significant mass effect or acute intracranial hemorrhage.  2.  Additional chronic changes including chronic bilateral cerebral and cerebellar hemisphere infarcts, as detailed.

## 2023-06-22 NOTE — ED NOTES
Bed: JNED-20  Expected date:   Expected time:   Means of arrival:   Comments:  José Miguel NELSON when boarder orders placed

## 2023-06-23 ENCOUNTER — APPOINTMENT (OUTPATIENT)
Dept: PHYSICAL THERAPY | Facility: CLINIC | Age: 68
End: 2023-06-23
Attending: INTERNAL MEDICINE
Payer: COMMERCIAL

## 2023-06-23 ENCOUNTER — APPOINTMENT (OUTPATIENT)
Dept: ULTRASOUND IMAGING | Facility: CLINIC | Age: 68
End: 2023-06-23
Attending: HOSPITALIST
Payer: COMMERCIAL

## 2023-06-23 ENCOUNTER — APPOINTMENT (OUTPATIENT)
Dept: OCCUPATIONAL THERAPY | Facility: CLINIC | Age: 68
End: 2023-06-23
Attending: INTERNAL MEDICINE
Payer: COMMERCIAL

## 2023-06-23 VITALS
BODY MASS INDEX: 33.42 KG/M2 | RESPIRATION RATE: 18 BRPM | HEART RATE: 71 BPM | DIASTOLIC BLOOD PRESSURE: 73 MMHG | OXYGEN SATURATION: 93 % | WEIGHT: 182.7 LBS | SYSTOLIC BLOOD PRESSURE: 166 MMHG | TEMPERATURE: 98.9 F

## 2023-06-23 LAB
ANION GAP SERPL CALCULATED.3IONS-SCNC: 12 MMOL/L (ref 5–18)
BUN SERPL-MCNC: 32 MG/DL (ref 8–22)
CALCIUM SERPL-MCNC: 9.3 MG/DL (ref 8.5–10.5)
CHLORIDE BLD-SCNC: 107 MMOL/L (ref 98–107)
CO2 SERPL-SCNC: 20 MMOL/L (ref 22–31)
CREAT SERPL-MCNC: 2.02 MG/DL (ref 0.6–1.1)
ERYTHROCYTE [DISTWIDTH] IN BLOOD BY AUTOMATED COUNT: 13.7 % (ref 10–15)
GFR SERPL CREATININE-BSD FRML MDRD: 26 ML/MIN/1.73M2
GLUCOSE BLD-MCNC: 112 MG/DL (ref 70–125)
GLUCOSE BLDC GLUCOMTR-MCNC: 106 MG/DL (ref 70–99)
HCT VFR BLD AUTO: 32 % (ref 35–47)
HGB BLD-MCNC: 10.6 G/DL (ref 11.7–15.7)
MCH RBC QN AUTO: 29.4 PG (ref 26.5–33)
MCHC RBC AUTO-ENTMCNC: 33.1 G/DL (ref 31.5–36.5)
MCV RBC AUTO: 89 FL (ref 78–100)
PLATELET # BLD AUTO: 337 10E3/UL (ref 150–450)
POTASSIUM BLD-SCNC: 3.2 MMOL/L (ref 3.5–5)
RBC # BLD AUTO: 3.61 10E6/UL (ref 3.8–5.2)
SODIUM SERPL-SCNC: 139 MMOL/L (ref 136–145)
WBC # BLD AUTO: 14.8 10E3/UL (ref 4–11)

## 2023-06-23 PROCEDURE — 999N000226 HC STATISTIC SLP IP EVAL DEFER: Performed by: SPEECH-LANGUAGE PATHOLOGIST

## 2023-06-23 PROCEDURE — 99239 HOSP IP/OBS DSCHRG MGMT >30: CPT | Performed by: INTERNAL MEDICINE

## 2023-06-23 PROCEDURE — 97116 GAIT TRAINING THERAPY: CPT | Mod: GP

## 2023-06-23 PROCEDURE — 250N000013 HC RX MED GY IP 250 OP 250 PS 637: Performed by: INTERNAL MEDICINE

## 2023-06-23 PROCEDURE — 97535 SELF CARE MNGMENT TRAINING: CPT | Mod: GO

## 2023-06-23 PROCEDURE — G0378 HOSPITAL OBSERVATION PER HR: HCPCS

## 2023-06-23 PROCEDURE — 97161 PT EVAL LOW COMPLEX 20 MIN: CPT | Mod: GP

## 2023-06-23 PROCEDURE — 85014 HEMATOCRIT: CPT | Performed by: INTERNAL MEDICINE

## 2023-06-23 PROCEDURE — 82962 GLUCOSE BLOOD TEST: CPT

## 2023-06-23 PROCEDURE — 82310 ASSAY OF CALCIUM: CPT | Performed by: INTERNAL MEDICINE

## 2023-06-23 PROCEDURE — 36415 COLL VENOUS BLD VENIPUNCTURE: CPT | Performed by: INTERNAL MEDICINE

## 2023-06-23 PROCEDURE — 250N000012 HC RX MED GY IP 250 OP 636 PS 637: Performed by: STUDENT IN AN ORGANIZED HEALTH CARE EDUCATION/TRAINING PROGRAM

## 2023-06-23 PROCEDURE — 97166 OT EVAL MOD COMPLEX 45 MIN: CPT | Mod: GO

## 2023-06-23 PROCEDURE — 93930 UPPER EXTREMITY STUDY: CPT

## 2023-06-23 PROCEDURE — 250N000011 HC RX IP 250 OP 636: Mod: JZ | Performed by: INTERNAL MEDICINE

## 2023-06-23 PROCEDURE — 250N000013 HC RX MED GY IP 250 OP 250 PS 637: Performed by: STUDENT IN AN ORGANIZED HEALTH CARE EDUCATION/TRAINING PROGRAM

## 2023-06-23 PROCEDURE — 96372 THER/PROPH/DIAG INJ SC/IM: CPT | Mod: JZ | Performed by: INTERNAL MEDICINE

## 2023-06-23 RX ORDER — CEFDINIR 300 MG/1
300 CAPSULE ORAL DAILY
Qty: 7 CAPSULE | Refills: 0
Start: 2023-06-23 | End: 2023-12-06

## 2023-06-23 RX ORDER — POTASSIUM CHLORIDE 1.5 G/1.58G
20 POWDER, FOR SOLUTION ORAL ONCE
Status: COMPLETED | OUTPATIENT
Start: 2023-06-23 | End: 2023-06-23

## 2023-06-23 RX ADMIN — INSULIN GLARGINE 20 UNITS: 100 INJECTION, SOLUTION SUBCUTANEOUS at 09:54

## 2023-06-23 RX ADMIN — POTASSIUM CHLORIDE 20 MEQ: 1.5 POWDER, FOR SOLUTION ORAL at 09:43

## 2023-06-23 RX ADMIN — ASPIRIN 81 MG: 81 TABLET, COATED ORAL at 09:45

## 2023-06-23 RX ADMIN — Medication 25 MCG: at 09:44

## 2023-06-23 RX ADMIN — LOPERAMIDE HYDROCHLORIDE 2 MG: 2 CAPSULE ORAL at 14:37

## 2023-06-23 RX ADMIN — PANTOPRAZOLE SODIUM 40 MG: 40 TABLET, DELAYED RELEASE ORAL at 09:45

## 2023-06-23 RX ADMIN — ISOSORBIDE MONONITRATE 60 MG: 30 TABLET, EXTENDED RELEASE ORAL at 09:44

## 2023-06-23 RX ADMIN — HYDRALAZINE HYDROCHLORIDE 50 MG: 25 TABLET ORAL at 14:01

## 2023-06-23 RX ADMIN — ACETAMINOPHEN 650 MG: 325 TABLET ORAL at 11:10

## 2023-06-23 RX ADMIN — ACETAMINOPHEN 650 MG: 325 TABLET ORAL at 02:37

## 2023-06-23 RX ADMIN — ESCITALOPRAM OXALATE 20 MG: 20 TABLET, FILM COATED ORAL at 09:44

## 2023-06-23 RX ADMIN — ATORVASTATIN CALCIUM 80 MG: 40 TABLET, FILM COATED ORAL at 09:44

## 2023-06-23 RX ADMIN — THERA TABS 1 TABLET: TAB at 09:44

## 2023-06-23 RX ADMIN — HYDRALAZINE HYDROCHLORIDE 50 MG: 25 TABLET ORAL at 09:46

## 2023-06-23 RX ADMIN — BUMETANIDE 2 MG: 2 TABLET ORAL at 09:44

## 2023-06-23 RX ADMIN — HEPARIN SODIUM 5000 UNITS: 5000 INJECTION, SOLUTION INTRAVENOUS; SUBCUTANEOUS at 09:44

## 2023-06-23 RX ADMIN — AMLODIPINE BESYLATE 5 MG: 5 TABLET ORAL at 09:46

## 2023-06-23 ASSESSMENT — ACTIVITIES OF DAILY LIVING (ADL)
ADLS_ACUITY_SCORE: 33

## 2023-06-23 NOTE — PROVIDER NOTIFICATION
"Paged cross over \"Blood pressures have discrepancies. Manual 100/60 on left side machine 184/75 on left side. manual 70/50 on right machine 80/44 machine. Can  I give Hydralazine or isosorbide\"    Go by pressures on left arm. As long as not numbness or tingling in arms okay to give medication.     No numbness and tinging in arms gave medication. MD orders US doppler in upper extremities.   "

## 2023-06-23 NOTE — PROGRESS NOTES
"PRIMARY DIAGNOSIS: \"GENERIC\" NURSING  OUTPATIENT/OBSERVATION GOALS TO BE MET BEFORE DISCHARGE:    1. ADLs back to baseline: Yes.     2. Activity and level of assistance: Up with standby assistance.     3. Pain status: Improved-controlled with oral pain medications. Using Tylenol PRN for right hand pain.     4. Return to near baseline physical activity: Yes             Discharge Planner Nurse    Safe discharge environment identified: Yes, [facility].     Barriers to discharge: Yes. Awaiting page to Vascular to review today's ultrasound results.     Please review provider order for any additional goals.   Nurse to notify provider when observation goals have been met and patient is ready for discharge.  "

## 2023-06-23 NOTE — CONSULTS
DIABETES CARE    Situation:  Consulted by Provider for Diabetes Education.  68 year old female with type 2 Diabetes. Patient was admitted for evaluation of right hand third and fourth fingers tingling for 2 days, hyperglycemia, per family intermittent confusion. MRI-brain on 06/21 showed late subacute infarct of left occipitoparietal lobe.  No evidence of any acute stroke.  Neurology evaluated patient at [facility] and felt most likely her symptoms are related to her UTI.  Patient is found to have UTI. .     Background:  Related comorbidities include:Recemt CVA. HF,HTN, CKD. DIANA, Obesity  PCP: Flora Betts MD      Diabetes History:   History of type 2 Diabetes with use of basal insulin.     Meds for BG Management PTA:  Lantus 20 units in am    Current Inpatient Meds for BG Management:  Lantus 20 units in am  Novolog medium correction scale      Labs:  Hemoglobin A1C: 7.7%  (estimated average  mg/dL)   Hgb: 10.9 g/dL  SCr: 2.04 mg/dL   GFR: 26  mL/min/1.73m^2    Blood Glucose POC:    Latest Reference Range & Units 06/22/23 07:42 06/22/23 12:46 06/22/23 16:14 06/22/23 18:00 06/22/23 21:35 06/23/23 02:32 06/23/23 06:42   Glucose 70 - 125 mg/dL       112   GLUCOSE BY METER POCT 70 - 99 mg/dL 97 122 (H) 129 (H) 157 (H) 124 (H) 106 (H)      Diet Order:60 gram CHO, low sat fat low NA  Intake: Not iindicted  Weight: 82.9 kg    BMI: 33.42 kg/m^2    DM EDUCATION/COUNSELING:  Barriers to Learning and/or DM Self-Management: None known  Previous DM Education: No  Current education and/or visit with patient and/or caregiver:  Educated/reviewed diabetes basics: pathophysiology, hyperglycemia, long term complications, treatment.  Educated/reviewed hypoglycemia: symptoms, causes, treatment.  Explained normal/goals of blood sugar control, A1C, when to call provider.  Patient has both meter and CGM that she is using. She missed education during covid.  Specific medications were explained including how they each acted on  "blood sugar, their timing, and differences in dosing.   Patient has been injection Lantus daily. Discussed storage, rotation of sites with site selection.     Carbohydrates were briefly discussed and their effect on BG. Reinforced healthy eating.   Good understanding but gave her further education to help with diabetes management.   Written handouts given on all education provided.     Continue with same plan.         Refer to \"Guidelines for Insulin Initiation and Care in Hospitalized Adults\" link in Diabetes Management Order set for dosing guidelines.  Hospital goals for blood glucose levels are < 180 mg/dL for improved health outcomes.    DISCHARGE NEEDS:  None known  Thank you,   Yola White RN, [facility]/ Pager [phone]    "

## 2023-06-23 NOTE — PROGRESS NOTES
[facility] Medicine PROGRESS NOTE      Identification/Summary:   Indu Felix is a 68 year old female admitted on 6/21/2023. She was brought to the ED for evaluation of right hand third and fourth fingers tingling for 2 days, hyperglycemia, per family intermittent confusion. MRI-brain on 06/21 showed late subacute infarct of left occipitoparietal lobe.  No evidence of any acute stroke.  Neurology evaluated patient at [facility] and felt most likely her symptoms are related to her UTI.  Patient is found to have UTI. UCx growing E.coli. sensitivity pending. Patient is to be transferred to [facility] as there were no bloods available at [facility].  Physical therapy recommending TCU at discharge.  She has residual dysarthria, memory impairment according to daughter who is at bedside.     Acute cystitis without hematuria  -Continue IV ceftriaxone  -Follow-up urine culture- E.coli. Sensitivity pending     Recent CVA  Acute metabolic encephalopathy  Mild cognitive impairment  -Family reported some intermittent confusion, patient is awake and oriented x2 during my examination  -Continue PTA donepezil  -Delirium prevention and treatment     Left occipitoparietal late subacute CVA  Dysarthria  -Woke up with speech difficulties on 6/11/2023, admitted at [facility] in [facility], MRI showed subacute infarct left parietal lobe  -Echocardiogram  this admission showed normal EF, mild mitral, aortic stenosis  Evaluated by neurology  PT OT, speech therapy  Continue aspirin, statin     Heart failure with preserved ejection fraction  Pulmonary hypertension  Mitral and aortic valves stenosis  -Right heart cath 2/8/2023 showed severely elevated filling pressures of the right and left sides, severely elevated pulmonary artery hypertension, normal cardiac output  -Compensated  -Echocardiogram as above  -Continue PTA bumetanide 2 mg twice a day     Essential hypertension  -Continue  "PTA amlodipine, hydralazine, isosorbide mononitrate, metoprolol     Type 2 diabetes without long-term insulin use, diet controlled  -Insulin sliding scale, lantus     Chronic kidney disease stage IV  Non-anion gap metabolic acidosis  -Stable renal function  -Avoid nephrotoxins  -Renally dose medications     Anemia chronic kidney disease  -Stable hemoglobin     Drug-induced platelet defect  -On PTA aspirin  -Monitor for bleeding     DIANA  -Oxygen via nasal cannula/mask as needed for sleep     Obesity  -BMI 33.65  Patient is clinically and hemodynamically stable for transfer to [facility]. Patient is accepted under Hospitalist service.          Diet: Combination Diet Moderate Consistent Carb (60 g CHO per Meal) Diet; Low Saturated Fat Na <2400mg Diet  DVT Prophylaxis: Start subcu heparin  Code Status: Full Code      Anticipated possible discharge in 1-2 days once urine culture, pending placement milestones are met.    Interval History/Subjective:  Laying in bed appears comfortable, she is answering questions appropriately.  Daughter at bedside.  She denies any shortness of breath, chest pain, focal weakness tingling numbness.  Daughter notices still her speech is slow, sometimes having trouble finding words, some memory issues.     Physical Exam/Objective:  Vitals I/O   Vital signs:  Temp: 98.9  F (37.2  C) Temp src: Oral BP: (!) 158/67 Pulse: 70   Resp: 16            Estimated body mass index is 33.65 kg/m  as calculated from the following:    Height as of 6/21/23: 1.575 m (5' 2\").    Weight as of 6/21/23: 83.5 kg (184 lb). No intake/output data recorded.     There is no height or weight on file to calculate BMI.    General Appearance:  Alert, cooperative, no distress   Head:  Normocephalic, atraumatic   Eyes:  PERRL    Throat:  mucosa; moist   Neck: No JVD, thyromegaly   Lungs:   Clear to auscultation bilaterally, respirations unlabored   Chest Wall:  No tenderness or deformity   Heart:  Regular rate and " rhythm, S1, S2 normal, + murmur   Abdomen:   Soft, non tender, non distended, bowel sounds present, no guarding or rigidity   Extremities: No edema, no joint swelling   Skin: Skin color, texture, turgor normal, no rashes or lesions   Neurologic: Alert power 5 x 5 strength in upper and lower extremities, sensation is intact, speech normal moves all 4 extremities       Medications:   Personally Reviewed.    [START ON 6/23/2023] amLODIPine  5 mg Oral Daily     [START ON 6/23/2023] aspirin  81 mg Oral Daily     [START ON 6/23/2023] atorvastatin  80 mg Oral Daily     [START ON 6/23/2023] bumetanide  2 mg Oral BID     cefTRIAXone  1 g Intravenous Q24H     donepezil  10 mg Oral At Bedtime     [START ON 6/23/2023] escitalopram  20 mg Oral Daily     hydrALAZINE  50 mg Oral TID     [START ON 6/23/2023] insulin aspart  1-7 Units Subcutaneous TID AC     insulin aspart  1-5 Units Subcutaneous At Bedtime     [START ON 6/23/2023] insulin glargine  20 Units Subcutaneous QAM     isosorbide mononitrate  60 mg Oral BID     [START ON 6/23/2023] metoprolol succinate ER  200 mg Oral Daily     [START ON 6/23/2023] multivitamin, therapeutic  1 tablet Oral Daily     [START ON 6/23/2023] pantoprazole  40 mg Oral QAM AC     traZODone  100 mg Oral At Bedtime     [START ON 6/23/2023] Vitamin D3  25 mcg Oral Daily       Data reviewed today: I personally reviewed all new medications, labs, imaging/diagnostics reports over the past 24 hours. Pertinent findings include    Labs:  Most Recent 3 CBC's:Recent Labs   Lab Test 06/21/23  1801 02/11/23  0517 02/09/23  0527 02/08/23  1312   WBC 12.3*  --  8.1 10.2   HGB 10.9*  --  9.2* 10.4*   MCV 87  --  87 84    289 301 358     Most Recent 3 BMP's:Recent Labs   Lab Test 06/22/23  1800 06/22/23  1614 06/22/23  1246 06/21/23  2247 06/21/23  1801 06/19/23  1326 05/22/23  1602   NA  --   --   --   --  139 142 137   POTASSIUM  --   --   --   --  4.2 4.1 4.6   CHLORIDE  --   --   --   --  104 107 104    CO2  --   --   --   --  20* 14* 16*   BUN  --   --   --   --  40.1* 54.4* 48.2*   CR  --   --   --   --  2.04* 2.83* 2.50*   ANIONGAP  --   --   --   --  15 21* 17*   JARETT  --   --   --   --  9.8 9.7 9.6   * 129* 122*   < > 126* 108* 74    < > = values in this interval not displayed.     Most Recent 2 LFT's:Recent Labs   Lab Test 23  1801 23  0936   AST 25 18   ALT 15 13   ALKPHOS 121* 123*   BILITOTAL 0.4 0.5     Most Recent 3 INR's:No lab results found.    Imaging:   Recent Results (from the past 24 hour(s))   Echocardiogram Complete   Result Value    LVEF  > 65%    Narrative    [id number]  [id number]  [id number]  [id number]^JAMIL^TREY^[place] [address]     Name: ZENIA CHAVEZ  MRN: [id number]  : 1955  Study Date: 2023 11:12 AM  Age: 68 yrs  Gender: Female  Patient [facility]  Reason For Study: CVA  Ordering Physician: TREY NEGRON  Performed By: JERRELL     BSA: 1.8 m2  Height: 62 in  Weight: 184 lb  HR: 64  BP: 196/79 mmHg  ______________________________________________________________________________  Procedure  Complete Portable Echo Adult. Good quality two-dimensional was performed and  interpreted. Good quality color and spectral Doppler were performed and  interpreted.  ______________________________________________________________________________  Interpretation Summary     Left ventricular size, wall motion and function are normal. The ejection  fraction is > 65%.  There is moderate concentric left ventricular hypertrophy.  Diastolic Doppler findings (E/E' ratio and/or other parameters) suggest left  ventricular filling pressures are increased.  Normal right ventricle size and systolic function.  The left atrium is moderate to severely dilated.  Severe calcified posterior mitral annulus leading mild mitral valve stenosis.  Severe calcified aortic valve with reduced excursion.  It appears at least moderate aortic valve  stenosis, but mean gradient of  aortic valve is 14 mmHg. It could be low flow low gradient aortic valve  stensosi.     No previous study for comparison.  ______________________________________________________________________________  I      WMSI = 1.00     % Normal = 100     X - Cannot   0 -                      (2) - Mildly 2 -          Segments  Size  Interpret    Hyperkinetic 1 - Normal  Hypokinetic  Hypokinetic  1-2     small                                                     7 -          3-5      moderate  3 - Akinetic 4 -          5 -         6 - Akinetic Dyskinetic   6-14    large               Dyskinetic   Aneurysmal  w/scar       w/scar       15-16   diffuse     Left Ventricle  Left ventricular size, wall motion and function are normal. The ejection  fraction is > 65%. There is moderate concentric left ventricular hypertrophy.  Diastolic Doppler findings (E/E' ratio and/or other parameters) suggest left  ventricular filling pressures are increased. Normal left ventricular wall  motion.     Right Ventricle  Normal right ventricle size and systolic function.     Atria  The left atrium is moderate to severely dilated. Right atrial size is normal.  There is no atrial shunt seen.     Mitral Valve  There is moderate mitral annular calcification. There is mild to moderate (1-  2+) mitral regurgitation. There is mild mitral stenosis. The mean mitral valve  gradient is 3.7 mmHg.     Tricuspid Valve  Tricuspid valve leaflets appear normal. There is no evidence of tricuspid  stenosis or clinically significant tricuspid regurgitation.     Aortic Valve  Severe aortic valve calcification is present. There is trace to mild aortic  regurgitation. Moderate valvular aortic stenosis.     Pulmonic Valve  The pulmonic valve is not well seen, but is grossly normal.     Vessels  The aorta root is normal. IVC diameter <2.1 cm collapsing >50% with sniff  suggests a normal RA pressure of 3 mmHg.     Pericardium  There is no  pericardial effusion.     ______________________________________________________________________________  MMode/2D Measurements & Calculations  IVSd: 1.4 cm  LVIDd: 3.9 cm  LVIDs: 2.2 cm  LVPWd: 1.4 cm  FS: 43.3 %  LV mass(C)d: 207.1 grams  LV mass(C)dI: 112.3 grams/m2  Ao root diam: 2.7 cm  asc Aorta Diam: 3.0 cm     LVOT diam: 1.9 cm  LVOT area: 2.8 cm2  LA Volume (BP): 93.6 ml  LA Volume Index (BP): 50.9 ml/m2     LA Volume Indexed (AL/bp): 56.4 ml/m2  RWT: 0.72  TAPSE: 2.5 cm     Time Measurements  MM HR: 60.0 BPM     Doppler Measurements & Calculations  MV E max westley: 93.3 cm/sec  MV A max westley: 133.0 cm/sec  MV E/A: 0.70  MV max PG: 10.5 mmHg  MV mean PG: 3.7 mmHg  MV V2 VTI: 45.7 cm  MV dec slope: 229.0 cm/sec2  MV dec time: 0.41 sec  Ao V2 max: 265.8 cm/sec  Ao max P.0 mmHg  Ao V2 mean: 211.0 cm/sec  Ao mean P.0 mmHg  Ao V2 VTI: 57.3 cm  PA V2 max: 130.0 cm/sec  PA max P.8 mmHg  PA acc time: 0.13 sec  TR max westley: 199.0 cm/sec  TR max PG: 15.8 mmHg  E/E' av.2  Lateral E/e': 20.4     Medial E/e': 22.0  RV S Westley: 11.9 cm/sec     ______________________________________________________________________________  Report approved by: Rusty Leal 2023 04:33 PM                  > 50 MINUTES SPENT BY ME on the date of service doing chart review, history, exam, documentation & further activities per the note.    Tish Slater MD  Hospitalist  [facility]

## 2023-06-23 NOTE — PLAN OF CARE
Occupational Therapy Discharge Summary    Reason for therapy discharge:    Discharged to d/c to  hospital    Progress towards therapy goal(s). See goals on Care Plan in [facility] electronic health record for goal details.  Goals partially met.  Barriers to achieving goals:   discharge from facility.    Therapy recommendation(s):    Continued therapy is recommended.  Rationale/Recommendations:  decreased ADLs.    Goal Outcome Evaluation:               Mary Reyes, OTR/FIOR    6/23/2023

## 2023-06-23 NOTE — PLAN OF CARE
Physical Therapy Discharge Summary    Reason for therapy discharge:    Discharged to [facility]

## 2023-06-23 NOTE — PLAN OF CARE
Speech Language Pathology: Orders received. Chart reviewed and discussed with care team including nursing and with patient and her son.? Speech Language Pathology not indicated due to patient and son report significant spontaneous improvement with speech and language function, close to baseline status, and declined further need for SLP intervention at this time. Page sent to the provider regarding deferral.? Defer discharge recommendations to provider team.? Will complete orders.

## 2023-06-23 NOTE — PROGRESS NOTES
Requested images to be pushed from [facility] and emailed image resource center to be on the look out for them and push them to us.

## 2023-06-23 NOTE — PROGRESS NOTES
Occupational Therapy Discharge Summary    Reason for therapy discharge:    Discharged to transitional care facility.    Progress towards therapy goal(s). See goals on Care Plan in [facility] electronic health record for goal details.  Goals not met.  Barriers to achieving goals:   discharge from facility.    Therapy recommendation(s):    Continued therapy is recommended.  Rationale/Recommendations:  Ensure safety at home.

## 2023-06-23 NOTE — PROGRESS NOTES
06/23/23 1033   Appointment Info   Signing Clinician's Name / Credentials (OT) RADHA Mckeon/L   Rehab Comments (OT) OT sandrita   Quick Adds   Quick Adds Certification   Living Environment   People in Home spouse;child(sharron), adult;grandchild(sharron)   Current Living Arrangements house   Self-Care   Usual Activity Tolerance good   Current Activity Tolerance good   Equipment Currently Used at Home grab bar, toilet;grab bar, tub/shower   Fall history within last six months no   Activity/Exercise/Self-Care Comment pt indep with ADL except assist with bathing. IADL assist from spouse.   General Information   Onset of Illness/Injury or Date of Surgery 06/22/23   Referring Physician Dr. Slater   Patient/Family Therapy Goal Statement (OT) to TCU soon   Additional Occupational Profile Info/Pertinent History of Current Problem hx of CVA   Existing Precautions/Restrictions fall   Limitations/Impairments safety/cognitive   Cognitive Status Examination   Orientation Status orientation to person, place and time   Affect/Mental Status (Cognitive) other (see comments)  (deficits at this time)   Safety Deficit minimal deficit   Memory Deficit moderate deficit;short-term memory   Attention Deficit moderate deficit   Executive Function Deficit planning/decision-making;problem-solving/reasoning   Visual Perception   Visual Impairment/Limitations corrective lenses full-time   Sensory   Sensory Comments R UE with tingling and son stated MD aware and has ordered testing for this as apparently there was varible BP and pulses in R UE.   Pain Assessment   Patient Currently in Pain No   Posture   Posture not impaired   Range of Motion Comprehensive   General Range of Motion no range of motion deficits identified   Strength Comprehensive (MMT)   General Manual Muscle Testing (MMT) Assessment no strength deficits identified   Muscle Tone Assessment   Muscle Tone Quick Adds No deficits were identified   Coordination   Upper Extremity  Coordination No deficits were identified   Transfers   Transfer Comments CGA   Sit-Stand Transfer   Sit/Stand Transfer Comments CGA   Toilet Transfer   Toilet Transfer Comments CGA   Balance   Balance Comments decreased   Activities of Daily Living   BADL Assessment/Intervention grooming;toileting   Grooming Assessment/Training   [place] Level (Grooming) contact guard assist   Toileting   [place] Level (Toileting) contact guard assist   Clinical Impression   Criteria for Skilled Therapeutic Interventions Met (OT) Yes, treatment indicated   OT Diagnosis decr ADL indep/safety   Influenced by the following impairments s/p CVA   OT Problem List-Impairments impacting ADL activity tolerance impaired;balance;cognition;mobility   Assessment of Occupational Performance 3-5 Performance Deficits   Identified Performance Deficits cognition, toileting, g/h, dressing, transfers   Planned Therapy Interventions (OT) ADL retraining;bed mobility training;cognition;transfer training   Clinical Decision Making Complexity (OT) moderate complexity   Risk & Benefits of therapy have been explained care plan/treatment goals reviewed;patient;son   OT Total Evaluation Time   OT Eval, Moderate Complexity Minutes (99665) 8   Therapy Certification   Start of Care Date 06/23/23   Certification date from 06/23/23   Certification date to 06/23/23   Medical Diagnosis CVA; UTI   OT Goals   Therapy Frequency (OT) Daily   OT Predicted Duration/Target Date for Goal Attainment 06/29/23   OT Goals Hygiene/Grooming;Cognition;Toilet Transfer/Toileting   OT: Hygiene/Grooming supervision/stand-by assist;while standing   OT: Toilet Transfer/Toileting Supervision/stand-by assist;using adaptive equipment   OT: Cognitive Patient/caregiver will verbalize understanding of cognitive assessment results/recommendations as needed for safe discharge planning  (min cues)   Interventions   Interventions Quick Adds Self-Care/Home Management   Self-Care/Home  Management   Self-Care/Home Mgmt/ADL, Compensatory, Meal Prep Minutes (98331) 17   Symptoms Noted During/After Treatment (Meal Preparation/Planning Training) other (see comments)  (tingling in arm)   Treatment Detail/Skilled Intervention chair and toilet transfers with CGA/SBA/FWW/cues for hand placement. Toileting with SBA/rail. Stood at sink with SBA and incr time/effort second to decr cog and need of cues for attention/sequencing.   OT Discharge Planning   OT Plan SLUMS/ACLS, g/h at sink-other balance tasks; cog with ADL;LE dress   OT Discharge Recommendation (DC Rec) Transitional Care Facility   OT Rationale for DC Rec Ax1 for ADLs and mobility and would benefit from OT to incr cog/safety/balance with func mob/transfers/ADL.   OT Brief overview of current status SBA/CGA for func mob and ADL   Total Session Time   Timed Code Treatment Minutes 17   Total Session Time (sum of timed and untimed services) 25    [facility]  OUTPATIENT OCCUPATIONAL THERAPY  EVALUATION  PLAN OF TREATMENT FOR OUTPATIENT REHABILITATION  (COMPLETE FOR INITIAL CLAIMS ONLY)  Patient's Last Name, First Name, M.I.  YOB: 1955  Indu Felix                          Provider's Name  [facility] Medical Record No.  [id number]                             Onset Date:  06/22/23   Start of Care Date:  06/23/23   Type:     ___PT   _X_OT   ___SLP Medical Diagnosis:  CVA; UTI                    OT Diagnosis:  decr ADL indep/safety Visits from SOC:  1     See note for plan of treatment, functional goals and certification details    I CERTIFY THE NEED FOR THESE SERVICES FURNISHED UNDER        THIS PLAN OF TREATMENT AND WHILE UNDER MY CARE     (Physician co-signature of this document indicates review and certification of the therapy plan).

## 2023-06-23 NOTE — PLAN OF CARE
Problem: Plan of Care - These are the overarching goals to be used throughout the patient stay.    Goal: Plan of Care Review  Description: The Plan of Care Review/Shift note should be completed every shift.  The Outcome Evaluation is a brief statement about your assessment that the patient is improving, declining, or no change.  This information will be displayed automatically on your shift note.  Outcome: Progressing    Goal Outcome Evaluation:    Patient is A & O x 4 this AM. BP increased using left arm. Scheduled medications provided. Right hand/finger pain minimal and controlled with PRN Tylenol.    Tele NSR with a prolonged QT. IV is SL. K+replacement provided, per MD order (not protocol). Speech cleared for discharge. Seen by PT/OT. Suggest TCU placement.    Ultrasound upper extremities completed. MD consulted with Vascular. OK for the patient to follow-up outpatient.     TCU placement found @ [facility]. Patient cleared for discharge by MD. Ride provided by son in personal vehicle.    Problem: Pain Acute  Goal: Optimal Pain Control and Function  Outcome: Progressing     Problem: Confusion Acute  Goal: Optimal Cognitive Function  Outcome: Progressing                                 ......

## 2023-06-23 NOTE — PROVIDER NOTIFICATION
" \"Pt having Blood pressures discrepancy and variable. at 2055 Right arm 105/62 on machine and left 218/85 on machine. at 2113 manual 170/80 on left side. can't get a manual on right\"    -Md said to wait and recheck BP in an hour if its above 180 give hydralazine.   "

## 2023-06-23 NOTE — PROGRESS NOTES
"PRIMARY DIAGNOSIS: \"GENERIC\" NURSING  OUTPATIENT/OBSERVATION GOALS TO BE MET BEFORE DISCHARGE:  1. ADLs back to baseline: No    2. Activity and level of assistance: Up with standby assistance.    3. Pain status: Pain free.    4. Return to near baseline physical activity: No     Discharge Planner Nurse   Safe discharge environment identified: No  Barriers to discharge: Yes, TCU and IV antibotiC       Entered by: Mariaa Gregg RN 06/23/2023 12:01 AM     Please review provider order for any additional goals.   Nurse to notify provider when observation goals have been met and patient is ready for discharge.    Pt A&Ox4. BP discrepancies see chart. Major differences between manual and machine BP. Paged MD see MD note.  MD ordered a US doppler of extremities. Pt assist of one with cane. Tele sinus dysthymia. Call light within reach and bed alarm on.   "

## 2023-06-23 NOTE — PROGRESS NOTES
Care Management Follow Up    Length of Stay (days): 0    Expected Discharge Date: 06/23/2023     Concerns to be Addressed:       Patient plan of care discussed at interdisciplinary rounds: Yes    Anticipated Discharge Disposition:       Anticipated Discharge Services:    Anticipated Discharge DME:      Patient/family educated on Medicare website which has current facility and service quality ratings:    Education Provided on the Discharge Plan:    Patient/Family in Agreement with the Plan:      Referrals Placed by CM/SW:    Private pay costs discussed: Not applicable    Additional Information:  10:52 AM  Pt accepted to [facility] today before 5pm or Monday per Aleshia.  Prior auth approved and faxed to TCU.  Son will transport to TCU.  Waiting to see if pt is medically ready for discharge today.  Pt agreeable to private room fee ($25 per day).        OLY Landaverde       Care Management Discharge Note    Discharge Date: 06/23/2023       Discharge Disposition:  [facility]    Discharge Services:  TCU    Discharge DME:  none    Discharge Transportation: Son at 3pm    Private pay costs discussed: Not applicable      PAS Confirmation Code: [id number] ([id number])  Patient/family educated on Medicare website which has current facility and service quality ratings:  yes    Education Provided on the Discharge Plan:  yes  Persons Notified of Discharge Plans: pt, son, TCU  Patient/Family in Agreement with the Plan:  yes    Handoff Referral Completed: Yes    Additional Information:  2:44 PM  Pt medically ready to discharge to [facility] via son at 3pm.  TCU will obtain discharge orders from [facility].  PAS done.  No further discharge needs.        OLY Landaverde

## 2023-06-23 NOTE — PROGRESS NOTES
"PRIMARY DIAGNOSIS: \"GENERIC\" NURSING  OUTPATIENT/OBSERVATION GOALS TO BE MET BEFORE DISCHARGE:  1. ADLs back to baseline: No    2. Activity and level of assistance: Up with standby assistance.    3. Pain status: Pain free.    4. Return to near baseline physical activity: No     Discharge Planner Nurse   Safe discharge environment identified: No  Barriers to discharge: Yes, TCU and IV antibotiC       Entered by: Mariaa Gregg RN 06/23/2023 12:00 AM     Please review provider order for any additional goals.   Nurse to notify provider when observation goals have been met and patient is ready for discharge.    Pt A&Ox4. BP discrepancies see chart. Paged MD see MD note. Pt assist of one with cane. Call light within reach and bed alarm on.   "

## 2023-06-23 NOTE — DISCHARGE SUMMARY
[facility] MEDICINE  DISCHARGE SUMMARY     Primary Care Physician: Flora Betts  Admission Date: 6/22/2023   Discharge Provider: Tish Slater MD Discharge Date: 6/23/2023   Diet: Orders Placed This Encounter      Combination Diet Moderate Consistent Carb (60 g CHO per Meal) Diet; Low Saturated Fat Na <2400mg Diet      Diet      Code Status: Full Code   Activity: activity as tolerated   [facility]      Condition at Discharge: Stable      REASON FOR PRESENTATION(See Admission Note for Details)   Right hand finger numbness    PRINCIPAL & ACTIVE DISCHARGE DIAGNOSES     Principal Problem:  Urinary tract infection without hematuria, site unspecified  Subacute CVA  Chronic CHF  Mild aortic, mitral stenosis  Thyroid mass follow-up as outpatient  Right subclavian artery stenosis  History of mild cognitive impairment  Hypertension  Diabetes mellitus type 2  CKD stage IV  Obstructive sleep apnea  Obesity    SIGNIFICANT FINDINGS (Imaging, labs):     Most Recent 3 CBC's:Recent Labs   Lab Test 06/23/23  0642 06/21/23  1801 02/11/23  0517 02/09/23  0527   WBC 14.8* 12.3*  --  8.1   HGB 10.6* 10.9*  --  9.2*   MCV 89 87  --  87    352 289 301      Most Recent 3 BMP's:  Recent Labs   Lab Test 06/23/23  0642 06/23/23  0232 06/22/23  2135 06/21/23  2247 06/21/23  1801 06/19/23  1326     --   --   --  139 142   POTASSIUM 3.2*  --   --   --  4.2 4.1   CHLORIDE 107  --   --   --  104 107   CO2 20*  --   --   --  20* 14*   BUN 32*  --   --   --  40.1* 54.4*   CR 2.02*  --   --   --  2.04* 2.83*   ANIONGAP 12  --   --   --  15 21*   JARETT 9.3  --   --   --  9.8 9.7    106* 124*   < > 126* 108*    < > = values in this interval not displayed.     Most Recent 2 LFT's:  Recent Labs   Lab Test 06/21/23  1801 04/19/23  0936   AST 25 18   ALT 15 13   ALKPHOS 121* 123*   BILITOTAL 0.4 0.5     Most Recent INR's and Anticoagulation Dosing History:  Anticoagulation Dose History         No data to  display              Most Recent 3 Troponin's:No lab results found.  Most Recent Cholesterol Panel:  Recent Labs   Lab Test 04/19/23  0936   CHOL 189   LDL 99   HDL 37*   TRIG 264*     Most Recent 6 Bacteria Isolates From Any Culture (See EPIC Reports for Culture Details):No lab results found.  Most Recent TSH, T4 and A1c Labs:  Recent Labs   Lab Test 06/21/23  1801   A1C 7.7*     Results for orders placed or performed during the hospital encounter of 06/22/23   US Upper Ext Arterial Duplex Bilateral    Narrative    EXAM: US UPPER EXTREMITY ARTERIAL DUPLEX BILATERAL  LOCATION: [facility]  DATE: 6/23/2023    INDICATION: BP discrepancy, avoiding IV contrast with CKD  COMPARISON: None.  TECHNIQUE: Arterial Duplex ultrasound of the bilateral arms. Color flow and spectral Doppler with waveform analysis performed.    FINDINGS (RIGHT upper extremity):  ARTERIAL PEAK SYSTOLIC VELOCITIES (cm/s):  Subclavian artery: Distal: 364 and proximal: 106  Axillary artery: 214  Brachial (proximal): 42  Brachial (mid): 50  Brachial (distal): 39  Radial: 38  Ulnar: 27    WAVEFORMS/COLOR DOPPLER: Biphasic through the proximal subclavian artery transitioning to monophasic.    FINDINGS (LEFT upper extremity):  ARTERIAL PEAK SYSTOLIC VELOCITIES (cm/s):  Subclavian artery: Proximal: 194 and distal: 155  Axillary artery: 150  Brachial (proximal): 150  Brachial (mid): 119  Brachial (distal): 120  Radial: 110  Ulnar: 132     WAVEFORMS/COLOR DOPPLER: Normal multiphasic waveforms.      Impression    IMPRESSION:   1.  Right arm: Severe stenosis lateral right subclavian artery with associated downstream monophasic waveforms.   2.  Left arm: Normal.      MRI brain without contrast 6/21  IMPRESSION:  1.  Mild left occipitoparietal cortical/subcortical restricted diffusion with intermediate to elevated ADC values and FLAIR hyperintensity, most compatible with a late subacute infarct. No significant mass effect or acute  intracranial hemorrhage.  2.  Additional chronic changes including chronic bilateral cerebral and cerebellar hemisphere infarcts, as detailed.    PENDING LABS         PROCEDURES ( this hospitalization only)          RECOMMENDATION FOR F/U VISIT     Recheck CBC.    DISPOSITION     Skilled Nursing Facility    SUMMARY OF HOSPITAL COURSE:    Indu Felix is a 68 year old female past medical history significant for hypertension diabetes mellitus type 2, CKD stage IV, chronic CHF, thyroid mass, recently diagnosed CVA at [facility] in [facility] she was brought to the ED for evaluation of right hand third and fourth fingers tingling for 2 days, hyperglycemia, per family intermittent confusion.  She was admitted for UTI.  Was seen by neurology, MRI-brain on 06/21 showed recent subacute infarct of left occipitoparietal lobe.  No evidence of any acute stroke. UCx growing E.coli pansensitive.  She is discharging on cefdinir.. Patient is to be transferred to [facility] as there were no bloods available at [facility].  She was seen by physical therapy and is discharging to TCU for further rehabilitation.  She was found to have significant blood pressure variance between both upper extremities.  Ultrasound of upper extremities revealed severe right subclavian artery stenosis.  Discussed with vascular surgery recommended outpatient follow-up, continue aspirin, statin.  She has known thyroid mass, and recommended to continue follow-up with endocrinology.    Discharge Medications with Med changes:        Review of your medicines      START taking      Dose / Directions   cefdinir 300 MG capsule  Commonly known as: OMNICEF  Used for: Urinary tract infection without hematuria, site unspecified      Dose: 300 mg  Take 1 capsule (300 mg) by mouth daily 7 days  Quantity: 7 capsule  Refills: 0        CONTINUE these medicines which have NOT CHANGED      Dose / Directions   amLODIPine 5 MG  tablet  Commonly known as: NORVASC  Used for: Benign essential hypertension      Dose: 5 mg  Take 1 tablet (5 mg) by mouth daily  Quantity: 30 tablet  Refills: 0     aspirin 81 MG EC tablet      Dose: 81 mg  Take 81 mg by mouth daily  Refills: 0     atorvastatin 80 MG tablet  Commonly known as: LIPITOR      Dose: 80 mg  Take 80 mg by mouth daily  Refills: 0     bumetanide 2 MG tablet  Commonly known as: BUMEX      Dose: 2 mg  Take 1 tablet (2 mg) by mouth 2 times daily  Quantity: 180 tablet  Refills: 1     Contour Next Test test strip  Generic drug: blood glucose      USE TO TEST TWICE DAILY for 75  Refills: 0     donepezil 5 MG tablet  Commonly known as: ARICEPT      Dose: 10 mg  Take 10 mg by mouth At Bedtime  Refills: 0     escitalopram 20 MG tablet  Commonly known as: LEXAPRO      Dose: 20 mg  Take 20 mg by mouth daily  Refills: 0     fish oil-omega-3 fatty acids 1000 MG capsule      Dose: 1,000 mg  Take 1,000 mg by mouth daily  Refills: 0     hydrALAZINE 50 MG tablet  Commonly known as: APRESOLINE      Dose: 50 mg  Take 50 mg by mouth 3 times daily  Refills: 0     isosorbide mononitrate 60 MG 24 hr tablet  Commonly known as: IMDUR  Used for: Benign essential hypertension, Pulmonary hypertension (H)      Dose: 60 mg  Take 1 tablet (60 mg) by mouth 2 times daily  Quantity: 60 tablet  Refills: 0     Lantus SoloStar 100 UNIT/ML pen  Generic drug: insulin glargine      Dose: 20 Units  Inject 20 Units Subcutaneous every morning  Refills: 0     loperamide 2 MG capsule  Commonly known as: IMODIUM      Dose: 2 mg  Take 2 mg by mouth 2 times daily (before meals)  Refills: 0     melatonin 3 MG tablet      Dose: 6 mg  Take 6 mg by mouth nightly as needed for sleep  Refills: 0     metoprolol succinate  MG 24 hr tablet  Commonly known as: TOPROL XL      Dose: 200 mg  Take 200 mg by mouth daily  Refills: 0     multivitamin tablet      Dose: 1 tablet  Take 1 tablet by mouth daily  Refills: 0     omeprazole 20 MG DR  capsule  Commonly known as: priLOSEC      Dose: 20 mg  20 mg daily before breakfast  Refills: 0     traZODone 100 MG tablet  Commonly known as: DESYREL      Dose: 100 mg  Take 100 mg by mouth At Bedtime  Refills: 0     Vitamin D3 25 mcg (1000 units) tablet  Commonly known as: CHOLECALCIFEROL      Dose: 1 tablet  Take 1 tablet by mouth daily  Refills: 0                 Rationale for medication changes:        Cefdinir for bladder infection      Consults   Neurology at [facility]    Immunizations given this encounter     [unfilled]      Discharge Procedure Orders   General info for SNF   Order Comments: Length of Stay Estimate: Short Term Care: Estimated # of Days <30  Condition at Discharge: Improving  Level of care:skilled   Rehabilitation Potential: Good  Admission H&P remains valid and up-to-date: Yes  Recent Chemotherapy: N/A  Use Nursing Home Standing Orders: Yes     Mantoux instructions   Order Comments: Give two-step Mantoux (PPD) Per Facility Policy Yes     Reason for your hospital stay   Order Comments: Bladder infection,recent stroke     Glucose monitor nursing POCT   Order Comments: Before meals and at bedtime     Activity - Up with assistive device     Order Specific Question Answer Comments   Is discharge order? Yes      Follow Up and recommended labs and tests   Order Comments: Follow up with Nursing home physician.  Check CBC in 3 days  Follow up with primary care provider in 2 weeks  Follow up with specialist neurology as recommended  Follow-up with vascular surgery in 2 weeks     Full Code     Order Specific Question Answer Comments   Code status determined by: Discussion with patient/ legal decision maker      Physical Therapy Adult Consult   Order Comments: Evaluate and treat as clinically indicated.    Reason: recent stroke     Occupational Therapy Adult Consult   Order Comments: Evaluate and treat as clinically indicated.    Reason: recent stroke     Speech Language Path Adult  "Consult   Order Comments: Evaluate and treat as clinically indicated.    Reason:  recent CVA     Fall precautions     Diet   Order Comments: Follow this diet upon discharge: Orders Placed This Encounter      Combination Diet Moderate Consistent Carb (60 g CHO per Meal) Diet; Low Saturated Fat Na <2400mg Diet     Order Specific Question Answer Comments   Is discharge order? Yes      Examination     Vital Signs in last 24 hours:   Vital signs:  Temp: 98.9  F (37.2  C) Temp src: Oral BP: (!) 166/73 Pulse: 71   Resp: 18 SpO2: 93 % O2 Device: None (Room air)     Weight: 82.9 kg (182 lb 11.2 oz)  Estimated body mass index is 33.42 kg/m  as calculated from the following:    Height as of 6/21/23: 1.575 m (5' 2\").    Weight as of this encounter: 82.9 kg (182 lb 11.2 oz).       Pertinent positives on exam:  Neuro: Alert oriented x2 she has good strength in upper and lower extremities, 5 x 5, sensation is intact,   Extremities, has good capillary refill in right hand    Please see EMR for more detailed significant labs, imaging, consultant notes etc.  Total time spent on discharge: > 35 minutes    Tish Slater MD   [facility] Hospitalist Service: Ph:[phone]    CC:Flora Betts      "

## 2023-06-23 NOTE — PROGRESS NOTES
Care Management Follow Up    Length of Stay (days): 0    Expected Discharge Date: 06/24/2023     Concerns to be Addressed:       Patient plan of care discussed at interdisciplinary rounds: Yes    Anticipated Discharge Disposition:       Anticipated Discharge Services:    Anticipated Discharge DME:      Patient/family educated on Medicare website which has current facility and service quality ratings:    Education Provided on the Discharge Plan:    Patient/Family in Agreement with the Plan:      Referrals Placed by CM/SW:    Private pay costs discussed: Not applicable    Additional Information:  9:34 AM  [facility] received Brian prior auth which was approved.  Auth # [id number] with approval days 6/23/23-6/27/23.      OLY Landaverde

## 2023-06-23 NOTE — PROGRESS NOTES
"PRIMARY DIAGNOSIS: \"GENERIC\" NURSING  OUTPATIENT/OBSERVATION GOALS TO BE MET BEFORE DISCHARGE:  1. ADLs back to baseline: Yes.    2. Activity and level of assistance: Up with standby assistance.    3. Pain status: Improved-controlled with oral pain medications. Using Tylenol PRN for right hand pain.    4. Return to near baseline physical activity: Yes     Discharge Planner Nurse   Safe discharge environment identified: Yes, [facility].    Barriers to discharge: Yes. Need ultrasound of bilateral upper extremities.    Please review provider order for any additional goals.   Nurse to notify provider when observation goals have been met and patient is ready for discharge.  "

## 2023-06-23 NOTE — PLAN OF CARE
"  Problem: Plan of Care - These are the overarching goals to be used throughout the patient stay.    Goal: Optimal Comfort and Wellbeing  Outcome: Progressing   Goal Outcome Evaluation:         Pt is alert and oriented x4. Denies pain, numbness and tingling to extremities when first assessed. Later c/o \"severe\" pain to right hand and fingers. No swelling or redness noted. Able to flex and move fingers. Pt verbalized relief after taking Tylenol PRN.  Tele Sinus rhythm w/ First degree AV Block and prolonged QT/QTc.     VSS. BP taken at left arm.     Pt refused CPAP/BiPAP and requested to remove leg pumps. Explained benefits , verbalized understanding but still refused.      "

## 2023-06-23 NOTE — PLAN OF CARE
Physical Therapy Discharge Summary    Reason for therapy discharge:    Discharged to transitional care facility.    Progress towards therapy goal(s). See goals on Care Plan in [facility] electronic health record for goal details.  Goals not met.  Barriers to achieving goals:   discharge from facility.    Therapy recommendation(s):    Continued therapy is recommended.  Rationale/Recommendations:  continued PT at TCU to improve functional mobility.  Continue home exercise program.

## 2023-06-23 NOTE — DISCHARGE INSTRUCTIONS
DIABETES REMINDERS:  1) Your blood sugar goals:  100-150mg/dL before eating  and  mg/dL 2 hours after eating (or per your doctor).  3) Always be prepared to treat a low blood sugar should it happen. Keep a sugar-containing beverage or food nearby.  4)Follow insulin regimen on discharge orders, if ordered,  until able to see provider where doses may be adjusted based on blood sugar patterns.  5) When to call your clinic:   Blood sugar over 400 mg/dL.   If you have 2 to 3 low blood sugars (under 70mg/dL) in a row,   Low reading the same time of day several days in a row,  Blood sugars elevated and you can not get them down with your usual diabetes regimen,  You are ill and can't keep blood sugars controlled.   6) When to call 911:  If your blood glucose does not get better with treatment, or if you/someone else is unable to give you treatment.  7) Talk to your primary care doctor about an appointment with a Certified Diabetes Educator to assist you with your BG management

## 2023-06-24 ENCOUNTER — LAB REQUISITION (OUTPATIENT)
Dept: LAB | Facility: CLINIC | Age: 68
End: 2023-06-24
Payer: COMMERCIAL

## 2023-06-24 DIAGNOSIS — D64.9 ANEMIA, UNSPECIFIED: ICD-10-CM

## 2023-06-26 LAB
ERYTHROCYTE [DISTWIDTH] IN BLOOD BY AUTOMATED COUNT: 13.9 % (ref 10–15)
HCT VFR BLD AUTO: 30.6 % (ref 35–47)
HGB BLD-MCNC: 9.8 G/DL (ref 11.7–15.7)
MCH RBC QN AUTO: 29.2 PG (ref 26.5–33)
MCHC RBC AUTO-ENTMCNC: 32 G/DL (ref 31.5–36.5)
MCV RBC AUTO: 91 FL (ref 78–100)
PLATELET # BLD AUTO: 367 10E3/UL (ref 150–450)
RBC # BLD AUTO: 3.36 10E6/UL (ref 3.8–5.2)
WBC # BLD AUTO: 8 10E3/UL (ref 4–11)

## 2023-06-26 PROCEDURE — 36415 COLL VENOUS BLD VENIPUNCTURE: CPT | Mod: ORL | Performed by: FAMILY MEDICINE

## 2023-06-26 PROCEDURE — P9603 ONE-WAY ALLOW PRORATED MILES: HCPCS | Mod: ORL | Performed by: FAMILY MEDICINE

## 2023-06-26 PROCEDURE — 85027 COMPLETE CBC AUTOMATED: CPT | Mod: ORL | Performed by: FAMILY MEDICINE

## 2023-06-29 ENCOUNTER — LAB REQUISITION (OUTPATIENT)
Dept: LAB | Facility: CLINIC | Age: 68
End: 2023-06-29
Payer: COMMERCIAL

## 2023-06-29 DIAGNOSIS — N18.4 CHRONIC KIDNEY DISEASE, STAGE 4 (SEVERE) (H): ICD-10-CM

## 2023-06-29 DIAGNOSIS — D64.9 ANEMIA, UNSPECIFIED: ICD-10-CM

## 2023-06-30 LAB
ANION GAP SERPL CALCULATED.3IONS-SCNC: 12 MMOL/L (ref 7–15)
BUN SERPL-MCNC: 35.9 MG/DL (ref 8–23)
CALCIUM SERPL-MCNC: 8.9 MG/DL (ref 8.8–10.2)
CHLORIDE SERPL-SCNC: 110 MMOL/L (ref 98–107)
CREAT SERPL-MCNC: 2.18 MG/DL (ref 0.51–0.95)
DEPRECATED HCO3 PLAS-SCNC: 19 MMOL/L (ref 22–29)
GFR SERPL CREATININE-BSD FRML MDRD: 24 ML/MIN/1.73M2
GLUCOSE SERPL-MCNC: 104 MG/DL (ref 70–99)
HGB BLD-MCNC: 10 G/DL (ref 11.7–15.7)
POTASSIUM SERPL-SCNC: 4.2 MMOL/L (ref 3.4–5.3)
SODIUM SERPL-SCNC: 141 MMOL/L (ref 136–145)

## 2023-06-30 PROCEDURE — 80048 BASIC METABOLIC PNL TOTAL CA: CPT | Mod: ORL | Performed by: FAMILY MEDICINE

## 2023-06-30 PROCEDURE — 85018 HEMOGLOBIN: CPT | Mod: ORL | Performed by: FAMILY MEDICINE

## 2023-06-30 PROCEDURE — P9604 ONE-WAY ALLOW PRORATED TRIP: HCPCS | Mod: ORL | Performed by: FAMILY MEDICINE

## 2023-06-30 PROCEDURE — 36415 COLL VENOUS BLD VENIPUNCTURE: CPT | Mod: ORL | Performed by: FAMILY MEDICINE

## 2023-07-19 ENCOUNTER — TRANSFERRED RECORDS (OUTPATIENT)
Dept: HEALTH INFORMATION MANAGEMENT | Facility: CLINIC | Age: 68
End: 2023-07-19

## 2023-07-27 ENCOUNTER — TRANSFERRED RECORDS (OUTPATIENT)
Dept: HEALTH INFORMATION MANAGEMENT | Facility: CLINIC | Age: 68
End: 2023-07-27

## 2023-07-27 ENCOUNTER — LAB REQUISITION (OUTPATIENT)
Dept: LAB | Facility: CLINIC | Age: 68
End: 2023-07-27

## 2023-07-27 DIAGNOSIS — N18.4 CHRONIC KIDNEY DISEASE, STAGE 4 (SEVERE) (H): ICD-10-CM

## 2023-07-27 LAB
ALBUMIN SERPL BCG-MCNC: 4.2 G/DL (ref 3.5–5.2)
ANION GAP SERPL CALCULATED.3IONS-SCNC: 15 MMOL/L (ref 7–15)
BUN SERPL-MCNC: 48.3 MG/DL (ref 8–23)
CALCIUM SERPL-MCNC: 9.5 MG/DL (ref 8.8–10.2)
CHLORIDE SERPL-SCNC: 109 MMOL/L (ref 98–107)
CREAT SERPL-MCNC: 2.33 MG/DL (ref 0.51–0.95)
CREAT UR-MCNC: 33.1 MG/DL
DEPRECATED HCO3 PLAS-SCNC: 17 MMOL/L (ref 22–29)
ERYTHROCYTE [DISTWIDTH] IN BLOOD BY AUTOMATED COUNT: 13.8 % (ref 10–15)
GFR SERPL CREATININE-BSD FRML MDRD: 22 ML/MIN/1.73M2
GLUCOSE SERPL-MCNC: 92 MG/DL (ref 70–99)
HCT VFR BLD AUTO: 35.2 % (ref 35–47)
HGB BLD-MCNC: 11.2 G/DL (ref 11.7–15.7)
MCH RBC QN AUTO: 28.8 PG (ref 26.5–33)
MCHC RBC AUTO-ENTMCNC: 31.8 G/DL (ref 31.5–36.5)
MCV RBC AUTO: 91 FL (ref 78–100)
MICROALBUMIN UR-MCNC: 33.4 MG/L
MICROALBUMIN/CREAT UR: 100.91 MG/G CR (ref 0–25)
PHOSPHATE SERPL-MCNC: 4.3 MG/DL (ref 2.5–4.5)
PLATELET # BLD AUTO: 370 10E3/UL (ref 150–450)
POTASSIUM SERPL-SCNC: 5.4 MMOL/L (ref 3.4–5.3)
RBC # BLD AUTO: 3.89 10E6/UL (ref 3.8–5.2)
SODIUM SERPL-SCNC: 141 MMOL/L (ref 136–145)
WBC # BLD AUTO: 10.9 10E3/UL (ref 4–11)

## 2023-07-27 PROCEDURE — 82040 ASSAY OF SERUM ALBUMIN: CPT | Performed by: FAMILY MEDICINE

## 2023-07-27 PROCEDURE — 85027 COMPLETE CBC AUTOMATED: CPT | Performed by: FAMILY MEDICINE

## 2023-07-27 PROCEDURE — 82570 ASSAY OF URINE CREATININE: CPT | Performed by: FAMILY MEDICINE

## 2023-08-06 ENCOUNTER — HEALTH MAINTENANCE LETTER (OUTPATIENT)
Age: 68
End: 2023-08-06

## 2023-08-17 ENCOUNTER — TELEPHONE (OUTPATIENT)
Dept: PHARMACY | Facility: OTHER | Age: 68
End: 2023-08-17
Payer: COMMERCIAL

## 2023-08-17 NOTE — TELEPHONE ENCOUNTER
MTM referral from: Patient's insurance    MTM referral outreach attempt #1 on August 17, 2023 at 2:19 PM      Outcome: Left Message    Gabrielle Estevez PharmD, [facility]  Medication Therapy Management Provider  Pager: [phone]

## 2023-08-23 ENCOUNTER — TELEPHONE (OUTPATIENT)
Dept: PHARMACY | Facility: OTHER | Age: 68
End: 2023-08-23
Payer: COMMERCIAL

## 2023-08-23 NOTE — TELEPHONE ENCOUNTER
MTM Recruitment: [facility]  insurance     Referral outreach attempt #2 on August 23, 2023      Outcome: left voicemail and MyChart message sent    Samia SamuelD  Medication Therapy Management Pharmacist   [facility]

## 2023-09-19 ENCOUNTER — TELEPHONE (OUTPATIENT)
Dept: PHARMACY | Facility: PHYSICIAN GROUP | Age: 68
End: 2023-09-19
Payer: COMMERCIAL

## 2023-09-19 NOTE — TELEPHONE ENCOUNTER
Clinical Pharmacist Note     Patient was referred for an MTM appointment by their insurance plan.  Calling patient to see if they had time to review their medications today, or if we could schedule a follow up appointment.  Appointment would be a general review of their medications to make sure they are working well, don't have any serious interactions/aren't causing side effects, and if there are ways to simplify them, make them easier to take, or ways to get the cost down.  Patient can call [phone] to schedule an appointment with any [facility] Pharmacist.     Loretta Blanca, Pharm D.,   MTM Pharmacist - [facility]  Secure Voicemail: [phone]

## 2023-10-15 ENCOUNTER — HEALTH MAINTENANCE LETTER (OUTPATIENT)
Age: 68
End: 2023-10-15

## 2023-12-05 ENCOUNTER — HOSPITAL ENCOUNTER (INPATIENT)
Facility: HOSPITAL | Age: 68
LOS: 5 days | Discharge: NURSING, REHAB OR LONG-TERM CARE | DRG: 065 | End: 2023-12-11
Attending: STUDENT IN AN ORGANIZED HEALTH CARE EDUCATION/TRAINING PROGRAM | Admitting: INTERNAL MEDICINE
Payer: COMMERCIAL

## 2023-12-05 ENCOUNTER — APPOINTMENT (OUTPATIENT)
Dept: CT IMAGING | Facility: HOSPITAL | Age: 68
DRG: 065 | End: 2023-12-05
Attending: STUDENT IN AN ORGANIZED HEALTH CARE EDUCATION/TRAINING PROGRAM
Payer: COMMERCIAL

## 2023-12-05 DIAGNOSIS — Z79.4 TYPE 2 DIABETES MELLITUS WITH HYPEROSMOLARITY WITHOUT COMA, WITH LONG-TERM CURRENT USE OF INSULIN (H): ICD-10-CM

## 2023-12-05 DIAGNOSIS — I50.32 CHRONIC HEART FAILURE WITH PRESERVED EJECTION FRACTION (H): ICD-10-CM

## 2023-12-05 DIAGNOSIS — I63.9 ISCHEMIC STROKE (H): ICD-10-CM

## 2023-12-05 DIAGNOSIS — N39.0 URINARY TRACT INFECTION WITHOUT HEMATURIA, SITE UNSPECIFIED: ICD-10-CM

## 2023-12-05 DIAGNOSIS — I10 BENIGN ESSENTIAL HYPERTENSION: ICD-10-CM

## 2023-12-05 DIAGNOSIS — R52 PAIN: ICD-10-CM

## 2023-12-05 DIAGNOSIS — R19.7 DIARRHEA, UNSPECIFIED TYPE: ICD-10-CM

## 2023-12-05 DIAGNOSIS — E11.00 TYPE 2 DIABETES MELLITUS WITH HYPEROSMOLARITY WITHOUT COMA, WITH LONG-TERM CURRENT USE OF INSULIN (H): ICD-10-CM

## 2023-12-05 DIAGNOSIS — Z86.73 HISTORY OF CVA (CEREBROVASCULAR ACCIDENT): Primary | ICD-10-CM

## 2023-12-05 LAB
ALBUMIN SERPL BCG-MCNC: 4.4 G/DL (ref 3.5–5.2)
ALBUMIN UR-MCNC: 30 MG/DL
ALP SERPL-CCNC: 153 U/L (ref 40–150)
ALT SERPL W P-5'-P-CCNC: 18 U/L (ref 0–50)
ANION GAP SERPL CALCULATED.3IONS-SCNC: 16 MMOL/L (ref 7–15)
APPEARANCE UR: CLEAR
AST SERPL W P-5'-P-CCNC: 26 U/L (ref 0–45)
B-OH-BUTYR SERPL-SCNC: <0.18 MMOL/L
BACTERIA #/AREA URNS HPF: ABNORMAL /HPF
BASE EXCESS BLDV CALC-SCNC: -3.8 MMOL/L
BASOPHILS # BLD AUTO: 0.1 10E3/UL (ref 0–0.2)
BASOPHILS NFR BLD AUTO: 1 %
BILIRUB SERPL-MCNC: 0.3 MG/DL
BILIRUB UR QL STRIP: NEGATIVE
BUN SERPL-MCNC: 33.8 MG/DL (ref 8–23)
CALCIUM SERPL-MCNC: 9.8 MG/DL (ref 8.8–10.2)
CHLORIDE SERPL-SCNC: 101 MMOL/L (ref 98–107)
COLOR UR AUTO: COLORLESS
CREAT SERPL-MCNC: 1.88 MG/DL (ref 0.51–0.95)
DEPRECATED HCO3 PLAS-SCNC: 18 MMOL/L (ref 22–29)
EGFRCR SERPLBLD CKD-EPI 2021: 28 ML/MIN/1.73M2
EOSINOPHIL # BLD AUTO: 0.4 10E3/UL (ref 0–0.7)
EOSINOPHIL NFR BLD AUTO: 4 %
ERYTHROCYTE [DISTWIDTH] IN BLOOD BY AUTOMATED COUNT: 12.6 % (ref 10–15)
GLUCOSE BLDC GLUCOMTR-MCNC: 219 MG/DL (ref 70–99)
GLUCOSE SERPL-MCNC: 220 MG/DL (ref 70–99)
GLUCOSE UR STRIP-MCNC: 50 MG/DL
HCO3 BLDV-SCNC: 20 MMOL/L (ref 24–30)
HCT VFR BLD AUTO: 38.4 % (ref 35–47)
HGB BLD-MCNC: 12.5 G/DL (ref 11.7–15.7)
HGB UR QL STRIP: NEGATIVE
HOLD SPECIMEN: NORMAL
HOLD SPECIMEN: NORMAL
HYALINE CASTS: 4 /LPF
IMM GRANULOCYTES # BLD: 0 10E3/UL
IMM GRANULOCYTES NFR BLD: 0 %
KETONES UR STRIP-MCNC: NEGATIVE MG/DL
LEUKOCYTE ESTERASE UR QL STRIP: ABNORMAL
LYMPHOCYTES # BLD AUTO: 2.4 10E3/UL (ref 0.8–5.3)
LYMPHOCYTES NFR BLD AUTO: 24 %
MCH RBC QN AUTO: 28.1 PG (ref 26.5–33)
MCHC RBC AUTO-ENTMCNC: 32.6 G/DL (ref 31.5–36.5)
MCV RBC AUTO: 86 FL (ref 78–100)
MONOCYTES # BLD AUTO: 0.9 10E3/UL (ref 0–1.3)
MONOCYTES NFR BLD AUTO: 9 %
NEUTROPHILS # BLD AUTO: 6.2 10E3/UL (ref 1.6–8.3)
NEUTROPHILS NFR BLD AUTO: 62 %
NITRATE UR QL: NEGATIVE
NRBC # BLD AUTO: 0 10E3/UL
NRBC BLD AUTO-RTO: 0 /100
OXYHGB MFR BLDV: 71.9 % (ref 70–75)
PCO2 BLDV: 28 MM HG (ref 35–50)
PH BLDV: 7.47 [PH] (ref 7.35–7.45)
PH UR STRIP: 6 [PH] (ref 5–7)
PLATELET # BLD AUTO: 403 10E3/UL (ref 150–450)
PO2 BLDV: 36 MM HG (ref 25–47)
POTASSIUM SERPL-SCNC: 3.8 MMOL/L (ref 3.4–5.3)
PROT SERPL-MCNC: 8 G/DL (ref 6.4–8.3)
RBC # BLD AUTO: 4.45 10E6/UL (ref 3.8–5.2)
RBC URINE: 1 /HPF
SAO2 % BLDV: 72.8 % (ref 70–75)
SODIUM SERPL-SCNC: 135 MMOL/L (ref 135–145)
SP GR UR STRIP: 1.01 (ref 1–1.03)
SQUAMOUS EPITHELIAL: <1 /HPF
TROPONIN T SERPL HS-MCNC: 75 NG/L
UROBILINOGEN UR STRIP-MCNC: <2 MG/DL
WBC # BLD AUTO: 10 10E3/UL (ref 4–11)
WBC URINE: 41 /HPF

## 2023-12-05 PROCEDURE — 93005 ELECTROCARDIOGRAM TRACING: CPT | Performed by: STUDENT IN AN ORGANIZED HEALTH CARE EDUCATION/TRAINING PROGRAM

## 2023-12-05 PROCEDURE — 82010 KETONE BODYS QUAN: CPT | Performed by: STUDENT IN AN ORGANIZED HEALTH CARE EDUCATION/TRAINING PROGRAM

## 2023-12-05 PROCEDURE — 84484 ASSAY OF TROPONIN QUANT: CPT | Performed by: STUDENT IN AN ORGANIZED HEALTH CARE EDUCATION/TRAINING PROGRAM

## 2023-12-05 PROCEDURE — 85025 COMPLETE CBC W/AUTO DIFF WBC: CPT | Performed by: STUDENT IN AN ORGANIZED HEALTH CARE EDUCATION/TRAINING PROGRAM

## 2023-12-05 PROCEDURE — 36415 COLL VENOUS BLD VENIPUNCTURE: CPT | Performed by: STUDENT IN AN ORGANIZED HEALTH CARE EDUCATION/TRAINING PROGRAM

## 2023-12-05 PROCEDURE — 80053 COMPREHEN METABOLIC PANEL: CPT | Performed by: STUDENT IN AN ORGANIZED HEALTH CARE EDUCATION/TRAINING PROGRAM

## 2023-12-05 PROCEDURE — 99285 EMERGENCY DEPT VISIT HI MDM: CPT | Mod: 25

## 2023-12-05 PROCEDURE — 70450 CT HEAD/BRAIN W/O DYE: CPT

## 2023-12-05 PROCEDURE — 82962 GLUCOSE BLOOD TEST: CPT

## 2023-12-05 PROCEDURE — 81001 URINALYSIS AUTO W/SCOPE: CPT | Performed by: STUDENT IN AN ORGANIZED HEALTH CARE EDUCATION/TRAINING PROGRAM

## 2023-12-05 PROCEDURE — 82805 BLOOD GASES W/O2 SATURATION: CPT | Performed by: STUDENT IN AN ORGANIZED HEALTH CARE EDUCATION/TRAINING PROGRAM

## 2023-12-05 PROCEDURE — 87086 URINE CULTURE/COLONY COUNT: CPT | Performed by: STUDENT IN AN ORGANIZED HEALTH CARE EDUCATION/TRAINING PROGRAM

## 2023-12-05 RX ORDER — CEFTRIAXONE 1 G/1
1 INJECTION, POWDER, FOR SOLUTION INTRAMUSCULAR; INTRAVENOUS ONCE
Qty: 10 ML | Refills: 0 | Status: COMPLETED | OUTPATIENT
Start: 2023-12-06 | End: 2023-12-06

## 2023-12-05 ASSESSMENT — ACTIVITIES OF DAILY LIVING (ADL): ADLS_ACUITY_SCORE: 35

## 2023-12-06 ENCOUNTER — APPOINTMENT (OUTPATIENT)
Dept: SPEECH THERAPY | Facility: HOSPITAL | Age: 68
DRG: 065 | End: 2023-12-06
Attending: PSYCHIATRY & NEUROLOGY
Payer: COMMERCIAL

## 2023-12-06 ENCOUNTER — APPOINTMENT (OUTPATIENT)
Dept: NEUROLOGY | Facility: HOSPITAL | Age: 68
DRG: 065 | End: 2023-12-06
Attending: PSYCHIATRY & NEUROLOGY
Payer: COMMERCIAL

## 2023-12-06 ENCOUNTER — APPOINTMENT (OUTPATIENT)
Dept: MRI IMAGING | Facility: HOSPITAL | Age: 68
DRG: 065 | End: 2023-12-06
Attending: STUDENT IN AN ORGANIZED HEALTH CARE EDUCATION/TRAINING PROGRAM
Payer: COMMERCIAL

## 2023-12-06 ENCOUNTER — APPOINTMENT (OUTPATIENT)
Dept: CARDIOLOGY | Facility: HOSPITAL | Age: 68
DRG: 065 | End: 2023-12-06
Attending: INTERNAL MEDICINE
Payer: COMMERCIAL

## 2023-12-06 ENCOUNTER — APPOINTMENT (OUTPATIENT)
Dept: PHYSICAL THERAPY | Facility: HOSPITAL | Age: 68
DRG: 065 | End: 2023-12-06
Attending: PSYCHIATRY & NEUROLOGY
Payer: COMMERCIAL

## 2023-12-06 ENCOUNTER — APPOINTMENT (OUTPATIENT)
Dept: OCCUPATIONAL THERAPY | Facility: HOSPITAL | Age: 68
DRG: 065 | End: 2023-12-06
Attending: PSYCHIATRY & NEUROLOGY
Payer: COMMERCIAL

## 2023-12-06 PROBLEM — I63.9 ISCHEMIC STROKE (H): Status: ACTIVE | Noted: 2023-12-06

## 2023-12-06 LAB
ANION GAP SERPL CALCULATED.3IONS-SCNC: 10 MMOL/L (ref 7–15)
BASOPHILS # BLD AUTO: 0.1 10E3/UL (ref 0–0.2)
BASOPHILS NFR BLD AUTO: 1 %
BUN SERPL-MCNC: 27.2 MG/DL (ref 8–23)
CALCIUM SERPL-MCNC: 9.4 MG/DL (ref 8.8–10.2)
CHLORIDE SERPL-SCNC: 107 MMOL/L (ref 98–107)
CHOLEST SERPL-MCNC: 193 MG/DL
CREAT SERPL-MCNC: 1.6 MG/DL (ref 0.51–0.95)
DEPRECATED HCO3 PLAS-SCNC: 21 MMOL/L (ref 22–29)
EGFRCR SERPLBLD CKD-EPI 2021: 35 ML/MIN/1.73M2
EOSINOPHIL # BLD AUTO: 0.3 10E3/UL (ref 0–0.7)
EOSINOPHIL NFR BLD AUTO: 4 %
ERYTHROCYTE [DISTWIDTH] IN BLOOD BY AUTOMATED COUNT: 12.8 % (ref 10–15)
GLUCOSE BLDC GLUCOMTR-MCNC: 124 MG/DL (ref 70–99)
GLUCOSE BLDC GLUCOMTR-MCNC: 138 MG/DL (ref 70–99)
GLUCOSE BLDC GLUCOMTR-MCNC: 145 MG/DL (ref 70–99)
GLUCOSE BLDC GLUCOMTR-MCNC: 159 MG/DL (ref 70–99)
GLUCOSE SERPL-MCNC: 157 MG/DL (ref 70–99)
HBA1C MFR BLD: 6.3 %
HCT VFR BLD AUTO: 35.7 % (ref 35–47)
HDLC SERPL-MCNC: 39 MG/DL
HGB BLD-MCNC: 11.9 G/DL (ref 11.7–15.7)
IMM GRANULOCYTES # BLD: 0 10E3/UL
IMM GRANULOCYTES NFR BLD: 0 %
LDLC SERPL CALC-MCNC: 111 MG/DL
LVEF ECHO: NORMAL
LYMPHOCYTES # BLD AUTO: 1.7 10E3/UL (ref 0.8–5.3)
LYMPHOCYTES NFR BLD AUTO: 18 %
MCH RBC QN AUTO: 28.8 PG (ref 26.5–33)
MCHC RBC AUTO-ENTMCNC: 33.3 G/DL (ref 31.5–36.5)
MCV RBC AUTO: 86 FL (ref 78–100)
MONOCYTES # BLD AUTO: 0.8 10E3/UL (ref 0–1.3)
MONOCYTES NFR BLD AUTO: 9 %
NEUTROPHILS # BLD AUTO: 6.4 10E3/UL (ref 1.6–8.3)
NEUTROPHILS NFR BLD AUTO: 68 %
NONHDLC SERPL-MCNC: 154 MG/DL
NRBC # BLD AUTO: 0 10E3/UL
NRBC BLD AUTO-RTO: 0 /100
PLATELET # BLD AUTO: 354 10E3/UL (ref 150–450)
POTASSIUM SERPL-SCNC: 3.9 MMOL/L (ref 3.4–5.3)
RBC # BLD AUTO: 4.13 10E6/UL (ref 3.8–5.2)
SODIUM SERPL-SCNC: 138 MMOL/L (ref 135–145)
TRIGL SERPL-MCNC: 217 MG/DL
TROPONIN T SERPL HS-MCNC: 71 NG/L
WBC # BLD AUTO: 9.4 10E3/UL (ref 4–11)

## 2023-12-06 PROCEDURE — 96361 HYDRATE IV INFUSION ADD-ON: CPT

## 2023-12-06 PROCEDURE — 82962 GLUCOSE BLOOD TEST: CPT

## 2023-12-06 PROCEDURE — 95816 EEG AWAKE AND DROWSY: CPT | Mod: 26 | Performed by: PSYCHIATRY & NEUROLOGY

## 2023-12-06 PROCEDURE — 87040 BLOOD CULTURE FOR BACTERIA: CPT | Performed by: STUDENT IN AN ORGANIZED HEALTH CARE EDUCATION/TRAINING PROGRAM

## 2023-12-06 PROCEDURE — 255N000002 HC RX 255 OP 636: Mod: JZ | Performed by: STUDENT IN AN ORGANIZED HEALTH CARE EDUCATION/TRAINING PROGRAM

## 2023-12-06 PROCEDURE — 97116 GAIT TRAINING THERAPY: CPT | Mod: GP

## 2023-12-06 PROCEDURE — 96375 TX/PRO/DX INJ NEW DRUG ADDON: CPT

## 2023-12-06 PROCEDURE — 70551 MRI BRAIN STEM W/O DYE: CPT

## 2023-12-06 PROCEDURE — 999N000157 HC STATISTIC RCP TIME EA 10 MIN

## 2023-12-06 PROCEDURE — 36415 COLL VENOUS BLD VENIPUNCTURE: CPT | Performed by: INTERNAL MEDICINE

## 2023-12-06 PROCEDURE — 95816 EEG AWAKE AND DROWSY: CPT

## 2023-12-06 PROCEDURE — 250N000012 HC RX MED GY IP 250 OP 636 PS 637: Performed by: INTERNAL MEDICINE

## 2023-12-06 PROCEDURE — 94660 CPAP INITIATION&MGMT: CPT

## 2023-12-06 PROCEDURE — 85025 COMPLETE CBC W/AUTO DIFF WBC: CPT | Performed by: INTERNAL MEDICINE

## 2023-12-06 PROCEDURE — 250N000011 HC RX IP 250 OP 636: Mod: JZ | Performed by: STUDENT IN AN ORGANIZED HEALTH CARE EDUCATION/TRAINING PROGRAM

## 2023-12-06 PROCEDURE — 210N000001 HC R&B IMCU HEART CARE

## 2023-12-06 PROCEDURE — 70547 MR ANGIOGRAPHY NECK W/O DYE: CPT

## 2023-12-06 PROCEDURE — 97162 PT EVAL MOD COMPLEX 30 MIN: CPT | Mod: GP

## 2023-12-06 PROCEDURE — 96365 THER/PROPH/DIAG IV INF INIT: CPT | Mod: 59

## 2023-12-06 PROCEDURE — 92610 EVALUATE SWALLOWING FUNCTION: CPT | Mod: GN

## 2023-12-06 PROCEDURE — 70544 MR ANGIOGRAPHY HEAD W/O DYE: CPT

## 2023-12-06 PROCEDURE — 80061 LIPID PANEL: CPT | Performed by: PSYCHIATRY & NEUROLOGY

## 2023-12-06 PROCEDURE — 83036 HEMOGLOBIN GLYCOSYLATED A1C: CPT | Performed by: INTERNAL MEDICINE

## 2023-12-06 PROCEDURE — A9585 GADOBUTROL INJECTION: HCPCS | Mod: JZ | Performed by: STUDENT IN AN ORGANIZED HEALTH CARE EDUCATION/TRAINING PROGRAM

## 2023-12-06 PROCEDURE — 80048 BASIC METABOLIC PNL TOTAL CA: CPT | Performed by: INTERNAL MEDICINE

## 2023-12-06 PROCEDURE — 99222 1ST HOSP IP/OBS MODERATE 55: CPT | Performed by: INTERNAL MEDICINE

## 2023-12-06 PROCEDURE — 93306 TTE W/DOPPLER COMPLETE: CPT

## 2023-12-06 PROCEDURE — 36415 COLL VENOUS BLD VENIPUNCTURE: CPT | Performed by: STUDENT IN AN ORGANIZED HEALTH CARE EDUCATION/TRAINING PROGRAM

## 2023-12-06 PROCEDURE — 250N000011 HC RX IP 250 OP 636: Mod: JZ | Performed by: INTERNAL MEDICINE

## 2023-12-06 PROCEDURE — 99223 1ST HOSP IP/OBS HIGH 75: CPT | Performed by: PSYCHIATRY & NEUROLOGY

## 2023-12-06 PROCEDURE — 97166 OT EVAL MOD COMPLEX 45 MIN: CPT | Mod: GO

## 2023-12-06 PROCEDURE — 84484 ASSAY OF TROPONIN QUANT: CPT | Performed by: STUDENT IN AN ORGANIZED HEALTH CARE EDUCATION/TRAINING PROGRAM

## 2023-12-06 PROCEDURE — 250N000013 HC RX MED GY IP 250 OP 250 PS 637: Performed by: INTERNAL MEDICINE

## 2023-12-06 PROCEDURE — 97535 SELF CARE MNGMENT TRAINING: CPT | Mod: GO

## 2023-12-06 PROCEDURE — 258N000003 HC RX IP 258 OP 636: Performed by: STUDENT IN AN ORGANIZED HEALTH CARE EDUCATION/TRAINING PROGRAM

## 2023-12-06 PROCEDURE — 93306 TTE W/DOPPLER COMPLETE: CPT | Mod: 26 | Performed by: INTERNAL MEDICINE

## 2023-12-06 RX ORDER — DIPHENHYDRAMINE HYDROCHLORIDE 50 MG/ML
25 INJECTION INTRAMUSCULAR; INTRAVENOUS ONCE
Status: COMPLETED | OUTPATIENT
Start: 2023-12-06 | End: 2023-12-06

## 2023-12-06 RX ORDER — AMOXICILLIN 250 MG
1 CAPSULE ORAL 2 TIMES DAILY PRN
Status: DISCONTINUED | OUTPATIENT
Start: 2023-12-06 | End: 2023-12-11 | Stop reason: HOSPADM

## 2023-12-06 RX ORDER — AMOXICILLIN 250 MG
2 CAPSULE ORAL 2 TIMES DAILY PRN
Status: DISCONTINUED | OUTPATIENT
Start: 2023-12-06 | End: 2023-12-11 | Stop reason: HOSPADM

## 2023-12-06 RX ORDER — NICOTINE POLACRILEX 4 MG
15-30 LOZENGE BUCCAL
Status: DISCONTINUED | OUTPATIENT
Start: 2023-12-06 | End: 2023-12-11 | Stop reason: HOSPADM

## 2023-12-06 RX ORDER — ACETAMINOPHEN 650 MG/1
650 SUPPOSITORY RECTAL EVERY 4 HOURS PRN
Status: DISCONTINUED | OUTPATIENT
Start: 2023-12-06 | End: 2023-12-11 | Stop reason: HOSPADM

## 2023-12-06 RX ORDER — GADOBUTROL 604.72 MG/ML
10 INJECTION INTRAVENOUS ONCE
Status: COMPLETED | OUTPATIENT
Start: 2023-12-06 | End: 2023-12-06

## 2023-12-06 RX ORDER — ONDANSETRON 2 MG/ML
4 INJECTION INTRAMUSCULAR; INTRAVENOUS EVERY 6 HOURS PRN
Status: DISCONTINUED | OUTPATIENT
Start: 2023-12-06 | End: 2023-12-11 | Stop reason: HOSPADM

## 2023-12-06 RX ORDER — CEFTRIAXONE 1 G/1
1 INJECTION, POWDER, FOR SOLUTION INTRAMUSCULAR; INTRAVENOUS EVERY 24 HOURS
Status: DISCONTINUED | OUTPATIENT
Start: 2023-12-06 | End: 2023-12-07

## 2023-12-06 RX ORDER — HYDRALAZINE HYDROCHLORIDE 20 MG/ML
10 INJECTION INTRAMUSCULAR; INTRAVENOUS EVERY 6 HOURS PRN
Status: DISCONTINUED | OUTPATIENT
Start: 2023-12-06 | End: 2023-12-07

## 2023-12-06 RX ORDER — LIDOCAINE 40 MG/G
CREAM TOPICAL
Status: DISCONTINUED | OUTPATIENT
Start: 2023-12-06 | End: 2023-12-11 | Stop reason: HOSPADM

## 2023-12-06 RX ORDER — ACETAMINOPHEN 325 MG/1
650 TABLET ORAL EVERY 4 HOURS PRN
Status: DISCONTINUED | OUTPATIENT
Start: 2023-12-06 | End: 2023-12-11 | Stop reason: HOSPADM

## 2023-12-06 RX ORDER — CALCIUM CARBONATE 500 MG/1
1000 TABLET, CHEWABLE ORAL 4 TIMES DAILY PRN
Status: DISCONTINUED | OUTPATIENT
Start: 2023-12-06 | End: 2023-12-11 | Stop reason: HOSPADM

## 2023-12-06 RX ORDER — ATORVASTATIN CALCIUM 10 MG/1
10 TABLET, FILM COATED ORAL EVERY EVENING
Status: DISCONTINUED | OUTPATIENT
Start: 2023-12-06 | End: 2023-12-07

## 2023-12-06 RX ORDER — DEXTROSE MONOHYDRATE 25 G/50ML
25-50 INJECTION, SOLUTION INTRAVENOUS
Status: DISCONTINUED | OUTPATIENT
Start: 2023-12-06 | End: 2023-12-11 | Stop reason: HOSPADM

## 2023-12-06 RX ORDER — ONDANSETRON 4 MG/1
4 TABLET, ORALLY DISINTEGRATING ORAL EVERY 6 HOURS PRN
Status: DISCONTINUED | OUTPATIENT
Start: 2023-12-06 | End: 2023-12-11 | Stop reason: HOSPADM

## 2023-12-06 RX ORDER — HALOPERIDOL 5 MG/ML
3 INJECTION INTRAMUSCULAR ONCE
Status: COMPLETED | OUTPATIENT
Start: 2023-12-06 | End: 2023-12-06

## 2023-12-06 RX ADMIN — SODIUM CHLORIDE, POTASSIUM CHLORIDE, SODIUM LACTATE AND CALCIUM CHLORIDE 1000 ML: 600; 310; 30; 20 INJECTION, SOLUTION INTRAVENOUS at 00:01

## 2023-12-06 RX ADMIN — CEFTRIAXONE SODIUM 1 G: 1 INJECTION, POWDER, FOR SOLUTION INTRAMUSCULAR; INTRAVENOUS at 02:04

## 2023-12-06 RX ADMIN — HALOPERIDOL LACTATE 3 MG: 5 INJECTION, SOLUTION INTRAMUSCULAR at 01:51

## 2023-12-06 RX ADMIN — DIPHENHYDRAMINE HYDROCHLORIDE 25 MG: 50 INJECTION INTRAMUSCULAR; INTRAVENOUS at 01:51

## 2023-12-06 RX ADMIN — INSULIN ASPART 1 UNITS: 100 INJECTION, SOLUTION INTRAVENOUS; SUBCUTANEOUS at 16:31

## 2023-12-06 RX ADMIN — HYDRALAZINE HYDROCHLORIDE 10 MG: 20 INJECTION INTRAMUSCULAR; INTRAVENOUS at 11:47

## 2023-12-06 RX ADMIN — ATORVASTATIN CALCIUM 10 MG: 10 TABLET, FILM COATED ORAL at 19:14

## 2023-12-06 RX ADMIN — CEFTRIAXONE SODIUM 1 G: 1 INJECTION, POWDER, FOR SOLUTION INTRAMUSCULAR; INTRAVENOUS at 11:48

## 2023-12-06 ASSESSMENT — ACTIVITIES OF DAILY LIVING (ADL)
ADLS_ACUITY_SCORE: 35
ADLS_ACUITY_SCORE: 24
ADLS_ACUITY_SCORE: 35
DRESSING/BATHING_DIFFICULTY: NO
ADLS_ACUITY_SCORE: 35
CONCENTRATING,_REMEMBERING_OR_MAKING_DECISIONS_DIFFICULTY: NO
WALKING_OR_CLIMBING_STAIRS_DIFFICULTY: YES
ADLS_ACUITY_SCORE: 24
CHANGE_IN_FUNCTIONAL_STATUS_SINCE_ONSET_OF_CURRENT_ILLNESS/INJURY: NO
TOILETING_ISSUES: NO
FALL_HISTORY_WITHIN_LAST_SIX_MONTHS: NO
DOING_ERRANDS_INDEPENDENTLY_DIFFICULTY: NO
EQUIPMENT_CURRENTLY_USED_AT_HOME: CANE, STRAIGHT;WALKER, ROLLING
ADLS_ACUITY_SCORE: 37
HEARING_DIFFICULTY_OR_DEAF: NO
DIFFICULTY_COMMUNICATING: NO
WALKING_OR_CLIMBING_STAIRS: AMBULATION DIFFICULTY, REQUIRES EQUIPMENT
VISION_MANAGEMENT: READERS
ADLS_ACUITY_SCORE: 35
ADLS_ACUITY_SCORE: 25
WEAR_GLASSES_OR_BLIND: YES
DIFFICULTY_EATING/SWALLOWING: NO
ADLS_ACUITY_SCORE: 35
ADLS_ACUITY_SCORE: 25

## 2023-12-06 NOTE — PLAN OF CARE
Assumed cares: [id number]  Vitals: VSS on RA except elevated BP, not within parameters for PRN hydralazine  Pain: Pt denies any pain  Neuro: A&Ox4 with intermittent confusion  Cardiac: NSR with 1st degree AV block  Respiratory: WDL; denies any shortness of breath  GI/: Treating UTI with abx  Skin: No new skin changes   IV/Drains: R PIV- SL  Activity: Assist x1 with cane  Behavior: Pt is calm and cooperative with cares. 1:1 for safety  Events: Pt admitted from ED at 1630.     Plan of Care: Follow patient plan of care    Goal Outcome Evaluation:      Plan of Care Reviewed With: patient    Overall Patient Progress: no changeOverall Patient Progress: no change    Outcome Evaluation: Follow patient plan of care

## 2023-12-06 NOTE — ED TRIAGE NOTES
Patient arrives to triage from home with family with chief concerns of altered mental status and elevated blood glucose.  Patient's daughter concerned patient has been non-compliant with diabetes management.  When daughter spoke to patient yesterday around 1700 patient was already sounding confused.  Patient is alert and oriented x3 in triage.  Blood glucose 212 mg/dL and is hypertensive.  Denies headache and no dizziness.  History of CHF and CVA.  Writer does smell urine odor on patient, daughter reports patient has been confused in the past when diagnosed with UTI.

## 2023-12-06 NOTE — PROGRESS NOTES
SPEECH PATHOLOGY CLINICAL SWALLOW EVALUATION       12/06/23 1400   Appointment Info   Signing Clinician's Name / Credentials (SLP) Aníbal Walker MA [facility] SLP   General Information   Onset of Illness/Injury or Date of Surgery 12/05/23   Referring Physician Joe Thakur MD   Patient/Family Therapy Goal Statement (SLP) pt denies speech or swallow deficits   Pertinent History of Current Problem 68 year old female admitted on 12/5/2023. She is admitted with intermittent confusion, a subacute CVA and a UTI. Patient has history of left CVA in June 2023  without speech/swallow deficits, now presenting with subacute right CVA.   General Observations Pt is alert cooperative with encouragement   Type of Evaluation   Type of Evaluation Swallow Evaluation   Oral Motor   Oral Musculature generally intact   Structural Abnormalities none present   Mucosal Quality good   Dentition (Oral Motor)   Dentition (Oral Motor) natural dentition;adequate dentition   Facial Symmetry (Oral Motor)   Facial Symmetry (Oral Motor) WNL   Lip Function (Oral Motor)   Lip Range of Motion (Oral Motor) WNL   Lip Strength (Oral Motor) WNL   Tongue Function (Oral Motor)   Tongue Strength (Oral Motor) WNL   Tongue Coordination/Speed (Oral Motor) WNL   Tongue ROM (Oral Motor) WNL   Jaw Function (Oral Motor)   Jaw Function (Oral Motor) WNL   Cough/Swallow/Gag Reflex (Oral Motor)   Volitional Swallow (Oral Motor) WNL   Vocal Quality/Secretion Management (Oral Motor)   Vocal Quality (Oral Motor) WNL   Secretion Management (Oral Motor) WNL   General Swallowing Observations   Past History of Dysphagia none   Respiratory Support room air   Current Diet/Method of Nutritional Intake (General Swallowing Observations, NIS) thin liquids (level 0);regular diet   Swallowing Evaluation Clinical swallow evaluation   Clinical Swallow Evaluation   Feeding Assistance no assistance needed   Clinical Swallow Evaluation Textures Trialed thin liquids;soft & bite-sized;solid  foods   Clinical Swallow Eval: Thin Liquid Texture Trial   Mode of Presentation, Thin Liquids cup   Volume of Liquid or Food Presented 2 oz   Oral Phase of Swallow WFL   Pharyngeal Phase of Swallow intact   Diagnostic Statement no overt s/s aspiration   Clinical Swallow Eval: Soft & Bite Sized   Mode of Presentation spoon;self-fed   Volume Presented 1 oz   Oral Phase WFL   Pharyngeal Phase intact   Diagnostic Statement did not want to eat much, limited bites, no oral stasis, no overt s/s aspiration   Clinical Swallow Evaluation: Solid Food Texture Trial   Mode of Presentation self-fed   Volume Presented sandwich   Oral Phase WFL   Pharyngeal Phase intact   Diagnostic Statement good oral mastication and control, no oral stasis, no overt s/s aspiration   Esophageal Phase of Swallow   Patient reports or presents with symptoms of esophageal dysphagia No   Swallowing Recommendations   Diet Consistency Recommendations thin liquids (level 0);regular diet   Supervision Level for Intake patient independent   Medication Administration Recommendations, Swallowing (SLP) patient preference   Instrumental Assessment Recommendations instrumental evaluation not recommended at this time   Clinical Impression   Clinical Impression Comments Patient demonstrates no oral dysphagia, no overt s/s aspiration and no concern for pharyngeal dysphagia. She did have some word finding difficulty during conversation at times, confusion/AMS status documented and patient is currently on 1:1. Will assess language and cognitive linguistic function related to right CVA.   SLP Total Evaluation Time   Eval: oral/pharyngeal swallow function, clinical swallow Minutes (17911) 15   SLP Discharge Planning   SLP Plan assess language/cog-ling related to R CVA and est goals. No dysphagia   SLP Rationale for DC Rec TBD   SLP Brief overview of current status  regular diet w/thin liquids

## 2023-12-06 NOTE — PHARMACY-ADMISSION MEDICATION HISTORY
"Pharmacist Admission Medication History    Admission medication history is complete. The information provided in this note is only as accurate as the sources available at the time of the update.    Information Source(s): Patient and CareEverywhere/SureScripts via in-person    Pertinent Information:   Patient still with confusion when I interviewed her this afternoon. She couldn't remember when she last took medications- I asked if she could specify if it's been days, weeks or months and she could not specify  She said she could not remember any names of medications  The fill history was consistent with recent fills the medications listed below, with the exception of escitalopram (last filled 8/15/23 for a 90 day supply of 20 mg tablets) and donepezil (last filled 7/19/23 for a 90 day supply of 5 mg tablets)  Sign out from the overnight medication scribe \"Pt claimed to take her own meds but when asked about them she couldn't remember.  [facility] RN Home RN was visiting until told by pt they weren't needed anymore.  Been months, pt doesn't remember RN dismissal, sometime around 6/16/23.  Patient daughter, present, wasn't able to produce any relevant information as the medication list she had on her phone's note page was from Feb 23'.  Pt was asked when she took medications last, he answer was still unknown.\"    Changes made to PTA medication list:  Added: None  Deleted: old cefdinir course  Changed: None    Medication Affordability:       Allergies reviewed with patient and updates made in EHR: unable to assess    Medication History Completed By: Katherine Wright [facility] 12/6/2023 12:15 PM    PTA Med List   Medication Sig Last Dose    amLODIPine (NORVASC) 5 MG tablet Take 1 tablet (5 mg) by mouth daily Unknown    aspirin 81 MG EC tablet Take 81 mg by mouth daily Unknown    atorvastatin (LIPITOR) 80 MG tablet Take 80 mg by mouth daily Unknown    bumetanide (BUMEX) 2 MG tablet Take 1 tablet (2 mg) by mouth 2 times " daily Unknown    cholecalciferol 25 MCG (1000 UT) TABS Take 1 tablet by mouth daily Unknown    donepezil (ARICEPT) 5 MG tablet Take 10 mg by mouth At Bedtime Unknown    escitalopram (LEXAPRO) 20 MG tablet Take 20 mg by mouth daily Unknown    fish oil-omega-3 fatty acids 1000 MG capsule Take 1,000 mg by mouth daily Unknown    hydrALAZINE (APRESOLINE) 50 MG tablet Take 50 mg by mouth 3 times daily Unknown    insulin glargine (LANTUS SOLOSTAR) 100 UNIT/ML pen Inject 20 Units Subcutaneous every morning Unknown    isosorbide mononitrate (IMDUR) 60 MG 24 hr tablet Take 1 tablet (60 mg) by mouth 2 times daily Unknown    loperamide (IMODIUM) 2 MG capsule Take 2 mg by mouth 2 times daily (before meals) Unknown    melatonin 3 MG tablet Take 6 mg by mouth nightly as needed for sleep Unknown    metoprolol succinate ER (TOPROL XL) 200 MG 24 hr tablet Take 200 mg by mouth daily Unknown    multivitamin (ONE-DAILY) tablet Take 1 tablet by mouth daily Unknown    omeprazole (PRILOSEC) 20 MG DR capsule 20 mg daily before breakfast Unknown    traZODone (DESYREL) 100 MG tablet Take 100 mg by mouth At Bedtime Unknown

## 2023-12-06 NOTE — ED NOTES
Spoke with patient's daughter Solange. She was updated on patient condition and plan. Daughter asked to be updated with any changes or when patient is moved. Cell phone # is [phone].  Daughter also reported that patient does not use her CPAP at home.

## 2023-12-06 NOTE — CONSULTS
"  [facility]    Stroke Telephone Note    The patient is a 68 year old female with CHF, CKD, HTN, Type 2 Diabetes Mellitus presented with 3-4 days if confusion, memory concerns, some concern of mixing words. /84, . ED obtained MRI brain and MRA head and neck. MRA showed acute to subacute R PCA territory stroke involving temporal, hippocampal and thalamus region. 5 mm high grade stenosis vs near occlusion of R p1, occlusion of distal R P3.     Vitals  BP: (!) 173/73   Pulse: 84   Resp: 22   Temp: 98.6  F (37  C)   Weight: 89.7 kg (197 lb 12.8 oz)    Impression  R PCA territory acute-subacute ischemic stroke  ICAD  R P1 and P3 stenosis vs occlusion    Recommendations  - Neurochecks and Vital Signs every 4h   - Permissive HTN; goal SBP < 220 mmHg  - Daily aspirin 81 mg for secondary stroke prevention  - Statin: resume home lipitor 80  - MRI Brain with and without contrast  - Telemetry, EKG  - Bedside Glucose Monitoring  - A1c, Lipid Panel, Troponin x 3  - PT/OT/SLP  - Stroke Education  - Euthermia, Euglycemia    My recommendations are based on the information provided over the phone by Indu Felix's in-person providers. They are not intended to replace the clinical judgment of her in-person providers. I was not requested to personally see or examine the patient at this time.     The Stroke Staff is Dr. Marley.    Tanya Franklin MD  Vascular Neurology Fellow    To page me or covering stroke neurology team member, click here: AMCOM  Choose \"On Call\" tab at top, then select \"NEUROLOGY/ALL SITES\" from middle drop-down box, press Enter, then look for \"stroke\" or \"telestroke\" for your site.        "

## 2023-12-06 NOTE — ED NOTES
P3 monitor tech contacted to add patient, monitor tech stated they do not have room on their board.

## 2023-12-06 NOTE — ED NOTES
"MRI called to say they needed help, pt found to be very confused and aggitated     \"Get me the helll out of here, you guys are trying to kill me, call my  and get him here, I'm leaving.\"    MD to bedside to evaluate   Medications ordered see MAR    Report given to STEPHANIE RN  "

## 2023-12-06 NOTE — ED NOTES
Dr Ofe meyer messaged reguarding patient having BGL checks. No orders placed for BGL monitoring or insulin.

## 2023-12-06 NOTE — PROGRESS NOTES
12/06/23 1338   Appointment Info   Signing Clinician's Name / Credentials (PT) Fiordaliza Puente PT   Living Environment   People in Home spouse   Current Living Arrangements house   Home Accessibility stairs to enter home;stairs within home   Number of Stairs, Main Entrance 2   Stair Railings, Main Entrance railings safe and in good condition   Number of Stairs, Within Home, Primary greater than 10 stairs   Stair Railings, Within Home, Primary railings safe and in good condition   Transportation Anticipated family or friend will provide   Living Environment Comments laundry in basement   Self-Care   Equipment Currently Used at Home cane, straight;walker, rolling   Activity/Exercise/Self-Care Comment ambulates without AD normally; has cane and FWW available; independent ADL's; shares IADL tasks with spouse   General Information   Onset of Illness/Injury or Date of Surgery 12/05/23   Referring Physician Dr. Thakur   Patient/Family Therapy Goals Statement (PT) none stated   Pertinent History of Current Problem (include personal factors and/or comorbidities that impact the POC) UTI, R PCA CVA; PMh of L CVA in June 2023, DM, HTN, DIANA, CKD   Existing Precautions/Restrictions fall   Cognition   Orientation Status (Cognition) oriented x 4   Follows Commands (Cognition) WFL   Range of Motion (ROM)   Range of Motion ROM is WFL   Strength (Manual Muscle Testing)   Strength Comments R ankle DF 3+/5, R shld flexion 4/5; LUE/LE WNL   Bed Mobility   Bed Mobility supine-sit;sit-supine   Supine-Sit [place] (Bed Mobility) supervision   Sit-Supine [place] (Bed Mobility) contact guard   Transfers   Transfers sit-stand transfer   Sit-Stand Transfer   Sit-Stand [place] (Transfers) supervision;verbal cues   Assistive Device (Sit-Stand Transfers) walker, front-wheeled   Gait/Stairs (Locomotion)   [place] Level (Gait) contact guard;verbal cues   Assistive Device (Gait) cane, straight   Distance in Feet (Gait) 120   Pattern  (Gait) step-to   Deviations/Abnormal Patterns (Gait) base of support, wide;petey decreased;gait speed decreased;stride length decreased;weight shifting decreased   Clinical Impression   Criteria for Skilled Therapeutic Intervention Yes, treatment indicated   PT Diagnosis (PT) impaired functional mobility   Influenced by the following impairments decreased strength, balance, activity tolerance   Functional limitations due to impairments transfers, bed mob., gait, stairs   Clinical Presentation (PT Evaluation Complexity) evolving   Clinical Presentation Rationale pt presents as medically diagnosed   Clinical Decision Making (Complexity) moderate complexity   Planned Therapy Interventions (PT) balance training;bed mobility training;gait training;home exercise program;neuromuscular re-education;patient/family education;stair training;strengthening;transfer training   Risk & Benefits of therapy have been explained evaluation/treatment results reviewed;patient   PT Total Evaluation Time   PT Eval, Moderate Complexity Minutes (75896) 10   Physical Therapy Goals   PT Frequency Daily   PT Predicted Duration/Target Date for Goal Attainment 12/13/23   PT Goals Bed Mobility;Transfers;Gait;Stairs   PT: Bed Mobility Independent;Supine to/from sit   PT: Transfers Modified independent;Sit to/from stand;Assistive device   PT: Gait Supervision/stand-by assist;Greater than 200 feet;Assistive device   PT: Stairs Supervision/stand-by assist;2 stairs;Rail on left;Rail on right   Interventions   Interventions Quick Adds Gait Training   Gait Training   Gait Training Minutes (75525) 8   Symptoms Noted During/After Treatment (Gait Training) fatigue   Treatment Detail/Skilled Intervention gait training with FWW cuing for staying close inside walker and for increased foot clearance/heel strike; recommend use of walker at home to decrease fall risk   Distance in Feet 120   [place] Level (Gait Training) stand-by assist   Physical  Assistance Level (Gait Training) verbal cues;supervision;nonverbal cues (demo/gestures)   Weight Bearing (Gait Training) full weight-bearing   Assistive Device (Gait Training) rolling walker   Pattern Analysis (Gait Training) swing-through gait   Gait Analysis Deviations decreased step length;decreased toe-to-floor clearance;decreased weight-shifting ability;decreased petey   Impairments (Gait Analysis/Training) balance impaired;strength decreased   PT Discharge Planning   PT Plan gait with FWW, progress to cane as able; stairs; standing LE and balance ex   PT Discharge Recommendation (DC Rec) home with home care physical therapy;home with assist   PT Rationale for DC Rec home with family assist and home PT for strengthening and mobility training; recommend use of walker   PT Brief overview of current status amb. 120 feet with walker sba   PT Equipment Needed at Discharge walker, rolling  (pt has at home)   Total Session Time   Timed Code Treatment Minutes 8   Total Session Time (sum of timed and untimed services) 18

## 2023-12-06 NOTE — PROGRESS NOTES
Bedside EEG was performed. Wake, drowsy were obtained.   Activations completed were: (eyes open/eyes closed, mental activations, photic)   Activations omitted & reasoning: Hyperventilations due to patient sate  Patient's mental status was: (alert/cooperative)    Skin intact? yes     EEG #   EEG system used: [id number]    Neurologist dictation to follow.

## 2023-12-06 NOTE — CONSULTS
This is a neurological consultation      68-year-old admitted to hospital 12/5/2023      Chief complaint  Large right CVA  Intracranial atherosclerosis        HPI  68-year-old presented to hospital 12/5/2023 feeling weak overnight relatively nonspecific  Also had some altered mental status    Patient seems somewhat confused on presentation    Per family he may have some poorly controlled diabetes    Patient with left hemisphere stroke in June 2023  MRI scan shows new large right PCA stroke with intracranial atherosclerosis 12/5/2023  Case evaluated by stroke code team on presentation    Patient previously on 81 mg of aspirin per their report they recommended continuing daily aspirin    Question whether we should do follow-up CT head to make sure there is no hemorrhagic conversion and change to dual antiplatelet medication    Also with large vessel ischemic stroke concern for embolic phenomenon will need echo checked        Past medical history  Left CVA June 2023 (left parietal lobe)  Mild dementia  Hypertension  Posterior reversible encephalopathy syndrome/hypertensive urgency  Diabetes  CKD  CHF  Aortic stenosis  Pulmonary hypertension  Obstructive sleep apnea    Habits  Non-smoker  Does not drink alcohol    Family history  Father hypertension  Mother diabetes/hypertension/dementia      Work-up  CT scan head 12/5/2023 compared to 6/11/2023  1.  Moderate area of hypodensity medial aspect of right temporal lobe.        This probably represents subacute ischemia.        This is new since prior. Questionable tiny amount of associated petechial hemorrhage.  2.  Moderate area of more pronounced hypodensity left parietal lobe, most consistent with late subacute to chronic infarction.        This has evolved since prior.  3.  Mild presumed chronic small vessel ischemia.  4.  Mild atrophy.  HEAD MRI: 12/6/2023  1.  Moderate-sized zone of acute to early subacute ischemic infarction centered in the inferomedial right  temporal lobe.       This involves the hippocampus and parahippocampal structures and posterior right thalamus.       5.8 x 2.5 x 2.0 cm  2.  No space-occupying hemorrhage.   3.  Age-related changes.  HEAD MRA: 12/6/2023  1.  5 mm segment of high-grade stenosis/near occlusion at the right P1-P2 junction. High-grade stenosis or occlusion of the distal right P3 segment.  2.  Moderate atheromatous disease elsewhere.  NECK MRA: 12/6/2023  No measurable stenosis or dissection.    HGBA1C 7.7, (6/21/2023)  Troponin 71        Labs  Sodium 138 potassium 3.9  BUN 27.2, creatinine 1.6  Glucose 157  WBC 9.4, hemoglobin 11.9, platelets 354,000      Exam  Blood pressure 139/71, pulse 75, temperature 98.9  Blood pressure range 140//84  Pulse range 68-93  Tmax 98.9    Review of systems  Difficulty with some confusion  No headache no chest pain.   No abdominal pain no shortness of breath no nausea or vomiting    Denies diplopia dysarthria dysphagia  Denies visual changes although has a visual field cut  Otherwise review systems negative    General exam per primary MD  Alert attentive  HEENT no signs of trauma  Lungs clear   Heart rate regular, has a murmur  Abdomen soft  No significant edema    Neurologic exam  Alert attentive  Somewhat perplexed  Prosody speech normal  Naming normal  Comprehension normal  Repetition normal  No aphasia  Some neglect of the left side  Memory not formally tested mildly perplexed  Years 2024 president kang Stein earlier was more confused is on a one-to-one sitter    Cranial nerves II through XII  No ophthalmoplegia  No nystagmus  Face symmetrical  Tongue twisters good  Left visual field cut    Upper extremities  No drift  No tremor    Lower extremities  Distal proximal strength okay test in the bed    Gait  Deferred      Assessment/plan    Large right inferior medial temporal lobe infarct 5.8 x 2.5 x 2.0 cm 12/5/2023        Involves hippocampus parahippocampal structures and posterior right  thalamus also.        Presented with confusion    2.   Intracranial atherosclerosis        HEAD MRA: 12/6/2023        1.  5 mm segment of high-grade stenosis/near occlusion at the right P1-P2 junction.              High-grade stenosis or occlusion of the distal right P3 segment.        2.  Moderate atheromatous disease elsewhere.    3.  Vascular risk factors:       Hypertension/CKD/obstructive sleep apnea/previous CVA/CHF    4.   Above complicated by mild pre-existing dementia        Family history mother with dementia    5.   Possible UTI being addressed by primary MD complicating the above    Diagnosis  Right PCA stroke large  Intracranial atherosclerosis severe  Confusion      Previously on 81 mg of aspirin  Had CVA in June 2023 also (left hemisphere)      Check echo  Check EEG  Check lipids  PT/OT/swallow eval    With initial CT of the head 12/5/2023 showing questionable petechial hemorrhage  Recommend follow-up CT scan of the head tomorrow.  If no hemorrhagic conversion then switch to dual antiplatelet medication with Plavix and aspirin at that time.    85 minutes total care time today

## 2023-12-06 NOTE — H&P
"[facility]    History and Physical - Hospitalist Service       Date of Admission:  12/5/2023    Assessment & Plan      Indu Felix is a 68 year old female admitted on 12/5/2023. She is admitted with intermittent confusion, a subacute CVA and a UTI    Assessment and plan  Subacute CVA  CT concerning for a possible surrounding petechial hemorrhage  --Complete stroke workup, echocardiogram  Neurology consult  Neurochecks  Atorvastatin  Will defer to neurology service with respect to initiation of aspirin given CT concerning for possible petechial hemorrhage      Intermittent confusion  --Etiology may be multifactorial subacute CVA versus UTI  --As needed Haldol if agitated again      UTI  -- Follow-up urine culture  --Continue empiric ceftriaxone      History diabetes  --Monitor blood sugar  --Insulin regimen      History of hypertension  --Pressure is currently elevated, allow for permissive hypertension given subacute CVA  --As needed hydralazine if SBP is over 180 systolic and then can continue home BP meds      History CKD  History of pulmonary hypertension  History of aortic stenosis  --Continue appropriate home meds as med rec is properly done      History of DIANA  CPAP for sleep as needed        Diet: Moderate Consistent Carb (60 g CHO per Meal) Diet  DVT Prophylaxis: Holding given concern for petechial hemorrhage  Holley Catheter: Not present  Lines: None     Cardiac Monitoring: ACTIVE order. Indication: Stroke, acute (48 hours)  Code Status: Full Code    Clinically Significant Risk Factors Present on Admission                # Drug Induced Platelet Defect: home medication list includes an antiplatelet medication   # Hypertension: Noted on problem list     # DMII: A1C = N/A within past 6 months   # Obesity: Estimated body mass index is 36.18 kg/m  as calculated from the following:    Height as of this encounter: 1.575 m (5' 2\").    Weight as of this encounter: 89.7 kg (197 lb 12.8 oz).  "             Disposition Plan      Expected Discharge Date: 12/08/2023                  Piotr Henley MD  Hospitalist Service  [facility]  Securely message with [employer] (more info)  Text page via [employer] Paging/Directory     ______________________________________________________________________    Chief Complaint   Confusion    History is obtained from the patient,er provider    History of Present Illness   Indu Felix is a 68 year old female who was brought to the ER secondary to confusion.  Patient admits that she may have been confused and that was why she ended up in the ER.  Family was concerned for UTI patient however denies any fever chills nausea vomiting abdominal pain dysuria or frequency.  Workup in the ER included a CT and an MRI that showed subacute CVA and there was also mention of possible petechial hemorrhage hospitalist consulted for admission      Past Medical History    Past Medical History:   Diagnosis Date    Aortic stenosis     Benign essential hypertension     Chronic heart failure with preserved ejection fraction (H) 3/23/2023    Chronic heart failure with preserved ejection fraction (HFpEF) (H)     Chronic kidney disease     CKD (chronic kidney disease) stage 3, GFR 30-59 ml/min (H)     Congestive heart failure (H)     Diabetes mellitus (H)     Mitral stenosis     Obesity     DIANA (obstructive sleep apnea) 3/23/2023    Pulmonary hypertension (H)     Stage 3a chronic kidney disease (H) 2/8/2023    Tricuspid valve disorders, non-rheumatic        Past Surgical History   Past Surgical History:   Procedure Laterality Date    CV LEFT HEART CATH N/A 2/8/2023    Procedure: Left Heart Catheterization;  Surgeon: Yolanda Ramirez MD;  Location: [facility] CATH LAB CV    CV RIGHT HEART CATH MEASUREMENTS RECORDED N/A 2/8/2023    Procedure: Right Heart Catheterization;  Surgeon: Yolanda Ramirez MD;  Location: [facility] CATH LAB CV       Prior to Admission Medications   Prior to  Admission Medications   Prescriptions Last Dose Informant Patient Reported? Taking?   amLODIPine (NORVASC) 5 MG tablet   No No   Sig: Take 1 tablet (5 mg) by mouth daily   aspirin 81 MG EC tablet   Yes No   Sig: Take 81 mg by mouth daily   atorvastatin (LIPITOR) 80 MG tablet   Yes No   Sig: Take 80 mg by mouth daily   blood glucose (CONTOUR NEXT TEST) test strip   Yes No   Sig: USE TO TEST TWICE DAILY for 75   bumetanide (BUMEX) 2 MG tablet   No No   Sig: Take 1 tablet (2 mg) by mouth 2 times daily   cefdinir (OMNICEF) 300 MG capsule   No No   Sig: Take 1 capsule (300 mg) by mouth daily 7 days   cholecalciferol 25 MCG (1000 UT) TABS   Yes No   Sig: Take 1 tablet by mouth daily   donepezil (ARICEPT) 5 MG tablet   Yes No   Sig: Take 10 mg by mouth At Bedtime   escitalopram (LEXAPRO) 20 MG tablet   Yes No   Sig: Take 20 mg by mouth daily   fish oil-omega-3 fatty acids 1000 MG capsule   Yes No   Sig: Take 1,000 mg by mouth daily   hydrALAZINE (APRESOLINE) 50 MG tablet   Yes No   Sig: Take 50 mg by mouth 3 times daily   insulin glargine (LANTUS SOLOSTAR) 100 UNIT/ML pen   Yes No   Sig: Inject 20 Units Subcutaneous every morning   isosorbide mononitrate (IMDUR) 60 MG 24 hr tablet   No No   Sig: Take 1 tablet (60 mg) by mouth 2 times daily   loperamide (IMODIUM) 2 MG capsule   Yes No   Sig: Take 2 mg by mouth 2 times daily (before meals)   melatonin 3 MG tablet   Yes No   Sig: Take 6 mg by mouth nightly as needed for sleep   metoprolol succinate ER (TOPROL XL) 200 MG 24 hr tablet   Yes No   Sig: Take 200 mg by mouth daily   multivitamin (ONE-DAILY) tablet   Yes No   Sig: Take 1 tablet by mouth daily   omeprazole (PRILOSEC) 20 MG DR capsule   Yes No   Si mg daily before breakfast   traZODone (DESYREL) 100 MG tablet   Yes No   Sig: Take 100 mg by mouth At Bedtime      Facility-Administered Medications: None        Social History   I have reviewed this patient's social history and updated it with pertinent information if  needed.  Social History     Tobacco Use    Smoking status: Never    Smokeless tobacco: Never   Substance Use Topics    Alcohol use: Never    Drug use: Never         Family History     Unable to assess secondary to confusion      Allergies   Allergies   Allergen Reactions    Lisinopril Other (See Comments) and Unknown        Physical Exam   Vital Signs: Temp: 98.6  F (37  C) Temp src: Oral BP: (!) 173/73 Pulse: 84   Resp: 22 SpO2: 94 % O2 Device: None (Room air)    Weight: 197 lbs 12.8 oz      General: Awake and alert, pleasant at the time of interview, remittent confusion  Eyes: Pupils reactive to light  HENT: Atraumatic, oral mucosa moist  Neck: No masses, or swelling  Pulmonary: Good air entry, clear to auscultation  CVS: Heart sounds 1 and 2 present, regular rhythm, rate, +murmur  GI/ Abdomen: Soft , not tender , not distended, bowel sounds ++  : No tam   VADIM: Normal inspection, no muscle spasm  Skin: No rash, skin intact  Extremities: No edema  Neuro: Alert and awake , answer the trick question appropriately but gets intermittently confused, speech is normal, no appreciated focal weakness  psych: Normal mood and affect      Medical Decision Making       55 MINUTES SPENT BY ME on the date of service doing chart review, history, exam, documentation & further activities per the note.      Data     I have personally reviewed the following data over the past 24 hrs:    10.0  \   12.5   / 403     135 101 33.8 (H) /  220 (H)   3.8 18 (L) 1.88 (H) \     ALT: 18 AST: 26 AP: 153 (H) TBILI: 0.3   ALB: 4.4 TOT PROTEIN: 8.0 LIPASE: N/A     Trop: 71 (H) BNP: N/A       Imaging results reviewed over the past 24 hrs:   Recent Results (from the past 24 hour(s))   Head CT w/o contrast    Narrative    EXAM: CT HEAD W/O CONTRAST  LOCATION: [facility]  DATE: 12/5/2023    INDICATION: Confusion  COMPARISON: 06/11/2023  TECHNIQUE: Routine CT Head without IV contrast. Multiplanar reformats. Dose reduction  techniques were used.    FINDINGS:  INTRACRANIAL CONTENTS: No intracranial hemorrhage, extraaxial collection, or mass effect.  Moderate area of hypodensity medial aspect of right temporal lobe. This probably represents subacute ischemia. This is new since prior. Questionable tiny amount of   associated petechial hemorrhage. Moderate area of more pronounced hypodensity left parietal lobe, most consistent with late subacute to chronic infarction. This has evolved since prior. Mild presumed chronic small vessel ischemic changes. Mild   generalized volume loss. No hydrocephalus. Several small chronic infarctions both cerebellar hemispheres.     VISUALIZED ORBITS/SINUSES/MASTOIDS: No intraorbital abnormality. No paranasal sinus mucosal disease. No middle ear or mastoid effusion.    BONES/SOFT TISSUES: No acute abnormality.      Impression    IMPRESSION:  1.  Moderate area of hypodensity medial aspect of right temporal lobe. This probably represents subacute ischemia. This is new since prior. Questionable tiny amount of associated petechial hemorrhage.  2.  Moderate area of more pronounced hypodensity left parietal lobe, most consistent with late subacute to chronic infarction. This has evolved since prior.  3.  Mild presumed chronic small vessel ischemia.  4.  Mild atrophy.     MR Brain w/o Contrast    Narrative    EXAM: MR BRAIN W/O CONTRAST, MRA BRAIN ([place]) W/O CONTRAST, MRA NECK (CAROTIDS) W/O CONTRAST  LOCATION: [facility]  DATE: 12/6/2023    INDICATION: ams  COMPARISON: None.  TECHNIQUE:   1) Routine multiplanar multisequence head MRI without intravenous contrast.  2) 3D time-of-flight head MRA without intravenous contrast.  3) Neck MRA without IV contrast. Stenosis measurements made according to NASCET criteria unless otherwise specified.      FINDINGS:  HEAD MRI:  Limited MRI as the patient was not able to fully tolerate the study.    INTRACRANIAL CONTENTS: There is a  moderate-sized zone of restricted diffusion centered in the inferomedial right temporal lobe, measuring 5.8 x 2.5 x 2 cm (AP by TR by CC). This involves the hippocampus and parahippocampal structures. Small amount of   restricted diffusion within the dorsal right thalamus. No space-occupying hemorrhage is identified. Patchy nonspecific T2/FLAIR hyperintensities within the cerebral white matter most consistent with mild to moderate chronic microvascular ischemic change.   Chronic infarcts in the left parietal/occipital lobe, right occipital lobe, left centrum semiovale and bilateral cerebellum. Mild to moderate generalized cerebral atrophy. No hydrocephalus. Normal position of the cerebellar tonsils.     SELLA: No abnormality accounting for technique.    OSSEOUS STRUCTURES/SOFT TISSUES: Normal marrow signal. The major intracranial vascular flow voids are maintained.     ORBITS: No abnormality accounting for technique.     SINUSES/MASTOIDS: Mild mucosal thickening scattered about the paranasal sinuses. No middle ear or mastoid effusion.     HEAD MRA:   ANTERIOR CIRCULATION: Moderate atheromatous disease without branch occlusion, aneurysm or AVM. Standard [place] anatomy.    POSTERIOR CIRCULATION: There is a 5 mm segment of high-grade stenosis/near occlusion at the right P1 - P2 junction high-grade stenosis at the distal right P3 segment. Moderate atheromatous disease without aneurysm or AVM.. Balanced vertebral arteries   supply a normal basilar artery.     NECK MRA:   RIGHT CAROTID: No measurable stenosis or dissection.    LEFT CAROTID: No measurable stenosis or dissection.    VERTEBRAL ARTERIES: No focal stenosis or dissection. Balanced vertebral arteries.    AORTIC ARCH: Suboptimally visualized on this exam.      Impression    IMPRESSION:  HEAD MRI:  1.  Moderate-sized zone of acute to early subacute ischemic infarction centered in the inferomedial right temporal lobe. This involves the hippocampus and  parahippocampal structures and posterior right thalamus.  2.  No space-occupying hemorrhage.   3.  Age-related changes.    HEAD MRA:  1.  5 mm segment of high-grade stenosis/near occlusion at the right P1-P2 junction. High-grade stenosis or occlusion of the distal right P3 segment.  2.  Moderate atheromatous disease elsewhere.    NECK MRA:  No measurable stenosis or dissection.     MRA Angiogram Head w/o Contrast    Narrative    EXAM: MR BRAIN W/O CONTRAST, MRA BRAIN ([place] OF [place]) W/O CONTRAST, MRA NECK (CAROTIDS) W/O CONTRAST  LOCATION: [facility]  DATE: 12/6/2023    INDICATION: ams  COMPARISON: None.  TECHNIQUE:   1) Routine multiplanar multisequence head MRI without intravenous contrast.  2) 3D time-of-flight head MRA without intravenous contrast.  3) Neck MRA without IV contrast. Stenosis measurements made according to NASCET criteria unless otherwise specified.      FINDINGS:  HEAD MRI:  Limited MRI as the patient was not able to fully tolerate the study.    INTRACRANIAL CONTENTS: There is a moderate-sized zone of restricted diffusion centered in the inferomedial right temporal lobe, measuring 5.8 x 2.5 x 2 cm (AP by TR by CC). This involves the hippocampus and parahippocampal structures. Small amount of   restricted diffusion within the dorsal right thalamus. No space-occupying hemorrhage is identified. Patchy nonspecific T2/FLAIR hyperintensities within the cerebral white matter most consistent with mild to moderate chronic microvascular ischemic change.   Chronic infarcts in the left parietal/occipital lobe, right occipital lobe, left centrum semiovale and bilateral cerebellum. Mild to moderate generalized cerebral atrophy. No hydrocephalus. Normal position of the cerebellar tonsils.     SELLA: No abnormality accounting for technique.    OSSEOUS STRUCTURES/SOFT TISSUES: Normal marrow signal. The major intracranial vascular flow voids are maintained.     ORBITS: No abnormality  accounting for technique.     SINUSES/MASTOIDS: Mild mucosal thickening scattered about the paranasal sinuses. No middle ear or mastoid effusion.     HEAD MRA:   ANTERIOR CIRCULATION: Moderate atheromatous disease without branch occlusion, aneurysm or AVM. Standard [place] anatomy.    POSTERIOR CIRCULATION: There is a 5 mm segment of high-grade stenosis/near occlusion at the right P1 - P2 junction high-grade stenosis at the distal right P3 segment. Moderate atheromatous disease without aneurysm or AVM.. Balanced vertebral arteries   supply a normal basilar artery.     NECK MRA:   RIGHT CAROTID: No measurable stenosis or dissection.    LEFT CAROTID: No measurable stenosis or dissection.    VERTEBRAL ARTERIES: No focal stenosis or dissection. Balanced vertebral arteries.    AORTIC ARCH: Suboptimally visualized on this exam.      Impression    IMPRESSION:  HEAD MRI:  1.  Moderate-sized zone of acute to early subacute ischemic infarction centered in the inferomedial right temporal lobe. This involves the hippocampus and parahippocampal structures and posterior right thalamus.  2.  No space-occupying hemorrhage.   3.  Age-related changes.    HEAD MRA:  1.  5 mm segment of high-grade stenosis/near occlusion at the right P1-P2 junction. High-grade stenosis or occlusion of the distal right P3 segment.  2.  Moderate atheromatous disease elsewhere.    NECK MRA:  No measurable stenosis or dissection.     MRA Angiogram Neck w/o Contrast    Narrative    EXAM: MR BRAIN W/O CONTRAST, MRA BRAIN ([place]) W/O CONTRAST, MRA NECK (CAROTIDS) W/O CONTRAST  LOCATION: [facility]  DATE: 12/6/2023    INDICATION: ams  COMPARISON: None.  TECHNIQUE:   1) Routine multiplanar multisequence head MRI without intravenous contrast.  2) 3D time-of-flight head MRA without intravenous contrast.  3) Neck MRA without IV contrast. Stenosis measurements made according to NASCET criteria unless otherwise  specified.      FINDINGS:  HEAD MRI:  Limited MRI as the patient was not able to fully tolerate the study.    INTRACRANIAL CONTENTS: There is a moderate-sized zone of restricted diffusion centered in the inferomedial right temporal lobe, measuring 5.8 x 2.5 x 2 cm (AP by TR by CC). This involves the hippocampus and parahippocampal structures. Small amount of   restricted diffusion within the dorsal right thalamus. No space-occupying hemorrhage is identified. Patchy nonspecific T2/FLAIR hyperintensities within the cerebral white matter most consistent with mild to moderate chronic microvascular ischemic change.   Chronic infarcts in the left parietal/occipital lobe, right occipital lobe, left centrum semiovale and bilateral cerebellum. Mild to moderate generalized cerebral atrophy. No hydrocephalus. Normal position of the cerebellar tonsils.     SELLA: No abnormality accounting for technique.    OSSEOUS STRUCTURES/SOFT TISSUES: Normal marrow signal. The major intracranial vascular flow voids are maintained.     ORBITS: No abnormality accounting for technique.     SINUSES/MASTOIDS: Mild mucosal thickening scattered about the paranasal sinuses. No middle ear or mastoid effusion.     HEAD MRA:   ANTERIOR CIRCULATION: Moderate atheromatous disease without branch occlusion, aneurysm or AVM. Standard [place] anatomy.    POSTERIOR CIRCULATION: There is a 5 mm segment of high-grade stenosis/near occlusion at the right P1 - P2 junction high-grade stenosis at the distal right P3 segment. Moderate atheromatous disease without aneurysm or AVM.. Balanced vertebral arteries   supply a normal basilar artery.     NECK MRA:   RIGHT CAROTID: No measurable stenosis or dissection.    LEFT CAROTID: No measurable stenosis or dissection.    VERTEBRAL ARTERIES: No focal stenosis or dissection. Balanced vertebral arteries.    AORTIC ARCH: Suboptimally visualized on this exam.      Impression    IMPRESSION:  HEAD MRI:  1.   Moderate-sized zone of acute to early subacute ischemic infarction centered in the inferomedial right temporal lobe. This involves the hippocampus and parahippocampal structures and posterior right thalamus.  2.  No space-occupying hemorrhage.   3.  Age-related changes.    HEAD MRA:  1.  5 mm segment of high-grade stenosis/near occlusion at the right P1-P2 junction. High-grade stenosis or occlusion of the distal right P3 segment.  2.  Moderate atheromatous disease elsewhere.    NECK MRA:  No measurable stenosis or dissection.

## 2023-12-06 NOTE — ED NOTES
"[facility] ED Handoff Report    ED Chief Complaint: altered mental status    ED Diagnosis:  (I63.9) Ischemic stroke (H)  Comment:   Plan:     (N39.0) Urinary tract infection without hematuria, site unspecified  Comment: One dose of antibiotics given  Plan:        PMH:    Past Medical History:   Diagnosis Date    Aortic stenosis     Benign essential hypertension     Chronic heart failure with preserved ejection fraction (H) 3/23/2023    Chronic heart failure with preserved ejection fraction (HFpEF) (H)     Chronic kidney disease     CKD (chronic kidney disease) stage 3, GFR 30-59 ml/min (H)     Congestive heart failure (H)     Diabetes mellitus (H)     Mitral stenosis     Obesity     DIANA (obstructive sleep apnea) 3/23/2023    Pulmonary hypertension (H)     Stage 3a chronic kidney disease (H) 2/8/2023    Tricuspid valve disorders, non-rheumatic         Code Status:  Full Code     Falls Risk: No Band: Not applicable    Current Living Situation/Residence: lives alone     Elimination Status: Continent: Yes     Activity Level: SBA    Patients Preferred Language:  English     Needed: No    Vital Signs:  BP (!) 159/71   Pulse 81   Temp 97.9  F (36.6  C) (Oral)   Resp 14   Ht 1.575 m (5' 2\")   Wt 89.7 kg (197 lb 12.8 oz)   SpO2 99%   BMI 36.18 kg/m       Cardiac Rhythm: Sinus Rhythm    Pain Score: 0/10    Is the Patient Confused:  Yes, patient was able to answer my orientation questions appropriately except unable to recall who the president is.      Last Food or Drink: 12/5/23, has not eaten breakfast tray    Focused Assessment:  Patient is alert and oriented to place and self. Patient was unaware of the time of day and who the president was. Patient has been very sleepy and did not eat breakfast tray.     Tests Performed: Done: Labs and Imaging    Treatments Provided:  Patient is on 2 lpm of O2. IV antibiotics. Neuro checks every 4 hours, commode at bedside. Patient is on 1:1 as she became " disoriented in the night and ripped out IV.     Family Dynamics/Concerns: No    Family Updated On Visitor Policy: Yes    Plan of Care Communicated to Family: Yes    Who Was Updated about Plan of Care: Spoke with daughter Solange Dominguez Checklist Done and Signed by Patient: Yes    Medications sent with patient: Insulin pen    Covid: asymptomatic     Additional Information: Spoke with patients daughter this morning. Plan is for patient to have echo today. Patient is on 1:1 as she becomes disoriented. On O2 at 2 lpm via NC. Patient is standby assist at bedside commode.     RN: Shayy Mcginnis RN   12/6/2023 10:33 AM

## 2023-12-06 NOTE — ED PROVIDER NOTES
"  Emergency Department Encounter         FINAL IMPRESSION:  Stroke, UTI        ED COURSE AND MEDICAL DECISION MAKING       ED Course as of 12/05/23 2142 Tue Dec 05, 2023   2126 68-year-old diabetic with \"a hint of dementia\" according to family here from home with 3 to 4 days of increased confusion and inability to remember to take her insulin or take care of her blood sugar readings.  No falls.  Patient denies any pain on arrival.  She is mildly confused stating that she thinks it is the morning.  Otherwise is able to tell me the year, month day and her birthday.  Here with 3 family members.  She denies any headaches, chest pain, trouble breathing, abdominal pain dysuria or diarrhea.  No fevers.  Examination limited as patient is sitting in a chair in the waiting room.  Plan for broad workup and most likely admission.   2140 EKG is sinus 78, patient with inversions in V5 and V6 with some ST changes, no STEMI today, these inversions do appear new when compared to February 8, 2023.  QTc today is 483.   -MRI showing stroke.  UTI.  Given Rocephin.  Discussed case with hospitalist as well as stroke neurology.    Additional ED Course Timeline:  9:27 PM I met with the patient, obtained history, performed an initial exam, and discussed options and plan for diagnostics and treatment here in the ED.   11:20 PM I updated the patient regarding their work-up findings.   4:29 AM I Spoke with Dr. Henley, Hospitalist. We further discussed the patient's case and reviewed ED work-up so far. She agrees to admit the patient.                         Medical Decision Making    History:  Supplemental history from: Documented in chart, if applicable and Family Member/Significant Other  External Record(s) reviewed: Documented in chart, if applicable.    Work Up:  Chart documentation includes differential considered and any EKGs or imaging independently interpreted by provider, where specified.  In additional to work up documented, I " considered the following work up: Documented in chart, if applicable.    External consultation:  Discussion of management with another provider: Documented in chart, if applicable    Complicating factors:  Care impacted by chronic illness: Chronic Kidney Disease, Dementia, Heart Disease, and Hyperlipidemia  Care affected by social determinants of health: Access to Medical Care    Disposition considerations: Admit.                  Critical Care     Performed by: Collin Ritchie or    Authorized by: Collin Ritchie  Total critical care time:  minutes  Critical care was necessary to treat or prevent imminent or life-threatening deterioration of the following conditions:   Critical care was time spent personally by me on the following activities: development of treatment plan with patient or surrogate, discussions with consultants, examination of patient, evaluation of patient's response to treatment, obtaining history from patient or surrogate, ordering and performing treatments and interventions, ordering and review of laboratory studies, ordering and review of radiographic studies, re-evaluation of patient's condition and monitoring for potential decompensation.  Critical care time was exclusive of separately billable procedures and treating other patients.'    At the conclusion of the encounter I discussed the results of all the tests and the disposition. The questions were answered. The patient or family acknowledged understanding and was agreeable with the care plan.                  MEDICATIONS GIVEN IN THE EMERGENCY DEPARTMENT:  Medications - No data to display    NEW PRESCRIPTIONS STARTED AT TODAY'S ED VISIT:  New Prescriptions    No medications on file       HPI     Patient information obtained from: The patient and her family    Use of : N/A     Indu COOLEY Elvira is a 68 year old female with a pertinent history of CHF, CKD, HTN, Type 2 Diabetes Mellitus, who presents to this ED via walk-in for evaluation of Altered  "Mental Status.    The patient reports she felt weak overnight but denies any pain, shortness of breath, urinary symptoms, and decreased appetite.     Per family, the patient's diabetic management is not well controlled. She has a antonio monitor but her family is uncertain of the last changed date. Her antonio monitor is also off. She seems confused and has a history of UTIs. [facility] given her reported medical history of kidney failure, but she stopped seeing them. She does have a \"hint of dementia\". No other complaints or concerns at this time.          REVIEW OF SYSTEMS:  Review of Systems   Constitutional: Negative for fever, malaise  HEENT: Negative runny nose, sore throat, ear pain, neck pain  Respiratory: Negative for shortness of breath, cough, congestion  Cardiovascular: Negative for chest pain, leg edema  Gastrointestinal: Negative for abdominal distention, abdominal pain, constipation, vomiting, nausea, diarrhea  Genitourinary: Negative for dysuria and hematuria.   Integument: Negative for rash, skin breakdown  Neurological: Negative for paresthesias, headache. Positive for generalized weakness/fatigue.  Musculoskeletal: Negative for joint pain, joint swelling      All other systems reviewed and are negative.          MEDICAL HISTORY     Past Medical History:   Diagnosis Date    Aortic stenosis     Benign essential hypertension     Chronic heart failure with preserved ejection fraction (H) 3/23/2023    Chronic heart failure with preserved ejection fraction (HFpEF) (H)     Chronic kidney disease     CKD (chronic kidney disease) stage 3, GFR 30-59 ml/min (H)     Congestive heart failure (H)     Diabetes mellitus (H)     Mitral stenosis     Obesity     DIANA (obstructive sleep apnea) 3/23/2023    Pulmonary hypertension (H)     Stage 3a chronic kidney disease (H) 2/8/2023    Tricuspid valve disorders, non-rheumatic        Past Surgical History:   Procedure Laterality Date    CV LEFT " "HEART CATH N/A 2/8/2023    Procedure: Left Heart Catheterization;  Surgeon: Yolanda Ramirez MD;  Location: [facility] CATH LAB CV    CV RIGHT HEART CATH MEASUREMENTS RECORDED N/A 2/8/2023    Procedure: Right Heart Catheterization;  Surgeon: Yolanda Ramirez MD;  Location: [facility] CATH LAB CV       Social History     Tobacco Use    Smoking status: Never    Smokeless tobacco: Never   Substance Use Topics    Alcohol use: Never    Drug use: Never       amLODIPine (NORVASC) 5 MG tablet  aspirin 81 MG EC tablet  atorvastatin (LIPITOR) 80 MG tablet  blood glucose (CONTOUR NEXT TEST) test strip  bumetanide (BUMEX) 2 MG tablet  cefdinir (OMNICEF) 300 MG capsule  cholecalciferol 25 MCG (1000 UT) TABS  donepezil (ARICEPT) 5 MG tablet  escitalopram (LEXAPRO) 20 MG tablet  fish oil-omega-3 fatty acids 1000 MG capsule  hydrALAZINE (APRESOLINE) 50 MG tablet  insulin glargine (LANTUS SOLOSTAR) 100 UNIT/ML pen  isosorbide mononitrate (IMDUR) 60 MG 24 hr tablet  loperamide (IMODIUM) 2 MG capsule  melatonin 3 MG tablet  metoprolol succinate ER (TOPROL XL) 200 MG 24 hr tablet  multivitamin (ONE-DAILY) tablet  omeprazole (PRILOSEC) 20 MG DR capsule  traZODone (DESYREL) 100 MG tablet            PHYSICAL EXAM     BP (!) 202/84   Pulse 81   Temp 98.6  F (37  C) (Oral)   Resp 22   Ht 1.575 m (5' 2\")   Wt 89.7 kg (197 lb 12.8 oz)   SpO2 99%   BMI 36.18 kg/m        PHYSICAL EXAM:     General: Patient appears well, nontoxic, comfortable  HEENT: Moist mucous membranes,  No head trauma.    Cardiovascular: Normal rate, normal rhythm, no extremity edema.  No appreciable murmur.  Respiratory: No signs of respiratory distress, lungs are clear to auscultation bilaterally with no wheezes rhonchi or rales.  Abdominal: Soft, nontender, nondistended, no palpable masses, no guarding, no rebound  Musculoskeletal: Full range of motion of joints, no deformities appreciated.  Neurological: Alert and intermittently oriented however thinks it is " currently daytime when it is evening.  Grossly neurologically intact.  Psychological: Normal affect and mood.  Integument: No rashes appreciated          RESULTS       Labs Ordered and Resulted from Time of ED Arrival to Time of ED Departure   GLUCOSE BY METER - Abnormal       Result Value    GLUCOSE BY METER POCT 219 (*)    ROUTINE UA WITH MICROSCOPIC REFLEX TO CULTURE   COMPREHENSIVE METABOLIC PANEL   TROPONIN T, HIGH SENSITIVITY   KETONE BETA-HYDROXYBUTYRATE QUANTITATIVE, RAPID   CBC WITH PLATELETS AND DIFFERENTIAL   BLOOD GAS VENOUS       Head CT w/o contrast    (Results Pending)                     PROCEDURES:  Procedures:  Procedures       I, Edu Garner am serving as a scribe to document services personally performed by Collin Ritchie DO, based on my observations and the provider's statements to me.  I, Collin Ritchie DO, attest that Edu Garner is acting in a scribe capacity, has observed my performance of the services and has documented them in accordance with my direction.    Collin Ritchie DO  Emergency Medicine  [facility] EMERGENCY DEPARTMENT       Collin Ritchie DO  12/06/23 9945

## 2023-12-06 NOTE — PROGRESS NOTES
MD updated re: elevated B/P.  Patient received one dose of hydralazine.  Patient is very anxious while B/P cuff is squeezing.

## 2023-12-06 NOTE — PROGRESS NOTES
Occupational Therapy      12/06/23 1300   Appointment Info   Signing Clinician's Name / Credentials (OT) Lilia Murray OTR/L   Living Environment   People in Home spouse   Current Living Arrangements house   Home Accessibility stairs within home   Number of Stairs, Within Home, Primary greater than 10 stairs  (laundry in basement)   Living Environment Comments Information per chart review.   Self-Care   Equipment Currently Used at Home walker, rolling;walker, standard;cane, straight  (Patient primarily uses SEC at home)   Activity/Exercise/Self-Care Comment Per chart review, patient performs ADLs on own and spouse assist with IADLs   General Information   Onset of Illness/Injury or Date of Surgery 12/05/23   Referring Physician Geovany Guan MD   Patient/Family Therapy Goal Statement (OT) none stated   Additional Occupational Profile Info/Pertinent History of Current Problem Indu Felix is a 68 year old female admitted on 12/5/2023. She is admitted with intermittent confusion, a subacute CVA and a UTI. Patient has history of left CVA in June 2023 now presenting with subacute right CVA   Existing Precautions/Restrictions fall   General Observations and Info Patient was previously agitated, but per RN none today.   Cognitive Status Examination   Orientation Status person  (disroiented to place, oriented to month/year, not exact date or day of week)   Visual Perception   Impact of Vision Impairment on Function (Vision) OT noted difficutly looking left, question neglect vs. field cut   Range of Motion Comprehensive   General Range of Motion no range of motion deficits identified   Strength Comprehensive (MMT)   General Manual Muscle Testing (MMT) Assessment no strength deficits identified   Coordination   Upper Extremity Coordination No deficits were identified   Bed Mobility   Bed Mobility supine-sit;sit-supine   Supine-Sit [place] (Bed Mobility) minimum assist (75% patient effort)   Sit-Supine  [place] (Bed Mobility) minimum assist (75% patient effort)   Transfers   Transfers sit-stand transfer   Sit-Stand Transfer   Sit-Stand [place] (Transfers) minimum assist (75% patient effort)   Balance   Balance Comments Unsteady upon standing. Patient ambulated in room wtih SEC. Patient switching cane between L and R UEs. Reaching for items to support self with other arm.   Activities of Daily Living   BADL Assessment/Intervention lower body dressing   Lower Body Dressing Assessment/Training   Comment, (Lower Body Dressing) Patient unsteady, requiring support from bilateral UEs for support, per clinical judgement patient will require assistance with standing tasks including g/h @ sink. Due to need for assistance wtih transfers and LB dressing, patient will also need assistance and improvements in LE dressing and toileting.   [place] Level (Lower Body Dressing) socks;maximum assist (25% patient effort)   Clinical Impression   Criteria for Skilled Therapeutic Interventions Met (OT) Yes, treatment indicated   OT Diagnosis Decreased ADL independence   Influenced by the following impairments subacute right CVA, UTI   OT Problem List-Impairments impacting ADL problems related to;activity tolerance impaired;cognition   Assessment of Occupational Performance 3-5 Performance Deficits   Identified Performance Deficits cognition, trsfs, bed mobility, LE dressing   Planned Therapy Interventions (OT) ADL retraining;bed mobility training;cognition;neuromuscular re-education;transfer training   Clinical Decision Making Complexity (OT) detailed assessment/moderate complexity   Risk & Benefits of therapy have been explained evaluation/treatment results reviewed;care plan/treatment goals reviewed   OT Total Evaluation Time   OT Eval, Moderate Complexity Minutes (95082) 15   OT Goals   Therapy Frequency (OT) Daily   OT Predicted Duration/Target Date for Goal Attainment 12/13/23   OT Goals Toilet  Transfer/Toileting;Hygiene/Grooming;Cognition   OT: Hygiene/Grooming supervision/stand-by assist;while standing   OT: Toilet Transfer/Toileting Supervision/stand-by assist;toilet transfer;cleaning and garment management   OT: Cognitive Patient/caregiver will verbalize understanding of cognitive assessment results/recommendations as needed for safe discharge planning   Self-Care/Home Management   Self-Care/Home Mgmt/ADL, Compensatory, Meal Prep Minutes (68181) 10   Treatment Detail/Skilled Intervention Eval completed, treatment intiated. Repeat STS from bed. patient impulsive, requires cues for safety. OT demonstrated figure-4 technique for donning slippers while seated EOB, patient unable to perform. Patient ambulated from room to BR with CGA and SEC. Patient then declines using BR (intially, stated she needed to use BR). Patient was left in bed with call light and 1:1 in room.   OT Discharge Planning   OT Plan Cog assessment, LE dressing, trsfs   OT Discharge Recommendation (DC Rec) home with home care occupational therapy   OT Rationale for DC Rec Patient currently requiring Min A for trsfs, bed mobility. Patient demonstrating some confusion as well. If spouse is able to provide care for patient, patient may discharge home; otherwise, recommend TCU at discharge   OT Brief overview of current status Min A for bed mobility and transfers, Max A for dressing, confused   Total Session Time   Timed Code Treatment Minutes 10   Total Session Time (sum of timed and untimed services) 25

## 2023-12-06 NOTE — PROGRESS NOTES
Report called to P3 nurse.  Patient transferred to P3 via stretcher.  Medications and patient belongings sent with the patient.

## 2023-12-06 NOTE — ED NOTES
Bed: [place] [address]  Expected date:   Expected time:   Means of arrival:   Comments:  [facility] ED 03 D.M.

## 2023-12-07 ENCOUNTER — APPOINTMENT (OUTPATIENT)
Dept: OCCUPATIONAL THERAPY | Facility: HOSPITAL | Age: 68
DRG: 065 | End: 2023-12-07
Payer: COMMERCIAL

## 2023-12-07 ENCOUNTER — APPOINTMENT (OUTPATIENT)
Dept: PHYSICAL THERAPY | Facility: HOSPITAL | Age: 68
DRG: 065 | End: 2023-12-07
Payer: COMMERCIAL

## 2023-12-07 ENCOUNTER — APPOINTMENT (OUTPATIENT)
Dept: CT IMAGING | Facility: HOSPITAL | Age: 68
DRG: 065 | End: 2023-12-07
Attending: PSYCHIATRY & NEUROLOGY
Payer: COMMERCIAL

## 2023-12-07 ENCOUNTER — APPOINTMENT (OUTPATIENT)
Dept: SPEECH THERAPY | Facility: HOSPITAL | Age: 68
DRG: 065 | End: 2023-12-07
Payer: COMMERCIAL

## 2023-12-07 LAB
GLUCOSE BLDC GLUCOMTR-MCNC: 137 MG/DL (ref 70–99)
GLUCOSE BLDC GLUCOMTR-MCNC: 143 MG/DL (ref 70–99)
GLUCOSE BLDC GLUCOMTR-MCNC: 159 MG/DL (ref 70–99)
GLUCOSE BLDC GLUCOMTR-MCNC: 163 MG/DL (ref 70–99)

## 2023-12-07 PROCEDURE — 99232 SBSQ HOSP IP/OBS MODERATE 35: CPT | Performed by: INTERNAL MEDICINE

## 2023-12-07 PROCEDURE — 97129 THER IVNTJ 1ST 15 MIN: CPT | Mod: GO

## 2023-12-07 PROCEDURE — 99233 SBSQ HOSP IP/OBS HIGH 50: CPT | Performed by: PSYCHIATRY & NEUROLOGY

## 2023-12-07 PROCEDURE — 97116 GAIT TRAINING THERAPY: CPT | Mod: GP | Performed by: PHYSICAL THERAPIST

## 2023-12-07 PROCEDURE — 250N000013 HC RX MED GY IP 250 OP 250 PS 637: Performed by: INTERNAL MEDICINE

## 2023-12-07 PROCEDURE — 97530 THERAPEUTIC ACTIVITIES: CPT | Mod: GP | Performed by: PHYSICAL THERAPIST

## 2023-12-07 PROCEDURE — 70450 CT HEAD/BRAIN W/O DYE: CPT

## 2023-12-07 PROCEDURE — 96125 COGNITIVE TEST BY HC PRO: CPT | Mod: GN

## 2023-12-07 PROCEDURE — 250N000011 HC RX IP 250 OP 636: Mod: JZ | Performed by: INTERNAL MEDICINE

## 2023-12-07 PROCEDURE — 250N000012 HC RX MED GY IP 250 OP 636 PS 637: Performed by: INTERNAL MEDICINE

## 2023-12-07 PROCEDURE — 210N000001 HC R&B IMCU HEART CARE

## 2023-12-07 PROCEDURE — 97535 SELF CARE MNGMENT TRAINING: CPT | Mod: GO

## 2023-12-07 RX ORDER — HYDRALAZINE HYDROCHLORIDE 25 MG/1
25 TABLET, FILM COATED ORAL 3 TIMES DAILY PRN
Status: DISCONTINUED | OUTPATIENT
Start: 2023-12-07 | End: 2023-12-11 | Stop reason: HOSPADM

## 2023-12-07 RX ORDER — DONEPEZIL HYDROCHLORIDE 5 MG/1
10 TABLET, FILM COATED ORAL AT BEDTIME
Status: DISCONTINUED | OUTPATIENT
Start: 2023-12-07 | End: 2023-12-11 | Stop reason: HOSPADM

## 2023-12-07 RX ORDER — TRAZODONE HYDROCHLORIDE 50 MG/1
100 TABLET, FILM COATED ORAL AT BEDTIME
Status: DISCONTINUED | OUTPATIENT
Start: 2023-12-07 | End: 2023-12-11 | Stop reason: HOSPADM

## 2023-12-07 RX ORDER — PANTOPRAZOLE SODIUM 40 MG/1
40 TABLET, DELAYED RELEASE ORAL
Status: DISCONTINUED | OUTPATIENT
Start: 2023-12-07 | End: 2023-12-11 | Stop reason: HOSPADM

## 2023-12-07 RX ORDER — CEPHALEXIN 500 MG/1
500 CAPSULE ORAL 2 TIMES DAILY
Status: DISCONTINUED | OUTPATIENT
Start: 2023-12-07 | End: 2023-12-09

## 2023-12-07 RX ORDER — CLOPIDOGREL BISULFATE 75 MG/1
75 TABLET ORAL DAILY
Status: DISCONTINUED | OUTPATIENT
Start: 2023-12-07 | End: 2023-12-11 | Stop reason: HOSPADM

## 2023-12-07 RX ORDER — ATORVASTATIN CALCIUM 40 MG/1
80 TABLET, FILM COATED ORAL EVERY EVENING
Status: DISCONTINUED | OUTPATIENT
Start: 2023-12-07 | End: 2023-12-11 | Stop reason: HOSPADM

## 2023-12-07 RX ORDER — ASPIRIN 81 MG/1
81 TABLET ORAL DAILY
Status: DISCONTINUED | OUTPATIENT
Start: 2023-12-07 | End: 2023-12-11 | Stop reason: HOSPADM

## 2023-12-07 RX ORDER — ISOSORBIDE MONONITRATE 30 MG/1
60 TABLET, EXTENDED RELEASE ORAL DAILY
Status: DISCONTINUED | OUTPATIENT
Start: 2023-12-07 | End: 2023-12-11 | Stop reason: HOSPADM

## 2023-12-07 RX ORDER — BUMETANIDE 2 MG/1
2 TABLET ORAL
Status: DISCONTINUED | OUTPATIENT
Start: 2023-12-07 | End: 2023-12-10

## 2023-12-07 RX ORDER — AMLODIPINE BESYLATE 5 MG/1
5 TABLET ORAL DAILY
Status: DISCONTINUED | OUTPATIENT
Start: 2023-12-07 | End: 2023-12-11 | Stop reason: HOSPADM

## 2023-12-07 RX ORDER — ESCITALOPRAM OXALATE 10 MG/1
20 TABLET ORAL DAILY
Status: DISCONTINUED | OUTPATIENT
Start: 2023-12-07 | End: 2023-12-11 | Stop reason: HOSPADM

## 2023-12-07 RX ADMIN — CEPHALEXIN 500 MG: 500 CAPSULE ORAL at 13:41

## 2023-12-07 RX ADMIN — INSULIN GLARGINE 10 UNITS: 100 INJECTION, SOLUTION SUBCUTANEOUS at 13:42

## 2023-12-07 RX ADMIN — INSULIN ASPART 1 UNITS: 100 INJECTION, SOLUTION INTRAVENOUS; SUBCUTANEOUS at 17:21

## 2023-12-07 RX ADMIN — INSULIN ASPART 1 UNITS: 100 INJECTION, SOLUTION INTRAVENOUS; SUBCUTANEOUS at 09:02

## 2023-12-07 RX ADMIN — ATORVASTATIN CALCIUM 80 MG: 40 TABLET, FILM COATED ORAL at 21:42

## 2023-12-07 RX ADMIN — DONEPEZIL HYDROCHLORIDE 10 MG: 5 TABLET, FILM COATED ORAL at 21:42

## 2023-12-07 RX ADMIN — HYDRALAZINE HYDROCHLORIDE 10 MG: 20 INJECTION INTRAMUSCULAR; INTRAVENOUS at 04:50

## 2023-12-07 RX ADMIN — ISOSORBIDE MONONITRATE 60 MG: 30 TABLET, EXTENDED RELEASE ORAL at 13:41

## 2023-12-07 RX ADMIN — Medication 81 MG: at 13:40

## 2023-12-07 RX ADMIN — AMLODIPINE BESYLATE 5 MG: 5 TABLET ORAL at 12:12

## 2023-12-07 RX ADMIN — BUMETANIDE 2 MG: 2 TABLET ORAL at 21:42

## 2023-12-07 RX ADMIN — CEPHALEXIN 500 MG: 500 CAPSULE ORAL at 21:42

## 2023-12-07 RX ADMIN — ESCITALOPRAM OXALATE 20 MG: 10 TABLET ORAL at 12:12

## 2023-12-07 RX ADMIN — INSULIN ASPART 1 UNITS: 100 INJECTION, SOLUTION INTRAVENOUS; SUBCUTANEOUS at 12:05

## 2023-12-07 RX ADMIN — PANTOPRAZOLE SODIUM 40 MG: 40 TABLET, DELAYED RELEASE ORAL at 17:20

## 2023-12-07 RX ADMIN — CLOPIDOGREL BISULFATE 75 MG: 75 TABLET ORAL at 14:19

## 2023-12-07 RX ADMIN — BUMETANIDE 2 MG: 2 TABLET ORAL at 13:41

## 2023-12-07 RX ADMIN — TRAZODONE HYDROCHLORIDE 100 MG: 50 TABLET ORAL at 21:42

## 2023-12-07 ASSESSMENT — ACTIVITIES OF DAILY LIVING (ADL)
ADLS_ACUITY_SCORE: 27
ADLS_ACUITY_SCORE: 25
ADLS_ACUITY_SCORE: 25
ADLS_ACUITY_SCORE: 27
DEPENDENT_IADLS:: INDEPENDENT
ADLS_ACUITY_SCORE: 25

## 2023-12-07 NOTE — PROGRESS NOTES
"[facility]    Hospitalist Progress Note    Assessment & Plan   68 year old female who was admitted on 12/5/2023 for recurrent stroke.     Impression:   Principal Problem:    Ischemic stroke (H)  Active Problems:    Benign essential hypertension    DM type 2, Hgb A1C 6.3 on 12/6/23    Chronic heart failure with preserved ejection fraction (H)    DIANA on CPAP    History of CVA (cerebrovascular accident)    Urinary tract infection without hematuria, site unspecified      Plan:  Discussed with Neurology, will resume aspirin, check head CT to rule out bleed and if no bleed then start Plavix.  Will complete workup to rule out embolic source of stroke (cardiac event monitor for 30 days).  Discussed with  and left message for daughter.     DVT Prophylaxis: Pneumatic Compression Devices  Code Status: Full Code    Disposition: Expected discharge home with home care tomorrow.     John Husain MD  Pager [phone]  Cell Phone [phone]  Text Page (7am to 6pm)    Interval History   Feels OK, but reports brain \"foggy\"    Physical Exam   Temp: 98.1  F (36.7  C) Temp src: Oral BP: 128/55 Pulse: 105   Resp: 20 SpO2: 96 % O2 Device: None (Room air)    Vitals:    12/05/23 2101 12/07/23 0442   Weight: 89.7 kg (197 lb 12.8 oz) 87.3 kg (192 lb 7.4 oz)     Vital Signs with Ranges  Temp:  [98.1  F (36.7  C)-98.5  F (36.9  C)] 98.1  F (36.7  C)  Pulse:  [] 105  Resp:  [18-20] 20  BP: (128-210)/() 128/55  FiO2 (%):  [21 %] 21 %  SpO2:  [95 %-97 %] 96 %  I/O last 3 completed shifts:  In: 363 [P.O.:360; I.V.:3]  Out: 300 [Urine:300]    # Pain Assessment:      12/7/2023     3:41 PM   Current Pain Score   Patient currently in pain? denies   Indu s pain level was assessed and she currently denies pain.        Constitutional: Awake, alert, cooperative, no apparent distress  Respiratory: Clear to auscultation bilaterally, no crackles or wheezing  Cardiovascular: Regular rate and rhythm, " normal S1 and S2, and no murmur noted  GI: Normal bowel sounds, soft, non-distended, non-tender  Extrem: No calf tenderness, no ankle edema  Neuro: Ox2 -- knew month and year, but did not know the name of the hospital and thought [facility] was president.   no focal motor or sensory deficits    Medications      amLODIPine  5 mg Oral Daily    aspirin  81 mg Oral Daily    atorvastatin  80 mg Oral QPM    bumetanide  2 mg Oral BID    cephALEXin  500 mg Oral BID    clopidogrel  75 mg Oral Daily    donepezil  10 mg Oral At Bedtime    escitalopram  20 mg Oral Daily    insulin aspart  1-7 Units Subcutaneous TID AC    insulin aspart  1-5 Units Subcutaneous At Bedtime    insulin glargine  10 Units Subcutaneous QAM    isosorbide mononitrate  60 mg Oral Daily    pantoprazole  40 mg Oral QAM AC    sodium chloride (PF)  3 mL Intracatheter Q8H    traZODone  100 mg Oral At Bedtime       Data   Recent Labs   Lab 12/07/23  1154 12/07/23  0751 12/06/23  2104 12/06/23  0751 12/06/23  0734 12/05/23  2143   WBC  --   --   --   --  9.4 10.0   HGB  --   --   --   --  11.9 12.5   MCV  --   --   --   --  86 86   PLT  --   --   --   --  354 403   NA  --   --   --   --  138 135   POTASSIUM  --   --   --   --  3.9 3.8   CHLORIDE  --   --   --   --  107 101   CO2  --   --   --   --  21* 18*   BUN  --   --   --   --  27.2* 33.8*   CR  --   --   --   --  1.60* 1.88*   ANIONGAP  --   --   --   --  10 16*   JARETT  --   --   --   --  9.4 9.8   * 163* 124*   < > 157* 220*   ALBUMIN  --   --   --   --   --  4.4   PROTTOTAL  --   --   --   --   --  8.0   BILITOTAL  --   --   --   --   --  0.3   ALKPHOS  --   --   --   --   --  153*   ALT  --   --   --   --   --  18   AST  --   --   --   --   --  26    < > = values in this interval not displayed.       Imaging:   Recent Results (from the past 24 hour(s))   CT Head w/o Contrast    Narrative    EXAM: CT HEAD W/O CONTRAST  LOCATION: [facility]  DATE: 12/07/2023    INDICATION: Fu CVA,  ro bleed conversion.  COMPARISON: CT brain 06/11/2023.  TECHNIQUE: Routine CT Head without IV contrast. Multiplanar reformats. Dose reduction techniques were used.    FINDINGS:  INTRACRANIAL CONTENTS: No finding for new infarct, hemorrhage, or mass. Chronic infarct left posterior parietal lobe and both cerebellar hemispheres. Subacute to chronic appearing infarct inferior and medial right temporal lobe and inferior right   occipital lobe.    Moderate prominence of the lateral and third ventricles and sulci. Mild presumed sequela of chronic microvascular ischemic change.    VISUALIZED ORBITS/SINUSES/MASTOIDS: Orbits are unremarkable. Paranasal sinuses, middle ear cavities, and mastoid air cells are clear.    BONES/SOFT TISSUES: Calvarium is intact, without suspicious lytic or blastic foci. Coarse atherosclerotic plaque both carotid siphons.      Impression    IMPRESSION:  1.  Chronic left parietal and bilateral cerebellar infarcts.    2.  Subacute or early chronic right temporal-occipital infarcts. No finding for hemorrhagic transformation.    3.  No finding for new infarct.    4.  Moderate volume loss and at least mild presumed sequela of chronic microvascular ischemic change.

## 2023-12-07 NOTE — PLAN OF CARE
Goal Outcome Evaluation:  Pt confused intermittently. Neuros intact. VSS. BP down to 130s systolic after scheduled BP meds. Denies pain. Tele NSR. Sats in 90s on RA. LS clear. SBA with walker. Loose, incontinent stool x1. Bed alarm on, call light in reach. Makes needs known.         Problem: Stroke, Ischemic (Includes Transient Ischemic Attack)  Goal: Optimal Coping  Outcome: Progressing  Intervention: Support Psychosocial Response to Stroke  Recent Flowsheet Documentation  Taken 12/7/2023 1149 by Kelsea Self RN  Supportive Measures:   active listening utilized   decision-making supported   relaxation techniques promoted  Goal: Effective Bowel Elimination  Outcome: Progressing  Goal: Optimal Cerebral Tissue Perfusion  Outcome: Progressing  Intervention: Protect and Optimize Cerebral Perfusion  Recent Flowsheet Documentation  Taken 12/7/2023 1149 by Kelsea Self RN  Sensory Stimulation Regulation:   care clustered   quiet environment promoted  Goal: Optimal Cognitive Function  Outcome: Progressing  Intervention: Optimize Cognitive Function  Recent Flowsheet Documentation  Taken 12/7/2023 1149 by Kelsea Self RN  Sensory Stimulation Regulation:   care clustered   quiet environment promoted  Reorientation Measures:   clock in view   calendar in view   glasses use encouraged  Environment Familiarity/Consistency: daily routine followed  Goal: Improved Communication Skills  Outcome: Progressing  Intervention: Optimize Communication Skills  Recent Flowsheet Documentation  Taken 12/7/2023 1149 by Kelsea Self RN  Communication Enhancement Strategies: call light answered in person  Goal: Optimal Functional Ability  Outcome: Progressing  Intervention: Optimize Functional Ability  Recent Flowsheet Documentation  Taken 12/7/2023 1149 by Kelsea Self RN  Activity Management: activity adjusted per tolerance  Goal: Optimal Nutrition Intake  Outcome: Progressing  Goal: Effective Oxygenation and  Ventilation  Outcome: Progressing  Goal: Improved Sensorimotor Function  Outcome: Progressing  Intervention: Optimize Range of Motion, Motor Control and Function  Recent Flowsheet Documentation  Taken 12/7/2023 1149 by Kelsea Self, RN  Positioning/Transfer Devices:   pillows   in use  Goal: Safe and Effective Swallow  Outcome: Progressing  Goal: Effective Urinary Elimination  Outcome: Progressing     Problem: UTI (Urinary Tract Infection)  Goal: Improved Infection Symptoms  Outcome: Progressing

## 2023-12-07 NOTE — CONSULTS
Care Management Initial Consult    General Information  Assessment completed with: Patient,    Type of CM/SW Visit: Initial Assessment    Primary Care Provider verified and updated as needed: Yes   Readmission within the last 30 days: no previous admission in last 30 days      Reason for Consult: discharge planning  Advance Care Planning: Advance Care Planning Reviewed: no concerns identified          Communication Assessment  Patient's communication style: spoken language (English or Bilingual)    Hearing Difficulty or Deaf: no   Wear Glasses or Blind: yes    Cognitive  Cognitive/Neuro/Behavioral: .WDL except, orientation  Level of Consciousness: intermittent confusion  Arousal Level: opens eyes spontaneously  Orientation: disoriented to, time  Mood/Behavior: calm, cooperative     Speech: clear, spontaneous    Living Environment:   People in home: spouse     Current living Arrangements: house      Able to return to prior arrangements:         Family/Social Support:  Care provided by: self  Provides care for: no one  Marital Status:             Description of Support System: Supportive, Involved         Current Resources:   Patient receiving home care services: No     Community Resources: None  Equipment currently used at home: cane, straight  Supplies currently used at home: None    Employment/Financial:  Employment Status: retired        Financial Concerns: none           Does the patient's insurance plan have a 3 day qualifying hospital stay waiver?  No    Lifestyle & Psychosocial Needs:  Social Determinants of Health     Food Insecurity: Not on file   Depression: Not on file   Housing Stability: Not on file   Tobacco Use: Low Risk  (12/5/2023)    Patient History     Smoking Tobacco Use: Never     Smokeless Tobacco Use: Never     Passive Exposure: Not on file   Financial Resource Strain: Not on file   Alcohol Use: Not on file   Transportation Needs: Not on file   Physical Activity: Not on file    Interpersonal Safety: Not on file   Stress: Not on file   Social Connections: Not on file       Functional Status:  Prior to admission patient needed assistance:   Dependent ADLs:: Ambulation-cane  Dependent IADLs:: Independent       Mental Health Status:          Chemical Dependency Status:                Values/Beliefs:  Spiritual, Cultural Beliefs, Cheondoism Practices, Values that affect care:                 Additional Information:  Assessed with pt. She lives in a house with her  Elio. She is independent with ADLs and IADLs. Still drives. Walks with a cane. No other equipment or services.  to transport at discharge.    RNCM to follow for medical progression, recommendations, and final discharge plan.    Pt is accepting of CM setting up home care. A referral has been placed for home care for this pt.    3:18 CM pt got a message from nursing that pt's daughter wants pt to go to TCU. CM told nursing that I will discuss this with the pt's daughter. CM then called pt's daughter Solange and there was  no answer. LVM for her to call me back to discuss discharge plans.    Got an update that [facility] can take the pt with a SOC for 12/11. Told them I would discuss with pt and provider.    Provider would like to add speech to home care. CM LM with [facility] to see if they could provide this service.        Yasmine Silva, RN

## 2023-12-07 NOTE — PROGRESS NOTES
Spoke with pt daughter, Solange, who states that she does not feel comfortable with pt discharging to home, and would like to explore TCU options. SW updated and will call pt daughter.

## 2023-12-07 NOTE — PROGRESS NOTES
This is a neurologica follow-up note      68-year-old admitted to hospital 12/5/2023      Chief complaint  Large right CVA  Intracranial atherosclerosis  Previous left CVA June 2023      HPI  68-year-old presented to hospital 12/5/2023 feeling weak overnight relatively nonspecific  Also had some altered mental status    Patient seems somewhat confused on presentation    Per family he may have some poorly controlled diabetes    Patient with left hemisphere stroke in June 2023  MRI scan shows new large right PCA stroke with intracranial atherosclerosis 12/5/2023  Case evaluated by stroke code team on presentation    Patient previously on 81 mg of aspirin per their report they recommended continuing daily aspirin    Question whether we should do follow-up CT head to make sure there is no hemorrhagic conversion and change to dual antiplatelet medication    Also with large vessel ischemic stroke concern for embolic phenomenon will need echo checked        Past medical history  Left CVA June 2023 (left parietal lobe)  Mild dementia  Hypertension  Posterior reversible encephalopathy syndrome/hypertensive urgency  Diabetes  CKD  CHF  Aortic stenosis  Pulmonary hypertension  Obstructive sleep apnea    Habits  Non-smoker  Does not drink alcohol    Family history  Father hypertension  Mother diabetes/hypertension/dementia      Work-up  CT scan head 12/5/2023 compared to 6/11/2023  1.  Moderate area of hypodensity medial aspect of right temporal lobe.        This probably represents subacute ischemia.        This is new since prior. Questionable tiny amount of associated petechial hemorrhage.  2.  Moderate area of more pronounced hypodensity left parietal lobe, most consistent with late subacute to chronic infarction.        This has evolved since prior.  3.  Mild presumed chronic small vessel ischemia.  4.  Mild atrophy.  HEAD MRI: 12/6/2023  1.  Moderate-sized zone of acute to early subacute ischemic infarction centered in  the inferomedial right temporal lobe.       This involves the hippocampus and parahippocampal structures and posterior right thalamus.       5.8 x 2.5 x 2.0 cm  2.  No space-occupying hemorrhage.   3.  Age-related changes.  HEAD MRA: 12/6/2023  1.  5 mm segment of high-grade stenosis/near occlusion at the right P1-P2 junction. High-grade stenosis or occlusion of the distal right P3 segment.  2.  Moderate atheromatous disease elsewhere.  NECK MRA: 12/6/2023  No measurable stenosis or dissection.  HDL/LDL 39/111  HGBA1C 7.7, (6/21/2023)  Troponin 71  Cardiac echo 12/6/2023, ejection fraction 60-65%, left atrium moderately dilated  EEG 12/6/2023 Impression:  Moderately abnormal awake EEG due to:  Bitemporal theta delta slowing worse on the right than the left.    Slums score 19 out of 30, (12/7/2023)      Labs  Sodium 138 potassium 3.9  BUN 27.2, creatinine 1.6  Glucose 157-124  WBC 9.4, hemoglobin 11.9, platelets 354,000      Exam  Blood pressure 182/74, pulse 89, temperature 98.5  Blood pressure range 159//86  Pulse range 75-1 08  Tmax 98.9      Review of systems  Difficulty with some confusion  No headache no chest pain.   No abdominal pain no shortness of breath no nausea or vomiting    Denies diplopia dysarthria dysphagia  Denies visual changes although has a visual field cut  Otherwise review systems negative    General exam per primary MD  Alert attentive  HEENT no signs of trauma  Lungs clear   Heart rate regular, has a murmur  Abdomen soft  No significant edema    Neurologic exam  Alert attentive  Somewhat perplexed  Prosody speech normal  Naming normal  Comprehension normal  Repetition normal  No aphasia  Some neglect of the left side  Memory not formally tested mildly perplexed  Years 2024 president kang Stein earlier was more confused is on a one-to-one sitter    Slums score 19 out of 30, (12/7/2023)      Cranial nerves II through XII  No ophthalmoplegia  No nystagmus  Face symmetrical  Tongue  twisters good  Left visual field cut    Upper extremities  No drift  No tremor    Lower extremities  Distal proximal strength okay test in the bed    Gait  Deferred      Assessment/plan    Large right inferior medial temporal lobe infarct 5.8 x 2.5 x 2.0 cm 12/5/2023        Involves hippocampus parahippocampal structures and posterior right thalamus also.        Presented with confusion    2.   Intracranial atherosclerosis        HEAD MRA: 12/6/2023        1.  5 mm segment of high-grade stenosis/near occlusion at the right P1-P2 junction.              High-grade stenosis or occlusion of the distal right P3 segment.        2.  Moderate atheromatous disease elsewhere.    3.  Vascular risk factors:       Hypertension/CKD/obstructive sleep apnea/previous CVA/CHF    4.   Above complicated by mild pre-existing dementia        Family history mother with dementia    5.   Possible UTI being addressed by primary MD complicating the above    Diagnosis  Right PCA stroke large  Intracranial atherosclerosis severe  Confusion      Previously on 81 mg of aspirin  Had CVA in June 2023 also (left hemisphere)      Cardiac echo 12/6/2023, ejection fraction 60-65%, left atrium moderately dilated    EEG 12/6/2023 Impression:  Moderately abnormal awake EEG due to:  Bitemporal theta delta slowing worse on the right than the left.    PT/OT/swallow eval    With initial CT of the head 12/5/2023 showing questionable petechial hemorrhage  Recommend follow-up CT scan of the head today  If no hemorrhagic conversion then switch patient to dual antiplatelet medication Plavix 75 mg/aspirin 81 mg for 3 months    Recommend outpatient cardiac monitoring looking for arrhythmia question whether these are embolic strokes specially with moderately dilated left atrium    Question whether we should have cardiology involved    Discussed with patient and OT at bedside face-to-face  Discussed with primary MD face-to-face  Disposition per primary MD    Patient  could follow-up with our nurse practitioner in 3 months    Total care time today 55 minutes  If no hemorrhagic conversion then switch to dual antiplatelet medication with Plavix and aspirin at that time.

## 2023-12-07 NOTE — PROGRESS NOTES
"Speech Language Pathology     Cognitive Linguistic Quick Test (CLQT)    SUMMARY OF TEST:    The CLQT assesses visual attention and perception, working memory and language output skills, as well as auditory memory and comprehension.  Non-linguistic tasks can help assess planning, and self-monitoring, visual discrimination and analysis, as well as creativity and mental flexibility.   Together, these subtests assess the cognitive domains of attention, memory, executive function, language, and visuospatial skills using a severity rating of either WNL (within normal limits), Mild, Moderate or Severe.    RESULTS OF TESTING:   Attention    Score: 22    Severity Rating: Severe   Memory    Score: 96    Severity Rating: Moderate   Executive Functions    Score: 8    Severity Rating: Moderate   Language    Score: 26    Severity Rating: Mild   Visuospatial Skills    Score: 26    Severity Rating: Severe   Composite Severity Rating    Score: 1.6    Severity Rating: Moderate   Clock Drawing     Score: 12    Severity Rating: WNL     INTERPRETATION OF TEST RESULTS: Patient presents with impaired problem solving, awareness, and insight. She had significant difficulty with visual tasks, except clock drawing, often saying \"my glasses don't work, I need new glasses\". No field preference noted, but she did have difficulty visually scanning and attending to details to navigate or find information. Patient may have difficulty in her home environment with cooking safety, medication management, and problem solving when challenges occur or situations out of the norm.  TIME ADMINISTERING TEST: 40  TIME FOR INTERPRETATION AND PREPARATION OF REPORT: 15  TOTAL TIME: 55  Reference:  Irma Garcia, Kristin, CCC-SLP, (2001) [employer]  "

## 2023-12-07 NOTE — PHARMACY-CONSULT NOTE
Pharmacy Consult to evaluate for medication related stroke core measures    Indu Felix, 68 year old female admitted for ischemic stroke on 12/5/2023.    Thrombolytic was not given because of Time from onset contraindications    VTE Prophylaxis SCDs /PCDs attempted to be placed on 12/7, as appropriate prior to end of hospital day 2, however patient is refusing intervention.     Antithrombotic: aspirin and clopidogrel started on 12/7, as appropriate by end of hospital day 2. Continue antithrombotic therapy on discharge to meet quality measures, unless contraindicated.    Anticoagulation if history of A-fib/flutter: Patient does not have history of A-fib/flutter - anticoagulation not required for medication related stroke core measures.     LDL Cholesterol Calculated   Date Value Ref Range Status   12/06/2023 111 (H) <=100 mg/dL Final       Patient's home statin, Lipitor (atorvastatin) restarted; continue statin on discharge to meet quality measures, unless contraindicated.     Recommendations: None at this time    Thank you for the consult.    STEVEN RUTHERFORD RPH 12/7/2023 2:05 PM

## 2023-12-07 NOTE — PROGRESS NOTES
Placed pt on our cpap 5-20 auto set mask small. Pt has already removed cpap and does not want to use it, feels the mask is to heavy duty for her. Pt wore cpap about 10 minutes.

## 2023-12-07 NOTE — PLAN OF CARE
Shift: 19:00-07:30    Goal Outcome Evaluation:    Problem: Stroke, Ischemic (Includes Transient Ischemic Attack)  Goal: Optimal Coping  Outcome: Progressing  Intervention: Support Psychosocial Response to Stroke  Flowsheets (Taken 12/6/2023 2123)  Supportive Measures:   active listening utilized   decision-making supported   relaxation techniques promoted  Goal: Optimal Cognitive Function  Outcome: Progressing  Intervention: Optimize Cognitive Function  Flowsheets (Taken 12/6/2023 2123)  Sensory Stimulation Regulation:   care clustered   quiet environment promoted  Reorientation Measures:   clock in view   calendar in view   glasses use encouraged  Environment Familiarity/Consistency: daily routine followed  Goal: Safe and Effective Swallow  Outcome: Progressing  Intervention: Promote and Optimize Fluid and Food Intake  Flowsheets (Taken 12/6/2023 2123)  Aspiration Precautions: (Swallows food and thin liquids without difficulty. Has been seen and cleared by SLP)   awake/alert before oral intake   distractions minimized during oral intake   upright posture maintained   other (see comments)     Problem: Comorbidity Management  Goal: Blood Pressure in Desired Range  Outcome: Progressing  Intervention: Maintain Blood Pressure Management  Recent Flowsheet Documentation  Taken 12/6/2023 1923 by Sarahi Jameson RN  Medication Review/Management: medications reviewed          A&Ox4. Patient verbalized that she is aware she came to hospital for confusion and stroke. She has some word-finding difficulty, but otherwise speech is clear. Neuro checks; neuro intact, everything strong and symmetrical. Needed assistance lifting self out of chair and bed, but ambulated to bathroom with no difficulty using walker and SBA.     BG <200, no HS sliding scale novolog needed. BP elevated, but below parameters for PRN hydralazine    04:42 /103. Administered PRN IV 10mg hydralazine at 04:50.  Recheck was 171/68    BP (!) 171/68 (BP  "Location: Left arm, Patient Position: Right side, Cuff Size: Adult Large)   Pulse 100   Temp 98.2  F (36.8  C) (Oral)   Resp 18   Ht 1.575 m (5' 2\")   Wt 87.3 kg (192 lb 7.4 oz)   SpO2 97%   BMI 35.20 kg/m                   "

## 2023-12-08 ENCOUNTER — APPOINTMENT (OUTPATIENT)
Dept: OCCUPATIONAL THERAPY | Facility: HOSPITAL | Age: 68
DRG: 065 | End: 2023-12-08
Payer: COMMERCIAL

## 2023-12-08 ENCOUNTER — APPOINTMENT (OUTPATIENT)
Dept: SPEECH THERAPY | Facility: HOSPITAL | Age: 68
DRG: 065 | End: 2023-12-08
Payer: COMMERCIAL

## 2023-12-08 ENCOUNTER — APPOINTMENT (OUTPATIENT)
Dept: PHYSICAL THERAPY | Facility: HOSPITAL | Age: 68
DRG: 065 | End: 2023-12-08
Payer: COMMERCIAL

## 2023-12-08 LAB
ANION GAP SERPL CALCULATED.3IONS-SCNC: 12 MMOL/L (ref 7–15)
BUN SERPL-MCNC: 32.2 MG/DL (ref 8–23)
CALCIUM SERPL-MCNC: 9 MG/DL (ref 8.8–10.2)
CHLORIDE SERPL-SCNC: 107 MMOL/L (ref 98–107)
CREAT SERPL-MCNC: 2.03 MG/DL (ref 0.51–0.95)
DEPRECATED HCO3 PLAS-SCNC: 20 MMOL/L (ref 22–29)
EGFRCR SERPLBLD CKD-EPI 2021: 26 ML/MIN/1.73M2
GLUCOSE BLDC GLUCOMTR-MCNC: 117 MG/DL (ref 70–99)
GLUCOSE BLDC GLUCOMTR-MCNC: 142 MG/DL (ref 70–99)
GLUCOSE BLDC GLUCOMTR-MCNC: 152 MG/DL (ref 70–99)
GLUCOSE BLDC GLUCOMTR-MCNC: 189 MG/DL (ref 70–99)
GLUCOSE SERPL-MCNC: 151 MG/DL (ref 70–99)
POTASSIUM SERPL-SCNC: 3.4 MMOL/L (ref 3.4–5.3)
SODIUM SERPL-SCNC: 139 MMOL/L (ref 135–145)

## 2023-12-08 PROCEDURE — 999N000157 HC STATISTIC RCP TIME EA 10 MIN

## 2023-12-08 PROCEDURE — 99232 SBSQ HOSP IP/OBS MODERATE 35: CPT | Performed by: PSYCHIATRY & NEUROLOGY

## 2023-12-08 PROCEDURE — 250N000013 HC RX MED GY IP 250 OP 250 PS 637: Performed by: INTERNAL MEDICINE

## 2023-12-08 PROCEDURE — 97110 THERAPEUTIC EXERCISES: CPT | Mod: GP | Performed by: PHYSICAL THERAPIST

## 2023-12-08 PROCEDURE — 99232 SBSQ HOSP IP/OBS MODERATE 35: CPT | Performed by: INTERNAL MEDICINE

## 2023-12-08 PROCEDURE — 36415 COLL VENOUS BLD VENIPUNCTURE: CPT | Performed by: INTERNAL MEDICINE

## 2023-12-08 PROCEDURE — 97129 THER IVNTJ 1ST 15 MIN: CPT | Mod: GN

## 2023-12-08 PROCEDURE — 97535 SELF CARE MNGMENT TRAINING: CPT | Mod: GO

## 2023-12-08 PROCEDURE — 97116 GAIT TRAINING THERAPY: CPT | Mod: GP | Performed by: PHYSICAL THERAPIST

## 2023-12-08 PROCEDURE — 94660 CPAP INITIATION&MGMT: CPT

## 2023-12-08 PROCEDURE — 210N000001 HC R&B IMCU HEART CARE

## 2023-12-08 PROCEDURE — 80048 BASIC METABOLIC PNL TOTAL CA: CPT | Performed by: INTERNAL MEDICINE

## 2023-12-08 PROCEDURE — 97530 THERAPEUTIC ACTIVITIES: CPT | Mod: GO

## 2023-12-08 RX ORDER — METOPROLOL SUCCINATE 50 MG/1
50 TABLET, EXTENDED RELEASE ORAL DAILY
Qty: 30 TABLET | Refills: 1 | Status: SHIPPED | OUTPATIENT
Start: 2023-12-09 | End: 2023-12-11

## 2023-12-08 RX ORDER — ACETAMINOPHEN 325 MG/1
650 TABLET ORAL EVERY 4 HOURS PRN
Start: 2023-12-08

## 2023-12-08 RX ORDER — BUMETANIDE 2 MG/1
2 TABLET ORAL DAILY
Qty: 180 TABLET | Refills: 1 | Status: SHIPPED | OUTPATIENT
Start: 2023-12-08

## 2023-12-08 RX ORDER — INSULIN GLARGINE 100 [IU]/ML
10 INJECTION, SOLUTION SUBCUTANEOUS EVERY MORNING
Start: 2023-12-08

## 2023-12-08 RX ORDER — CLOPIDOGREL BISULFATE 75 MG/1
75 TABLET ORAL DAILY
Qty: 30 TABLET | Refills: 1 | Status: SHIPPED | OUTPATIENT
Start: 2023-12-09 | End: 2024-04-15

## 2023-12-08 RX ORDER — METOPROLOL SUCCINATE 50 MG/1
50 TABLET, EXTENDED RELEASE ORAL DAILY
Status: DISCONTINUED | OUTPATIENT
Start: 2023-12-08 | End: 2023-12-10

## 2023-12-08 RX ORDER — LOPERAMIDE HCL 2 MG
2 CAPSULE ORAL 2 TIMES DAILY PRN
Start: 2023-12-08

## 2023-12-08 RX ADMIN — AMLODIPINE BESYLATE 5 MG: 5 TABLET ORAL at 08:21

## 2023-12-08 RX ADMIN — BUMETANIDE 2 MG: 2 TABLET ORAL at 08:21

## 2023-12-08 RX ADMIN — CEPHALEXIN 500 MG: 500 CAPSULE ORAL at 21:07

## 2023-12-08 RX ADMIN — TRAZODONE HYDROCHLORIDE 100 MG: 50 TABLET ORAL at 21:07

## 2023-12-08 RX ADMIN — INSULIN ASPART 1 UNITS: 100 INJECTION, SOLUTION INTRAVENOUS; SUBCUTANEOUS at 08:19

## 2023-12-08 RX ADMIN — PANTOPRAZOLE SODIUM 40 MG: 40 TABLET, DELAYED RELEASE ORAL at 07:17

## 2023-12-08 RX ADMIN — METOPROLOL SUCCINATE 50 MG: 50 TABLET, EXTENDED RELEASE ORAL at 08:42

## 2023-12-08 RX ADMIN — BUMETANIDE 2 MG: 2 TABLET ORAL at 17:56

## 2023-12-08 RX ADMIN — DONEPEZIL HYDROCHLORIDE 10 MG: 5 TABLET, FILM COATED ORAL at 21:07

## 2023-12-08 RX ADMIN — CLOPIDOGREL BISULFATE 75 MG: 75 TABLET ORAL at 08:21

## 2023-12-08 RX ADMIN — CEPHALEXIN 500 MG: 500 CAPSULE ORAL at 08:21

## 2023-12-08 RX ADMIN — ISOSORBIDE MONONITRATE 60 MG: 30 TABLET, EXTENDED RELEASE ORAL at 08:21

## 2023-12-08 RX ADMIN — Medication 81 MG: at 08:21

## 2023-12-08 RX ADMIN — INSULIN GLARGINE 10 UNITS: 100 INJECTION, SOLUTION SUBCUTANEOUS at 08:19

## 2023-12-08 RX ADMIN — ESCITALOPRAM OXALATE 20 MG: 10 TABLET ORAL at 08:21

## 2023-12-08 RX ADMIN — INSULIN ASPART 1 UNITS: 100 INJECTION, SOLUTION INTRAVENOUS; SUBCUTANEOUS at 12:40

## 2023-12-08 RX ADMIN — ATORVASTATIN CALCIUM 80 MG: 40 TABLET, FILM COATED ORAL at 21:08

## 2023-12-08 ASSESSMENT — ACTIVITIES OF DAILY LIVING (ADL)
ADLS_ACUITY_SCORE: 31
ADLS_ACUITY_SCORE: 27
ADLS_ACUITY_SCORE: 31
ADLS_ACUITY_SCORE: 30
ADLS_ACUITY_SCORE: 27
ADLS_ACUITY_SCORE: 31
ADLS_ACUITY_SCORE: 27
ADLS_ACUITY_SCORE: 31
ADLS_ACUITY_SCORE: 30
ADLS_ACUITY_SCORE: 31
ADLS_ACUITY_SCORE: 31
ADLS_ACUITY_SCORE: 30

## 2023-12-08 NOTE — PROGRESS NOTES
This is a neurologica follow-up note      68-year-old admitted to hospital 12/5/2023      Chief complaint  Large right CVA  Intracranial atherosclerosis  Previous left CVA June 2023      HPI  68-year-old presented to hospital 12/5/2023 feeling weak overnight relatively nonspecific  Also had some altered mental status    Patient seems somewhat confused on presentation    Per family he may have some poorly controlled diabetes    Patient with left hemisphere stroke in June 2023  MRI scan shows new large right PCA stroke with intracranial atherosclerosis 12/5/2023  Case evaluated by stroke code team on presentation    Patient previously on 81 mg of aspirin per their report they recommended continuing daily aspirin    Question whether we should do follow-up CT head to make sure there is no hemorrhagic conversion and change to dual antiplatelet medication    Also with large vessel ischemic stroke concern for embolic phenomenon will need echo checked        Past medical history  Left CVA June 2023 (left parietal lobe)  Mild dementia  Hypertension  Posterior reversible encephalopathy syndrome/hypertensive urgency  Diabetes  CKD  CHF  Aortic stenosis  Pulmonary hypertension  Obstructive sleep apnea    Habits  Non-smoker  Does not drink alcohol    Family history  Father hypertension  Mother diabetes/hypertension/dementia      Work-up  CT scan head 12/5/2023 compared to 6/11/2023  1.  Moderate area of hypodensity medial aspect of right temporal lobe.        This probably represents subacute ischemia.        This is new since prior. Questionable tiny amount of associated petechial hemorrhage.  2.  Moderate area of more pronounced hypodensity left parietal lobe, most consistent with late subacute to chronic infarction.        This has evolved since prior.  3.  Mild presumed chronic small vessel ischemia.  4.  Mild atrophy.  HEAD MRI: 12/6/2023  1.  Moderate-sized zone of acute to early subacute ischemic infarction centered in  the inferomedial right temporal lobe.       This involves the hippocampus and parahippocampal structures and posterior right thalamus.       5.8 x 2.5 x 2.0 cm  2.  No space-occupying hemorrhage.   3.  Age-related changes.  HEAD MRA: 12/6/2023  1.  5 mm segment of high-grade stenosis/near occlusion at the right P1-P2 junction. High-grade stenosis or occlusion of the distal right P3 segment.  2.  Moderate atheromatous disease elsewhere.  NECK MRA: 12/6/2023  No measurable stenosis or dissection.  Cardiac echo 12/6/2023, ejection fraction 60-65%, left atrium moderately dilated  EEG 12/6/2023 Impression:  Moderately abnormal awake EEG due to:  Bitemporal theta delta slowing worse on the right than the left.  CT scan head 12/7/2023 follow-up  1.  Chronic left parietal and bilateral cerebellar infarcts.  2.  Subacute or early chronic right temporal-occipital infarcts. No finding for hemorrhagic transformation.  3.  No finding for new infarct.  4.  Moderate volume loss and at least mild presumed sequela of chronic microvascular ischemic change.      HDL/LDL 39/111  HGBA1C 7.7, (6/21/2023)  Troponin 71    Slums score 19 out of 30, (12/7/2023)      Labs  Sodium 139 potassium 3.4  BUN 32.2, creatinine 2.03  Glucose 157-124-151  WBC 9.4, hemoglobin 11.9, platelets 354,000      Exam  Blood pressure 131/62, pulse 78, temperature 97.7  Blood pressure range 128//79  Pulse range 78-1 05  Tmax 98.5          Review of systems  Difficulty with some confusion  No headache no chest pain.   No abdominal pain no shortness of breath no nausea or vomiting    Denies diplopia dysarthria dysphagia  Denies visual changes although has a visual field cut  Otherwise review systems negative    General exam per primary MD  Alert attentive  HEENT no signs of trauma  Lungs clear   Heart rate regular, has a murmur  Abdomen soft  No significant edema    Neurologic exam  Alert attentive  Somewhat perplexed  Prosody speech normal  Naming  normal  Comprehension normal  Repetition normal  No aphasia  Some neglect of the left side  Memory not formally tested mildly perplexed  Years 2024 president kang Stein earlier was more confused is on a one-to-one sitter    Slums score 19 out of 30, (12/7/2023)      Cranial nerves II through XII  No ophthalmoplegia  No nystagmus  Face symmetrical  Tongue twisters good  Left visual field cut    Upper extremities  No drift  No tremor    Lower extremities  Distal proximal strength okay test in the bed    Gait  Ambulates with walker and standby assistance of 1      Assessment/plan    Large right inferior medial temporal lobe infarct 5.8 x 2.5 x 2.0 cm 12/5/2023        Involves hippocampus parahippocampal structures and posterior right thalamus also.        Presented with confusion    2.   Intracranial atherosclerosis        HEAD MRA: 12/6/2023        1.  5 mm segment of high-grade stenosis/near occlusion at the right P1-P2 junction.              High-grade stenosis or occlusion of the distal right P3 segment.        2.  Moderate atheromatous disease elsewhere.    3.  Vascular risk factors:       Hypertension/CKD/obstructive sleep apnea/previous CVA/CHF    4.   Above complicated by mild pre-existing dementia        Family history mother with dementia    5.   Possible UTI being addressed by primary MD complicating the above    Diagnosis  Right PCA stroke large  Intracranial atherosclerosis severe  Confusion/multi-infarct dementia      Previously on 81 mg of aspirin  Had CVA in June 2023 also (left hemisphere)      Cardiac echo 12/6/2023, ejection fraction 60-65%, left atrium moderately dilated    EEG 12/6/2023 Impression:  Moderately abnormal awake EEG due to:  Bitemporal theta delta slowing worse on the right than the left.    PT/OT/swallow eval    Follow-up CT scan head 12/7/2023   CT scan head 12/7/2023 follow-up  1.  Chronic left parietal and bilateral cerebellar infarcts.  2.  Subacute or early chronic right  temporal-occipital infarcts. No finding for hemorrhagic transformation.  3.  No finding for new infarct.  4.  Moderate volume loss and at least mild presumed sequela of chronic microvascular ischemic change.    No hemorrhagic conversion    Patient will be treated with dual antiplatelet medication for 3 months  Recommend outpatient cardiac monitoring looking for arrhythmia  Concern for embolic stroke with multifocal stroke and moderately enlarged left atrium    If no hemorrhagic conversion then switch patient to dual antiplatelet medication Plavix 75 mg/aspirin 81 mg for 3 months, then switch to Plavix monotherapy after that if no embolic source found    Patient could follow-up with our nurse practitioner in 3 months  Plavix 75 mg daily  Aspirin 81 mg daily  Lipitor 80 mg daily    For multi infarct dementia started on Aricept 10 mg once per day    Patient needs some assistance with ambulation  Has some stairs to get into the house    Recommend outpatient versus TCU therapy depending on whether patient can safely be cared for at home by family.  Discussed with primary MD face-to-face  He is setting up discharge recommendations    Can follow-up with nurse practitioner in 3 months    Disposition per primary MD  Total care time today 35 minutes  Neurology will sign off at this time

## 2023-12-08 NOTE — PROGRESS NOTES
Care Management Follow Up    Length of Stay (days): 2    Expected Discharge Date: 12/08/2023     Concerns to be Addressed: Medically ready needs TCU.  Patient plan of care discussed at interdisciplinary rounds: Yes    Anticipated Discharge Disposition:       Anticipated Discharge Services:    Anticipated Discharge DME:      Patient/family educated on Medicare website which has current facility and service quality ratings:    Education Provided on the Discharge Plan:    Patient/Family in Agreement with the Plan:      Referrals Placed by CM/SW:    Private pay costs discussed: Not applicable    Additional Information:  Therapy recommendation changed today to TCU and pt's daughter and pt's  think TCU is the best place for the pt. She needs more care and strength before she returns to her home with her . The pt is not happy about TCU but understands that she cannot go home without support and at this time her  told her that he cannot support her until she gets stronger. Pt has been to [facility] in the past and is willing to go back. Pt and pt's  also said I could send referrals to [facility]. [facility].    Pt is medically ready to discharge once prior auth has been approved and TCU is found        Yasmine Silva RN

## 2023-12-08 NOTE — PROGRESS NOTES
"[facility]    Hospitalist Progress Note    Assessment & Plan   68 year old female who was admitted on 12/5/2023 for recurrent stroke.     Impression:   Principal Problem:    Ischemic stroke (H)  Active Problems:    Benign essential hypertension    DM type 2, Hgb A1C 6.3 on 12/6/23    Chronic heart failure with preserved ejection fraction (H)    DIANA on CPAP    History of CVA (cerebrovascular accident)    Urinary tract infection without hematuria, site unspecified      Plan:  Discussed with Neurology, will resume aspirin, check head CT to rule out bleed and if no bleed then start Plavix.  Will complete workup to rule out embolic source of stroke (cardiac event monitor for 30 days).  Discussed with  and left message for daughter.     DVT Prophylaxis: Pneumatic Compression Devices  Code Status: Full Code    Disposition: Expected discharge home with home care tomorrow.     John Husain MD  Pager [phone]  Cell Phone [phone]  Text Page (7am to 6pm)    Interval History   Feels OK, but reports brain \"foggy\"    Physical Exam   Temp: 98  F (36.7  C) Temp src: Oral BP: (!) 148/66 Pulse: 72   Resp: 22 SpO2: 95 % O2 Device: None (Room air)    Vitals:    12/05/23 2101 12/07/23 0442 12/08/23 0652   Weight: 89.7 kg (197 lb 12.8 oz) 87.3 kg (192 lb 7.4 oz) 86.7 kg (191 lb 2.2 oz)     Vital Signs with Ranges  Temp:  [97.7  F (36.5  C)-98.1  F (36.7  C)] 98  F (36.7  C)  Pulse:  [] 72  Resp:  [18-22] 22  BP: (128-191)/(55-84) 148/66  SpO2:  [95 %-96 %] 95 %  I/O last 3 completed shifts:  In: 840 [P.O.:840]  Out: 600 [Urine:600]    # Pain Assessment:      12/8/2023    12:00 PM   Current Pain Score   Patient currently in pain? denies   Indu s pain level was assessed and she currently denies pain.        Constitutional: Awake, alert, cooperative, no apparent distress  Respiratory: Clear to auscultation bilaterally, no crackles or wheezing  Cardiovascular: Regular rate and rhythm, " normal S1 and S2, and no murmur noted  GI: Normal bowel sounds, soft, non-distended, non-tender  Extrem: No calf tenderness, no ankle edema  Neuro: Ox2 -- knew month and year, but did not know the name of the hospital and thought [facility] was president.   no focal motor or sensory deficits    Medications      amLODIPine  5 mg Oral Daily    aspirin  81 mg Oral Daily    atorvastatin  80 mg Oral QPM    bumetanide  2 mg Oral BID    cephALEXin  500 mg Oral BID    clopidogrel  75 mg Oral Daily    donepezil  10 mg Oral At Bedtime    escitalopram  20 mg Oral Daily    insulin aspart  1-7 Units Subcutaneous TID AC    insulin aspart  1-5 Units Subcutaneous At Bedtime    insulin glargine  10 Units Subcutaneous QAM    isosorbide mononitrate  60 mg Oral Daily    metoprolol succinate ER  50 mg Oral Daily    pantoprazole  40 mg Oral QAM AC    sodium chloride (PF)  3 mL Intracatheter Q8H    traZODone  100 mg Oral At Bedtime       Data   Recent Labs   Lab 12/08/23  1215 12/08/23  0818 12/08/23  0521 12/06/23  0751 12/06/23  0734 12/05/23  2143   WBC  --   --   --   --  9.4 10.0   HGB  --   --   --   --  11.9 12.5   MCV  --   --   --   --  86 86   PLT  --   --   --   --  354 403   NA  --   --  139  --  138 135   POTASSIUM  --   --  3.4  --  3.9 3.8   CHLORIDE  --   --  107  --  107 101   CO2  --   --  20*  --  21* 18*   BUN  --   --  32.2*  --  27.2* 33.8*   CR  --   --  2.03*  --  1.60* 1.88*   ANIONGAP  --   --  12  --  10 16*   JARETT  --   --  9.0  --  9.4 9.8   * 152* 151*   < > 157* 220*   ALBUMIN  --   --   --   --   --  4.4   PROTTOTAL  --   --   --   --   --  8.0   BILITOTAL  --   --   --   --   --  0.3   ALKPHOS  --   --   --   --   --  153*   ALT  --   --   --   --   --  18   AST  --   --   --   --   --  26    < > = values in this interval not displayed.       Imaging:   No results found for this or any previous visit (from the past 24 hour(s)).

## 2023-12-08 NOTE — PLAN OF CARE
"  Problem: Adult Inpatient Plan of Care  Goal: Plan of Care Review  Description: The Plan of Care Review/Shift note should be completed every shift.  The Outcome Evaluation is a brief statement about your assessment that the patient is improving, declining, or no change.  This information will be displayed automatically on your shift  note.  Outcome: Progressing  Flowsheets (Taken 12/8/2023 1419)  Plan of Care Reviewed With:   patient   child  Goal: Patient-Specific Goal (Individualized)  Description: You can add care plan individualizations to a care plan. Examples of Individualization might be:  \"Parent requests to be called daily at 9am for status\", \"I have a hard time hearing out of my right ear\", or \"Do not touch me to wake me up as it startles  me\".  Outcome: Progressing  Goal: Absence of Hospital-Acquired Illness or Injury  Outcome: Progressing  Intervention: Identify and Manage Fall Risk  Recent Flowsheet Documentation  Taken 12/8/2023 0800 by John Fraga RN  Safety Promotion/Fall Prevention:   activity supervised   assistive device/personal items within reach   clutter free environment maintained   mobility aid in reach   room near nurse's station   room organization consistent   safety round/check completed   bedside attendant  Intervention: Prevent Infection  Recent Flowsheet Documentation  Taken 12/8/2023 0800 by John Fraga RN  Infection Prevention:   environmental surveillance performed   rest/sleep promoted  Goal: Optimal Comfort and Wellbeing  Outcome: Progressing  Goal: Readiness for Transition of Care  Outcome: Progressing     Problem: Suicide Risk  Goal: Absence of Self-Harm  Outcome: Progressing  Intervention: Assess Risk to Self and Maintain Safety  Recent Flowsheet Documentation  Taken 12/8/2023 0800 by John Fraga RN  Enhanced Safety Measures: room near unit station     Problem: Stroke, Ischemic (Includes Transient Ischemic Attack)  Goal: Optimal Coping  Outcome: Progressing  Goal: " Effective Bowel Elimination  Outcome: Progressing  Goal: Optimal Cerebral Tissue Perfusion  Outcome: Progressing  Intervention: Protect and Optimize Cerebral Perfusion  Recent Flowsheet Documentation  Taken 12/8/2023 1200 by John Fraga RN  Sensory Stimulation Regulation:   care clustered   quiet environment promoted  Cerebral Perfusion Promotion: blood pressure monitored  Taken 12/8/2023 0800 by John Fraga RN  Sensory Stimulation Regulation:   care clustered   quiet environment promoted  Cerebral Perfusion Promotion: blood pressure monitored  Goal: Optimal Cognitive Function  Outcome: Progressing  Intervention: Optimize Cognitive Function  Recent Flowsheet Documentation  Taken 12/8/2023 1200 by John Fraga RN  Sensory Stimulation Regulation:   care clustered   quiet environment promoted  Reorientation Measures:   clock in view   calendar in view   glasses use encouraged  Taken 12/8/2023 0800 by John Fraga RN  Sensory Stimulation Regulation:   care clustered   quiet environment promoted  Reorientation Measures:   clock in view   calendar in view   glasses use encouraged  Goal: Improved Communication Skills  Outcome: Progressing  Intervention: Optimize Communication Skills  Recent Flowsheet Documentation  Taken 12/8/2023 1200 by John Fraga RN  Communication Enhancement Strategies: call light answered in person  Taken 12/8/2023 0800 by John Fraga RN  Communication Enhancement Strategies: call light answered in person  Goal: Optimal Functional Ability  Outcome: Progressing  Goal: Optimal Nutrition Intake  Outcome: Progressing  Goal: Effective Oxygenation and Ventilation  Outcome: Progressing  Goal: Improved Sensorimotor Function  Outcome: Progressing  Goal: Safe and Effective Swallow  Outcome: Progressing  Goal: Effective Urinary Elimination  Outcome: Progressing     Problem: UTI (Urinary Tract Infection)  Goal: Improved Infection Symptoms  Outcome: Progressing     Problem: Comorbidity  Management  Goal: Blood Glucose Levels Within Targeted Range  Outcome: Progressing  Intervention: Monitor and Manage Glycemia  Recent Flowsheet Documentation  Taken 12/8/2023 0800 by John Fraga RN  Medication Review/Management: medications reviewed  Goal: Blood Pressure in Desired Range  Outcome: Progressing  Intervention: Maintain Blood Pressure Management  Recent Flowsheet Documentation  Taken 12/8/2023 0800 by John Fraga RN  Medication Review/Management: medications reviewed    Goal Outcome Evaluation:      Plan of Care Reviewed With: patient, child    Pt alert but disoriented to time & situation, intermittently confused. Hypertensive with systolic in the 190s this AM - BP improved following scheduled meds. Pt otherwise vitally stable & saturating well on RA. Tele has been NSR. Neuro checks continued - no obvious decline in neuro status but patient seems to have trouble with visual fields with L-side cut & weakness of upper extremities. Ambulating well with SBA. Pt was supposed to discharge home, discharge was cancelled due to familial concerns about going home & now planning for TCU placement. Will continue to monitor.     John Fraga RN on 12/8/2023 at 2:35 PM

## 2023-12-08 NOTE — PLAN OF CARE
Assumed care 1900 to 0730. A&O x 4. Assist x 1 with a gait belt and walker. Tele is NSR. Denies pain. Up to toilet. SCD in place. Patient slept for the majority of the night. Call light within reach, able to make needs known. Bed alarm on for safety.    Problem: Comorbidity Management  Goal: Blood Pressure in Desired Range  Intervention: Maintain Blood Pressure Management  Recent Flowsheet Documentation  Taken 12/8/2023 0415 by SANDRA BERNSTEIN  Medication Review/Management: medications reviewed  Taken 12/8/2023 0130 by SANDRA BERNSTEIN  Medication Review/Management: medications reviewed  Taken 12/7/2023 2142 by SANDRA BERNSTEIN  Medication Review/Management: medications reviewed     Problem: Stroke, Ischemic (Includes Transient Ischemic Attack)  Goal: Effective Urinary Elimination  Outcome: Progressing

## 2023-12-09 ENCOUNTER — APPOINTMENT (OUTPATIENT)
Dept: PHYSICAL THERAPY | Facility: HOSPITAL | Age: 68
DRG: 065 | End: 2023-12-09
Payer: COMMERCIAL

## 2023-12-09 LAB
ANION GAP SERPL CALCULATED.3IONS-SCNC: 12 MMOL/L (ref 7–15)
ATRIAL RATE - MUSE: 78 BPM
BACTERIA UR CULT: ABNORMAL
BACTERIA UR CULT: ABNORMAL
BUN SERPL-MCNC: 37.1 MG/DL (ref 8–23)
CALCIUM SERPL-MCNC: 9.1 MG/DL (ref 8.8–10.2)
CHLORIDE SERPL-SCNC: 106 MMOL/L (ref 98–107)
CREAT SERPL-MCNC: 2.17 MG/DL (ref 0.51–0.95)
DEPRECATED HCO3 PLAS-SCNC: 21 MMOL/L (ref 22–29)
DIASTOLIC BLOOD PRESSURE - MUSE: NORMAL MMHG
EGFRCR SERPLBLD CKD-EPI 2021: 24 ML/MIN/1.73M2
ERYTHROCYTE [DISTWIDTH] IN BLOOD BY AUTOMATED COUNT: 13.1 % (ref 10–15)
GLUCOSE BLDC GLUCOMTR-MCNC: 135 MG/DL (ref 70–99)
GLUCOSE BLDC GLUCOMTR-MCNC: 142 MG/DL (ref 70–99)
GLUCOSE BLDC GLUCOMTR-MCNC: 229 MG/DL (ref 70–99)
GLUCOSE BLDC GLUCOMTR-MCNC: 259 MG/DL (ref 70–99)
GLUCOSE SERPL-MCNC: 148 MG/DL (ref 70–99)
HCT VFR BLD AUTO: 34.5 % (ref 35–47)
HGB BLD-MCNC: 11.1 G/DL (ref 11.7–15.7)
INTERPRETATION ECG - MUSE: NORMAL
MCH RBC QN AUTO: 28.1 PG (ref 26.5–33)
MCHC RBC AUTO-ENTMCNC: 32.2 G/DL (ref 31.5–36.5)
MCV RBC AUTO: 87 FL (ref 78–100)
P AXIS - MUSE: 70 DEGREES
PLATELET # BLD AUTO: 320 10E3/UL (ref 150–450)
POTASSIUM SERPL-SCNC: 3.5 MMOL/L (ref 3.4–5.3)
PR INTERVAL - MUSE: 172 MS
QRS DURATION - MUSE: 92 MS
QT - MUSE: 424 MS
QTC - MUSE: 483 MS
R AXIS - MUSE: 67 DEGREES
RBC # BLD AUTO: 3.95 10E6/UL (ref 3.8–5.2)
SODIUM SERPL-SCNC: 139 MMOL/L (ref 135–145)
SYSTOLIC BLOOD PRESSURE - MUSE: NORMAL MMHG
T AXIS - MUSE: 4 DEGREES
VENTRICULAR RATE- MUSE: 78 BPM
WBC # BLD AUTO: 8.4 10E3/UL (ref 4–11)

## 2023-12-09 PROCEDURE — 250N000013 HC RX MED GY IP 250 OP 250 PS 637: Performed by: INTERNAL MEDICINE

## 2023-12-09 PROCEDURE — 85027 COMPLETE CBC AUTOMATED: CPT | Performed by: INTERNAL MEDICINE

## 2023-12-09 PROCEDURE — 999N000157 HC STATISTIC RCP TIME EA 10 MIN

## 2023-12-09 PROCEDURE — 82310 ASSAY OF CALCIUM: CPT | Performed by: INTERNAL MEDICINE

## 2023-12-09 PROCEDURE — 97116 GAIT TRAINING THERAPY: CPT | Mod: GP | Performed by: PHYSICAL THERAPIST

## 2023-12-09 PROCEDURE — 97530 THERAPEUTIC ACTIVITIES: CPT | Mod: GP | Performed by: PHYSICAL THERAPIST

## 2023-12-09 PROCEDURE — 99232 SBSQ HOSP IP/OBS MODERATE 35: CPT | Performed by: INTERNAL MEDICINE

## 2023-12-09 PROCEDURE — 94660 CPAP INITIATION&MGMT: CPT

## 2023-12-09 PROCEDURE — 36415 COLL VENOUS BLD VENIPUNCTURE: CPT | Performed by: INTERNAL MEDICINE

## 2023-12-09 PROCEDURE — 120N000004 HC R&B MS OVERFLOW

## 2023-12-09 RX ORDER — POTASSIUM CHLORIDE 1500 MG/1
40 TABLET, EXTENDED RELEASE ORAL ONCE
Status: COMPLETED | OUTPATIENT
Start: 2023-12-09 | End: 2023-12-09

## 2023-12-09 RX ADMIN — ISOSORBIDE MONONITRATE 60 MG: 30 TABLET, EXTENDED RELEASE ORAL at 09:36

## 2023-12-09 RX ADMIN — DONEPEZIL HYDROCHLORIDE 10 MG: 5 TABLET, FILM COATED ORAL at 21:17

## 2023-12-09 RX ADMIN — CLOPIDOGREL BISULFATE 75 MG: 75 TABLET ORAL at 09:36

## 2023-12-09 RX ADMIN — AMLODIPINE BESYLATE 5 MG: 5 TABLET ORAL at 09:36

## 2023-12-09 RX ADMIN — Medication 81 MG: at 09:35

## 2023-12-09 RX ADMIN — BUMETANIDE 2 MG: 2 TABLET ORAL at 18:52

## 2023-12-09 RX ADMIN — INSULIN ASPART 1 UNITS: 100 INJECTION, SOLUTION INTRAVENOUS; SUBCUTANEOUS at 09:37

## 2023-12-09 RX ADMIN — BUMETANIDE 2 MG: 2 TABLET ORAL at 09:36

## 2023-12-09 RX ADMIN — ATORVASTATIN CALCIUM 80 MG: 40 TABLET, FILM COATED ORAL at 21:17

## 2023-12-09 RX ADMIN — PANTOPRAZOLE SODIUM 40 MG: 40 TABLET, DELAYED RELEASE ORAL at 06:33

## 2023-12-09 RX ADMIN — POTASSIUM CHLORIDE 40 MEQ: 1500 TABLET, EXTENDED RELEASE ORAL at 09:35

## 2023-12-09 RX ADMIN — INSULIN GLARGINE 10 UNITS: 100 INJECTION, SOLUTION SUBCUTANEOUS at 09:37

## 2023-12-09 RX ADMIN — METOPROLOL SUCCINATE 50 MG: 50 TABLET, EXTENDED RELEASE ORAL at 09:36

## 2023-12-09 RX ADMIN — TRAZODONE HYDROCHLORIDE 100 MG: 50 TABLET ORAL at 21:17

## 2023-12-09 RX ADMIN — INSULIN ASPART 2 UNITS: 100 INJECTION, SOLUTION INTRAVENOUS; SUBCUTANEOUS at 12:44

## 2023-12-09 RX ADMIN — ESCITALOPRAM OXALATE 20 MG: 10 TABLET ORAL at 09:36

## 2023-12-09 ASSESSMENT — ACTIVITIES OF DAILY LIVING (ADL)
ADLS_ACUITY_SCORE: 33
ADLS_ACUITY_SCORE: 31
ADLS_ACUITY_SCORE: 31
ADLS_ACUITY_SCORE: 33
ADLS_ACUITY_SCORE: 31
ADLS_ACUITY_SCORE: 31
ADLS_ACUITY_SCORE: 33
ADLS_ACUITY_SCORE: 31
ADLS_ACUITY_SCORE: 31

## 2023-12-09 NOTE — PLAN OF CARE
Goal Outcome Evaluation:       Pt. Alert, forgetful and with intermittent confusion. VSS, denied pain, nausea, dyspnea, or dizziness. A bit poor balance so up with 1 assist, walker and gait belt. Walking to BR to void. No obvious weakness, visual field cuts, or neglect observed. Partially incontinent BM smear in depends. Plan for discharge to TCU. Continue to monitor.

## 2023-12-09 NOTE — PLAN OF CARE
"  Problem: Adult Inpatient Plan of Care  Goal: Plan of Care Review  Description: The Plan of Care Review/Shift note should be completed every shift.  The Outcome Evaluation is a brief statement about your assessment that the patient is improving, declining, or no change.  This information will be displayed automatically on your shift  note.  Outcome: Progressing  Flowsheets (Taken 12/9/2023 1413)  Plan of Care Reviewed With: patient  Goal: Patient-Specific Goal (Individualized)  Description: You can add care plan individualizations to a care plan. Examples of Individualization might be:  \"Parent requests to be called daily at 9am for status\", \"I have a hard time hearing out of my right ear\", or \"Do not touch me to wake me up as it startles  me\".  Outcome: Progressing  Goal: Absence of Hospital-Acquired Illness or Injury  Outcome: Progressing  Goal: Optimal Comfort and Wellbeing  Outcome: Progressing  Goal: Readiness for Transition of Care  Outcome: Progressing     Problem: Suicide Risk  Goal: Absence of Self-Harm  Outcome: Progressing     Problem: Stroke, Ischemic (Includes Transient Ischemic Attack)  Goal: Optimal Coping  Outcome: Progressing  Goal: Effective Bowel Elimination  Outcome: Progressing  Goal: Optimal Cerebral Tissue Perfusion  Outcome: Progressing  Intervention: Protect and Optimize Cerebral Perfusion  Recent Flowsheet Documentation  Taken 12/9/2023 1200 by John Fraga RN  Cerebral Perfusion Promotion: blood pressure monitored  Goal: Optimal Cognitive Function  Outcome: Progressing  Goal: Improved Communication Skills  Outcome: Progressing  Goal: Optimal Functional Ability  Outcome: Progressing  Goal: Optimal Nutrition Intake  Outcome: Progressing  Goal: Effective Oxygenation and Ventilation  Outcome: Progressing  Goal: Improved Sensorimotor Function  Outcome: Progressing  Goal: Safe and Effective Swallow  Outcome: Progressing  Goal: Effective Urinary Elimination  Outcome: Progressing     Problem: " UTI (Urinary Tract Infection)  Goal: Improved Infection Symptoms  Outcome: Progressing     Problem: Comorbidity Management  Goal: Blood Glucose Levels Within Targeted Range  Outcome: Progressing  Goal: Blood Pressure in Desired Range  Outcome: Progressing    Goal Outcome Evaluation:      Plan of Care Reviewed With: patient    Pt alert but disoriented to situation & time/date. Vitally stable & tele has been NSR, saturating well on RA. Denies pain or SOB. Currently awaiting TCU placement. Will continue to monitor.     John Fraga RN on 12/9/2023 at 2:59 PM

## 2023-12-09 NOTE — PROGRESS NOTES
"BP (!) 146/68 (BP Location: Right arm)   Pulse 71   Temp 97.7  F (36.5  C) (Oral)   Resp 18   Ht 1.575 m (5' 2\")   Wt 85.3 kg (188 lb 1.6 oz)   SpO2 92%   BMI 34.40 kg/m       Pt currently sleeping. Cpap device ( [facility] owned) on standby. Will return if further assistance is needed. Pt on RA.    "

## 2023-12-09 NOTE — PLAN OF CARE
Problem: Stroke, Ischemic (Includes Transient Ischemic Attack)  Goal: Optimal Coping  Outcome: Progressing     Problem: Comorbidity Management  Goal: Blood Glucose Levels Within Targeted Range  Outcome: Progressing  Intervention: Monitor and Manage Glycemia  Recent Flowsheet Documentation  Taken 12/8/2023 1600 by Jessy Hayward RN  Medication Review/Management: medications reviewed     Problem: Comorbidity Management  Goal: Blood Pressure in Desired Range  Outcome: Progressing  Intervention: Maintain Blood Pressure Management  Recent Flowsheet Documentation  Taken 12/8/2023 1600 by Jessy Hayward RN  Medication Review/Management: medications reviewed     Goal Outcome Evaluation:  A/O x3, forgetful, can be easily reoriented. Room Air on days, CPAP at bedtime. No pain. BG checks, ISS at bedtime, snacks given. -140's. Rhythm- NSR. Up in the chair, ambulated to the bathroom SBA using walker. Pending TCU. Bed alarm on for safety.

## 2023-12-10 ENCOUNTER — APPOINTMENT (OUTPATIENT)
Dept: SPEECH THERAPY | Facility: HOSPITAL | Age: 68
DRG: 065 | End: 2023-12-10
Payer: COMMERCIAL

## 2023-12-10 ENCOUNTER — APPOINTMENT (OUTPATIENT)
Dept: PHYSICAL THERAPY | Facility: HOSPITAL | Age: 68
DRG: 065 | End: 2023-12-10
Payer: COMMERCIAL

## 2023-12-10 PROBLEM — F01.50 VASCULAR DEMENTIA (H): Status: ACTIVE | Noted: 2023-12-10

## 2023-12-10 LAB
ANION GAP SERPL CALCULATED.3IONS-SCNC: 13 MMOL/L (ref 7–15)
BUN SERPL-MCNC: 40.6 MG/DL (ref 8–23)
C DIFF TOX B STL QL: NEGATIVE
CALCIUM SERPL-MCNC: 9.1 MG/DL (ref 8.8–10.2)
CHLORIDE SERPL-SCNC: 110 MMOL/L (ref 98–107)
CREAT SERPL-MCNC: 2.24 MG/DL (ref 0.51–0.95)
DEPRECATED HCO3 PLAS-SCNC: 20 MMOL/L (ref 22–29)
EGFRCR SERPLBLD CKD-EPI 2021: 23 ML/MIN/1.73M2
GLUCOSE BLDC GLUCOMTR-MCNC: 149 MG/DL (ref 70–99)
GLUCOSE BLDC GLUCOMTR-MCNC: 161 MG/DL (ref 70–99)
GLUCOSE BLDC GLUCOMTR-MCNC: 161 MG/DL (ref 70–99)
GLUCOSE BLDC GLUCOMTR-MCNC: 176 MG/DL (ref 70–99)
GLUCOSE BLDC GLUCOMTR-MCNC: 205 MG/DL (ref 70–99)
GLUCOSE SERPL-MCNC: 158 MG/DL (ref 70–99)
POTASSIUM SERPL-SCNC: 4.3 MMOL/L (ref 3.4–5.3)
SODIUM SERPL-SCNC: 143 MMOL/L (ref 135–145)

## 2023-12-10 PROCEDURE — 87493 C DIFF AMPLIFIED PROBE: CPT | Performed by: INTERNAL MEDICINE

## 2023-12-10 PROCEDURE — 250N000013 HC RX MED GY IP 250 OP 250 PS 637: Performed by: INTERNAL MEDICINE

## 2023-12-10 PROCEDURE — 999N000157 HC STATISTIC RCP TIME EA 10 MIN

## 2023-12-10 PROCEDURE — 94660 CPAP INITIATION&MGMT: CPT

## 2023-12-10 PROCEDURE — 80048 BASIC METABOLIC PNL TOTAL CA: CPT | Performed by: INTERNAL MEDICINE

## 2023-12-10 PROCEDURE — 36415 COLL VENOUS BLD VENIPUNCTURE: CPT | Performed by: INTERNAL MEDICINE

## 2023-12-10 PROCEDURE — 97129 THER IVNTJ 1ST 15 MIN: CPT | Mod: GN | Performed by: SPEECH-LANGUAGE PATHOLOGIST

## 2023-12-10 PROCEDURE — 99232 SBSQ HOSP IP/OBS MODERATE 35: CPT | Performed by: INTERNAL MEDICINE

## 2023-12-10 PROCEDURE — 97116 GAIT TRAINING THERAPY: CPT | Mod: GP | Performed by: PHYSICAL THERAPIST

## 2023-12-10 PROCEDURE — 120N000004 HC R&B MS OVERFLOW

## 2023-12-10 PROCEDURE — 97530 THERAPEUTIC ACTIVITIES: CPT | Mod: GP | Performed by: PHYSICAL THERAPIST

## 2023-12-10 RX ORDER — METOPROLOL SUCCINATE 100 MG/1
100 TABLET, EXTENDED RELEASE ORAL DAILY
Status: DISCONTINUED | OUTPATIENT
Start: 2023-12-10 | End: 2023-12-11 | Stop reason: HOSPADM

## 2023-12-10 RX ADMIN — DONEPEZIL HYDROCHLORIDE 10 MG: 5 TABLET, FILM COATED ORAL at 21:54

## 2023-12-10 RX ADMIN — TRAZODONE HYDROCHLORIDE 100 MG: 50 TABLET ORAL at 21:54

## 2023-12-10 RX ADMIN — INSULIN ASPART 1 UNITS: 100 INJECTION, SOLUTION INTRAVENOUS; SUBCUTANEOUS at 17:26

## 2023-12-10 RX ADMIN — PANTOPRAZOLE SODIUM 40 MG: 40 TABLET, DELAYED RELEASE ORAL at 08:41

## 2023-12-10 RX ADMIN — METOPROLOL SUCCINATE 100 MG: 100 TABLET, EXTENDED RELEASE ORAL at 08:42

## 2023-12-10 RX ADMIN — Medication 81 MG: at 08:40

## 2023-12-10 RX ADMIN — INSULIN GLARGINE 10 UNITS: 100 INJECTION, SOLUTION SUBCUTANEOUS at 08:42

## 2023-12-10 RX ADMIN — CLOPIDOGREL BISULFATE 75 MG: 75 TABLET ORAL at 08:41

## 2023-12-10 RX ADMIN — INSULIN ASPART 1 UNITS: 100 INJECTION, SOLUTION INTRAVENOUS; SUBCUTANEOUS at 12:02

## 2023-12-10 RX ADMIN — ESCITALOPRAM OXALATE 20 MG: 10 TABLET ORAL at 08:41

## 2023-12-10 RX ADMIN — ATORVASTATIN CALCIUM 80 MG: 40 TABLET, FILM COATED ORAL at 21:54

## 2023-12-10 RX ADMIN — INSULIN ASPART 1 UNITS: 100 INJECTION, SOLUTION INTRAVENOUS; SUBCUTANEOUS at 08:42

## 2023-12-10 RX ADMIN — AMLODIPINE BESYLATE 5 MG: 5 TABLET ORAL at 08:42

## 2023-12-10 RX ADMIN — ISOSORBIDE MONONITRATE 60 MG: 30 TABLET, EXTENDED RELEASE ORAL at 08:41

## 2023-12-10 ASSESSMENT — ACTIVITIES OF DAILY LIVING (ADL)
ADLS_ACUITY_SCORE: 32
ADLS_ACUITY_SCORE: 33
ADLS_ACUITY_SCORE: 32
ADLS_ACUITY_SCORE: 33

## 2023-12-10 NOTE — PLAN OF CARE
"  Problem: Adult Inpatient Plan of Care  Goal: Plan of Care Review  Description: The Plan of Care Review/Shift note should be completed every shift.  The Outcome Evaluation is a brief statement about your assessment that the patient is improving, declining, or no change.  This information will be displayed automatically on your shift  note.  Outcome: Progressing  Flowsheets (Taken 12/10/2023 1420)  Plan of Care Reviewed With: patient  Goal: Patient-Specific Goal (Individualized)  Description: You can add care plan individualizations to a care plan. Examples of Individualization might be:  \"Parent requests to be called daily at 9am for status\", \"I have a hard time hearing out of my right ear\", or \"Do not touch me to wake me up as it startles  me\".  Outcome: Progressing  Goal: Absence of Hospital-Acquired Illness or Injury  Outcome: Progressing  Goal: Optimal Comfort and Wellbeing  Outcome: Progressing  Goal: Readiness for Transition of Care  Outcome: Progressing     Problem: Suicide Risk  Goal: Absence of Self-Harm  Outcome: Progressing     Problem: Stroke, Ischemic (Includes Transient Ischemic Attack)  Goal: Optimal Coping  Outcome: Progressing  Goal: Effective Bowel Elimination  Outcome: Progressing  Goal: Optimal Cerebral Tissue Perfusion  Outcome: Progressing  Goal: Optimal Cognitive Function  Outcome: Progressing  Goal: Improved Communication Skills  Outcome: Progressing  Goal: Optimal Functional Ability  Outcome: Progressing  Intervention: Optimize Functional Ability  Recent Flowsheet Documentation  Taken 12/10/2023 1100 by Jonh Fraga RN  Activity Management: (Patient refused to ambulate with RN)   patient refuses activity   other (see comments)  Goal: Optimal Nutrition Intake  Outcome: Progressing  Goal: Effective Oxygenation and Ventilation  Outcome: Progressing  Goal: Improved Sensorimotor Function  Outcome: Progressing  Goal: Safe and Effective Swallow  Outcome: Progressing  Goal: Effective Urinary " Elimination  Outcome: Progressing     Problem: UTI (Urinary Tract Infection)  Goal: Improved Infection Symptoms  Outcome: Progressing     Problem: Comorbidity Management  Goal: Blood Glucose Levels Within Targeted Range  Outcome: Progressing  Goal: Blood Pressure in Desired Range  Outcome: Progressing    Goal Outcome Evaluation:      Plan of Care Reviewed With: patient    Pt is alert but disoriented to time/situation with intermittent confusion. No acute changes found with continued neuro checks. Vitally stable & saturating well on RA - denies SOB. Stool sample collected & C-diff results now pending - enteric precautions continued. Pt is continent of bladder but continues to be incontinent of large & loose stools. Also with worsening JEFFREY, creatinine continues trending up today - bumex now on hold. Writer offered to ambulate with patient multiple times but activity was refused. Will continue to monitor.    John Fraga RN on 12/10/2023 at 2:25 PM

## 2023-12-10 NOTE — PLAN OF CARE
Goal Outcome Evaluation:      Plan of Care Reviewed With: patient    Overall Patient Progress: improvingOverall Patient Progress: improving     Pt. Slept well overnight. Denied pain, nausea, or dyspnea. VSS, continue to monitor.

## 2023-12-10 NOTE — PROGRESS NOTES
Care Management Follow Up    Length of Stay (days): 4    Expected Discharge Date: 12/11/2023     Concerns to be Addressed: TCU placement, [employer] insurance auth pending.   Patient plan of care discussed at interdisciplinary rounds: Yes    Anticipated Discharge Disposition:  TCU     Anticipated Discharge Services:    Anticipated Discharge DME:      Patient/family educated on Medicare website which has current facility and service quality ratings:    Education Provided on the Discharge Plan:    Patient/Family in Agreement with the Plan:      Referrals Placed by CM/SW:  TCU  Private pay costs discussed: Not applicable    Additional Information:  Plan was for pt to go home with  on Friday 12/8 then late in the day had a conversation with pt  and pt daughter and they both feel pt needs to go to TCU to get stronger before returning home with . Therapy rec has since then changed to TCU. This was discussed with the pt and she is not happy but accepting of going to TCU. We discussed that she will need insurance authorization before going to TCU and pt was accepting. This was submitted late 12/8. CM checked the protal on 12/9 to see if auth had been approved and could not find the pt in the portal so CM resubmitted the auth will check later today on the status. Pt has been accepted to [facility] and [facility] pending auth approval. CM will discuss facilities with the pt once auth has been approved.    2:39 PM  Prior auth has been approved and writer went to pt room to discuss this and also discuss the two TCU's that have accepted the pt. The pt would like to go to [facility]. [place] [facility] [place] let them know that pt has accepted the bed and that she will likely be ready Monday morning. Also tried to update the pt  and see if he wants to transport the pt to the TCU tomorrow morning, no answer LVM to call back. PAS done.    Yasmine Silva, RN

## 2023-12-10 NOTE — PROGRESS NOTES
[facility]    Hospitalist Progress Note    Assessment & Plan   68 year old female who was admitted on 12/5/2023 for recurrent stroke.     Impression:   Principal Problem:    Acute CVA Right Parietal-Occipital 12/6/23  Active Problems:    Benign essential hypertension    DM type 2, Hgb A1C 6.3 on 12/6/23    Chronic heart failure with preserved ejection fraction (H)    DIANA on CPAP    Hx of Left Parietal-Occipital CVA 6/11/23    Urinary tract infection without hematuria, site unspecified    Vascular dementia (H)      Plan:  Discussed with Neurology, will resume aspirin, check head CT to rule out bleed and if no bleed then start Plavix.  Will complete workup to rule out embolic source of stroke (cardiac event monitor for 30 days).  Discussed with  and left message for daughter.     DVT Prophylaxis: Pneumatic Compression Devices  Code Status: Full Code    Disposition: Expected discharge to TCU when place available.    John Husain MD  Pager [phone]  Cell Phone [phone]  Text Page (7am to 6pm)    Interval History   Feels OK, agreeable to TCU.     Physical Exam   Temp: 98.2  F (36.8  C) Temp src: Oral BP: (!) 155/68 Pulse: 62   Resp: 20 SpO2: 100 % O2 Device: None (Room air)    Vitals:    12/07/23 0442 12/08/23 0652 12/09/23 0633   Weight: 87.3 kg (192 lb 7.4 oz) 86.7 kg (191 lb 2.2 oz) 85.3 kg (188 lb 1.6 oz)     Vital Signs with Ranges  Temp:  [97.7  F (36.5  C)-98.2  F (36.8  C)] 98.2  F (36.8  C)  Pulse:  [62-75] 62  Resp:  [18-20] 20  BP: (127-155)/(57-69) 155/68  SpO2:  [95 %-100 %] 100 %  I/O last 3 completed shifts:  In: 960 [P.O.:960]  Out: 1450 [Urine:1450]    # Pain Assessment:      12/10/2023    12:00 PM   Current Pain Score   Patient currently in pain? denies   Indu s pain level was assessed and she currently denies pain.        Constitutional: Awake, alert, cooperative, no apparent distress  Respiratory: Clear to auscultation bilaterally, no crackles or  wheezing  Cardiovascular: Regular rate and rhythm, normal S1 and S2, and no murmur noted  GI: Normal bowel sounds, soft, non-distended, non-tender  Extrem: No calf tenderness, no ankle edema  Neuro: Ox2, no focal motor or sensory deficits, but forgetful     Medications      amLODIPine  5 mg Oral Daily    aspirin  81 mg Oral Daily    atorvastatin  80 mg Oral QPM    clopidogrel  75 mg Oral Daily    donepezil  10 mg Oral At Bedtime    escitalopram  20 mg Oral Daily    insulin aspart  1-7 Units Subcutaneous TID AC    insulin aspart  1-5 Units Subcutaneous At Bedtime    insulin glargine  10 Units Subcutaneous QAM    isosorbide mononitrate  60 mg Oral Daily    metoprolol succinate ER  100 mg Oral Daily    pantoprazole  40 mg Oral QAM AC    sodium chloride (PF)  3 mL Intracatheter Q8H    traZODone  100 mg Oral At Bedtime       Data   Recent Labs   Lab 12/10/23  1155 12/10/23  0755 12/10/23  0508 12/09/23  0804 12/09/23  0510 12/08/23  0818 12/08/23  0521 12/06/23  0751 12/06/23  0734 12/05/23  2143   WBC  --   --   --   --  8.4  --   --   --  9.4 10.0   HGB  --   --   --   --  11.1*  --   --   --  11.9 12.5   MCV  --   --   --   --  87  --   --   --  86 86   PLT  --   --   --   --  320  --   --   --  354 403   NA  --   --  143  --  139  --  139  --  138 135   POTASSIUM  --   --  4.3  --  3.5  --  3.4  --  3.9 3.8   CHLORIDE  --   --  110*  --  106  --  107  --  107 101   CO2  --   --  20*  --  21*  --  20*  --  21* 18*   BUN  --   --  40.6*  --  37.1*  --  32.2*  --  27.2* 33.8*   CR  --   --  2.24*  --  2.17*  --  2.03*  --  1.60* 1.88*   ANIONGAP  --   --  13  --  12  --  12  --  10 16*   JARETT  --   --  9.1  --  9.1  --  9.0  --  9.4 9.8   * 161* 158*   < > 148*   < > 151*   < > 157* 220*   ALBUMIN  --   --   --   --   --   --   --   --   --  4.4   PROTTOTAL  --   --   --   --   --   --   --   --   --  8.0   BILITOTAL  --   --   --   --   --   --   --   --   --  0.3   ALKPHOS  --   --   --   --   --   --   --   --    --  153*   ALT  --   --   --   --   --   --   --   --   --  18   AST  --   --   --   --   --   --   --   --   --  26    < > = values in this interval not displayed.       Imaging:   No results found for this or any previous visit (from the past 24 hour(s)).

## 2023-12-10 NOTE — PLAN OF CARE
Problem: Adult Inpatient Plan of Care  Goal: Plan of Care Review  Description: The Plan of Care Review/Shift note should be completed every shift.  The Outcome Evaluation is a brief statement about your assessment that the patient is improving, declining, or no change.  This information will be displayed automatically on your shift  note.  Outcome: Progressing     Problem: Adult Inpatient Plan of Care  Goal: Absence of Hospital-Acquired Illness or Injury  Intervention: Prevent Infection  Recent Flowsheet Documentation  Taken 12/9/2023 2116 by Silvia Garcia RN  Infection Prevention:   hand hygiene promoted   rest/sleep promoted  Taken 12/9/2023 1626 by Silvia Garcia RN  Infection Prevention:   hand hygiene promoted   rest/sleep promoted     Problem: Stroke, Ischemic (Includes Transient Ischemic Attack)  Goal: Optimal Coping  Outcome: Progressing     Problem: Stroke, Ischemic (Includes Transient Ischemic Attack)  Goal: Effective Bowel Elimination  Outcome: Progressing     Problem: Stroke, Ischemic (Includes Transient Ischemic Attack)  Goal: Optimal Cerebral Tissue Perfusion  Intervention: Protect and Optimize Cerebral Perfusion  Recent Flowsheet Documentation  Taken 12/9/2023 2116 by Silvia Garcia RN  Cerebral Perfusion Promotion: blood pressure monitored  Taken 12/9/2023 1626 by Silvia Garcia RN  Cerebral Perfusion Promotion: blood pressure monitored     Problem: Comorbidity Management  Goal: Blood Glucose Levels Within Targeted Range  Intervention: Monitor and Manage Glycemia  Recent Flowsheet Documentation  Taken 12/9/2023 2116 by Silvia Garcia RN  Medication Review/Management: medications reviewed  Taken 12/9/2023 1626 by Silvia Garcia RN  Medication Review/Management: medications reviewed  Goal: Blood Pressure in Desired Range  Intervention: Maintain Blood Pressure Management  Recent Flowsheet Documentation  Taken 12/9/2023 2116 by Silvia Garcia RN  Medication Review/Management: medications reviewed  Taken  12/9/2023 1626 by Silvia Garcia RN  Medication Review/Management: medications reviewed   Goal Outcome Evaluation:               Alert. Disoriented to time. Intermittently confused and disoriented. Clears up few minutes after waking up.    Denied pain.    Right hand grasp little weaker than right. Patient is right handed.     Up on chair for dinner.     Had x 2 loose stools this shift and on day shift. Cross cover MD informed, ordered C-diff, sample to collect yet.    Little weak and unsteady. Assist of 1, gait belt and walker. Falls precaution and interventions continued.

## 2023-12-10 NOTE — PROGRESS NOTES
[facility]    Hospitalist Progress Note    Assessment & Plan   68 year old female who was admitted on 12/5/2023 for recurrent stroke.     Impression:   Principal Problem:    Ischemic stroke (H)  Active Problems:    Benign essential hypertension    DM type 2, Hgb A1C 6.3 on 12/6/23    Chronic heart failure with preserved ejection fraction (H)    DIANA on CPAP    History of CVA (cerebrovascular accident)    Urinary tract infection without hematuria, site unspecified      Plan:  Discussed with Neurology, will resume aspirin, check head CT to rule out bleed and if no bleed then start Plavix.  Will complete workup to rule out embolic source of stroke (cardiac event monitor for 30 days).  Discussed with  and left message for daughter.     DVT Prophylaxis: Pneumatic Compression Devices  Code Status: Full Code    Disposition: Expected discharge to TCU when place available.    John Husain MD  Pager [phone]  Cell Phone [phone]  Text Page (7am to 6pm)    Interval History   Feels OK, agreeable to TCU.     Physical Exam   Temp: 98  F (36.7  C) Temp src: Oral BP: 127/57 Pulse: 70   Resp: 20 SpO2: 96 % O2 Device: None (Room air)    Vitals:    12/07/23 0442 12/08/23 0652 12/09/23 0633   Weight: 87.3 kg (192 lb 7.4 oz) 86.7 kg (191 lb 2.2 oz) 85.3 kg (188 lb 1.6 oz)     Vital Signs with Ranges  Temp:  [97.5  F (36.4  C)-98.1  F (36.7  C)] 98  F (36.7  C)  Pulse:  [66-71] 70  Resp:  [18-22] 20  BP: (127-147)/(57-76) 127/57  SpO2:  [92 %-97 %] 96 %  I/O last 3 completed shifts:  In: 963 [P.O.:960; I.V.:3]  Out: 1600 [Urine:1600]    # Pain Assessment:      12/9/2023     4:26 PM   Current Pain Score   Patient currently in pain? denies   Indu s pain level was assessed and she currently denies pain.        Constitutional: Awake, alert, cooperative, no apparent distress  Respiratory: Clear to auscultation bilaterally, no crackles or wheezing  Cardiovascular: Regular rate and rhythm,  normal S1 and S2, and no murmur noted  GI: Normal bowel sounds, soft, non-distended, non-tender  Extrem: No calf tenderness, no ankle edema  Neuro: Ox2, and still thought Conrad was president.   no focal motor or sensory deficits    Medications      amLODIPine  5 mg Oral Daily    aspirin  81 mg Oral Daily    atorvastatin  80 mg Oral QPM    bumetanide  2 mg Oral BID    clopidogrel  75 mg Oral Daily    donepezil  10 mg Oral At Bedtime    escitalopram  20 mg Oral Daily    insulin aspart  1-7 Units Subcutaneous TID AC    insulin aspart  1-5 Units Subcutaneous At Bedtime    insulin glargine  10 Units Subcutaneous QAM    isosorbide mononitrate  60 mg Oral Daily    metoprolol succinate ER  50 mg Oral Daily    pantoprazole  40 mg Oral QAM AC    sodium chloride (PF)  3 mL Intracatheter Q8H    traZODone  100 mg Oral At Bedtime       Data   Recent Labs   Lab 12/09/23  1633 12/09/23  1218 12/09/23  0804 12/09/23  0510 12/08/23  0818 12/08/23  0521 12/06/23  0751 12/06/23  0734 12/05/23  2143   WBC  --   --   --  8.4  --   --   --  9.4 10.0   HGB  --   --   --  11.1*  --   --   --  11.9 12.5   MCV  --   --   --  87  --   --   --  86 86   PLT  --   --   --  320  --   --   --  354 403   NA  --   --   --  139  --  139  --  138 135   POTASSIUM  --   --   --  3.5  --  3.4  --  3.9 3.8   CHLORIDE  --   --   --  106  --  107  --  107 101   CO2  --   --   --  21*  --  20*  --  21* 18*   BUN  --   --   --  37.1*  --  32.2*  --  27.2* 33.8*   CR  --   --   --  2.17*  --  2.03*  --  1.60* 1.88*   ANIONGAP  --   --   --  12  --  12  --  10 16*   JARETT  --   --   --  9.1  --  9.0  --  9.4 9.8   * 229* 142* 148*   < > 151*   < > 157* 220*   ALBUMIN  --   --   --   --   --   --   --   --  4.4   PROTTOTAL  --   --   --   --   --   --   --   --  8.0   BILITOTAL  --   --   --   --   --   --   --   --  0.3   ALKPHOS  --   --   --   --   --   --   --   --  153*   ALT  --   --   --   --   --   --   --   --  18   AST  --   --   --   --   --   --    --   --  26    < > = values in this interval not displayed.       Imaging:   No results found for this or any previous visit (from the past 24 hour(s)).

## 2023-12-11 ENCOUNTER — APPOINTMENT (OUTPATIENT)
Dept: OCCUPATIONAL THERAPY | Facility: HOSPITAL | Age: 68
DRG: 065 | End: 2023-12-11
Payer: COMMERCIAL

## 2023-12-11 VITALS
BODY MASS INDEX: 34.61 KG/M2 | DIASTOLIC BLOOD PRESSURE: 62 MMHG | RESPIRATION RATE: 18 BRPM | HEIGHT: 62 IN | SYSTOLIC BLOOD PRESSURE: 130 MMHG | TEMPERATURE: 98.2 F | HEART RATE: 69 BPM | OXYGEN SATURATION: 95 % | WEIGHT: 188.1 LBS

## 2023-12-11 LAB
ANION GAP SERPL CALCULATED.3IONS-SCNC: 10 MMOL/L (ref 7–15)
BACTERIA BLD CULT: NO GROWTH
BACTERIA BLD CULT: NO GROWTH
BUN SERPL-MCNC: 36.9 MG/DL (ref 8–23)
CALCIUM SERPL-MCNC: 9 MG/DL (ref 8.8–10.2)
CHLORIDE SERPL-SCNC: 110 MMOL/L (ref 98–107)
CREAT SERPL-MCNC: 1.99 MG/DL (ref 0.51–0.95)
DEPRECATED HCO3 PLAS-SCNC: 20 MMOL/L (ref 22–29)
EGFRCR SERPLBLD CKD-EPI 2021: 27 ML/MIN/1.73M2
GLUCOSE BLDC GLUCOMTR-MCNC: 126 MG/DL (ref 70–99)
GLUCOSE BLDC GLUCOMTR-MCNC: 193 MG/DL (ref 70–99)
GLUCOSE SERPL-MCNC: 111 MG/DL (ref 70–99)
HOLD SPECIMEN: NORMAL
POTASSIUM SERPL-SCNC: 3.8 MMOL/L (ref 3.4–5.3)
SODIUM SERPL-SCNC: 140 MMOL/L (ref 135–145)

## 2023-12-11 PROCEDURE — 36415 COLL VENOUS BLD VENIPUNCTURE: CPT | Performed by: INTERNAL MEDICINE

## 2023-12-11 PROCEDURE — 80048 BASIC METABOLIC PNL TOTAL CA: CPT | Performed by: INTERNAL MEDICINE

## 2023-12-11 PROCEDURE — 94660 CPAP INITIATION&MGMT: CPT

## 2023-12-11 PROCEDURE — 250N000013 HC RX MED GY IP 250 OP 250 PS 637: Performed by: INTERNAL MEDICINE

## 2023-12-11 PROCEDURE — 99239 HOSP IP/OBS DSCHRG MGMT >30: CPT | Performed by: INTERNAL MEDICINE

## 2023-12-11 PROCEDURE — 97535 SELF CARE MNGMENT TRAINING: CPT | Mod: GO

## 2023-12-11 PROCEDURE — 999N000157 HC STATISTIC RCP TIME EA 10 MIN

## 2023-12-11 RX ORDER — METOPROLOL SUCCINATE 100 MG/1
100 TABLET, EXTENDED RELEASE ORAL DAILY
Start: 2023-12-12 | End: 2024-09-12

## 2023-12-11 RX ADMIN — CLOPIDOGREL BISULFATE 75 MG: 75 TABLET ORAL at 08:44

## 2023-12-11 RX ADMIN — ISOSORBIDE MONONITRATE 60 MG: 30 TABLET, EXTENDED RELEASE ORAL at 08:45

## 2023-12-11 RX ADMIN — PANTOPRAZOLE SODIUM 40 MG: 40 TABLET, DELAYED RELEASE ORAL at 06:26

## 2023-12-11 RX ADMIN — Medication 81 MG: at 08:45

## 2023-12-11 RX ADMIN — METOPROLOL SUCCINATE 100 MG: 100 TABLET, EXTENDED RELEASE ORAL at 08:45

## 2023-12-11 RX ADMIN — AMLODIPINE BESYLATE 5 MG: 5 TABLET ORAL at 08:44

## 2023-12-11 RX ADMIN — INSULIN GLARGINE 10 UNITS: 100 INJECTION, SOLUTION SUBCUTANEOUS at 08:46

## 2023-12-11 RX ADMIN — ACETAMINOPHEN 650 MG: 325 TABLET ORAL at 08:44

## 2023-12-11 RX ADMIN — ESCITALOPRAM OXALATE 20 MG: 10 TABLET ORAL at 08:45

## 2023-12-11 RX ADMIN — INSULIN ASPART 2 UNITS: 100 INJECTION, SOLUTION INTRAVENOUS; SUBCUTANEOUS at 11:49

## 2023-12-11 ASSESSMENT — ACTIVITIES OF DAILY LIVING (ADL)
ADLS_ACUITY_SCORE: 33
ADLS_ACUITY_SCORE: 36
ADLS_ACUITY_SCORE: 33
ADLS_ACUITY_SCORE: 32
ADLS_ACUITY_SCORE: 32
ADLS_ACUITY_SCORE: 36
ADLS_ACUITY_SCORE: 32
ADLS_ACUITY_SCORE: 36

## 2023-12-11 NOTE — PLAN OF CARE
Physical Therapy Discharge Summary    Reason for therapy discharge:    Discharged to transitional care facility.    Progress towards therapy goal(s). See goals on Care Plan in [facility] electronic health record for goal details.  Goals partially met.  Barriers to achieving goals:   discharge from facility.    Therapy recommendation(s):    Continued therapy is recommended.  Rationale/Recommendations:  recommend continued PT at TCU.

## 2023-12-11 NOTE — PLAN OF CARE
Problem: UTI (Urinary Tract Infection)  Goal: Improved Infection Symptoms  Outcome: Progressing     Problem: Comorbidity Management  Goal: Blood Glucose Levels Within Targeted Range  Outcome: Progressing  Goal: Blood Pressure in Desired Range  Outcome: Progressing   Goal Outcome Evaluation:               Pt is axox3 calm and cooperative.  Pt is assist of 1 with walker to bathroom.     Pt C-diff sample was negative.  Pt had no loose stool on this 8 hr evening shift.     Pt is on RA with no sob.  Pt denies pain.      Pt will discharge to TCU

## 2023-12-11 NOTE — PROGRESS NOTES
Care Management Discharge Note    Discharge Date: 12/11/2023       Discharge Disposition: Transitional Care      Discharge Transportation: family or friend will provide    PAS Confirmation Code: [id number]    Education Provided on the Discharge Plan:  Per Care team   Persons Notified of Discharge Plans: Yes  Patient/Family in Agreement with the Plan:      Handoff Referral Completed: Yes    Additional Information:  Final discharge plan is for pt to go to [facility] TCU today 12/11. Lucila Narvaez plans on transporting at 4:00pm today. Facility just needs orders in by 2:00pm provider is aware. Writer updated bedside, and facility as well. PAS completed.       [employer] Auth Approval # [id number]    Flora Barfield RN

## 2023-12-11 NOTE — PLAN OF CARE
Speech Language Therapy Discharge Summary    Reason for therapy discharge:    Discharged to transitional care facility.    Progress towards therapy goal(s). See goals on Care Plan in [facility] electronic health record for goal details.  Goals partially met.  Barriers to achieving goals:   limited awareness of cognitive-linguistic and memory deficits.    Therapy recommendation(s):    Continued therapy is recommended.  Rationale/Recommendations:  Impaired comprehension for follow through, memory, attention to detail, and problem solving for safety and independence.

## 2023-12-11 NOTE — PLAN OF CARE
Goal Outcome Evaluation:    A/O x2. VSS. Denied pain. Assist of one with ADLs. Discharged to TCU with daughter at 1610. Took her personal belongings and discharge papers with her.

## 2023-12-11 NOTE — PLAN OF CARE
Goal Outcome Evaluation:      Plan of Care Reviewed With: patient    Overall Patient Progress: improvingOverall Patient Progress: improving     Pt. Slept well overnight, no c/o. Alert, oriented to self and place, but did not know time and date. Forgetful, and will set off alarm to get up. SBA to BR to void. Had one small loose BM.VSS, continue to monitor.

## 2023-12-11 NOTE — PROGRESS NOTES
Care Management Follow Up    Length of Stay (days): 5    Expected Discharge Date: 12/11/2023    Concerns to be Addressed:   Potential discharge today.  Will patient need CPAP?  Patient plan of care discussed at interdisciplinary rounds: Yes    Anticipated Discharge Disposition:  Transitional care (TCU) is recommended for continued therapy and skilled nursing.     Anticipated Discharge Services:  Continued therapy, skilled nursing and medical management.   Anticipated Discharge DME:  Per therapy (if indicated).    Patient/family educated on Medicare website which has current facility and service quality ratings:  Yes   Education Provided on the Discharge Plan:   Per team  Patient/Family in Agreement with the Plan:   Yes    Referrals Placed by CM/SW:  See previous CM notes   Private pay costs discussed: Not applicable at this time.     Additional Information:  Discharge is pending availability at TCU. [employer] authorization has been received. ? If patient will need CPAP at the TCU? Uncertain if she has her own. Left a message for admissions to discuss.     Mitzi Hutson RN

## 2023-12-11 NOTE — DISCHARGE SUMMARY
[facility]    Discharge Summary  Hospitalist    Date of Admission:  12/5/2023  Date of Discharge:  12/11/2023  4:11 PM  Discharging Provider: John Husain MD, MD    Discharge Diagnoses   Principal Problem:    Acute CVA Right Parietal-Occipital 12/6/23      Hx of Left Parietal-Occipital CVA 6/11/23      Urinary tract infection       Vascular dementia (H)    Active Problems:    Benign essential hypertension      DM type 2, Hgb A1C 6.3 on 12/6/23      Chronic heart failure with preserved ejection fraction (H)      DIANA on CPAP      Moderate Aortic Stenosis    History of Present Illness    68 year old female who was brought to the ER secondary to confusion.  Patient admits that she may have been confused and that was why she ended up in the ER.  Family was concerned for UTI patient however denies any fever chills nausea vomiting abdominal pain dysuria or frequency.  Workup in the ER included a CT and an MRI that showed subacute CVA and there was also mention of possible petechial hemorrhage hospitalist consulted for admission.    In ER, Creat 1.88, Trop 75, glucose 220, UA with 41 WBC's, And MRI/MRA showed:  HEAD MRI:  1.  Moderate-sized zone of acute to early subacute ischemic infarction centered in the inferomedial right temporal lobe. This involves the hippocampus and parahippocampal structures and posterior right thalamus.  2.  No space-occupying hemorrhage.   3.  Age-related changes.     HEAD MRA:  1.  5 mm segment of high-grade stenosis/near occlusion at the right P1-P2 junction. High-grade stenosis or occlusion of the distal right P3 segment.  2.  Moderate atheromatous disease elsewhere.     NECK MRA:  No measurable stenosis or dissection.    Hospital Course   Admitted to cardiac telemetry, no arrhythmias noted, but given multiple strokes in multiple areas we will get a 30 day cardiac event recorder to look for possible PAF.  Seen by Neurology, advised Aspirin EC 81 mg and  Plavix 75 mg daily.      Cardiac Echo showed:  1.Left ventricular size, wall motion and function are normal. The ejection  fraction is 60-65%.  2.There is mild concentric left ventricular hypertrophy.  3.39 mmHg LVOT peak gradient mid cavity due to hyperdynamic state.  4.Normal right ventricle size and systolic function.  5.Immobile mitral valve posterior leaflet There is mild mitral stenosis. The  mean mitral valve gradient is 5mmHg.  6.Thickened trileaflet aortic valve with moderate stenosis, at SVI of 53 mL/M2  peak velocity 3.0 m/s with a mean gradient 19 mmHg and dimensionless index  0.51.  Compared to the prior study dated 6/22/2023, there have been no changes.    Was seen by PT and OT and speech -- initially recommended home with home therapy, but there was concern that her  may have some cognitive issues (he did not know how to check her blood sugars, and had difficulty comprehending her medical diagnoses) and daughter strongly advocated for TCU setting at discharge and the patient was agreeable to this.     Did have Urine Culture grow 2 strains of Citrobacter, was treated with IV Ceftriaxone then oral Keflex and stopped after 4 days total.     It appears she has baseline cognitive impairment as on Aricept 5 mg daily prior to this stroke, and her may deficit now is worsening cognitive impairment.  Her insulin dose was adjusted, and her Metoprolol dose decreased slightly related to episode borderline hypotension.    _______________    John Husain MD  Pager: [phone]  Cell Phone:  [phone]       Significant Results and Procedures   As above    Pending Results   These results will be followed up by Dr. Husain  Unresulted Labs Ordered in the Past 30 Days of this Admission       No orders found from 11/5/2023 to 12/6/2023.            Code Status   Full Code       Primary Care Physician   Flora Betts    Physical Exam   Temp: 98.6  F (37  C) Temp src: Oral BP: 106/49 Pulse: 74   Resp: 20  SpO2: 95 % O2 Device: None (Room air)    Vitals:    12/07/23 0442 12/08/23 0652 12/09/23 0633   Weight: 87.3 kg (192 lb 7.4 oz) 86.7 kg (191 lb 2.2 oz) 85.3 kg (188 lb 1.6 oz)     Vital Signs with Ranges  Temp:  [97.7  F (36.5  C)-100.3  F (37.9  C)] 98.6  F (37  C)  Pulse:  [66-79] 74  Resp:  [16-20] 20  BP: (106-156)/(49-77) 106/49  SpO2:  [95 %-97 %] 95 %  I/O last 3 completed shifts:  In: 720 [P.O.:720]  Out: 750 [Urine:750]    Exam on discharge:   Lungs clear  CV with reg S1S2 with 2/6 systolic murmur  Neuro -- alert and oriented x 2    Discharge Disposition   Discharged to short-term care facility  Condition at discharge: Stable    Consultations This Hospital Stay   NEUROLOGY IP STROKE CONSULT  NEUROLOGY IP CONSULT  PHYSICAL THERAPY ADULT IP CONSULT  OCCUPATIONAL THERAPY ADULT IP CONSULT  SPEECH LANGUAGE PATH ADULT IP CONSULT  CARE MANAGEMENT / SOCIAL WORK IP CONSULT  PHYSICAL THERAPY ADULT IP CONSULT  OCCUPATIONAL THERAPY ADULT IP CONSULT    Time Spent on this Encounter   I spent a total of 35 minutes discharging this patient.     Discharge Orders      Primary Care - Care Coordination Referral      Reason for your hospital stay    Stroke     Follow-up and recommended labs and tests     Follow up with TCU provider in 5 days, and check BMP then and review blood glucose control and BP control, and follow-up with Neurology in 6-8 weeks to follow up on stroke (Neurology office is [phone]).    Call Dr. Redmond if any medical questions at Cell Phone [phone].     General info for SNF    Length of Stay Estimate: Short Term Care: Estimated # of Days <30  Condition at Discharge: Stable  Level of care:skilled   Rehabilitation Potential: Fair  Admission H&P remains valid and up-to-date: Yes  Recent Chemotherapy: N/A  Use Nursing Home Standing Orders: Yes     Mantoux instructions    Give two-step Mantoux (PPD) Per Facility Policy Yes     Glucose monitor nursing POCT    Before meals     Activity - Up with nursing  assistance     Full Code     Physical Therapy Adult Consult    Evaluate and treat as clinically indicated.    Reason:  Weakness     Occupational Therapy Adult Consult    Evaluate and treat as clinically indicated.    Reason:  Assist with ADL's     Adult Cardiac Event Monitor     Diet    Follow this diet upon discharge: Orders Placed This Encounter        Diabetic diet.     Discharge Medications   Current Discharge Medication List        START taking these medications    Details   acetaminophen (TYLENOL) 325 MG tablet Take 2 tablets (650 mg) by mouth every 4 hours as needed for mild pain or other (and adjunct with moderate or severe pain or per patient request)    Associated Diagnoses: Pain      clopidogrel (PLAVIX) 75 MG tablet Take 1 tablet (75 mg) by mouth daily  Qty: 30 tablet, Refills: 1    Associated Diagnoses: History of CVA (cerebrovascular accident)      insulin aspart (NOVOLOG PEN) 100 UNIT/ML pen 3 times a day with meals, for glucometer 150-200 give 2 units SQ, 201-250 give 4 units, >250 give 6 units    Associated Diagnoses: Type 2 diabetes mellitus with hyperosmolarity without coma, with long-term current use of insulin (H)           CONTINUE these medications which have CHANGED    Details   bumetanide (BUMEX) 2 MG tablet Take 1 tablet (2 mg) by mouth daily  Qty: 180 tablet, Refills: 1    Associated Diagnoses: Chronic heart failure with preserved ejection fraction (H)      insulin glargine (LANTUS SOLOSTAR) 100 UNIT/ML pen Inject 10 Units Subcutaneous every morning    Comments: If Lantus is not covered by insurance, may substitute Basaglar or Semglee or other insulin glargine product per insurance preference at same dose and frequency.    Associated Diagnoses: Type 2 diabetes mellitus with hyperosmolarity without coma, with long-term current use of insulin (H)      loperamide (IMODIUM) 2 MG capsule Take 1 capsule (2 mg) by mouth 2 times daily as needed for diarrhea    Associated Diagnoses: Diarrhea,  unspecified type      metoprolol succinate ER (TOPROL XL) 100 MG 24 hr tablet Take 1 tablet (100 mg) by mouth daily    Associated Diagnoses: Ischemic stroke (H)           CONTINUE these medications which have NOT CHANGED    Details   amLODIPine (NORVASC) 5 MG tablet Take 1 tablet (5 mg) by mouth daily  Qty: 30 tablet, Refills: 0    Associated Diagnoses: Benign essential hypertension      aspirin 81 MG EC tablet Take 81 mg by mouth daily      atorvastatin (LIPITOR) 80 MG tablet Take 80 mg by mouth daily      cholecalciferol 25 MCG (1000 UT) TABS Take 1 tablet by mouth daily      donepezil (ARICEPT) 5 MG tablet Take 10 mg by mouth At Bedtime      escitalopram (LEXAPRO) 20 MG tablet Take 20 mg by mouth daily      fish oil-omega-3 fatty acids 1000 MG capsule Take 1,000 mg by mouth daily      isosorbide mononitrate (IMDUR) 60 MG 24 hr tablet Take 1 tablet (60 mg) by mouth 2 times daily  Qty: 60 tablet, Refills: 0    Associated Diagnoses: Benign essential hypertension; Pulmonary hypertension (H)      melatonin 3 MG tablet Take 6 mg by mouth nightly as needed for sleep      multivitamin (ONE-DAILY) tablet Take 1 tablet by mouth daily      omeprazole (PRILOSEC) 20 MG DR capsule 20 mg daily before breakfast      traZODone (DESYREL) 100 MG tablet Take 100 mg by mouth At Bedtime           STOP taking these medications       blood glucose (CONTOUR NEXT TEST) test strip Comments:   Reason for Stopping:         hydrALAZINE (APRESOLINE) 50 MG tablet Comments:   Reason for Stopping:             Allergies   Allergies   Allergen Reactions    Lisinopril Other (See Comments) and Unknown     Data   Most Recent 3 CBC's:  Recent Labs   Lab Test 12/09/23  0510 12/06/23  0734 12/05/23  2143   WBC 8.4 9.4 10.0   HGB 11.1* 11.9 12.5   MCV 87 86 86    354 403      Most Recent 3 BMP's:  Recent Labs   Lab Test 12/11/23  1127 12/11/23  0752 12/11/23  0452 12/10/23  0755 12/10/23  0508 12/09/23  0804 12/09/23  0510   NA  --   --  140  --   143  --  139   POTASSIUM  --   --  3.8  --  4.3  --  3.5   CHLORIDE  --   --  110*  --  110*  --  106   CO2  --   --  20*  --  20*  --  21*   BUN  --   --  36.9*  --  40.6*  --  37.1*   CR  --   --  1.99*  --  2.24*  --  2.17*   ANIONGAP  --   --  10  --  13  --  12   JARETT  --   --  9.0  --  9.1  --  9.1   * 126* 111*   < > 158*   < > 148*    < > = values in this interval not displayed.     Most Recent 2 LFT's:  Recent Labs   Lab Test 12/05/23  2143 06/21/23  1801   AST 26 25   ALT 18 15   ALKPHOS 153* 121*   BILITOTAL 0.3 0.4     Most Recent INR's and Anticoagulation Dosing History:  Anticoagulation Dose History           No data to display              Most Recent 3 Troponin's:No lab results found.  Most Recent Cholesterol Panel:  Recent Labs   Lab Test 12/06/23  0734   CHOL 193   *   HDL 39*   TRIG 217*     Most Recent 6 Bacteria Isolates From Any Culture (See EPIC Reports for Culture Details):No lab results found.  Most Recent TSH, T4 and A1c Labs:  Recent Labs   Lab Test 12/06/23  0734   A1C 6.3*

## 2023-12-12 NOTE — PLAN OF CARE
Occupational Therapy Discharge Summary    Reason for therapy discharge:    Discharged to transitional care facility.    Progress towards therapy goal(s). See goals on Care Plan in [facility] electronic health record for goal details.  Goals partially met.  Barriers to achieving goals:   discharge from facility.    Therapy recommendation(s):    Continued therapy is recommended.  Rationale/Recommendations:  to maximize ADL independence.

## 2023-12-13 ENCOUNTER — HOSPITAL ENCOUNTER (OUTPATIENT)
Dept: CARDIOLOGY | Facility: HOSPITAL | Age: 68
Discharge: HOME | End: 2023-12-13
Attending: INTERNAL MEDICINE | Admitting: INTERNAL MEDICINE
Payer: COMMERCIAL

## 2023-12-13 ENCOUNTER — TRANSITIONAL CARE UNIT VISIT (OUTPATIENT)
Dept: GERIATRICS | Facility: CLINIC | Age: 68
End: 2023-12-13
Payer: COMMERCIAL

## 2023-12-13 VITALS
OXYGEN SATURATION: 94 % | WEIGHT: 191 LBS | HEIGHT: 62 IN | SYSTOLIC BLOOD PRESSURE: 127 MMHG | RESPIRATION RATE: 18 BRPM | BODY MASS INDEX: 35.15 KG/M2 | HEART RATE: 68 BPM | DIASTOLIC BLOOD PRESSURE: 55 MMHG | TEMPERATURE: 96.8 F

## 2023-12-13 DIAGNOSIS — Z79.4 TYPE 2 DIABETES MELLITUS WITH STAGE 4 CHRONIC KIDNEY DISEASE, WITH LONG-TERM CURRENT USE OF INSULIN (H): ICD-10-CM

## 2023-12-13 DIAGNOSIS — I63.9 ISCHEMIC STROKE (H): ICD-10-CM

## 2023-12-13 DIAGNOSIS — F01.A0 MILD VASCULAR DEMENTIA WITHOUT BEHAVIORAL DISTURBANCE, PSYCHOTIC DISTURBANCE, MOOD DISTURBANCE, OR ANXIETY (H): ICD-10-CM

## 2023-12-13 DIAGNOSIS — N18.4 STAGE 4 CHRONIC KIDNEY DISEASE (H): ICD-10-CM

## 2023-12-13 DIAGNOSIS — E11.22 TYPE 2 DIABETES MELLITUS WITH STAGE 4 CHRONIC KIDNEY DISEASE, WITH LONG-TERM CURRENT USE OF INSULIN (H): ICD-10-CM

## 2023-12-13 DIAGNOSIS — I10 BENIGN ESSENTIAL HYPERTENSION: ICD-10-CM

## 2023-12-13 DIAGNOSIS — N18.4 TYPE 2 DIABETES MELLITUS WITH STAGE 4 CHRONIC KIDNEY DISEASE, WITH LONG-TERM CURRENT USE OF INSULIN (H): ICD-10-CM

## 2023-12-13 DIAGNOSIS — I50.32 CHRONIC HEART FAILURE WITH PRESERVED EJECTION FRACTION (H): ICD-10-CM

## 2023-12-13 DIAGNOSIS — I69.90 LATE EFFECTS OF CVA (CEREBROVASCULAR ACCIDENT): Primary | ICD-10-CM

## 2023-12-13 PROCEDURE — 99309 SBSQ NF CARE MODERATE MDM 30: CPT | Performed by: NURSE PRACTITIONER

## 2023-12-13 PROCEDURE — 93270 REMOTE 30 DAY ECG REV/REPORT: CPT

## 2023-12-13 NOTE — LETTER
12/13/2023        RE: Indu Felix  [address]  [place] [address]        [facility] GERIATRICS    PRIMARY CARE PROVIDER AND CLINIC:  Flora Betts MD, [address] / [place] [address]  Chief Complaint   Patient presents with     Hospital F/U      [facility] Medical Record Number:  [id number]  Place of Service where encounter took place:  [facility] (U) [[id number]]    Indu Felix  is a 68 year old  (1955), admitted to the above facility from  [facility]. Hospital stay 12/5/23 through 12/11/23. Patient with PMH left parietal-occipital CVA in 6/2023, DM2, CKD4, HTN, DIANA, HFpEF, and vascular dementia presented to the ED with confusion. She was admitted with an acute right parietal occipital CVA. No arrhythmias noted on telemetry. Cause for her strokes is unclear. Neurology recommended ASA and plavix. She was going to be discharged home, however there was concern with her 's cognition as well.     To avoid risk of hypotension, falls, dizziness and tissue hypoperfusion, recommend  BP goal is < 150/90mmHg.    HPI:    Patient is aware of being in the hospital and being diagnosed with another stroke. She asks how we can prevent a 3rd one. Provider advises her of what has been done as far as evaluation and medications. She otherwise has no concerns or questions. Per therapy she was only able to walk 5 feet with a walker, min assist with transfers.   -150s/60-70s  HR 70s      CODE STATUS/ADVANCE DIRECTIVES DISCUSSION:  Full Code    ALLERGIES:   Allergies   Allergen Reactions     Lisinopril Other (See Comments) and Unknown      PAST MEDICAL HISTORY:   Past Medical History:   Diagnosis Date     Aortic stenosis      Benign essential hypertension      Chronic heart failure with preserved ejection fraction (H) 03/23/2023     Chronic heart failure with preserved ejection fraction (HFpEF) (H)      Chronic kidney disease      CKD (chronic kidney disease)  stage 3, GFR 30-59 ml/min (H)      Congestive heart failure (H)      History of CVA (cerebrovascular accident)      Mitral stenosis      Obesity      DIANA (obstructive sleep apnea) 03/23/2023     Pulmonary hypertension (H)      Stage 3a chronic kidney disease (H) 02/08/2023     Tricuspid valve disorders, non-rheumatic      Type 2 diabetes mellitus (H)       PAST SURGICAL HISTORY:   has a past surgical history that includes Right Heart Catheterization (N/A, 2/8/2023) and Left Heart Catheterization (N/A, 2/8/2023).  FAMILY HISTORY: family history is not on file.  SOCIAL HISTORY:   reports that she has never smoked. She has never used smokeless tobacco. She reports that she does not drink alcohol and does not use drugs.  Patient's living condition: lives with spouse    Post Discharge Medication Reconciliation Status:   MED REC REQUIRED  Post Medication Reconciliation Status:  Discharge medications reconciled, continue medications without change       Current Outpatient Medications   Medication Sig     acetaminophen (TYLENOL) 325 MG tablet Take 2 tablets (650 mg) by mouth every 4 hours as needed for mild pain or other (and adjunct with moderate or severe pain or per patient request)     amLODIPine (NORVASC) 5 MG tablet Take 1 tablet (5 mg) by mouth daily     aspirin 81 MG EC tablet Take 81 mg by mouth daily     atorvastatin (LIPITOR) 80 MG tablet Take 80 mg by mouth daily     bumetanide (BUMEX) 2 MG tablet Take 1 tablet (2 mg) by mouth daily     cholecalciferol 25 MCG (1000 UT) TABS Take 1 tablet by mouth daily     clopidogrel (PLAVIX) 75 MG tablet Take 1 tablet (75 mg) by mouth daily     donepezil (ARICEPT) 5 MG tablet Take 10 mg by mouth At Bedtime     escitalopram (LEXAPRO) 20 MG tablet Take 20 mg by mouth daily     fish oil-omega-3 fatty acids 1000 MG capsule Take 1,000 mg by mouth daily     insulin aspart (NOVOLOG PEN) 100 UNIT/ML pen 3 times a day with meals, for glucometer 150-200 give 2 units SQ, 201-250 give 4  "units, >250 give 6 units     insulin glargine (LANTUS SOLOSTAR) 100 UNIT/ML pen Inject 10 Units Subcutaneous every morning     isosorbide mononitrate (IMDUR) 60 MG 24 hr tablet Take 1 tablet (60 mg) by mouth 2 times daily     loperamide (IMODIUM) 2 MG capsule Take 1 capsule (2 mg) by mouth 2 times daily as needed for diarrhea     melatonin 3 MG tablet Take 6 mg by mouth nightly as needed for sleep     metoprolol succinate ER (TOPROL XL) 100 MG 24 hr tablet Take 1 tablet (100 mg) by mouth daily     multivitamin (ONE-DAILY) tablet Take 1 tablet by mouth daily     omeprazole (PRILOSEC) 20 MG DR capsule 20 mg daily before breakfast     traZODone (DESYREL) 100 MG tablet Take 100 mg by mouth At Bedtime     No current facility-administered medications for this visit.       ROS:  10 point ROS of systems including Constitutional, Eyes, Respiratory, Cardiovascular, Gastroenterology, Genitourinary, Integumentary, Musculoskeletal, Psychiatric were all negative except for pertinent positives noted in my HPI.    Vitals:  /55   Pulse 68   Temp 96.8  F (36  C)   Resp 18   Ht 1.575 m (5' 2\")   Wt 86.6 kg (191 lb)   SpO2 94%   BMI 34.93 kg/m    Exam:  GENERAL APPEARANCE:  Alert, in no distress  ENT:  Mouth and posterior oropharynx normal, moist mucous membranes  EYES:  EOM normal, conjunctiva and lids normal  RESP:  lungs clear to auscultation , no respiratory distress  CV:  Palpation and auscultation of heart done , regular rate and rhythm, no murmur, rub, or gallop  PSYCH:  oriented X 3, affect and mood normal    Lab/Diagnostic data:  Recent labs in [facility] reviewed by me today.     ASSESSMENT/PLAN:  (I69.90) Late effects of CVA (cerebrovascular accident)  (primary encounter diagnosis)  Comment: Per therapy update, patient has some functional impairment as well as cognitive. Based on this and the report regarding her , it may not be safe for her to discharge home. skilled nursing would likely be the safest option for them " both.   Plan: Continue current POC with no changes at this time and adjustments as needed. PT/OT eval and treat, discharge planning per their recommendations.    (I10) Benign essential hypertension  Comment: Generally controlled. Would prefer to avoid hypotension due to fall risk. Goal -130s.   Plan: Continue current POC with no changes at this time and adjustments as needed.    (I50.32) Chronic heart failure with preserved ejection fraction (H)  Comment: Chronic: CHF due to effects of HTN/Heart Disease. Currently: compensated.  Plan: Continue current medications: bumex. Monitor weights q week. Call provider if greater than 3 pound gain from previous weight. Monitor for shortness of breath, wheezing, increasing lower extremity edema and change in activity tolerance.     (E11.22,  N18.4,  Z79.4) Type 2 diabetes mellitus with stage 4 chronic kidney disease, with long-term current use of insulin (H)  Comment: Rare BG 200s. Will stop sliding scale insulin as she would not be able to manage this at home or at [facility] and this is not appropriate for long term treatment  Plan: Discontinue sliding scale insulin. BGM QID. Adjust medication as clinically indicated.    (N18.4) Stage 4 chronic kidney disease (H)  Comment: Chronic Kidney Disease due to: Diabetes  and Hypertension.  Plan: Avoid nephrotoxic medications and adjust medications per renal function. Monitor renal function prn. Refer to plan of care for Diabetes  and Hypertension.    (F01.A0) Mild vascular dementia without behavioral disturbance, psychotic disturbance, mood disturbance, or anxiety (H)  Comment: Appears mild on exam, but therapy will evaluate more thoroughly  Plan: OT eval and treat      Orders:  Discontinue sliding scale insulin       Electronically signed by:  KETURAH Ruiz CNP                     Sincerely,        Sariah Yeh, KETURAH CNP

## 2023-12-13 NOTE — PROGRESS NOTES
[facility] GERIATRICS    PRIMARY CARE PROVIDER AND CLINIC:  Flora Betts MD, [address] / [place] [address]  Chief Complaint   Patient presents with    Hospital F/U      [facility] Medical Record Number:  [id number]  Place of Service where encounter took place:  [facility] (TCU) [[id number]]    Indu Felix  is a 68 year old  (1955), admitted to the above facility from  [facility]. Hospital stay 12/5/23 through 12/11/23. Patient with PMH left parietal-occipital CVA in 6/2023, DM2, CKD4, HTN, DIANA, HFpEF, and vascular dementia presented to the ED with confusion. She was admitted with an acute right parietal occipital CVA. No arrhythmias noted on telemetry. Cause for her strokes is unclear. Neurology recommended ASA and plavix. She was going to be discharged home, however there was concern with her 's cognition as well.     To avoid risk of hypotension, falls, dizziness and tissue hypoperfusion, recommend  BP goal is < 150/90mmHg.    HPI:    Patient is aware of being in the hospital and being diagnosed with another stroke. She asks how we can prevent a 3rd one. Provider advises her of what has been done as far as evaluation and medications. She otherwise has no concerns or questions. Per therapy she was only able to walk 5 feet with a walker, min assist with transfers.   -150s/60-70s  HR 70s      CODE STATUS/ADVANCE DIRECTIVES DISCUSSION:  Full Code    ALLERGIES:   Allergies   Allergen Reactions    Lisinopril Other (See Comments) and Unknown      PAST MEDICAL HISTORY:   Past Medical History:   Diagnosis Date    Aortic stenosis     Benign essential hypertension     Chronic heart failure with preserved ejection fraction (H) 03/23/2023    Chronic heart failure with preserved ejection fraction (HFpEF) (H)     Chronic kidney disease     CKD (chronic kidney disease) stage 3, GFR 30-59 ml/min (H)     Congestive heart failure (H)     History of CVA (cerebrovascular  accident)     Mitral stenosis     Obesity     DIANA (obstructive sleep apnea) 03/23/2023    Pulmonary hypertension (H)     Stage 3a chronic kidney disease (H) 02/08/2023    Tricuspid valve disorders, non-rheumatic     Type 2 diabetes mellitus (H)       PAST SURGICAL HISTORY:   has a past surgical history that includes Right Heart Catheterization (N/A, 2/8/2023) and Left Heart Catheterization (N/A, 2/8/2023).  FAMILY HISTORY: family history is not on file.  SOCIAL HISTORY:   reports that she has never smoked. She has never used smokeless tobacco. She reports that she does not drink alcohol and does not use drugs.  Patient's living condition: lives with spouse    Post Discharge Medication Reconciliation Status:   MED REC REQUIRED  Post Medication Reconciliation Status:  Discharge medications reconciled, continue medications without change       Current Outpatient Medications   Medication Sig    acetaminophen (TYLENOL) 325 MG tablet Take 2 tablets (650 mg) by mouth every 4 hours as needed for mild pain or other (and adjunct with moderate or severe pain or per patient request)    amLODIPine (NORVASC) 5 MG tablet Take 1 tablet (5 mg) by mouth daily    aspirin 81 MG EC tablet Take 81 mg by mouth daily    atorvastatin (LIPITOR) 80 MG tablet Take 80 mg by mouth daily    bumetanide (BUMEX) 2 MG tablet Take 1 tablet (2 mg) by mouth daily    cholecalciferol 25 MCG (1000 UT) TABS Take 1 tablet by mouth daily    clopidogrel (PLAVIX) 75 MG tablet Take 1 tablet (75 mg) by mouth daily    donepezil (ARICEPT) 5 MG tablet Take 10 mg by mouth At Bedtime    escitalopram (LEXAPRO) 20 MG tablet Take 20 mg by mouth daily    fish oil-omega-3 fatty acids 1000 MG capsule Take 1,000 mg by mouth daily    insulin aspart (NOVOLOG PEN) 100 UNIT/ML pen 3 times a day with meals, for glucometer 150-200 give 2 units SQ, 201-250 give 4 units, >250 give 6 units    insulin glargine (LANTUS SOLOSTAR) 100 UNIT/ML pen Inject 10 Units Subcutaneous every  "morning    isosorbide mononitrate (IMDUR) 60 MG 24 hr tablet Take 1 tablet (60 mg) by mouth 2 times daily    loperamide (IMODIUM) 2 MG capsule Take 1 capsule (2 mg) by mouth 2 times daily as needed for diarrhea    melatonin 3 MG tablet Take 6 mg by mouth nightly as needed for sleep    metoprolol succinate ER (TOPROL XL) 100 MG 24 hr tablet Take 1 tablet (100 mg) by mouth daily    multivitamin (ONE-DAILY) tablet Take 1 tablet by mouth daily    omeprazole (PRILOSEC) 20 MG DR capsule 20 mg daily before breakfast    traZODone (DESYREL) 100 MG tablet Take 100 mg by mouth At Bedtime     No current facility-administered medications for this visit.       ROS:  10 point ROS of systems including Constitutional, Eyes, Respiratory, Cardiovascular, Gastroenterology, Genitourinary, Integumentary, Musculoskeletal, Psychiatric were all negative except for pertinent positives noted in my HPI.    Vitals:  /55   Pulse 68   Temp 96.8  F (36  C)   Resp 18   Ht 1.575 m (5' 2\")   Wt 86.6 kg (191 lb)   SpO2 94%   BMI 34.93 kg/m    Exam:  GENERAL APPEARANCE:  Alert, in no distress  ENT:  Mouth and posterior oropharynx normal, moist mucous membranes  EYES:  EOM normal, conjunctiva and lids normal  RESP:  lungs clear to auscultation , no respiratory distress  CV:  Palpation and auscultation of heart done , regular rate and rhythm, no murmur, rub, or gallop  PSYCH:  oriented X 3, affect and mood normal    Lab/Diagnostic data:  Recent labs in [facility] reviewed by me today.     ASSESSMENT/PLAN:  (I69.90) Late effects of CVA (cerebrovascular accident)  (primary encounter diagnosis)  Comment: Per therapy update, patient has some functional impairment as well as cognitive. Based on this and the report regarding her , it may not be safe for her to discharge home. half-way would likely be the safest option for them both.   Plan: Continue current POC with no changes at this time and adjustments as needed. PT/OT eval and treat, discharge " planning per their recommendations.    (I10) Benign essential hypertension  Comment: Generally controlled. Would prefer to avoid hypotension due to fall risk. Goal -130s.   Plan: Continue current POC with no changes at this time and adjustments as needed.    (I50.32) Chronic heart failure with preserved ejection fraction (H)  Comment: Chronic: CHF due to effects of HTN/Heart Disease. Currently: compensated.  Plan: Continue current medications: bumex. Monitor weights q week. Call provider if greater than 3 pound gain from previous weight. Monitor for shortness of breath, wheezing, increasing lower extremity edema and change in activity tolerance.     (E11.22,  N18.4,  Z79.4) Type 2 diabetes mellitus with stage 4 chronic kidney disease, with long-term current use of insulin (H)  Comment: Rare BG 200s. Will stop sliding scale insulin as she would not be able to manage this at home or at [facility] and this is not appropriate for long term treatment  Plan: Discontinue sliding scale insulin. BGM QID. Adjust medication as clinically indicated.    (N18.4) Stage 4 chronic kidney disease (H)  Comment: Chronic Kidney Disease due to: Diabetes  and Hypertension.  Plan: Avoid nephrotoxic medications and adjust medications per renal function. Monitor renal function prn. Refer to plan of care for Diabetes  and Hypertension.    (F01.A0) Mild vascular dementia without behavioral disturbance, psychotic disturbance, mood disturbance, or anxiety (H)  Comment: Appears mild on exam, but therapy will evaluate more thoroughly  Plan: OT eval and treat      Orders:  Discontinue sliding scale insulin       Electronically signed by:  KETURAH Ruiz CNP

## 2023-12-18 ENCOUNTER — TRANSITIONAL CARE UNIT VISIT (OUTPATIENT)
Dept: GERIATRICS | Facility: CLINIC | Age: 68
End: 2023-12-18
Payer: COMMERCIAL

## 2023-12-18 DIAGNOSIS — U07.1 INFECTION DUE TO 2019 NOVEL CORONAVIRUS: Primary | ICD-10-CM

## 2023-12-18 PROCEDURE — 99308 SBSQ NF CARE LOW MDM 20: CPT | Performed by: NURSE PRACTITIONER

## 2023-12-18 NOTE — PROGRESS NOTES
[facility] GERIATRICS    Chief Complaint   Patient presents with    Covid Concern     HPI:  Indu Felix is a 68 year old  (1955), who is being seen today for an episodic care visit at: [facility] () [[id number]].     Today's concern is:   Several patients on the unit have tested positive for COVID yesterday and today. Indu tested positive this morning. She has had a harsh cough. No fever or hypoxia. The nurse manager updated her POA, they did want her to have antiviral medication if appropriate.    Allergies, and PMH/PSH reviewed in [facility] today.  REVIEW OF SYSTEMS:  Limited secondary to cognitive impairment but today pt reports she sounds like a horse when she coughs    Objective:   There were no vitals taken for this visit.  GENERAL APPEARANCE:  Alert, in no distress  RESP:  no respiratory distress, cough harsh  PSYCH:  insight and judgement impaired, memory impaired , affect and mood normal      Assessment/Plan:  (U07.1) Infection due to 2019 novel coronavirus  (primary encounter diagnosis)  Comment: Patient with mild symptoms. No need for hospitalization, but will start antiviral medication. Due to medication interactions, will use molnupiravir    Orders:  Molnupiravir 800mg BID x 5 days      Electronically signed by: KETURAH Ruiz CNP

## 2023-12-18 NOTE — LETTER
12/18/2023        RE: Indu Felix  [address]  [facility] [address]        [facility] GERIATRICS    Chief Complaint   Patient presents with     Covid Concern     HPI:  Indu Felix is a 68 year old  (1955), who is being seen today for an episodic care visit at: [facility] () [[id number]].     Today's concern is:   Several patients on the unit have tested positive for COVID yesterday and today. Indu tested positive this morning. She has had a harsh cough. No fever or hypoxia. The nurse manager updated her POA, they did want her to have antiviral medication if appropriate.    Allergies, and PMH/PSH reviewed in [facility] today.  REVIEW OF SYSTEMS:  Limited secondary to cognitive impairment but today pt reports she sounds like a horse when she coughs    Objective:   There were no vitals taken for this visit.  GENERAL APPEARANCE:  Alert, in no distress  RESP:  no respiratory distress, cough harsh  PSYCH:  insight and judgement impaired, memory impaired , affect and mood normal      Assessment/Plan:  (U07.1) Infection due to 2019 novel coronavirus  (primary encounter diagnosis)  Comment: Patient with mild symptoms. No need for hospitalization, but will start antiviral medication. Due to medication interactions, will use molnupiravir    Orders:  Molnupiravir 800mg BID x 5 days      Electronically signed by: KETURAH Ruiz CNP           Sincerely,        KETURAH Ruiz CNP

## 2023-12-24 NOTE — PROGRESS NOTES
[facility] GERIATRICS  INITIAL VISIT NOTE  December 26, 2023      PRIMARY CARE PROVIDER AND CLINIC:  AlpeshFlora [address] / [place] [id number]    [facility] Medical Record Number:  [id number]  Place of Service where encounter took place:  [facility] ([facility]) [[id number]]    Chief Complaint   Patient presents with    Hospital F/U       HPI:    Indu Felix is a 68 year old  (1955) female seen today at [facility] TCU.  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 She was hospitalized at [facility] from 12/5/2023 to 12/11/2023 where she presented with confusion.  Imaging with subacute ischemic CVA in the inferomedial right temporal lobe involving the hippocampus, parahippocampal structures and posterior right thalamus.  Also noted to have high-grade stenosis in R posterior circulation. TTE with EF 60-65%, no PFO. Concern for underlying a-fib given distribution of CVA and a 30 day event monitor was recommended. She is new on clopidogrel with plan for DAPT x3 mos then monotherapy with clopidogrel. Hospital course also notable for Citrobacter UTI for which she completed abx in the hospital. PTA metoprolol and glargine were decreased. SLUMS 19/30 on 12/7. She was admitted to this facility for  rehab, medical management, and nursing care.      History obtained from: facility chart records, facility staff, patient report, [facility] chart review, and [facility] Everywhere [facility] chart review.      At the U, she tested positive for COVID 19 on 12/18/23 and completed a course of molnupiravir.     Today, Ms Felix is seen in her room laying in bed.  She is a limited historian due to dementia.  She tells me she has no aches or pains.  No chest pain.  No trouble breathing.  She did not want her drapes opened.  She is hungry for breakfast this morning.  No acute concerns today per discussion with nursing.  She is working  with therapies.    CODE STATUS: CPR/Full code     ALLERGIES:  Allergies   Allergen Reactions    Lisinopril Other (See Comments) and Unknown       PAST MEDICAL HISTORY:   Past Medical History:   Diagnosis Date    Aortic stenosis     Benign essential hypertension     Chronic heart failure with preserved ejection fraction (H) 03/23/2023    Chronic heart failure with preserved ejection fraction (HFpEF) (H)     Chronic kidney disease     CKD (chronic kidney disease) stage 3, GFR 30-59 ml/min (H)     Congestive heart failure (H)     History of CVA (cerebrovascular accident)     Mitral stenosis     Obesity     DIANA (obstructive sleep apnea) 03/23/2023    Pulmonary hypertension (H)     Stage 3a chronic kidney disease (H) 02/08/2023    Tricuspid valve disorders, non-rheumatic     Type 2 diabetes mellitus (H)        PAST SURGICAL HISTORY:   Past Surgical History:   Procedure Laterality Date    CV LEFT HEART CATH N/A 2/8/2023    Procedure: Left Heart Catheterization;  Surgeon: Yolanda Ramirez MD;  Location: [facility] CATH LAB CV    CV RIGHT HEART CATH MEASUREMENTS RECORDED N/A 2/8/2023    Procedure: Right Heart Catheterization;  Surgeon: Yolanda Ramirez MD;  Location: [facility] CATH LAB CV         SOCIAL HISTORY:   Patient's living condition: lives with spouse    MEDICATIONS:  Post Discharge Medication Reconciliation Status: discharge medications reconciled and changed, per note/orders.  Current Outpatient Medications   Medication Sig Dispense Refill    acetaminophen (TYLENOL) 325 MG tablet Take 2 tablets (650 mg) by mouth every 4 hours as needed for mild pain or other (and adjunct with moderate or severe pain or per patient request)      amLODIPine (NORVASC) 5 MG tablet Take 1 tablet (5 mg) by mouth daily 30 tablet 0    aspirin 81 MG EC tablet Take 81 mg by mouth daily      atorvastatin (LIPITOR) 80 MG tablet Take 80 mg by mouth daily      bumetanide (BUMEX) 2 MG tablet Take 1 tablet (2 mg) by mouth daily 180 tablet 1     "cholecalciferol 25 MCG (1000 UT) TABS Take 1 tablet by mouth daily      clopidogrel (PLAVIX) 75 MG tablet Take 1 tablet (75 mg) by mouth daily 30 tablet 1    donepezil (ARICEPT) 5 MG tablet Take 10 mg by mouth At Bedtime      escitalopram (LEXAPRO) 20 MG tablet Take 20 mg by mouth daily      fish oil-omega-3 fatty acids 1000 MG capsule Take 1,000 mg by mouth daily      insulin glargine (LANTUS SOLOSTAR) 100 UNIT/ML pen Inject 10 Units Subcutaneous every morning      isosorbide mononitrate (IMDUR) 60 MG 24 hr tablet Take 1 tablet (60 mg) by mouth 2 times daily 60 tablet 0    loperamide (IMODIUM) 2 MG capsule Take 1 capsule (2 mg) by mouth 2 times daily as needed for diarrhea      melatonin 3 MG tablet Take 6 mg by mouth nightly as needed for sleep      metoprolol succinate ER (TOPROL XL) 100 MG 24 hr tablet Take 1 tablet (100 mg) by mouth daily      multivitamin (ONE-DAILY) tablet Take 1 tablet by mouth daily      omeprazole (PRILOSEC) 20 MG DR capsule 20 mg daily before breakfast      traZODone (DESYREL) 100 MG tablet Take 100 mg by mouth At Bedtime         ROS:  Unable to obtain due to cognitive impairment or aphasia    PHYSICAL EXAM:  BP (!) 141/68   Pulse 73   Temp (!) 96.4  F (35.8  C)   Resp 20   Ht 1.575 m (5' 2\")   Wt 83.7 kg (184 lb 8 oz)   SpO2 96%   BMI 33.75 kg/m    Gen: laying in bed alert, cooperative and in no acute distress  Card: RRR, S1, S2, no murmurs  Resp: lungs clear to auscultation bilaterally anteriorly and laterally, no crackles or wheezes; moving good air  Ext: no LE edema  Neuro: CX II-XII grossly in tact; ROM in all four extremities grossly in tact  Psych:  memory, judgement and insight impaired      LABORATORY/IMAGING DATA:  Reviewed as per [facility] and/or [facility]    ASSESSMENT/PLAN:    COVID 19 Infection, Recovered (12/18/23)  Completed a course of molnupiravir. Lungs clear today.   -- isolation per facility protocol    Subacute R PCA Territory CVA (12/5/23)  History Left " Parietal CVA (June 2023)  Imaging also with stenosis in posterior circulation. Concern for underlying a-fib given distribution of recent CVA. New on clopidogrel for DAPT.   -- ASA 81 mg daily + clopidogrel 75 mg daily x3 most of DAPT, then monotherapy with clopidogrel daily  -- atorvastatin 80 mg daily   -- event monitor was to be ordered at discharge   -- PT/OT  -- follow up with neurology as scheduled     HFpEF (EF 60-65%), HTN  Aortic Stenosis   SBPs labile 120s-160s. HR 60s-80s. Weight stable 184 lbs. PTA on metoprolol  mg daily - decreased during hospitalization.   -- amlodipine 5 mg daily, bumetanide 2 mg daily, Imdur 60 mg BID, metoprolol  mg daily   -- follow BPs and adjust medications as needed    DM, Type II  Hgb A1c 6.3. PTA on glargine 20 units daily - decreased during hospitalization. Sugars 160s-180s (am), 180s-220s (noon) and 150s-170s (talia)  -- glargine 10 units qam  -- follow sugars and adjust insulin as needed - no indication to increase basal at this time    Vascular Dementia Without Behavioral Disturbance  Mild Major Recurrent Depression  SLUMS 19/30 on 12/7 in hospital. BIMS 14/15 at TCU  -- donepezil 10 mg at bedtime  -- escitalopram 20 mg daily   -- OT following for cog eval     CKD, Stage IV  Baseline Cr 2.5-2.6. Follows with nephrology. Cr 1.99 on 12/22. Is on bumet.   -- periodic BMP    GERD  -- omeprazole 20 mg daily     Insomnia  -- trazodone 100 mg at bedtime   -- melatonin 6 mg at PRN     Physical Deconditioning  In setting of hospitalization and underlying medical conditions  -- ongoing PT/OT            Electronically signed by:  Fiordaliza Ramos MD

## 2023-12-26 ENCOUNTER — TRANSITIONAL CARE UNIT VISIT (OUTPATIENT)
Dept: GERIATRICS | Facility: CLINIC | Age: 68
End: 2023-12-26
Payer: COMMERCIAL

## 2023-12-26 VITALS
OXYGEN SATURATION: 96 % | WEIGHT: 184.5 LBS | HEIGHT: 62 IN | SYSTOLIC BLOOD PRESSURE: 141 MMHG | HEART RATE: 73 BPM | RESPIRATION RATE: 20 BRPM | TEMPERATURE: 96.4 F | BODY MASS INDEX: 33.95 KG/M2 | DIASTOLIC BLOOD PRESSURE: 68 MMHG

## 2023-12-26 DIAGNOSIS — Z86.73 RECENT CEREBROVASCULAR ACCIDENT (CVA): Primary | ICD-10-CM

## 2023-12-26 DIAGNOSIS — N18.4 TYPE 2 DIABETES MELLITUS WITH STAGE 4 CHRONIC KIDNEY DISEASE, WITH LONG-TERM CURRENT USE OF INSULIN (H): ICD-10-CM

## 2023-12-26 DIAGNOSIS — K21.9 GASTROESOPHAGEAL REFLUX DISEASE WITHOUT ESOPHAGITIS: ICD-10-CM

## 2023-12-26 DIAGNOSIS — E11.22 TYPE 2 DIABETES MELLITUS WITH STAGE 4 CHRONIC KIDNEY DISEASE, WITH LONG-TERM CURRENT USE OF INSULIN (H): ICD-10-CM

## 2023-12-26 DIAGNOSIS — N18.4 STAGE 4 CHRONIC KIDNEY DISEASE (H): ICD-10-CM

## 2023-12-26 DIAGNOSIS — F01.A0 MILD VASCULAR DEMENTIA WITHOUT BEHAVIORAL DISTURBANCE, PSYCHOTIC DISTURBANCE, MOOD DISTURBANCE, OR ANXIETY (H): ICD-10-CM

## 2023-12-26 DIAGNOSIS — R53.81 PHYSICAL DECONDITIONING: ICD-10-CM

## 2023-12-26 DIAGNOSIS — I12.9 BENIGN HYPERTENSIVE KIDNEY DISEASE WITH CHRONIC KIDNEY DISEASE STAGE I THROUGH STAGE IV, OR UNSPECIFIED: ICD-10-CM

## 2023-12-26 DIAGNOSIS — U07.1 INFECTION DUE TO 2019 NOVEL CORONAVIRUS: ICD-10-CM

## 2023-12-26 DIAGNOSIS — Z79.4 TYPE 2 DIABETES MELLITUS WITH STAGE 4 CHRONIC KIDNEY DISEASE, WITH LONG-TERM CURRENT USE OF INSULIN (H): ICD-10-CM

## 2023-12-26 DIAGNOSIS — I50.32 CHRONIC HEART FAILURE WITH PRESERVED EJECTION FRACTION (H): ICD-10-CM

## 2023-12-26 PROCEDURE — 99305 1ST NF CARE MODERATE MDM 35: CPT | Performed by: INTERNAL MEDICINE

## 2023-12-26 NOTE — LETTER
12/26/2023        RE: Indu Felix  [address]  [place] [address]        [facility] GERIATRICS  INITIAL VISIT NOTE  December 26, 2023      PRIMARY CARE PROVIDER AND CLINIC:  Flora Betts [address] / [place] [address]    [facility] Medical Record Number:  [id number]  Place of Service where encounter took place:  [facility] ([facility]) [[id number]]    Chief Complaint   Patient presents with     Hospital F/U       HPI:    Indu Felix is a 68 year old  (1955) female seen today at [facility] TCU.  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 She was hospitalized at [facility] from 12/5/2023 to 12/11/2023 where she presented with confusion.  Imaging with subacute ischemic CVA in the inferomedial right temporal lobe involving the hippocampus, parahippocampal structures and posterior right thalamus.  Also noted to have high-grade stenosis in R posterior circulation. TTE with EF 60-65%, no PFO. Concern for underlying a-fib given distribution of CVA and a 30 day event monitor was recommended. She is new on clopidogrel with plan for DAPT x3 mos then monotherapy with clopidogrel. Hospital course also notable for Citrobacter UTI for which she completed abx in the hospital. PTA metoprolol and glargine were decreased. SLUMS 19/30 on 12/7. She was admitted to this facility for  rehab, medical management, and nursing care.      History obtained from: facility chart records, facility staff, patient report, [facility] chart review, and Care Everywhere [facility] chart review.      At the TCU, she tested positive for COVID 19 on 12/18/23 and completed a course of molnupiravir.     Today, Ms Felix is seen in her room laying in bed.  She is a limited historian due to dementia.  She tells me she has no aches or pains.  No chest pain.  No trouble breathing.  She did not want her drapes opened.  She is hungry for  breakfast this morning.  No acute concerns today per discussion with nursing.  She is working with therapies.    CODE STATUS: CPR/Full code     ALLERGIES:  Allergies   Allergen Reactions     Lisinopril Other (See Comments) and Unknown       PAST MEDICAL HISTORY:   Past Medical History:   Diagnosis Date     Aortic stenosis      Benign essential hypertension      Chronic heart failure with preserved ejection fraction (H) 03/23/2023     Chronic heart failure with preserved ejection fraction (HFpEF) (H)      Chronic kidney disease      CKD (chronic kidney disease) stage 3, GFR 30-59 ml/min (H)      Congestive heart failure (H)      History of CVA (cerebrovascular accident)      Mitral stenosis      Obesity      DIANA (obstructive sleep apnea) 03/23/2023     Pulmonary hypertension (H)      Stage 3a chronic kidney disease (H) 02/08/2023     Tricuspid valve disorders, non-rheumatic      Type 2 diabetes mellitus (H)        PAST SURGICAL HISTORY:   Past Surgical History:   Procedure Laterality Date     CV LEFT HEART CATH N/A 2/8/2023    Procedure: Left Heart Catheterization;  Surgeon: Yolanda Ramirez MD;  Location: [facility] CATH LAB CV     CV RIGHT HEART CATH MEASUREMENTS RECORDED N/A 2/8/2023    Procedure: Right Heart Catheterization;  Surgeon: Yolanda Ramirez MD;  Location: [facility] CATH LAB CV         SOCIAL HISTORY:   Patient's living condition: lives with spouse    MEDICATIONS:  Post Discharge Medication Reconciliation Status: discharge medications reconciled and changed, per note/orders.  Current Outpatient Medications   Medication Sig Dispense Refill     acetaminophen (TYLENOL) 325 MG tablet Take 2 tablets (650 mg) by mouth every 4 hours as needed for mild pain or other (and adjunct with moderate or severe pain or per patient request)       amLODIPine (NORVASC) 5 MG tablet Take 1 tablet (5 mg) by mouth daily 30 tablet 0     aspirin 81 MG EC tablet Take 81 mg by mouth daily       atorvastatin (LIPITOR) 80 MG tablet  "Take 80 mg by mouth daily       bumetanide (BUMEX) 2 MG tablet Take 1 tablet (2 mg) by mouth daily 180 tablet 1     cholecalciferol 25 MCG (1000 UT) TABS Take 1 tablet by mouth daily       clopidogrel (PLAVIX) 75 MG tablet Take 1 tablet (75 mg) by mouth daily 30 tablet 1     donepezil (ARICEPT) 5 MG tablet Take 10 mg by mouth At Bedtime       escitalopram (LEXAPRO) 20 MG tablet Take 20 mg by mouth daily       fish oil-omega-3 fatty acids 1000 MG capsule Take 1,000 mg by mouth daily       insulin glargine (LANTUS SOLOSTAR) 100 UNIT/ML pen Inject 10 Units Subcutaneous every morning       isosorbide mononitrate (IMDUR) 60 MG 24 hr tablet Take 1 tablet (60 mg) by mouth 2 times daily 60 tablet 0     loperamide (IMODIUM) 2 MG capsule Take 1 capsule (2 mg) by mouth 2 times daily as needed for diarrhea       melatonin 3 MG tablet Take 6 mg by mouth nightly as needed for sleep       metoprolol succinate ER (TOPROL XL) 100 MG 24 hr tablet Take 1 tablet (100 mg) by mouth daily       multivitamin (ONE-DAILY) tablet Take 1 tablet by mouth daily       omeprazole (PRILOSEC) 20 MG DR capsule 20 mg daily before breakfast       traZODone (DESYREL) 100 MG tablet Take 100 mg by mouth At Bedtime         ROS:  Unable to obtain due to cognitive impairment or aphasia    PHYSICAL EXAM:  BP (!) 141/68   Pulse 73   Temp (!) 96.4  F (35.8  C)   Resp 20   Ht 1.575 m (5' 2\")   Wt 83.7 kg (184 lb 8 oz)   SpO2 96%   BMI 33.75 kg/m    Gen: laying in bed alert, cooperative and in no acute distress  Card: RRR, S1, S2, no murmurs  Resp: lungs clear to auscultation bilaterally anteriorly and laterally, no crackles or wheezes; moving good air  Ext: no LE edema  Neuro: CX II-XII grossly in tact; ROM in all four extremities grossly in tact  Psych:  memory, judgement and insight impaired      LABORATORY/IMAGING DATA:  Reviewed as per [facility] and/or [facility]    ASSESSMENT/PLAN:    COVID 19 Infection, Recovered (12/18/23)  Completed a course of " molnupiravir. Lungs clear today.   -- isolation per facility protocol    Subacute R PCA [place] CVA (12/5/23)  History Left Parietal CVA (June 2023)  Imaging also with stenosis in posterior circulation. Concern for underlying a-fib given distribution of recent CVA. New on clopidogrel for DAPT.   -- ASA 81 mg daily + clopidogrel 75 mg daily x3 most of DAPT, then monotherapy with clopidogrel daily  -- atorvastatin 80 mg daily   -- event monitor was to be ordered at discharge   -- PT/OT  -- follow up with neurology as scheduled     HFpEF (EF 60-65%), HTN  Aortic Stenosis   SBPs labile 120s-160s. HR 60s-80s. Weight stable 184 lbs. PTA on metoprolol  mg daily - decreased during hospitalization.   -- amlodipine 5 mg daily, bumetanide 2 mg daily, Imdur 60 mg BID, metoprolol  mg daily   -- follow BPs and adjust medications as needed    DM, Type II  Hgb A1c 6.3. PTA on glargine 20 units daily - decreased during hospitalization. Sugars 160s-180s (am), 180s-220s (noon) and 150s-170s (talia)  -- glargine 10 units qam  -- follow sugars and adjust insulin as needed - no indication to increase basal at this time    Vascular Dementia Without Behavioral Disturbance  Mild Major Recurrent Depression  SLUMS 19/30 on 12/7 in hospital. BIMS 14/15 at TCU  -- donepezil 10 mg at bedtime  -- escitalopram 20 mg daily   -- OT following for cog eval     CKD, Stage IV  Baseline Cr 2.5-2.6. Follows with nephrology. Cr 1.99 on 12/22. Is on bumet.   -- periodic BMP    GERD  -- omeprazole 20 mg daily     Insomnia  -- trazodone 100 mg at bedtime   -- melatonin 6 mg at PRN     Physical Deconditioning  In setting of hospitalization and underlying medical conditions  -- ongoing PT/OT            Electronically signed by:  Fiordaliza Ramos MD                            Sincerely,        Fiordaliza Ramos MD

## 2023-12-29 ENCOUNTER — DISCHARGE SUMMARY NURSING HOME (OUTPATIENT)
Dept: GERIATRICS | Facility: CLINIC | Age: 68
End: 2023-12-29
Payer: COMMERCIAL

## 2023-12-29 VITALS
HEIGHT: 62 IN | WEIGHT: 185.5 LBS | OXYGEN SATURATION: 97 % | DIASTOLIC BLOOD PRESSURE: 55 MMHG | BODY MASS INDEX: 34.14 KG/M2 | SYSTOLIC BLOOD PRESSURE: 112 MMHG | RESPIRATION RATE: 18 BRPM | TEMPERATURE: 96.7 F | HEART RATE: 62 BPM

## 2023-12-29 DIAGNOSIS — N18.4 STAGE 4 CHRONIC KIDNEY DISEASE (H): ICD-10-CM

## 2023-12-29 DIAGNOSIS — I50.32 CHRONIC HEART FAILURE WITH PRESERVED EJECTION FRACTION (H): ICD-10-CM

## 2023-12-29 DIAGNOSIS — F01.A0 MILD VASCULAR DEMENTIA WITHOUT BEHAVIORAL DISTURBANCE, PSYCHOTIC DISTURBANCE, MOOD DISTURBANCE, OR ANXIETY (H): ICD-10-CM

## 2023-12-29 DIAGNOSIS — N18.4 TYPE 2 DIABETES MELLITUS WITH STAGE 4 CHRONIC KIDNEY DISEASE, WITH LONG-TERM CURRENT USE OF INSULIN (H): ICD-10-CM

## 2023-12-29 DIAGNOSIS — R53.81 PHYSICAL DECONDITIONING: ICD-10-CM

## 2023-12-29 DIAGNOSIS — Z86.73 RECENT CEREBROVASCULAR ACCIDENT (CVA): Primary | ICD-10-CM

## 2023-12-29 DIAGNOSIS — Z79.4 TYPE 2 DIABETES MELLITUS WITH STAGE 4 CHRONIC KIDNEY DISEASE, WITH LONG-TERM CURRENT USE OF INSULIN (H): ICD-10-CM

## 2023-12-29 DIAGNOSIS — E11.22 TYPE 2 DIABETES MELLITUS WITH STAGE 4 CHRONIC KIDNEY DISEASE, WITH LONG-TERM CURRENT USE OF INSULIN (H): ICD-10-CM

## 2023-12-29 PROCEDURE — 99316 NF DSCHRG MGMT 30 MIN+: CPT | Performed by: NURSE PRACTITIONER

## 2023-12-29 PROCEDURE — 93272 ECG/REVIEW INTERPRET ONLY: CPT | Performed by: INTERNAL MEDICINE

## 2023-12-29 NOTE — LETTER
12/29/2023        RE: Indu Felix  [address]  [place] [address]        [facility] GERIATRICS DISCHARGE SUMMARY  PATIENT'S NAME: Indu Felix  YOB: 1955  MEDICAL RECORD NUMBER:  [id number]  Place of Service where encounter took place:  [facility] [[id number]]    PRIMARY CARE PROVIDER AND CLINIC RESPONSIBLE AFTER TRANSFER:   Flora Betts MD, [address] / [place] [address]     Transferring providers: Miguel Colbert CNP, Fiordaliza Ramos MD  Recent Hospitalization/ED:  [facility] Hospital stay 12/5/23 to 12/11/23.  Date of SNF Admission: December 11, 2023  Date of SNF (anticipated) Discharge:  12/30/23  Discharged to: new assisted living for patient [facility]  Cognitive Scores: SLUMS: 19/30  Physical Function: Wheelchair dependent  DME: No new DME neede      CODE STATUS/ADVANCE DIRECTIVES DISCUSSION:  Full Code   ALLERGIES: Lisinopril    NURSING FACILITY COURSE   Medication Changes/Rationale:   Sliding scale insulin discontinued.     Summary of nursing facility stay:   1. Recent cerebrovascular accident (CVA)    2. Stage 4 chronic kidney disease (H)    3. Type 2 diabetes mellitus with stage 4 chronic kidney disease, with long-term current use of insulin (H)    4. Chronic heart failure with preserved ejection fraction (H)    5. Mild vascular dementia without behavioral disturbance, psychotic disturbance, mood disturbance, or anxiety (H)    6. Physical deconditioning      Mary was brought to the ER after experiencing some confusion at home and MRI revealed subacute CVA for which she was treated [facility] from 12/5 to 12/11/2023.  She has a history of hypertension, diabetes, CKD stage IV, CHF, vascular dementia.  She was transferred to [facility] for rehab and strengthening.  In the TCU she developed COVID-19 and was treated with molnupiravir, improved with no residual issues.  Weights have trended down but remained stable now at  185 pounds.  Vital signs variable but blood pressure improved today at 112/55.  Her sliding scale insulin was discontinued and she remains on Lantus 10 units daily.  Her slums score was 19 out of 30.  Per hospital notes, there is some concern for confusion with her .  Mary tells me she is discharging home to her  however per social work she will be discharging to [facility].  She otherwise has no concerns.  Slight agitation and ready to get out of the nursing home.      Discharge Medications:  MED REC REQUIRED  Post Medication Reconciliation Status:  Discharge medications reconciled, continue medications without change       Current Outpatient Medications   Medication Sig Dispense Refill     acetaminophen (TYLENOL) 325 MG tablet Take 2 tablets (650 mg) by mouth every 4 hours as needed for mild pain or other (and adjunct with moderate or severe pain or per patient request)       amLODIPine (NORVASC) 5 MG tablet Take 1 tablet (5 mg) by mouth daily 30 tablet 0     aspirin 81 MG EC tablet Take 81 mg by mouth daily       atorvastatin (LIPITOR) 80 MG tablet Take 80 mg by mouth daily       bumetanide (BUMEX) 2 MG tablet Take 1 tablet (2 mg) by mouth daily 180 tablet 1     cholecalciferol 25 MCG (1000 UT) TABS Take 1 tablet by mouth daily       clopidogrel (PLAVIX) 75 MG tablet Take 1 tablet (75 mg) by mouth daily 30 tablet 1     donepezil (ARICEPT) 5 MG tablet Take 10 mg by mouth At Bedtime       escitalopram (LEXAPRO) 20 MG tablet Take 20 mg by mouth daily       fish oil-omega-3 fatty acids 1000 MG capsule Take 1,000 mg by mouth daily       insulin glargine (LANTUS SOLOSTAR) 100 UNIT/ML pen Inject 10 Units Subcutaneous every morning       isosorbide mononitrate (IMDUR) 60 MG 24 hr tablet Take 1 tablet (60 mg) by mouth 2 times daily 60 tablet 0     loperamide (IMODIUM) 2 MG capsule Take 1 capsule (2 mg) by mouth 2 times daily as needed for diarrhea       melatonin 3 MG tablet Take 6 mg by mouth  "nightly as needed for sleep       metoprolol succinate ER (TOPROL XL) 100 MG 24 hr tablet Take 1 tablet (100 mg) by mouth daily       multivitamin (ONE-DAILY) tablet Take 1 tablet by mouth daily       omeprazole (PRILOSEC) 20 MG DR capsule 20 mg daily before breakfast       traZODone (DESYREL) 100 MG tablet Take 100 mg by mouth At Bedtime        Controlled medications:   not applicable/none     Past Medical History:   Past Medical History:   Diagnosis Date     Aortic stenosis      Benign essential hypertension      Chronic heart failure with preserved ejection fraction (H) 03/23/2023     Chronic heart failure with preserved ejection fraction (HFpEF) (H)      Chronic kidney disease      CKD (chronic kidney disease) stage 3, GFR 30-59 ml/min (H)      Congestive heart failure (H)      History of CVA (cerebrovascular accident)      Mitral stenosis      Obesity      DIANA (obstructive sleep apnea) 03/23/2023     Pulmonary hypertension (H)      Stage 3a chronic kidney disease (H) 02/08/2023     Tricuspid valve disorders, non-rheumatic      Type 2 diabetes mellitus (H)      Physical Exam:   Vitals: /55   Pulse 62   Temp (!) 96.7  F (35.9  C)   Resp 18   Ht 1.575 m (5' 2\")   Wt 84.1 kg (185 lb 8 oz)   SpO2 97%   BMI 33.93 kg/m    BMI: Body mass index is 33.93 kg/m .  General appearance: alert, cooperative.   Lungs: respirations unlabored,no wheezing or rales.   Cardiovascular: Regular rate and rhythm.   ABDOMEN: Globular and soft, non tender.    Extremities: extremities normal, atraumatic, no cyanosis or edema  Skin: No rashes or lesions  Neurologic: oriented. No focal deficits.   Psych: interacts well with caregivers,  pleasant     SNF labs: Recent labs in [facility] reviewed by me today.     DISCHARGE PLAN:  Follow up labs: No labs orders/due  Medical Follow Up:      Follow up with primary care provider in 1-2 weeks  [facility] scheduled appointments: n/a  Discharge Services: Home Care:  Occupational Therapy, " Physical Therapy, and Registered Nurse  Discharge Instructions Verbalized to Patient at Discharge:   Monitor blood glucose monitoring 4 times a day. Keep a record and bring it to your next primary provider visit.   Weigh yourself daily in the morning and keep a record. Call your primary clinic: a) if you are more short of breath, or b) if your weight changes more than 3 pounds in one day or more than 5 pounds in one week.     TOTAL DISCHARGE TIME:   Greater than 30 minutes  Electronically signed by:  Miguel Colbert CNP     Documentation of Face to Face and Certification for Home Health Services    I certify that patient: Indu is under my care and that I, or a nurse practitioner or physician's assistant working with me, had a face-to-face encounter that meets the physician face-to-face encounter requirements with this patient on: 12/29/23.    This encounter with the patient was in whole, or in part, for the following medical condition, which is the primary reason for home health care: History of CVA, vascular dementia.    I certify that, based on my findings, the following services are medically necessary home health services: Nursing, Occupational Therapy, and Physical Therapy.    My clinical findings support the need for the above services because: RN required for medication management, PT and OT required for continued functional improvment in home setting .     Further, I certify that my clinical findings support that this patient is homebound (i.e. absences from home require considerable and taxing effort and are for medical reasons or [facility] services or infrequently or of short duration when for other reasons) because: Requires assistance of another person or specialized equipment to access medical services because patient:  confusion and weakness s/s to recent CVA..    Based on the above findings. I certify that this patient is confined to the home and needs intermittent skilled nursing care, physical  therapy and/or speech therapy.  The patient is under my care, and I have initiated the establishment of the plan of care.  This patient will be followed by a physician who will periodically review the plan of care.                      Sincerely,        Miguel Colbert CNP

## 2023-12-30 NOTE — PROGRESS NOTES
[facility] GERIATRICS DISCHARGE SUMMARY  PATIENT'S NAME: Indu Felix  YOB: 1955  MEDICAL RECORD NUMBER:  [id number]  Place of Service where encounter took place:  [facility] ([facility]) [[id number]]    PRIMARY CARE PROVIDER AND CLINIC RESPONSIBLE AFTER TRANSFER:   Flora Betts MD, [address] / [place] [address]     Transferring providers: Miguel Colbert CNP, Fiordaliza Ramos MD  Recent Hospitalization/ED:  [facility] stay 12/5/23 to 12/11/23.  Date of SNF Admission: December 11, 2023  Date of SNF (anticipated) Discharge:  12/30/23  Discharged to: new assisted living for patient [facility]  Cognitive Scores: SLUMS: 19/30  Physical Function: Wheelchair dependent  DME: No new DME neede      CODE STATUS/ADVANCE DIRECTIVES DISCUSSION:  Full Code   ALLERGIES: Lisinopril    NURSING FACILITY COURSE   Medication Changes/Rationale:   Sliding scale insulin discontinued.     Summary of nursing facility stay:   1. Recent cerebrovascular accident (CVA)    2. Stage 4 chronic kidney disease (H)    3. Type 2 diabetes mellitus with stage 4 chronic kidney disease, with long-term current use of insulin (H)    4. Chronic heart failure with preserved ejection fraction (H)    5. Mild vascular dementia without behavioral disturbance, psychotic disturbance, mood disturbance, or anxiety (H)    6. Physical deconditioning      Mary was brought to the ER after experiencing some confusion at home and MRI revealed subacute CVA for which she was treated [facility] from 12/5 to 12/11/2023.  She has a history of hypertension, diabetes, CKD stage IV, CHF, vascular dementia.  She was transferred to [facility] for rehab and strengthening.  In the TCU she developed COVID-19 and was treated with molnupiravir, improved with no residual issues.  Weights have trended down but remained stable now at 185 pounds.  Vital signs variable but blood pressure improved today at 112/55.  Her sliding  scale insulin was discontinued and she remains on Lantus 10 units daily.  Her slums score was 19 out of 30.  Per hospital notes, there is some concern for confusion with her .  Mary tells me she is discharging home to her  however per social work she will be discharging to [facility].  She otherwise has no concerns.  Slight agitation and ready to get out of the nursing home.      Discharge Medications:  MED REC REQUIRED  Post Medication Reconciliation Status:  Discharge medications reconciled, continue medications without change       Current Outpatient Medications   Medication Sig Dispense Refill    acetaminophen (TYLENOL) 325 MG tablet Take 2 tablets (650 mg) by mouth every 4 hours as needed for mild pain or other (and adjunct with moderate or severe pain or per patient request)      amLODIPine (NORVASC) 5 MG tablet Take 1 tablet (5 mg) by mouth daily 30 tablet 0    aspirin 81 MG EC tablet Take 81 mg by mouth daily      atorvastatin (LIPITOR) 80 MG tablet Take 80 mg by mouth daily      bumetanide (BUMEX) 2 MG tablet Take 1 tablet (2 mg) by mouth daily 180 tablet 1    cholecalciferol 25 MCG (1000 UT) TABS Take 1 tablet by mouth daily      clopidogrel (PLAVIX) 75 MG tablet Take 1 tablet (75 mg) by mouth daily 30 tablet 1    donepezil (ARICEPT) 5 MG tablet Take 10 mg by mouth At Bedtime      escitalopram (LEXAPRO) 20 MG tablet Take 20 mg by mouth daily      fish oil-omega-3 fatty acids 1000 MG capsule Take 1,000 mg by mouth daily      insulin glargine (LANTUS SOLOSTAR) 100 UNIT/ML pen Inject 10 Units Subcutaneous every morning      isosorbide mononitrate (IMDUR) 60 MG 24 hr tablet Take 1 tablet (60 mg) by mouth 2 times daily 60 tablet 0    loperamide (IMODIUM) 2 MG capsule Take 1 capsule (2 mg) by mouth 2 times daily as needed for diarrhea      melatonin 3 MG tablet Take 6 mg by mouth nightly as needed for sleep      metoprolol succinate ER (TOPROL XL) 100 MG 24 hr tablet Take 1 tablet (100  "mg) by mouth daily      multivitamin (ONE-DAILY) tablet Take 1 tablet by mouth daily      omeprazole (PRILOSEC) 20 MG DR capsule 20 mg daily before breakfast      traZODone (DESYREL) 100 MG tablet Take 100 mg by mouth At Bedtime        Controlled medications:   not applicable/none     Past Medical History:   Past Medical History:   Diagnosis Date    Aortic stenosis     Benign essential hypertension     Chronic heart failure with preserved ejection fraction (H) 03/23/2023    Chronic heart failure with preserved ejection fraction (HFpEF) (H)     Chronic kidney disease     CKD (chronic kidney disease) stage 3, GFR 30-59 ml/min (H)     Congestive heart failure (H)     History of CVA (cerebrovascular accident)     Mitral stenosis     Obesity     DIANA (obstructive sleep apnea) 03/23/2023    Pulmonary hypertension (H)     Stage 3a chronic kidney disease (H) 02/08/2023    Tricuspid valve disorders, non-rheumatic     Type 2 diabetes mellitus (H)      Physical Exam:   Vitals: /55   Pulse 62   Temp (!) 96.7  F (35.9  C)   Resp 18   Ht 1.575 m (5' 2\")   Wt 84.1 kg (185 lb 8 oz)   SpO2 97%   BMI 33.93 kg/m    BMI: Body mass index is 33.93 kg/m .  General appearance: alert, cooperative.   Lungs: respirations unlabored,no wheezing or rales.   Cardiovascular: Regular rate and rhythm.   ABDOMEN: Globular and soft, non tender.    Extremities: extremities normal, atraumatic, no cyanosis or edema  Skin: No rashes or lesions  Neurologic: oriented. No focal deficits.   Psych: interacts well with caregivers,  pleasant     SNF labs: Recent labs in [facility] reviewed by me today.     DISCHARGE PLAN:  Follow up labs: No labs orders/due  Medical Follow Up:      Follow up with primary care provider in 1-2 weeks  [facility] scheduled appointments: n/a  Discharge Services: Home Care:  Occupational Therapy, Physical Therapy, and Registered Nurse  Discharge Instructions Verbalized to Patient at Discharge:   Monitor blood glucose " monitoring 4 times a day. Keep a record and bring it to your next primary provider visit.   Weigh yourself daily in the morning and keep a record. Call your primary clinic: a) if you are more short of breath, or b) if your weight changes more than 3 pounds in one day or more than 5 pounds in one week.     TOTAL DISCHARGE TIME:   Greater than 30 minutes  Electronically signed by:  Miguel Colbert CNP     Documentation of Face to Face and Certification for Home Health Services    I certify that patient: Indu is under my care and that I, or a nurse practitioner or physician's assistant working with me, had a face-to-face encounter that meets the physician face-to-face encounter requirements with this patient on: 12/29/23.    This encounter with the patient was in whole, or in part, for the following medical condition, which is the primary reason for home health care: History of CVA, vascular dementia.    I certify that, based on my findings, the following services are medically necessary home health services: Nursing, Occupational Therapy, and Physical Therapy.    My clinical findings support the need for the above services because: RN required for medication management, PT and OT required for continued functional improvment in home setting .     Further, I certify that my clinical findings support that this patient is homebound (i.e. absences from home require considerable and taxing effort and are for medical reasons or [employer] services or infrequently or of short duration when for other reasons) because: Requires assistance of another person or specialized equipment to access medical services because patient:  confusion and weakness s/s to recent CVA..    Based on the above findings. I certify that this patient is confined to the home and needs intermittent skilled nursing care, physical therapy and/or speech therapy.  The patient is under my care, and I have initiated the establishment of the plan of care.   This patient will be followed by a physician who will periodically review the plan of care.

## 2024-01-05 NOTE — PROGRESS NOTES
In person evaluation      HPI  Hospital visit 12/5/2023 1/8/2024, in person follow-up visit      68-year-old being evaluated neurologically for:  CVA times 2, left parietal June 2023/right temporal December 2023      Patient currently in the TCU  Needs one-to-one assistance with ambulation and assistance one-to-one with activities of daily living  Seems to have some poor discretion also    Patient has a heart monitor on it has not been interpreted yet    Patient on  Plavix 75 mg once per day  Aspirin 81 mg once per day  Atorvastatin 80 mg once per day    At the end of 3 months from her stroke could go on Plavix alone  Has intracranial atherosclerosis    Has difficulty with gait though and balance  Originally lived in a split level would have to get up the stairs  Bathroom bedroom and kitchen are all on the upstairs  Not sure that she is going to be good enough to get out of the TCU they are going to have a meeting in the near future with family        A.  CVA multiple       Left parietal June 2023       Right parietal 12/6/2023         HEAD MRI: 12/6/2023       1.  Moderate-sized zone of acute to early subacute ischemic infarction centered in the inferomedial right temporal lobe.             This involves the hippocampus and parahippocampal structures and posterior right thalamus.            5.8 x 2.5 x 2.0 cm    B.   Vascular risk factors         Hypertension/PRES/intracranial stenosis/CKD/DIANA/CHF    C.   Vascular dementia         Mother history of dementia  MoCA  12/7/2023,   19 out of 30 (slums)  1/8/2024,     15 out of 30      Past medical history  CVA left parietal (June 2023)  Left intracranial stenosis P3/P4  CVA right temporal (December 2023)  PRES April 2020  Moderate aortic stenosis  Diabetes  CKD  CHF  Hypertension  DIANA    Habits  Non-smoker  Does not drink alcohol    Family history  Father hypertension  Mother diabetes/hypertension/dementia        Workup  CT head 6/12/2023  1. Acute ischemic stroke of  the left parietal lobe involving the P3/P4 segment of the left posterior cerebral artery.   2. No acute intracranial hemorrhage.   3. Large 4.4 cm mass in the right thyroid lobe. Recommend nonemergent dedicated thyroid ultrasound for further characterization.  CTA head and neck 6/12/2023  1.  Left P3/P4 severe stenosis versus occlusion  2.  Carotid arteries no significant stenosis  MRI head 6/12/2023, compared to 6/11/2023 CT head  1. Subacute infarct left parietal lobe.   2.  No signal mass effect or hemorrhage.   Cardiac echo 6/12/2023, ejection fraction greater than 70%, left atrium moderately enlarged  Cardiac event monitor 7/18/2023 30 days see official report  No atrial fibrillation seen.  12/2023 workup  CT scan head 12/5/2023 compared to 6/11/2023  1.  Moderate area of hypodensity medial aspect of right temporal lobe.        This probably represents subacute ischemia.        This is new since prior. Questionable tiny amount of associated petechial hemorrhage.  2.  Moderate area of more pronounced hypodensity left parietal lobe, most consistent with late subacute to chronic infarction.        This has evolved since prior.  3.  Mild presumed chronic small vessel ischemia.  4.  Mild atrophy.  HEAD MRI: 12/6/2023  1.  Moderate-sized zone of acute to early subacute ischemic infarction centered in the inferomedial right temporal lobe.       This involves the hippocampus and parahippocampal structures and posterior right thalamus.       5.8 x 2.5 x 2.0 cm  2.  No space-occupying hemorrhage.   3.  Age-related changes.  HEAD MRA: 12/6/2023  1.  5 mm segment of high-grade stenosis/near occlusion at the right P1-P2 junction. High-grade stenosis or occlusion of the distal right P3 segment.  2.  Moderate atheromatous disease elsewhere.  NECK MRA: 12/6/2023  No measurable stenosis or dissection.  Cardiac echo 12/6/2023, ejection fraction 60-65%, left atrium moderately dilated  EEG 12/6/2023 Impression:  Moderately  abnormal awake EEG due to:  Bitemporal theta delta slowing worse on the right than the left.  CT scan head 12/7/2023 follow-up  1.  Chronic left parietal and bilateral cerebellar infarcts.  2.  Subacute or early chronic right temporal-occipital infarcts. No finding for hemorrhagic transformation.  3.  No finding for new infarct.  4.  Moderate volume loss and at least mild presumed sequela of chronic microvascular ischemic change.    Cardiac event monitor 12/27/2023 - 1/25/2024 no sustained tachyarrhythmias/no atrial fibrillation  Slums score 19 out of 30, (12/7/2023)                                6/2023  HDL/LDL             32/92  HGBA1C             7.4    MoCA  12/7/2023,   19 out of 30 (slums)  1/8/2024,     15 out of 30      Exam     Review of systems  Pertinent positives and negatives  No headache no chest pain or shortness of breath no nausea vomiting no diarrhea no fever chills  No diplopia dysarthria dysphagia  No focal weakness  Difficulty with cognition  Trouble with ADLs  Trouble with gait and balance    General exam  Alert attentive oriented x 3  HEENT normal  Lungs clear  Heart rate regular  Abdomen soft  Symmetrical pulses    Neurologic exam  Alert orient x 3  Prosody speech normal  Naming normal  Comprehension normal  Repetition normal  No aphasia  No neglect    MoCA  12/7/2023,   19 out of 30 (slums)  1/8/2024,     15 out of 30    Cranials 2 through 12  No ophthalmoplegia  No nystagmus  Could not  a visual field cut on confrontation  Face symmetrical  Tongue twisters good    Upper extremities  No drift  No tremor    Lower extremities  Distal proximal strength okay    Gait  Needs the assistance of at least 1 to get up  Needs assistance of at least 1 to hang onto her when she marches in place          Assessment/plan    1.  Left parietal lobe stroke 6/2023/right temporal stroke 12/2023          Left parietal lobe stroke 6/2023        Large right inferior medial temporal lobe infarct 5.8 x 2.5 x  2.0 cm 12/5/2023        Involves hippocampus parahippocampal structures and posterior right thalamus also.        Presented with confusion     2.   Intracranial atherosclerosis         Left P3/P4 occlusion versus high-grade stenosis         Right P1-P2 high-grade stenosis/right P3 segment occlusion          Moderate atheromatous disease elsewhere.       3.   Cardiac echo with left atrial enlargement        Cardiac event monitor 12/27/2023 - 1/25/2024 no sustained tachyarrhythmias/no atrial fibrillation    4.  Vascular risk factors:       Hypertension/CKD/obstructive sleep apnea/previous CVA/CHF/intracranial stenosis     4.   Above complicated by mild pre-existing dementia        Family history mother with dementia     MoCA  12/7/2023,   19 out of 30 (slums)  1/8/2024,     15 out of 30       Diagnosis  Right PCA stroke large  Intracranial atherosclerosis severe  Confusion/multi-infarct dementia  Previous left parietal CVA    Dual antiplatelet for at least 3 months then switch to Plavix alone  Plavix 75 mg daily  Aspirin 81 mg daily  Lipitor 80 mg daily     For multi infarct dementia started on Aricept 10 mg once per day  No GERD no lightheadedness no diarrhea    Lives in split-level  TCU reassessing whether she would be able to go home with help or not    Conference with patient and family members face-to-face today 1/8/2024  Cardiac rhythm monitor pending    Total care time today 45 minutes    Follow-up April 2024    Addendum 2/2/2024  Cardiac event monitor 12/27/2023 - 1/25/2024 no sustained tachyarrhythmias/no atrial fibrillation

## 2024-01-08 ENCOUNTER — OFFICE VISIT (OUTPATIENT)
Dept: NEUROLOGY | Facility: CLINIC | Age: 69
End: 2024-01-08
Attending: FAMILY MEDICINE
Payer: COMMERCIAL

## 2024-01-08 VITALS
SYSTOLIC BLOOD PRESSURE: 165 MMHG | DIASTOLIC BLOOD PRESSURE: 76 MMHG | BODY MASS INDEX: 34.04 KG/M2 | HEART RATE: 65 BPM | HEIGHT: 62 IN | WEIGHT: 185 LBS

## 2024-01-08 DIAGNOSIS — I65.22 STENOSIS OF INTRACRANIAL PORTIONS OF LEFT INTERNAL CAROTID ARTERY: ICD-10-CM

## 2024-01-08 DIAGNOSIS — F01.B0 MODERATE VASCULAR DEMENTIA WITHOUT BEHAVIORAL DISTURBANCE, PSYCHOTIC DISTURBANCE, MOOD DISTURBANCE, OR ANXIETY (H): Primary | ICD-10-CM

## 2024-01-08 DIAGNOSIS — I63.512 CEREBROVASCULAR ACCIDENT (CVA) DUE TO STENOSIS OF LEFT MIDDLE CEREBRAL ARTERY (H): ICD-10-CM

## 2024-01-08 DIAGNOSIS — I63.531 CEREBROVASCULAR ACCIDENT (CVA) DUE TO STENOSIS OF RIGHT POSTERIOR CEREBRAL ARTERY (H): ICD-10-CM

## 2024-01-08 PROCEDURE — 99215 OFFICE O/P EST HI 40 MIN: CPT | Performed by: PSYCHIATRY & NEUROLOGY

## 2024-01-08 ASSESSMENT — MONTREAL COGNITIVE ASSESSMENT (MOCA)
VISUOSPATIAL/EXECUTIVE SUBSCORE: 3
11. FOR EACH PAIR OF WORDS, WHAT CATEGORY DO THEY BELONG TO (OUT OF 2): 1
12. MEMORY INDEX SCORE: 0
WHAT IS THE TOTAL SCORE (OUT OF 30): 15
7. [VIGILENCE] TAP WHEN HEARING DESIGNATED LETTER: 1
9. REPEAT EACH SENTENCE: 2
8. SERIAL SUBTRACTION OF 7S: 1
6. READ LIST OF DIGITS [FORWARD/BACKWARD]: 2
4. NAME EACH OF THE THREE ANIMALS SHOWN: 3
WHAT LEVEL OF EDUCATION WAS ATTAINED: 0
13. ORIENTATION SUBSCORE: 2
10. [FLUENCY] NAME WORDS STARTING WITH DESIGNATED LETTER: 0

## 2024-01-08 NOTE — LETTER
1/8/2024         RE: Indu Felix  [address]  [place] [address]        Dear Colleague,    Thank you for referring your patient, Indu Felix, to the [facility] [place]. Please see a copy of my visit note below.    In person evaluation      HPI  Hospital visit 12/5/2023 1/8/2024, in person follow-up visit      68-year-old being evaluated neurologically for:  CVA times 2, left parietal June 2023/right temporal December 2023      Patient currently in the TCU  Needs one-to-one assistance with ambulation and assistance one-to-one with activities of daily living  Seems to have some poor discretion also    Patient has a heart monitor on it has not been interpreted yet    Patient on  Plavix 75 mg once per day  Aspirin 81 mg once per day  Atorvastatin 80 mg once per day    At the end of 3 months from her stroke could go on Plavix alone  Has intracranial atherosclerosis    Has difficulty with gait though and balance  Originally lived in a split level would have to get up the stairs  Bathroom bedroom and kitchen are all on the upstairs  Not sure that she is going to be good enough to get out of the TCU they are going to have a meeting in the near future with family        A.  CVA multiple       Left parietal June 2023       Right parietal 12/6/2023         HEAD MRI: 12/6/2023       1.  Moderate-sized zone of acute to early subacute ischemic infarction centered in the inferomedial right temporal lobe.             This involves the hippocampus and parahippocampal structures and posterior right thalamus.            5.8 x 2.5 x 2.0 cm    B.   Vascular risk factors         Hypertension/PRES/intracranial stenosis/CKD/DIANA/CHF    C.   Vascular dementia         Mother history of dementia  MoCA  12/7/2023,   19 out of 30 (slums)  1/8/2024,     15 out of 30      Past medical history  CVA left parietal (June 2023)  Left intracranial stenosis P3/P4  CVA right temporal (December 2023)  PRES  April 2020  Moderate aortic stenosis  Diabetes  CKD  CHF  Hypertension  DIANA    Habits  Non-smoker  Does not drink alcohol    Family history  Father hypertension  Mother diabetes/hypertension/dementia        Workup  CT head 6/12/2023  1. Acute ischemic stroke of the left parietal lobe involving the P3/P4 segment of the left posterior cerebral artery.   2. No acute intracranial hemorrhage.   3. Large 4.4 cm mass in the right thyroid lobe. Recommend nonemergent dedicated thyroid ultrasound for further characterization.  CTA head and neck 6/12/2023  1.  Left P3/P4 severe stenosis versus occlusion  2.  Carotid arteries no significant stenosis  MRI head 6/12/2023, compared to 6/11/2023 CT head  1. Subacute infarct left parietal lobe.   2.  No signal mass effect or hemorrhage.   Cardiac echo 6/12/2023, ejection fraction greater than 70%, left atrium moderately enlarged  Cardiac event monitor 7/18/2023 30 days see official report  No atrial fibrillation seen.  12/2023 workup  CT scan head 12/5/2023 compared to 6/11/2023  1.  Moderate area of hypodensity medial aspect of right temporal lobe.        This probably represents subacute ischemia.        This is new since prior. Questionable tiny amount of associated petechial hemorrhage.  2.  Moderate area of more pronounced hypodensity left parietal lobe, most consistent with late subacute to chronic infarction.        This has evolved since prior.  3.  Mild presumed chronic small vessel ischemia.  4.  Mild atrophy.  HEAD MRI: 12/6/2023  1.  Moderate-sized zone of acute to early subacute ischemic infarction centered in the inferomedial right temporal lobe.       This involves the hippocampus and parahippocampal structures and posterior right thalamus.       5.8 x 2.5 x 2.0 cm  2.  No space-occupying hemorrhage.   3.  Age-related changes.  HEAD MRA: 12/6/2023  1.  5 mm segment of high-grade stenosis/near occlusion at the right P1-P2 junction. High-grade stenosis or occlusion of  the distal right P3 segment.  2.  Moderate atheromatous disease elsewhere.  NECK MRA: 12/6/2023  No measurable stenosis or dissection.  Cardiac echo 12/6/2023, ejection fraction 60-65%, left atrium moderately dilated  EEG 12/6/2023 Impression:  Moderately abnormal awake EEG due to:  Bitemporal theta delta slowing worse on the right than the left.  CT scan head 12/7/2023 follow-up  1.  Chronic left parietal and bilateral cerebellar infarcts.  2.  Subacute or early chronic right temporal-occipital infarcts. No finding for hemorrhagic transformation.  3.  No finding for new infarct.  4.  Moderate volume loss and at least mild presumed sequela of chronic microvascular ischemic change.  Slums score 19 out of 30, (12/7/2023)                                6/2023  HDL/LDL             32/92  HGBA1C             7.4    MoCA  12/7/2023,   19 out of 30 (slums)  1/8/2024,     15 out of 30      Exam     Review of systems  Pertinent positives and negatives  No headache no chest pain or shortness of breath no nausea vomiting no diarrhea no fever chills  No diplopia dysarthria dysphagia  No focal weakness  Difficulty with cognition  Trouble with ADLs  Trouble with gait and balance    General exam  Alert attentive oriented x 3  HEENT normal  Lungs clear  Heart rate regular  Abdomen soft  Symmetrical pulses    Neurologic exam  Alert orient x 3  Prosody speech normal  Naming normal  Comprehension normal  Repetition normal  No aphasia  No neglect    MoCA  12/7/2023,   19 out of 30 (slums)  1/8/2024,     15 out of 30    Cranials 2 through 12  No ophthalmoplegia  No nystagmus  Could not  a visual field cut on confrontation  Face symmetrical  Tongue twisters good    Upper extremities  No drift  No tremor    Lower extremities  Distal proximal strength okay    Gait  Needs the assistance of at least 1 to get up  Needs assistance of at least 1 to hang onto her when she marches in place          Assessment/plan    1.  Left parietal lobe  stroke 6/2023/right temporal stroke 12/2023          Left parietal lobe stroke 6/2023        Large right inferior medial temporal lobe infarct 5.8 x 2.5 x 2.0 cm 12/5/2023        Involves hippocampus parahippocampal structures and posterior right thalamus also.        Presented with confusion     2.   Intracranial atherosclerosis         Left P3/P4 occlusion versus high-grade stenosis         Right P1-P2 high-grade stenosis/right P3 segment occlusion          Moderate atheromatous disease elsewhere.       3.   Cardiac echo with left atrial enlargement    4.  Vascular risk factors:       Hypertension/CKD/obstructive sleep apnea/previous CVA/CHF/intracranial stenosis     4.   Above complicated by mild pre-existing dementia        Family history mother with dementia     MoCA  12/7/2023,   19 out of 30 (slums)  1/8/2024,     15 out of 30       Diagnosis  Right PCA stroke large  Intracranial atherosclerosis severe  Confusion/multi-infarct dementia  Previous left parietal CVA    Dual antiplatelet for at least 3 months then switch to Plavix alone  Plavix 75 mg daily  Aspirin 81 mg daily  Lipitor 80 mg daily     For multi infarct dementia started on Aricept 10 mg once per day  No GERD no lightheadedness no diarrhea    Lives in split-level  TCU reassessing whether she would be able to go home with help or not    Conference with patient and family members face-to-face today 1/8/2024  Cardiac rhythm monitor pending    Total care time today 45 minutes    Follow-up April 2024                Again, thank you for allowing me to participate in the care of your patient.        Sincerely,        thanh Guan MD

## 2024-01-08 NOTE — NURSING NOTE
Chief Complaint   Patient presents with    Hospital F/U     Follow up for CVA on 12/5/23.      Debra Hayward LPN on 1/8/2024 at 3:01 PM

## 2024-01-08 NOTE — NURSING NOTE
RELL COGNITIVE ASSESSMENT ([facility])  Version 7.1 Original Version  VISUOSPATIAL/EXECUTIVE               COPY CUBE      [ 0   ]                                [ 0   ] DRAW CLOCK (Ten past eleven)  (3 points)    [  1  ]                    [   1 ]               [   1 ]       Contour            Numbers     Hands POINTS                   3/ 5   NAMING    [ 1  ]                                                                        [  1  ]                                             [  1  ]  Lilainey Bateman                                Camel                    3 / 3   MEMORY Read list of words, subject must repeat them. Do 2 trials, even if 1st trial is successful. Do a recall after 5 minutes  FACE VELVET [employer] BANG RED No Points    1st          2nd         ATTENTION Read list of digits (1 digit/sec) Subject has to repeat in the forward order       [  1  ]   2  1  8  5  4                                [   1 ] 7 4 2                          2/2   Read list of letters. The subject must tap with his hand at each letter A. No points if > 2 errors.  [ 1   ] F B A C M N A A J K L B A F A K D E A A A J A M O F A A B              1/1   Serial 7 subtraction starting at 100          [   1 ] 93         [  0  ] 86          [  0  ] 79          [   0 ] 72         [ 0   ] 65   4 or 5 correct subtractions: 3 points,  2 or 3 correct: 2 points,  1correct: 1 point,   0 correct: 0 points           1 /3   LANGUAGE Repeat: I only know that Piter is the one to help today. [   1  ]                                      The cat always hid under the couch when dogs were in the room. [  1 ]               2/2   Fluency: Name maximum number of words in one minute that begin with the letter F                                                                                                                    [  0  ] _6__ (N > 11 words)               0/1   ABSTRACTION Similarity  between e.g. banana-orange=fruit                                                                   [   1 ] train-bicycle                      [  0  ] watch-ruler             1 /2   DELAYED  RECALL Has to recall words  WITH NO CUE FACE  [   0 ] VELVET  [ 0   ] Yarsani  [  0  ]  BANG  [  0  ] RED  [ 0   ] Points for UNCUED recall only           0 /5           OPTIONAL Category cue           Multiple choice cue          ORIENTATION  [  0  ] Date     [   1 ] Month       [  1  ] Year      [ 0   ] Day      [ 0   ] Place        [  0  ] City          2/6   TOTAL  Normal > 26/30 Add 1 point if < 12 years education      15  /30

## 2024-01-12 ENCOUNTER — DOCUMENTATION ONLY (OUTPATIENT)
Dept: GERIATRICS | Facility: CLINIC | Age: 69
End: 2024-01-12
Payer: COMMERCIAL

## 2024-01-17 ENCOUNTER — TELEPHONE (OUTPATIENT)
Dept: CARDIOLOGY | Facility: CLINIC | Age: 69
End: 2024-01-17
Payer: COMMERCIAL

## 2024-01-17 NOTE — TELEPHONE ENCOUNTER
[facility] Call Center    Phone Message    May a detailed message be left on voicemail: ask for patient nurse      Reason for Call: Other: Jennifer would like a call back regarding when patient is due for appt and medication question on isosorbide mononitrate (IMDUR) 60 MG 24 hr tablet. Please reach out       Action Taken: Other: cardiology     Travel Screening: Not Applicable    Thank you!  [facility]

## 2024-01-18 NOTE — TELEPHONE ENCOUNTER
Called Anjali, nurse at facility to address concerns. First, they inquired if this patient is due for follow up with [facility]. Chart reviewed and it was noted that the patient is actually overdue. She was supposed to be seen in September. They will call scheduling to get this arranged either with [facility] or [facility].     Next, they inquire if the patient should be on isosorbide mononitrate 60 mg BID. Noted that the patient has been on this since February 2023 and not refilled by [facility]. Pt has a hx of uncontrolled htn, ckd, and cva. Apparently the pharmacy at the patient's facility called and said that this medicine is typically a once daily medication. Encouraged facility to discuss with PMD team or last prescriber, as it was not [facility]. In addition, given her history and reported tolerance with it, encouraged facility to keep follow up with [facility] in February and plan to discuss any concerns at that time. Understanding verbalized. -jamari

## 2024-02-07 NOTE — PROGRESS NOTES
HEART CARE ENCOUNTER NOTE      [facility]  [phone]      Assessment/Recommendations   Assessment:   Chronic heart failure with preserved ejection fraction: Patient originally lost 55 pounds after diuresis when she was in the hospital.  Remains on Bumex 2 mg daily.  Echocardiogram 12/6/2023 showed EF of 60 to 65% and normal left ventricular diastolic function with no regional wall motion abnormalities.  Today patient has a 10 pound weight gain in the past month.  Is noting abdominal bloating and lower extremity edema.  Denies orthopnea or shortness of breath.  Valvular disease: Moderate aortic stenosis, mild mitral valve stenosis with mild pulmonary hypertension most likely secondary to mitral stenosis.  No major change on echocardiogram 12/6/2023.  Essential hypertension: Resistant.  Currently well-controlled on metoprolol 100 mg daily, amlodipine 5 mg daily, Imdur 60 mg twice daily, Bumex 2 mg daily.  Reviewed BMP 12/11/2023 which was fairly stable.  Obstructive sleep apnea: Compliant on CPAP  Diabetes mellitus type 2: Insulin-dependent.  Hemoglobin A1c 6.3.  Chronic kidney disease stage III: Last creatinine 12/11/2023 was 1.99.  CVA history: Patient had left severe stroke June 2023.  MRI showed a new large right PCA stroke with intracranial atherosclerosis 12/5/2023.  Patient was started on dual antiplatelet therapy with aspirin 81 mg and Plavix 75 mg daily to remain on for at least 3 months and then switch to Plavix alone.  Remains on Lipitor 80 mg daily.  Mild vascular dementia    Plan:   Will increase Bumex for 3 days from 2 mg daily to 2 mg in the morning and 1 mg at noon.  Lab work today including BMP and BNP  Encouraged patient to monitor weights daily and reach out after the 3 days of increased diuretic to see if her weight, abdominal bloating, lower extremity edema are improving.  Continue following low-sodium diet  Patient is currently wearing MCOT and is unsure when she got it  placed or who ordered it.  I am able to see she had a cardiac event monitor end of December through end of January that showed no tachyarrhythmias or atrial fibrillation.  Unable to find an order for an MCOT.       Follow up in 3 to 4 months with Dr. Mondragon    The longitudinal plan of care for Indu Felix was addressed during this visit. Due to the added complexity in care, I will continue to support Indu in the subsequent management of this condition(s) and with the ongoing continuity of care of this condition(s).      History of Present Illness/Subjective    HPI: Indu Felix is a 69 year old female with PMHx of chronic heart failure with preserved ejection fraction, moderate aortic stenosis, mild mitral stenosis, essential hypertension, obstructive sleep apnea, diabetes mellitus type 2, chronic kidney disease stage III presents for follow-up.  Patient last saw Dr. Mondragon 6/5/2023 where patient's Bumex was decreased to see if patient could tolerate her CPAP more.    Patient presented to the ED 12/5/2023 with confusion.  Patient was found to have subacute right CVA and a UTI.  Patient underwent cardiac event monitor 12/27/2023 through 1/25/2024 that showed no sustained tachyarrhythmias or atrial fibrillation. At TCU currently.  Patient notes she has been walking short distances in the hallway and denies any exertional shortness of breath or chest pain.  I had asked patient what limits her walking and why she is in a wheelchair in clinic if she does not feel short of breath and patient was unable to have an answer other than someone told her to be in a wheelchair.  Patient denies any lightheadedness, orthopnea, heart palpitations.  Does endorse feeling some abdominal bloating and lower extremity edema.          Echocardiogram 12/6/23 Results:  1.Left ventricular size, wall motion and function are normal. The ejection  fraction is 60-65%.  2.There is mild concentric left ventricular hypertrophy.  3.39 mmHg LVOT  peak gradient mid cavity due to hyperdynamic state.  4.Normal right ventricle size and systolic function.  5.Immobile mitral valve posterior leaflet There is mild mitral stenosis. The  mean mitral valve gradient is 5mmHg.  6.Thickened trileaflet aortic valve with moderate stenosis, at SVI of 53 mL/M2  peak velocity 3.0 m/s with a mean gradient 19 mmHg and dimensionless index  0.51.  Compared to the prior study dated 6/22/2023, there have been no changes.    Cardiac event monitor 12/13/2023:  Cardiac event monitoring from 12/27/2023 to 1/25/2024 (monitored duration 5d 23h 1m).  Baseline rhythm was sinus rhythm.    Reported heart rate range 51 to 109bpm, average 58bpm.  2 symptom triggers (chest pain, took medication and other symptoms) correlated to sinus rhythm.  2 automated recordings included sinus rhythm.  No sustained tachyarrhythmias.  No atrial fibrillation.  There were no pauses noted.  Supraventricular and ventricular ectopic beat frequency are not reported on this monitoring modality.      Cardiac catheterization 2/8/2023:  Exertional dyspnea and acute heart failure in an obese diabetic woman with renal insufficiency  Elevated filling pressures extending back from the left ventricle, see numbers below.  AV gradient 14 mmHg by pullback  No significant LV-PCWP gradient     Physical Examination  Review of Systems   Vitals: /70 (BP Location: Left arm, Patient Position: Sitting, Cuff Size: Adult Regular)   Pulse 62   Wt 88.3 kg (194 lb 9.6 oz)   BMI 35.59 kg/m    BMI= Body mass index is 35.59 kg/m .  Wt Readings from Last 3 Encounters:   02/08/24 88.3 kg (194 lb 9.6 oz)   01/08/24 83.9 kg (185 lb)   12/29/23 84.1 kg (185 lb 8 oz)           ENT/Mouth: membranes moist, no oral lesions or bleeding gums.      EYES:  no scleral icterus, normal conjunctivae       Chest/Lungs:   lungs are clear to auscultation, no rales or wheezing, , equal chest wall expansion    Cardiovascular:   Regular. Normal first and  second heart sounds with 2 out of 6 systolic murmur, no rubs, or gallops; the carotid, radial  pulses are intact, mild edema bilaterally        Extremities: no cyanosis or clubbing   Skin: no xanthelasma, warm.    Neurologic: no tremors     Psychiatric: alert and oriented x3, calm        Please refer above for cardiac ROS details.        Medical History  Surgical History Family History Social History   Past Medical History:   Diagnosis Date    Aortic stenosis     Benign essential hypertension     Chronic heart failure with preserved ejection fraction (H) 03/23/2023    Chronic heart failure with preserved ejection fraction (HFpEF) (H)     Chronic kidney disease     CKD (chronic kidney disease) stage 3, GFR 30-59 ml/min (H)     Congestive heart failure (H)     History of CVA (cerebrovascular accident)     Mitral stenosis     Obesity     DIANA (obstructive sleep apnea) 03/23/2023    Pulmonary hypertension (H)     Stage 3a chronic kidney disease (H) 02/08/2023    Tricuspid valve disorders, non-rheumatic     Type 2 diabetes mellitus (H)      Past Surgical History:   Procedure Laterality Date    CV LEFT HEART CATH N/A 2/8/2023    Procedure: Left Heart Catheterization;  Surgeon: Yolanda Ramirez MD;  Location: [facility] CATH LAB CV    CV RIGHT HEART CATH MEASUREMENTS RECORDED N/A 2/8/2023    Procedure: Right Heart Catheterization;  Surgeon: Yolanda Ramirez MD;  Location: [facility] CATH LAB CV     No family history on file.     Social History     Socioeconomic History    Marital status:      Spouse name: Not on file    Number of children: Not on file    Years of education: Not on file    Highest education level: Not on file   Occupational History    Not on file   Tobacco Use    Smoking status: Never    Smokeless tobacco: Never   Substance and Sexual Activity    Alcohol use: Never    Drug use: Never    Sexual activity: Not Currently   Other Topics Concern    Not on file   Social History Narrative    Not on file      Social Determinants of Health     Financial Resource Strain: Not on file   Food Insecurity: Not on file   Transportation Needs: Not on file   Physical Activity: Not on file   Stress: Not on file   Social Connections: Not on file   Interpersonal Safety: Not on file   Housing Stability: Not on file           Medications  Allergies   Current Outpatient Medications   Medication Sig Dispense Refill    acetaminophen (TYLENOL) 325 MG tablet Take 2 tablets (650 mg) by mouth every 4 hours as needed for mild pain or other (and adjunct with moderate or severe pain or per patient request)      amLODIPine (NORVASC) 5 MG tablet Take 1 tablet (5 mg) by mouth daily 30 tablet 0    aspirin 81 MG EC tablet Take 81 mg by mouth daily      atorvastatin (LIPITOR) 80 MG tablet Take 80 mg by mouth daily      bumetanide (BUMEX) 2 MG tablet Take 1 tablet (2 mg) by mouth daily 180 tablet 1    cholecalciferol 25 MCG (1000 UT) TABS Take 1 tablet by mouth daily      clopidogrel (PLAVIX) 75 MG tablet Take 1 tablet (75 mg) by mouth daily 30 tablet 1    donepezil (ARICEPT) 5 MG tablet Take 10 mg by mouth At Bedtime      escitalopram (LEXAPRO) 20 MG tablet Take 20 mg by mouth daily      fish oil-omega-3 fatty acids 1000 MG capsule Take 1,000 mg by mouth daily      insulin glargine (LANTUS SOLOSTAR) 100 UNIT/ML pen Inject 10 Units Subcutaneous every morning      isosorbide mononitrate (IMDUR) 60 MG 24 hr tablet Take 1 tablet (60 mg) by mouth 2 times daily 60 tablet 0    loperamide (IMODIUM) 2 MG capsule Take 1 capsule (2 mg) by mouth 2 times daily as needed for diarrhea      melatonin 3 MG tablet Take 6 mg by mouth nightly as needed for sleep      metoprolol succinate ER (TOPROL XL) 100 MG 24 hr tablet Take 1 tablet (100 mg) by mouth daily      multivitamin (ONE-DAILY) tablet Take 1 tablet by mouth daily      omeprazole (PRILOSEC) 20 MG DR capsule 20 mg daily before breakfast      traZODone (DESYREL) 100 MG tablet Take 100 mg by mouth At Bedtime    "      Allergies   Allergen Reactions    Lisinopril Other (See Comments) and Unknown          Lab Results    Chemistry/lipid CBC Cardiac Enzymes/BNP/TSH/INR   Recent Labs   Lab Test 12/06/23  0734   CHOL 193   HDL 39*   *   TRIG 217*     Recent Labs   Lab Test 12/06/23  0734 04/19/23  0936 05/17/22  1417   * 99 130*     Recent Labs   Lab Test 12/11/23  1127 12/11/23  0752 12/11/23  0452   NA  --   --  140   POTASSIUM  --   --  3.8   CHLORIDE  --   --  110*   CO2  --   --  20*   *   < > 111*   BUN  --   --  36.9*   CR  --   --  1.99*   GFRESTIMATED  --   --  27*   JARETT  --   --  9.0    < > = values in this interval not displayed.     Recent Labs   Lab Test 12/11/23  0452 12/10/23  0508 12/09/23  0510   CR 1.99* 2.24* 2.17*     Recent Labs   Lab Test 12/06/23  0734 06/21/23  1801 02/08/23  1312   A1C 6.3* 7.7* 6.2*          Recent Labs   Lab Test 12/09/23  0510   WBC 8.4   HGB 11.1*   HCT 34.5*   MCV 87        Recent Labs   Lab Test 12/09/23  0510 12/06/23  0734 12/05/23  2143   HGB 11.1* 11.9 12.5    No results for input(s): \"TROPONINI\" in the last 41093 hours.  Recent Labs   Lab Test 01/19/23  1403   NTBNP 4,666*     No results for input(s): \"TSH\" in the last 29810 hours.  No results for input(s): \"INR\" in the last 12961 hours.       Barbara Rosario PA-C                                       "

## 2024-02-08 ENCOUNTER — LAB REQUISITION (OUTPATIENT)
Dept: LAB | Facility: CLINIC | Age: 69
End: 2024-02-08
Payer: COMMERCIAL

## 2024-02-08 ENCOUNTER — OFFICE VISIT (OUTPATIENT)
Dept: CARDIOLOGY | Facility: CLINIC | Age: 69
End: 2024-02-08
Payer: COMMERCIAL

## 2024-02-08 VITALS
HEART RATE: 62 BPM | BODY MASS INDEX: 35.59 KG/M2 | WEIGHT: 194.6 LBS | SYSTOLIC BLOOD PRESSURE: 120 MMHG | DIASTOLIC BLOOD PRESSURE: 70 MMHG

## 2024-02-08 DIAGNOSIS — G47.33 OSA (OBSTRUCTIVE SLEEP APNEA): ICD-10-CM

## 2024-02-08 DIAGNOSIS — I10 BENIGN ESSENTIAL HYPERTENSION: Primary | ICD-10-CM

## 2024-02-08 DIAGNOSIS — I63.9 ISCHEMIC STROKE (H): ICD-10-CM

## 2024-02-08 DIAGNOSIS — I34.2 NONRHEUMATIC MITRAL VALVE STENOSIS: ICD-10-CM

## 2024-02-08 DIAGNOSIS — I50.32 CHRONIC DIASTOLIC (CONGESTIVE) HEART FAILURE (H): ICD-10-CM

## 2024-02-08 DIAGNOSIS — I50.32 CHRONIC HEART FAILURE WITH PRESERVED EJECTION FRACTION (H): ICD-10-CM

## 2024-02-08 DIAGNOSIS — I35.0 NONRHEUMATIC AORTIC (VALVE) STENOSIS: ICD-10-CM

## 2024-02-08 LAB
ANION GAP SERPL CALCULATED.3IONS-SCNC: 16 MMOL/L (ref 7–15)
BUN SERPL-MCNC: 43.8 MG/DL (ref 8–23)
CALCIUM SERPL-MCNC: 9.4 MG/DL (ref 8.8–10.2)
CHLORIDE SERPL-SCNC: 101 MMOL/L (ref 98–107)
CREAT SERPL-MCNC: 2.27 MG/DL (ref 0.51–0.95)
DEPRECATED HCO3 PLAS-SCNC: 23 MMOL/L (ref 22–29)
EGFRCR SERPLBLD CKD-EPI 2021: 23 ML/MIN/1.73M2
GLUCOSE SERPL-MCNC: 185 MG/DL (ref 70–99)
NT-PROBNP SERPL-MCNC: 785 PG/ML (ref 0–900)
POTASSIUM SERPL-SCNC: 4.7 MMOL/L (ref 3.4–5.3)
SODIUM SERPL-SCNC: 140 MMOL/L (ref 135–145)

## 2024-02-08 PROCEDURE — 80048 BASIC METABOLIC PNL TOTAL CA: CPT | Performed by: STUDENT IN AN ORGANIZED HEALTH CARE EDUCATION/TRAINING PROGRAM

## 2024-02-08 PROCEDURE — 83880 ASSAY OF NATRIURETIC PEPTIDE: CPT | Performed by: STUDENT IN AN ORGANIZED HEALTH CARE EDUCATION/TRAINING PROGRAM

## 2024-02-08 PROCEDURE — 36415 COLL VENOUS BLD VENIPUNCTURE: CPT | Performed by: STUDENT IN AN ORGANIZED HEALTH CARE EDUCATION/TRAINING PROGRAM

## 2024-02-08 PROCEDURE — 99214 OFFICE O/P EST MOD 30 MIN: CPT | Performed by: STUDENT IN AN ORGANIZED HEALTH CARE EDUCATION/TRAINING PROGRAM

## 2024-02-08 PROCEDURE — G2211 COMPLEX E/M VISIT ADD ON: HCPCS | Performed by: STUDENT IN AN ORGANIZED HEALTH CARE EDUCATION/TRAINING PROGRAM

## 2024-02-08 NOTE — PATIENT INSTRUCTIONS
Indu Felix,    It was a pleasure to see you today at the [facility].     My recommendations after this visit include:    - Increase Bumex from 2 mg daily to 2 mg in the morning and 1 mg at noon for 3 days. Then let me know how your weights have been and the swelling in your lower legs. Go back down to 2 mg daily after 3 days.   - Monitor weights daily and continue following low sodium diet  - Recommend compression stockings  - Follow up with Dr. Mondragon in 3-4 months    - Please call [phone], if you have any questions or concerns      Barbara Rosario PA-C

## 2024-02-08 NOTE — LETTER
2/8/2024    Flora Betts MD  [address] [place] [address]    RE: Indu Felix       Dear Colleague,     I had the pleasure of seeing Indu Felix in the [facility].    HEART CARE ENCOUNTER NOTE      [facility]  [phone]      Assessment/Recommendations   Assessment:   Chronic heart failure with preserved ejection fraction: Patient originally lost 55 pounds after diuresis when she was in the hospital.  Remains on Bumex 2 mg daily.  Echocardiogram 12/6/2023 showed EF of 60 to 65% and normal left ventricular diastolic function with no regional wall motion abnormalities.  Today patient has a 10 pound weight gain in the past month.  Is noting abdominal bloating and lower extremity edema.  Denies orthopnea or shortness of breath.  Valvular disease: Moderate aortic stenosis, mild mitral valve stenosis with mild pulmonary hypertension most likely secondary to mitral stenosis.  No major change on echocardiogram 12/6/2023.  Essential hypertension: Resistant.  Currently well-controlled on metoprolol 100 mg daily, amlodipine 5 mg daily, Imdur 60 mg twice daily, Bumex 2 mg daily.  Reviewed BMP 12/11/2023 which was fairly stable.  Obstructive sleep apnea: Compliant on CPAP  Diabetes mellitus type 2: Insulin-dependent.  Hemoglobin A1c 6.3.  Chronic kidney disease stage III: Last creatinine 12/11/2023 was 1.99.  CVA history: Patient had left severe stroke June 2023.  MRI showed a new large right PCA stroke with intracranial atherosclerosis 12/5/2023.  Patient was started on dual antiplatelet therapy with aspirin 81 mg and Plavix 75 mg daily to remain on for at least 3 months and then switch to Plavix alone.  Remains on Lipitor 80 mg daily.  Mild vascular dementia    Plan:   Will increase Bumex for 3 days from 2 mg daily to 2 mg in the morning and 1 mg at noon.  Lab work today including BMP and BNP  Encouraged patient to monitor weights daily and reach out after the 3 days of increased  diuretic to see if her weight, abdominal bloating, lower extremity edema are improving.  Continue following low-sodium diet  Patient is currently wearing MCOT and is unsure when she got it placed or who ordered it.  I am able to see she had a cardiac event monitor end of December through end of January that showed no tachyarrhythmias or atrial fibrillation.  Unable to find an order for an MCOT.       Follow up in 3 to 4 months with Dr. Mondragon    The longitudinal plan of care for Indu Felix was addressed during this visit. Due to the added complexity in care, I will continue to support Indu in the subsequent management of this condition(s) and with the ongoing continuity of care of this condition(s).      History of Present Illness/Subjective    HPI: Indu Felix is a 69 year old female with PMHx of chronic heart failure with preserved ejection fraction, moderate aortic stenosis, mild mitral stenosis, essential hypertension, obstructive sleep apnea, diabetes mellitus type 2, chronic kidney disease stage III presents for follow-up.  Patient last saw Dr. Mondragon 6/5/2023 where patient's Bumex was decreased to see if patient could tolerate her CPAP more.    Patient presented to the ED 12/5/2023 with confusion.  Patient was found to have subacute right CVA and a UTI.  Patient underwent cardiac event monitor 12/27/2023 through 1/25/2024 that showed no sustained tachyarrhythmias or atrial fibrillation. At TCU currently.  Patient notes she has been walking short distances in the hallway and denies any exertional shortness of breath or chest pain.  I had asked patient what limits her walking and why she is in a wheelchair in clinic if she does not feel short of breath and patient was unable to have an answer other than someone told her to be in a wheelchair.  Patient denies any lightheadedness, orthopnea, heart palpitations.  Does endorse feeling some abdominal bloating and lower extremity  edema.          Echocardiogram 12/6/23 Results:  1.Left ventricular size, wall motion and function are normal. The ejection  fraction is 60-65%.  2.There is mild concentric left ventricular hypertrophy.  3.39 mmHg LVOT peak gradient mid cavity due to hyperdynamic state.  4.Normal right ventricle size and systolic function.  5.Immobile mitral valve posterior leaflet There is mild mitral stenosis. The  mean mitral valve gradient is 5mmHg.  6.Thickened trileaflet aortic valve with moderate stenosis, at SVI of 53 mL/M2  peak velocity 3.0 m/s with a mean gradient 19 mmHg and dimensionless index  0.51.  Compared to the prior study dated 6/22/2023, there have been no changes.    Cardiac event monitor 12/13/2023:  Cardiac event monitoring from 12/27/2023 to 1/25/2024 (monitored duration 5d 23h 1m).  Baseline rhythm was sinus rhythm.    Reported heart rate range 51 to 109bpm, average 58bpm.  2 symptom triggers (chest pain, took medication and other symptoms) correlated to sinus rhythm.  2 automated recordings included sinus rhythm.  No sustained tachyarrhythmias.  No atrial fibrillation.  There were no pauses noted.  Supraventricular and ventricular ectopic beat frequency are not reported on this monitoring modality.      Cardiac catheterization 2/8/2023:  Exertional dyspnea and acute heart failure in an obese diabetic woman with renal insufficiency  Elevated filling pressures extending back from the left ventricle, see numbers below.  AV gradient 14 mmHg by pullback  No significant LV-PCWP gradient     Physical Examination  Review of Systems   Vitals: /70 (BP Location: Left arm, Patient Position: Sitting, Cuff Size: Adult Regular)   Pulse 62   Wt 88.3 kg (194 lb 9.6 oz)   BMI 35.59 kg/m    BMI= Body mass index is 35.59 kg/m .  Wt Readings from Last 3 Encounters:   02/08/24 88.3 kg (194 lb 9.6 oz)   01/08/24 83.9 kg (185 lb)   12/29/23 84.1 kg (185 lb 8 oz)           ENT/Mouth: membranes moist, no oral lesions or  bleeding gums.      EYES:  no scleral icterus, normal conjunctivae       Chest/Lungs:   lungs are clear to auscultation, no rales or wheezing, , equal chest wall expansion    Cardiovascular:   Regular. Normal first and second heart sounds with 2 out of 6 systolic murmur, no rubs, or gallops; the carotid, radial  pulses are intact, mild edema bilaterally        Extremities: no cyanosis or clubbing   Skin: no xanthelasma, warm.    Neurologic: no tremors     Psychiatric: alert and oriented x3, calm        Please refer above for cardiac ROS details.        Medical History  Surgical History Family History Social History   Past Medical History:   Diagnosis Date    Aortic stenosis     Benign essential hypertension     Chronic heart failure with preserved ejection fraction (H) 03/23/2023    Chronic heart failure with preserved ejection fraction (HFpEF) (H)     Chronic kidney disease     CKD (chronic kidney disease) stage 3, GFR 30-59 ml/min (H)     Congestive heart failure (H)     History of CVA (cerebrovascular accident)     Mitral stenosis     Obesity     DIANA (obstructive sleep apnea) 03/23/2023    Pulmonary hypertension (H)     Stage 3a chronic kidney disease (H) 02/08/2023    Tricuspid valve disorders, non-rheumatic     Type 2 diabetes mellitus (H)      Past Surgical History:   Procedure Laterality Date    CV LEFT HEART CATH N/A 2/8/2023    Procedure: Left Heart Catheterization;  Surgeon: Yolanda Ramirez MD;  Location: [facility] CATH LAB CV    CV RIGHT HEART CATH MEASUREMENTS RECORDED N/A 2/8/2023    Procedure: Right Heart Catheterization;  Surgeon: Yolanda Ramirez MD;  Location: [facility] CATH LAB CV     No family history on file.     Social History     Socioeconomic History    Marital status:      Spouse name: Not on file    Number of children: Not on file    Years of education: Not on file    Highest education level: Not on file   Occupational History    Not on file   Tobacco Use    Smoking status: Never     Smokeless tobacco: Never   Substance and Sexual Activity    Alcohol use: Never    Drug use: Never    Sexual activity: Not Currently   Other Topics Concern    Not on file   Social History Narrative    Not on file     Social Determinants of Health     Financial Resource Strain: Not on file   Food Insecurity: Not on file   Transportation Needs: Not on file   Physical Activity: Not on file   Stress: Not on file   Social Connections: Not on file   Interpersonal Safety: Not on file   Housing Stability: Not on file           Medications  Allergies   Current Outpatient Medications   Medication Sig Dispense Refill    acetaminophen (TYLENOL) 325 MG tablet Take 2 tablets (650 mg) by mouth every 4 hours as needed for mild pain or other (and adjunct with moderate or severe pain or per patient request)      amLODIPine (NORVASC) 5 MG tablet Take 1 tablet (5 mg) by mouth daily 30 tablet 0    aspirin 81 MG EC tablet Take 81 mg by mouth daily      atorvastatin (LIPITOR) 80 MG tablet Take 80 mg by mouth daily      bumetanide (BUMEX) 2 MG tablet Take 1 tablet (2 mg) by mouth daily 180 tablet 1    cholecalciferol 25 MCG (1000 UT) TABS Take 1 tablet by mouth daily      clopidogrel (PLAVIX) 75 MG tablet Take 1 tablet (75 mg) by mouth daily 30 tablet 1    donepezil (ARICEPT) 5 MG tablet Take 10 mg by mouth At Bedtime      escitalopram (LEXAPRO) 20 MG tablet Take 20 mg by mouth daily      fish oil-omega-3 fatty acids 1000 MG capsule Take 1,000 mg by mouth daily      insulin glargine (LANTUS SOLOSTAR) 100 UNIT/ML pen Inject 10 Units Subcutaneous every morning      isosorbide mononitrate (IMDUR) 60 MG 24 hr tablet Take 1 tablet (60 mg) by mouth 2 times daily 60 tablet 0    loperamide (IMODIUM) 2 MG capsule Take 1 capsule (2 mg) by mouth 2 times daily as needed for diarrhea      melatonin 3 MG tablet Take 6 mg by mouth nightly as needed for sleep      metoprolol succinate ER (TOPROL XL) 100 MG 24 hr tablet Take 1 tablet (100 mg) by mouth daily  "     multivitamin (ONE-DAILY) tablet Take 1 tablet by mouth daily      omeprazole (PRILOSEC) 20 MG DR capsule 20 mg daily before breakfast      traZODone (DESYREL) 100 MG tablet Take 100 mg by mouth At Bedtime         Allergies   Allergen Reactions    Lisinopril Other (See Comments) and Unknown          Lab Results    Chemistry/lipid CBC Cardiac Enzymes/BNP/TSH/INR   Recent Labs   Lab Test 12/06/23  0734   CHOL 193   HDL 39*   *   TRIG 217*     Recent Labs   Lab Test 12/06/23  0734 04/19/23  0936 05/17/22  1417   * 99 130*     Recent Labs   Lab Test 12/11/23  1127 12/11/23  0752 12/11/23  0452   NA  --   --  140   POTASSIUM  --   --  3.8   CHLORIDE  --   --  110*   CO2  --   --  20*   *   < > 111*   BUN  --   --  36.9*   CR  --   --  1.99*   GFRESTIMATED  --   --  27*   JARETT  --   --  9.0    < > = values in this interval not displayed.     Recent Labs   Lab Test 12/11/23  0452 12/10/23  0508 12/09/23  0510   CR 1.99* 2.24* 2.17*     Recent Labs   Lab Test 12/06/23  0734 06/21/23  1801 02/08/23  1312   A1C 6.3* 7.7* 6.2*          Recent Labs   Lab Test 12/09/23  0510   WBC 8.4   HGB 11.1*   HCT 34.5*   MCV 87        Recent Labs   Lab Test 12/09/23  0510 12/06/23  0734 12/05/23  2143   HGB 11.1* 11.9 12.5    No results for input(s): \"TROPONINI\" in the last 77753 hours.  Recent Labs   Lab Test 01/19/23  1403   NTBNP 4,666*     No results for input(s): \"TSH\" in the last 91875 hours.  No results for input(s): \"INR\" in the last 96270 hours.       Barbara Rosario PA-C        Thank you for allowing me to participate in the care of your patient.      Sincerely,     Barbara Rosario PA-C     [facility]  cc:   No referring provider defined for this encounter.      "

## 2024-02-09 DIAGNOSIS — N18.31 STAGE 3A CHRONIC KIDNEY DISEASE (H): Primary | ICD-10-CM

## 2024-02-09 LAB
ANION GAP SERPL CALCULATED.3IONS-SCNC: 13 MMOL/L (ref 7–15)
BUN SERPL-MCNC: 45 MG/DL (ref 8–23)
CALCIUM SERPL-MCNC: 9.4 MG/DL (ref 8.8–10.2)
CHLORIDE SERPL-SCNC: 104 MMOL/L (ref 98–107)
CREAT SERPL-MCNC: 1.94 MG/DL (ref 0.51–0.95)
DEPRECATED HCO3 PLAS-SCNC: 24 MMOL/L (ref 22–29)
EGFRCR SERPLBLD CKD-EPI 2021: 27 ML/MIN/1.73M2
GLUCOSE SERPL-MCNC: 140 MG/DL (ref 70–99)
NT-PROBNP SERPL-MCNC: 536 PG/ML (ref 0–900)
POTASSIUM SERPL-SCNC: 4.3 MMOL/L (ref 3.4–5.3)
SODIUM SERPL-SCNC: 141 MMOL/L (ref 135–145)

## 2024-02-09 PROCEDURE — P9604 ONE-WAY ALLOW PRORATED TRIP: HCPCS | Mod: ORL | Performed by: FAMILY MEDICINE

## 2024-02-09 PROCEDURE — 80048 BASIC METABOLIC PNL TOTAL CA: CPT | Mod: ORL | Performed by: FAMILY MEDICINE

## 2024-02-09 PROCEDURE — 83880 ASSAY OF NATRIURETIC PEPTIDE: CPT | Mod: ORL | Performed by: FAMILY MEDICINE

## 2024-02-09 PROCEDURE — 36415 COLL VENOUS BLD VENIPUNCTURE: CPT | Mod: ORL | Performed by: FAMILY MEDICINE

## 2024-02-09 NOTE — PROGRESS NOTES
Barbara Rosario PA-C  2/9/2024  8:08 AM CST Back to Top      Please let patient know that the lab work did not show any significant fluid overload.  Her creatinine did come up a little bit but looks like it is around her baseline.  We were increasing the Bumex for 3 days so lets recheck a BMP mid-to-late next week to make sure the kidneys are okay.  Thank you           BMP ordered. -[facility]

## 2024-02-12 ENCOUNTER — TELEPHONE (OUTPATIENT)
Dept: CARDIOLOGY | Facility: CLINIC | Age: 69
End: 2024-02-12
Payer: COMMERCIAL

## 2024-02-12 NOTE — TELEPHONE ENCOUNTER
[facility] Call Center    Phone Message    May a detailed message be left on voicemail: yes     Reason for Call: Other: tennille janny is calling as they have orders to report that patients weight ( edema ), patients weight on 1/1: 196lbs 1/2:192lbs 1/3:192lbs 1/4:192lbs 1/5:194lbs 1/6:no weight 1/7: 194lbs 1/8:192lbs 1/9: weight not taken 1/10: 194lbs 1/11:194lbs.patients swelling has improved ebenezer stating it is very minimal, Please advise, thank you     Action Taken: Other: cardiology     Travel Screening: Not Applicable  Thank you!  [facility]

## 2024-02-12 NOTE — TELEPHONE ENCOUNTER
[facility] Call Center    Phone Message    May a detailed message be left on voicemail: yes     Reason for Call: Other: Pt Daughter would like a call back as she received a call regarding pt needing labs but stated it cost over 100 dollars to transport pt to clinic and is wondering if there is a different way to take care of this     Action Taken: Other: Cardio    Travel Screening: Not Applicable

## 2024-02-12 NOTE — TELEPHONE ENCOUNTER
Called back Solange to address her concerns. She cannot drive the patient in as it costs 100 dollars for transportation to our facility for blood work. We can fax over a lab order to her facility, [facility]. Writer will do this but also wanted to point out that the patient had labs the following day after seeing Barbara and creatinine was improved. Will update. -[facility]

## 2024-02-12 NOTE — TELEPHONE ENCOUNTER
Called Jeanette to address update. She states that Indu's weight has been stable. Her swelling is much better to her lower extremities. She received a fax for the [facility] order for lab work and they  will have this drawn on the next lab day at the facility this week. Indu is back to Bumex 2 mg daily. Will await blood work. -[facility]           SHAWANDA Jimenez only- Blood work will be done at her senior living facility this week. HF symptoms are improved, specifically her LE swelling.   Thanks,  Mal

## 2024-02-13 ENCOUNTER — LAB REQUISITION (OUTPATIENT)
Dept: LAB | Facility: CLINIC | Age: 69
End: 2024-02-13
Payer: COMMERCIAL

## 2024-02-13 DIAGNOSIS — N18.31 CHRONIC KIDNEY DISEASE, STAGE 3A (H): ICD-10-CM

## 2024-02-14 LAB
ANION GAP SERPL CALCULATED.3IONS-SCNC: 9 MMOL/L (ref 7–15)
BUN SERPL-MCNC: 34.2 MG/DL (ref 8–23)
CALCIUM SERPL-MCNC: 9 MG/DL (ref 8.8–10.2)
CHLORIDE SERPL-SCNC: 105 MMOL/L (ref 98–107)
CREAT SERPL-MCNC: 1.82 MG/DL (ref 0.51–0.95)
DEPRECATED HCO3 PLAS-SCNC: 25 MMOL/L (ref 22–29)
EGFRCR SERPLBLD CKD-EPI 2021: 29 ML/MIN/1.73M2
GLUCOSE SERPL-MCNC: 128 MG/DL (ref 70–99)
POTASSIUM SERPL-SCNC: 4.2 MMOL/L (ref 3.4–5.3)
SODIUM SERPL-SCNC: 139 MMOL/L (ref 135–145)

## 2024-02-14 PROCEDURE — 80048 BASIC METABOLIC PNL TOTAL CA: CPT | Mod: ORL | Performed by: FAMILY MEDICINE

## 2024-02-14 PROCEDURE — P9604 ONE-WAY ALLOW PRORATED TRIP: HCPCS | Mod: ORL | Performed by: FAMILY MEDICINE

## 2024-02-14 PROCEDURE — 36415 COLL VENOUS BLD VENIPUNCTURE: CPT | Mod: ORL | Performed by: FAMILY MEDICINE

## 2024-02-20 NOTE — TELEPHONE ENCOUNTER
Received a fax of BMP results from [facility]. Results are processed in [employer], so results in [facility] under MD for [facility], Dr. Coyne. Will route. - SHAWANDA Portillo only- Repeat BMP results are in epic dated 2/14. Pt will follow up with [facility] in June.   Thanks!  Mal

## 2024-04-11 ENCOUNTER — LAB REQUISITION (OUTPATIENT)
Dept: LAB | Facility: CLINIC | Age: 69
End: 2024-04-11
Payer: COMMERCIAL

## 2024-04-11 DIAGNOSIS — E11.9 TYPE 2 DIABETES MELLITUS WITHOUT COMPLICATIONS (H): ICD-10-CM

## 2024-04-12 LAB — HBA1C MFR BLD: 8.3 %

## 2024-04-12 PROCEDURE — 36415 COLL VENOUS BLD VENIPUNCTURE: CPT | Mod: ORL | Performed by: FAMILY MEDICINE

## 2024-04-12 PROCEDURE — 83036 HEMOGLOBIN GLYCOSYLATED A1C: CPT | Mod: ORL | Performed by: FAMILY MEDICINE

## 2024-04-12 PROCEDURE — P9604 ONE-WAY ALLOW PRORATED TRIP: HCPCS | Mod: ORL | Performed by: FAMILY MEDICINE

## 2024-04-12 NOTE — PROGRESS NOTES
In person evaluation      HPI  Hospital visit 12/5/2023 1/8/2024, in person follow-up visit  4/15/2024, in person visit      69-year-old being evaluated neurologically for:  CVA x 2, left parietal June 2023/right temporal December 2023  Vascular dementia    She is still at the nursing home  Does ambulate with a walker  Has very poor recall  Needs a lot of assistance in day-to-day living  Has poor discretion  Family feels her memory is still getting worse    No GERD  No lightheadedness  No diarrhea    No blood in the stool no black tarry stool  No excessive bruising    Tolerating dual antiplatelet and Aricept  I rediscussed with her intracranial stenosis multifocal strokes with dementia she cannot afford to have another event  Will continue with the dual antiplatelet medication  Will continue with Aricept 10 mg once per day  She will continue in the care center complex medical issues as well as her stroke and dementia  Check B12 and TSH to make sure there is no other treatable causes that can be manipulated      Follow-up in November 2024    Needs one-to-one assistance with ambulation and assistance one-to-one with activities of daily living  Seems to have some poor discretion also      Patient on  Plavix 75 mg once per day  Aspirin 81 mg once per day  Atorvastatin 80 mg once per day    Cardiac event monitor 12/27/2023 - 1/25/2024 no sustained tachyarrhythmias/no atrial fibrillation    Has intracranial atherosclerosis    Has difficulty with gait though and balance  Originally lived in a split level would have to get up the stairs  Bathroom bedroom and kitchen are all on the upstairs  Not sure that she is going to be good enough to get out of the TCU they are going to have a meeting in the near future with family        A.  CVA multiple       Left parietal June 2023       Right parietal 12/6/2023         HEAD MRI: 12/6/2023       1.  Moderate-sized zone of acute to early subacute ischemic infarction centered in the  inferomedial right temporal lobe.             This involves the hippocampus and parahippocampal structures and posterior right thalamus.            5.8 x 2.5 x 2.0 cm          Cardiac event monitor 12/27/2023 - 1/25/2024 no sustained tachyarrhythmias/no atrial fibrillation      B.   Vascular risk factors         Hypertension/PRES/intracranial stenosis/CKD/DIANA/CHF    C.   Vascular dementia         Mother history of dementia    MoCA  12/7/2023,   19 out of 30 (slums)  1/8/2024,     15 out of 30      Past medical history  CVA left parietal (June 2023)  Left intracranial stenosis P3/P4  CVA right temporal (December 2023)  PRES April 2020  Moderate aortic stenosis  Diabetes  CKD  CHF  Hypertension  DIANA    Habits  Non-smoker  Does not drink alcohol    Family history  Father hypertension  Mother diabetes/hypertension/dementia        Workup  CT head 6/12/2023  1. Acute ischemic stroke of the left parietal lobe involving the P3/P4 segment of the left posterior cerebral artery.   2. No acute intracranial hemorrhage.   3. Large 4.4 cm mass in the right thyroid lobe. Recommend nonemergent dedicated thyroid ultrasound for further characterization.  CTA head and neck 6/12/2023  1.  Left P3/P4 severe stenosis versus occlusion  2.  Carotid arteries no significant stenosis  MRI head 6/12/2023, compared to 6/11/2023 CT head  1. Subacute infarct left parietal lobe.   2.  No signal mass effect or hemorrhage.   Cardiac echo 6/12/2023, ejection fraction greater than 70%, left atrium moderately enlarged  Cardiac event monitor 7/18/2023 30 days see official report  No atrial fibrillation seen.  12/2023 workup  CT scan head 12/5/2023 compared to 6/11/2023  1.  Moderate area of hypodensity medial aspect of right temporal lobe.        This probably represents subacute ischemia.        This is new since prior. Questionable tiny amount of associated petechial hemorrhage.  2.  Moderate area of more pronounced hypodensity left parietal lobe, most  consistent with late subacute to chronic infarction.        This has evolved since prior.  3.  Mild presumed chronic small vessel ischemia.  4.  Mild atrophy.  HEAD MRI: 12/6/2023  1.  Moderate-sized zone of acute to early subacute ischemic infarction centered in the inferomedial right temporal lobe.       This involves the hippocampus and parahippocampal structures and posterior right thalamus.       5.8 x 2.5 x 2.0 cm  2.  No space-occupying hemorrhage.   3.  Age-related changes.  HEAD MRA: 12/6/2023  1.  5 mm segment of high-grade stenosis/near occlusion at the right P1-P2 junction. High-grade stenosis or occlusion of the distal right P3 segment.  2.  Moderate atheromatous disease elsewhere.  NECK MRA: 12/6/2023  No measurable stenosis or dissection.  Cardiac echo 12/6/2023, ejection fraction 60-65%, left atrium moderately dilated  EEG 12/6/2023 Impression:  Moderately abnormal awake EEG due to:  Bitemporal theta delta slowing worse on the right than the left.  CT scan head 12/7/2023 follow-up  1.  Chronic left parietal and bilateral cerebellar infarcts.  2.  Subacute or early chronic right temporal-occipital infarcts. No finding for hemorrhagic transformation.  3.  No finding for new infarct.  4.  Moderate volume loss and at least mild presumed sequela of chronic microvascular ischemic change.    Cardiac event monitor 12/27/2023 - 1/25/2024 no sustained tachyarrhythmias/no atrial fibrillation  Slums score 19 out of 30, (12/7/2023)                                6/2023 4/2023  HDL/LDL             32/92  HGBA1C             7.4          8.3    MoCA  12/7/2023,   19 out of 30 (slums)  1/8/2024,     15 out of 30      Exam     Review of systems  Pertinent positives and negatives  No headache no chest pain or shortness of breath no nausea vomiting no diarrhea no fever chills  No diplopia dysarthria dysphagia  No focal weakness  Difficulty with cognition  Trouble with ADLs  Trouble with gait and balance    General  exam  Blood pressure 136/77, pulse 66  Alert attentive oriented x 3  HEENT normal  Lungs clear  Heart rate regular  Abdomen soft  Symmetrical pulses    Neurologic exam  Alert orient x 3  Prosody speech normal  Naming normal  Comprehension normal  Repetition normal  No aphasia  No neglect    MoCA  12/7/2023,   19 out of 30 (slums)  1/8/2024,     15 out of 30    Patient repeats herself a lot  Could not recall that she is living in the nursing home instead of at home  She cannot really tell me much about what she watches on TV  She did know that the president was Herman and the  was Garrett    She surprises you but for the most part has difficulty and repeats questions          Cranials 2 through 12  No ophthalmoplegia  No nystagmus  Could not  a visual field cut on confrontation  Face symmetrical  Tongue twisters good    Upper extremities  No drift  No tremor    Lower extremities  Distal proximal strength okay    Gait  Did get up pushing off with both hands slight standby assistance  Walks with the walker actually could back up and sit back down although she plops into the chair slightly better than the last visit for gait            Assessment/plan    1.  Left parietal lobe stroke 6/2023/right temporal stroke 12/2023          Left parietal lobe stroke 6/2023        Large right inferior medial temporal lobe infarct 5.8 x 2.5 x 2.0 cm 12/5/2023        Involves hippocampus parahippocampal structures and posterior right thalamus also.        Presented with confusion     2.   Intracranial atherosclerosis         Left P3/P4 occlusion versus high-grade stenosis         Right P1-P2 high-grade stenosis/right P3 segment occlusion          Moderate atheromatous disease elsewhere.       3.   Cardiac echo with left atrial enlargement        Cardiac event monitor 12/27/2023 - 1/25/2024 no sustained tachyarrhythmias/no atrial fibrillation    4.  Vascular risk factors:       Hypertension/CKD/obstructive sleep  apnea/previous CVA/CHF/intracranial stenosis     4.   Above complicated by mild pre-existing dementia        Family history mother with dementia     MoCA  12/7/2023,   19 out of 30 (slums)  1/8/2024,     15 out of 30       Diagnosis  Right PCA stroke large (12/2023)  Intracranial atherosclerosis severe  Confusion/multi-infarct dementia  Left parietal CVA (6/2023)    Moderate aortic stenosis  Chronic heart failure  Hypertension/diabetes/CKD  Obstructive sleep apnea      Dual antiplatelet for at least 3 months then switch to Plavix alone  Plavix 75 mg daily  Aspirin 81 mg daily  Lipitor 80 mg daily     For multi infarct dementia  Aricept 10 mg once per day  No GERD no lightheadedness no diarrhea    Tolerating dual antiplatelet and Aricept  I rediscussed with her intracranial stenosis multifocal strokes with dementia she cannot afford to have another event  Will continue with the dual antiplatelet medication  Will continue with Aricept 10 mg once per day  She will continue in the care center complex medical issues as well as her stroke and dementia  Check B12 and TSH to make sure there is no other treatable causes that can be manipulated    Multiple issues as above discussed with patient/daughter/ with conference that she probably is better off staying in the care center.  Total care time today 45 minutes  Follow-up November 2024

## 2024-04-15 ENCOUNTER — OFFICE VISIT (OUTPATIENT)
Dept: NEUROLOGY | Facility: CLINIC | Age: 69
End: 2024-04-15
Payer: COMMERCIAL

## 2024-04-15 VITALS
SYSTOLIC BLOOD PRESSURE: 136 MMHG | HEIGHT: 62 IN | WEIGHT: 194 LBS | DIASTOLIC BLOOD PRESSURE: 77 MMHG | BODY MASS INDEX: 35.7 KG/M2 | HEART RATE: 66 BPM

## 2024-04-15 DIAGNOSIS — I65.22 STENOSIS OF INTRACRANIAL PORTIONS OF LEFT INTERNAL CAROTID ARTERY: ICD-10-CM

## 2024-04-15 DIAGNOSIS — I63.512 CEREBROVASCULAR ACCIDENT (CVA) DUE TO STENOSIS OF LEFT MIDDLE CEREBRAL ARTERY (H): ICD-10-CM

## 2024-04-15 DIAGNOSIS — M62.81 GENERALIZED MUSCLE WEAKNESS: ICD-10-CM

## 2024-04-15 DIAGNOSIS — I63.531 CEREBROVASCULAR ACCIDENT (CVA) DUE TO STENOSIS OF RIGHT POSTERIOR CEREBRAL ARTERY (H): ICD-10-CM

## 2024-04-15 DIAGNOSIS — F01.B0 MODERATE VASCULAR DEMENTIA WITHOUT BEHAVIORAL DISTURBANCE, PSYCHOTIC DISTURBANCE, MOOD DISTURBANCE, OR ANXIETY (H): Primary | ICD-10-CM

## 2024-04-15 DIAGNOSIS — Z86.73 HISTORY OF CVA (CEREBROVASCULAR ACCIDENT): ICD-10-CM

## 2024-04-15 PROCEDURE — G2211 COMPLEX E/M VISIT ADD ON: HCPCS | Performed by: PSYCHIATRY & NEUROLOGY

## 2024-04-15 PROCEDURE — 99215 OFFICE O/P EST HI 40 MIN: CPT | Performed by: PSYCHIATRY & NEUROLOGY

## 2024-04-15 RX ORDER — CLOPIDOGREL BISULFATE 75 MG/1
75 TABLET ORAL DAILY
Qty: 30 TABLET | Refills: 11 | Status: SHIPPED | OUTPATIENT
Start: 2024-04-15

## 2024-04-15 RX ORDER — DONEPEZIL HYDROCHLORIDE 10 MG/1
10 TABLET, FILM COATED ORAL AT BEDTIME
Qty: 30 TABLET | Refills: 11 | Status: SHIPPED | OUTPATIENT
Start: 2024-04-15

## 2024-04-15 NOTE — NURSING NOTE
Chief Complaint   Patient presents with    Follow Up     3 month follow up for CVA 12/5/23. Family feels memory has gotten worse.      Debra Hayward LPN on 4/15/2024 at 3:08 PM

## 2024-04-15 NOTE — LETTER
4/15/2024         RE: Indu Felix  [address]  [facility] [address]        Dear Colleague,    Thank you for referring your patient, Indu Felix, to the [facility]UR[facility] [place]. Please see a copy of my visit note below.    In person evaluation      Hospitals in [place]  Hospital visit 12/5/2023 1/8/2024, in person follow-up visit  4/15/2024, in person visit      69-year-old being evaluated neurologically for:  CVA x 2, left parietal June 2023/right temporal December 2023  Vascular dementia    She is still at the nursing home  Does ambulate with a walker  Has very poor recall  Needs a lot of assistance in day-to-day living  Has poor discretion  Family feels her memory is still getting worse    No GERD  No lightheadedness  No diarrhea    No blood in the stool no black tarry stool  No excessive bruising    Tolerating dual antiplatelet and Aricept  I rediscussed with her intracranial stenosis multifocal strokes with dementia she cannot afford to have another event  Will continue with the dual antiplatelet medication  Will continue with Aricept 10 mg once per day  She will continue in the care center complex medical issues as well as her stroke and dementia  Check B12 and TSH to make sure there is no other treatable causes that can be manipulated      Follow-up in November 2024    Needs one-to-one assistance with ambulation and assistance one-to-one with activities of daily living  Seems to have some poor discretion also      Patient on  Plavix 75 mg once per day  Aspirin 81 mg once per day  Atorvastatin 80 mg once per day    Cardiac event monitor 12/27/2023 - 1/25/2024 no sustained tachyarrhythmias/no atrial fibrillation    Has intracranial atherosclerosis    Has difficulty with gait though and balance  Originally lived in a split level would have to get up the stairs  Bathroom bedroom and kitchen are all on the upstairs  Not sure that she is going to be good enough to get out of the TCU they are  going to have a meeting in the near future with family        A.  CVA multiple       Left parietal June 2023       Right parietal 12/6/2023         HEAD MRI: 12/6/2023       1.  Moderate-sized zone of acute to early subacute ischemic infarction centered in the inferomedial right temporal lobe.             This involves the hippocampus and parahippocampal structures and posterior right thalamus.            5.8 x 2.5 x 2.0 cm          Cardiac event monitor 12/27/2023 - 1/25/2024 no sustained tachyarrhythmias/no atrial fibrillation      B.   Vascular risk factors         Hypertension/PRES/intracranial stenosis/CKD/DIANA/CHF    C.   Vascular dementia         Mother history of dementia    MoCA  12/7/2023,   19 out of 30 (slums)  1/8/2024,     15 out of 30      Past medical history  CVA left parietal (June 2023)  Left intracranial stenosis P3/P4  CVA right temporal (December 2023)  PRES April 2020  Moderate aortic stenosis  Diabetes  CKD  CHF  Hypertension  DIANA    Habits  Non-smoker  Does not drink alcohol    Family history  Father hypertension  Mother diabetes/hypertension/dementia        Workup  CT head 6/12/2023  1. Acute ischemic stroke of the left parietal lobe involving the P3/P4 segment of the left posterior cerebral artery.   2. No acute intracranial hemorrhage.   3. Large 4.4 cm mass in the right thyroid lobe. Recommend nonemergent dedicated thyroid ultrasound for further characterization.  CTA head and neck 6/12/2023  1.  Left P3/P4 severe stenosis versus occlusion  2.  Carotid arteries no significant stenosis  MRI head 6/12/2023, compared to 6/11/2023 CT head  1. Subacute infarct left parietal lobe.   2.  No signal mass effect or hemorrhage.   Cardiac echo 6/12/2023, ejection fraction greater than 70%, left atrium moderately enlarged  Cardiac event monitor 7/18/2023 30 days see official report  No atrial fibrillation seen.  12/2023 workup  CT scan head 12/5/2023 compared to 6/11/2023  1.  Moderate area of  hypodensity medial aspect of right temporal lobe.        This probably represents subacute ischemia.        This is new since prior. Questionable tiny amount of associated petechial hemorrhage.  2.  Moderate area of more pronounced hypodensity left parietal lobe, most consistent with late subacute to chronic infarction.        This has evolved since prior.  3.  Mild presumed chronic small vessel ischemia.  4.  Mild atrophy.  HEAD MRI: 12/6/2023  1.  Moderate-sized zone of acute to early subacute ischemic infarction centered in the inferomedial right temporal lobe.       This involves the hippocampus and parahippocampal structures and posterior right thalamus.       5.8 x 2.5 x 2.0 cm  2.  No space-occupying hemorrhage.   3.  Age-related changes.  HEAD MRA: 12/6/2023  1.  5 mm segment of high-grade stenosis/near occlusion at the right P1-P2 junction. High-grade stenosis or occlusion of the distal right P3 segment.  2.  Moderate atheromatous disease elsewhere.  NECK MRA: 12/6/2023  No measurable stenosis or dissection.  Cardiac echo 12/6/2023, ejection fraction 60-65%, left atrium moderately dilated  EEG 12/6/2023 Impression:  Moderately abnormal awake EEG due to:  Bitemporal theta delta slowing worse on the right than the left.  CT scan head 12/7/2023 follow-up  1.  Chronic left parietal and bilateral cerebellar infarcts.  2.  Subacute or early chronic right temporal-occipital infarcts. No finding for hemorrhagic transformation.  3.  No finding for new infarct.  4.  Moderate volume loss and at least mild presumed sequela of chronic microvascular ischemic change.    Cardiac event monitor 12/27/2023 - 1/25/2024 no sustained tachyarrhythmias/no atrial fibrillation  Slums score 19 out of 30, (12/7/2023)                                6/2023 4/2023  HDL/LDL             32/92  HGBA1C             7.4          8.3    MoCA  12/7/2023,   19 out of 30 (slums)  1/8/2024,     15 out of 30      Exam     Review of  systems  Pertinent positives and negatives  No headache no chest pain or shortness of breath no nausea vomiting no diarrhea no fever chills  No diplopia dysarthria dysphagia  No focal weakness  Difficulty with cognition  Trouble with ADLs  Trouble with gait and balance    General exam  Blood pressure 136/77, pulse 66  Alert attentive oriented x 3  HEENT normal  Lungs clear  Heart rate regular  Abdomen soft  Symmetrical pulses    Neurologic exam  Alert orient x 3  Prosody speech normal  Naming normal  Comprehension normal  Repetition normal  No aphasia  No neglect    MoCA  12/7/2023,   19 out of 30 (slums)  1/8/2024,     15 out of 30    Patient repeats herself a lot  Could not recall that she is living in the nursing home instead of at home  She cannot really tell me much about what she watches on TV  She did know that the president was Herman and the  was Tucker    She surprises you but for the most part has difficulty and repeats questions          Cranials 2 through 12  No ophthalmoplegia  No nystagmus  Could not  a visual field cut on confrontation  Face symmetrical  Tongue twisters good    Upper extremities  No drift  No tremor    Lower extremities  Distal proximal strength okay    Gait  Did get up pushing off with both hands slight standby assistance  Walks with the walker actually could back up and sit back down although she plops into the chair slightly better than the last visit for gait            Assessment/plan    1.  Left parietal lobe stroke 6/2023/right temporal stroke 12/2023          Left parietal lobe stroke 6/2023        Large right inferior medial temporal lobe infarct 5.8 x 2.5 x 2.0 cm 12/5/2023        Involves hippocampus parahippocampal structures and posterior right thalamus also.        Presented with confusion     2.   Intracranial atherosclerosis         Left P3/P4 occlusion versus high-grade stenosis         Right P1-P2 high-grade stenosis/right P3 segment occlusion           Moderate atheromatous disease elsewhere.       3.   Cardiac echo with left atrial enlargement        Cardiac event monitor 12/27/2023 - 1/25/2024 no sustained tachyarrhythmias/no atrial fibrillation    4.  Vascular risk factors:       Hypertension/CKD/obstructive sleep apnea/previous CVA/CHF/intracranial stenosis     4.   Above complicated by mild pre-existing dementia        Family history mother with dementia     MoCA  12/7/2023,   19 out of 30 (slums)  1/8/2024,     15 out of 30       Diagnosis  Right PCA stroke large (12/2023)  Intracranial atherosclerosis severe  Confusion/multi-infarct dementia  Left parietal CVA (6/2023)    Moderate aortic stenosis  Chronic heart failure  Hypertension/diabetes/CKD  Obstructive sleep apnea      Dual antiplatelet for at least 3 months then switch to Plavix alone  Plavix 75 mg daily  Aspirin 81 mg daily  Lipitor 80 mg daily     For multi infarct dementia  Aricept 10 mg once per day  No GERD no lightheadedness no diarrhea    Tolerating dual antiplatelet and Aricept  I rediscussed with her intracranial stenosis multifocal strokes with dementia she cannot afford to have another event  Will continue with the dual antiplatelet medication  Will continue with Aricept 10 mg once per day  She will continue in the care center complex medical issues as well as her stroke and dementia  Check B12 and TSH to make sure there is no other treatable causes that can be manipulated    Multiple issues as above discussed with patient/daughter/ with conference that she probably is better off staying in the care center.  Total care time today 45 minutes  Follow-up November 2024                Again, thank you for allowing me to participate in the care of your patient.        Sincerely,        thanh Guan MD

## 2024-04-16 ENCOUNTER — LAB REQUISITION (OUTPATIENT)
Dept: LAB | Facility: CLINIC | Age: 69
End: 2024-04-16
Payer: COMMERCIAL

## 2024-04-16 DIAGNOSIS — I63.512 CEREBRAL INFARCTION DUE TO UNSPECIFIED OCCLUSION OR STENOSIS OF LEFT MIDDLE CEREBRAL ARTERY (H): ICD-10-CM

## 2024-04-17 LAB
TSH SERPL DL<=0.005 MIU/L-ACNC: 1.65 UIU/ML (ref 0.3–4.2)
VIT B12 SERPL-MCNC: 1081 PG/ML (ref 232–1245)

## 2024-04-17 PROCEDURE — P9604 ONE-WAY ALLOW PRORATED TRIP: HCPCS | Mod: ORL | Performed by: FAMILY MEDICINE

## 2024-04-17 PROCEDURE — 36415 COLL VENOUS BLD VENIPUNCTURE: CPT | Mod: ORL | Performed by: FAMILY MEDICINE

## 2024-04-17 PROCEDURE — 82607 VITAMIN B-12: CPT | Mod: ORL | Performed by: FAMILY MEDICINE

## 2024-04-17 PROCEDURE — 84443 ASSAY THYROID STIM HORMONE: CPT | Mod: ORL | Performed by: FAMILY MEDICINE

## 2024-07-21 ENCOUNTER — HEALTH MAINTENANCE LETTER (OUTPATIENT)
Age: 69
End: 2024-07-21

## 2024-09-12 ENCOUNTER — OFFICE VISIT (OUTPATIENT)
Dept: CARDIOLOGY | Facility: CLINIC | Age: 69
End: 2024-09-12
Attending: STUDENT IN AN ORGANIZED HEALTH CARE EDUCATION/TRAINING PROGRAM
Payer: COMMERCIAL

## 2024-09-12 VITALS — SYSTOLIC BLOOD PRESSURE: 136 MMHG | DIASTOLIC BLOOD PRESSURE: 44 MMHG | HEART RATE: 66 BPM | RESPIRATION RATE: 16 BRPM

## 2024-09-12 DIAGNOSIS — Z79.4 TYPE 2 DIABETES MELLITUS WITH HYPEROSMOLARITY WITHOUT COMA, WITH LONG-TERM CURRENT USE OF INSULIN (H): ICD-10-CM

## 2024-09-12 DIAGNOSIS — E66.812 CLASS 2 SEVERE OBESITY DUE TO EXCESS CALORIES WITH SERIOUS COMORBIDITY IN ADULT, UNSPECIFIED BMI (H): ICD-10-CM

## 2024-09-12 DIAGNOSIS — F01.B0 MODERATE VASCULAR DEMENTIA WITHOUT BEHAVIORAL DISTURBANCE, PSYCHOTIC DISTURBANCE, MOOD DISTURBANCE, OR ANXIETY (H): ICD-10-CM

## 2024-09-12 DIAGNOSIS — E66.01 CLASS 2 SEVERE OBESITY DUE TO EXCESS CALORIES WITH SERIOUS COMORBIDITY IN ADULT, UNSPECIFIED BMI (H): ICD-10-CM

## 2024-09-12 DIAGNOSIS — I35.0 NONRHEUMATIC AORTIC (VALVE) STENOSIS: ICD-10-CM

## 2024-09-12 DIAGNOSIS — I50.32 CHRONIC HEART FAILURE WITH PRESERVED EJECTION FRACTION (H): Primary | ICD-10-CM

## 2024-09-12 DIAGNOSIS — I10 BENIGN ESSENTIAL HYPERTENSION: ICD-10-CM

## 2024-09-12 DIAGNOSIS — I63.9 ISCHEMIC STROKE (H): ICD-10-CM

## 2024-09-12 DIAGNOSIS — E11.00 TYPE 2 DIABETES MELLITUS WITH HYPEROSMOLARITY WITHOUT COMA, WITH LONG-TERM CURRENT USE OF INSULIN (H): ICD-10-CM

## 2024-09-12 DIAGNOSIS — G47.33 OSA (OBSTRUCTIVE SLEEP APNEA): ICD-10-CM

## 2024-09-12 DIAGNOSIS — I27.20 PULMONARY HYPERTENSION (H): ICD-10-CM

## 2024-09-12 DIAGNOSIS — I34.2 NONRHEUMATIC MITRAL VALVE STENOSIS: ICD-10-CM

## 2024-09-12 DIAGNOSIS — N18.31 STAGE 3A CHRONIC KIDNEY DISEASE (H): ICD-10-CM

## 2024-09-12 LAB
ANION GAP SERPL CALCULATED.3IONS-SCNC: 15 MMOL/L (ref 7–15)
BUN SERPL-MCNC: 40.4 MG/DL (ref 8–23)
CALCIUM SERPL-MCNC: 9.2 MG/DL (ref 8.8–10.4)
CHLORIDE SERPL-SCNC: 104 MMOL/L (ref 98–107)
CREAT SERPL-MCNC: 1.98 MG/DL (ref 0.51–0.95)
EGFRCR SERPLBLD CKD-EPI 2021: 27 ML/MIN/1.73M2
GLUCOSE SERPL-MCNC: 228 MG/DL (ref 70–99)
HCO3 SERPL-SCNC: 21 MMOL/L (ref 22–29)
POTASSIUM SERPL-SCNC: 5.4 MMOL/L (ref 3.4–5.3)
SODIUM SERPL-SCNC: 140 MMOL/L (ref 135–145)

## 2024-09-12 PROCEDURE — 99214 OFFICE O/P EST MOD 30 MIN: CPT | Performed by: INTERNAL MEDICINE

## 2024-09-12 PROCEDURE — 36415 COLL VENOUS BLD VENIPUNCTURE: CPT | Performed by: INTERNAL MEDICINE

## 2024-09-12 PROCEDURE — 80048 BASIC METABOLIC PNL TOTAL CA: CPT | Performed by: INTERNAL MEDICINE

## 2024-09-12 PROCEDURE — G2211 COMPLEX E/M VISIT ADD ON: HCPCS | Performed by: INTERNAL MEDICINE

## 2024-09-12 RX ORDER — IBUPROFEN 400 MG/1
400 TABLET, FILM COATED ORAL EVERY 4 HOURS PRN
COMMUNITY
Start: 2024-07-05

## 2024-09-12 RX ORDER — METOPROLOL TARTRATE 100 MG
100 TABLET ORAL DAILY
COMMUNITY
Start: 2024-08-20

## 2024-09-12 RX ORDER — PREDNISOLONE ACETATE 10 MG/ML
1 SUSPENSION/ DROPS OPHTHALMIC DAILY
COMMUNITY
Start: 2024-08-22

## 2024-09-12 RX ORDER — BUSPIRONE HYDROCHLORIDE 5 MG/1
5 TABLET ORAL 2 TIMES DAILY
COMMUNITY
Start: 2024-08-20

## 2024-09-12 NOTE — PATIENT INSTRUCTIONS
Ms Indu Felix,  I enjoyed visiting with you and your friend again today.  I am glad to hear you are doing well.  Per our conversation I will check blood work today to see how the kidney is doing, we know it is not working at 100% but to make sure it is no worse.  I will recheck ultrasound of the heart valve around December, a year out from the prior.  I will plan on seeing you 1 year or sooner if needed.  Zoltan Mondragon

## 2024-09-12 NOTE — LETTER
9/12/2024    [place]a Betts [place]  [address] [place] [address]    RE: Indu Felix       Dear Colleague,     I had the pleasure of seeing Indu Felix in the [facility].      [facility]  Heart Care Clinic Follow-up Note    Assessment & Plan        (I50.32) Chronic heart failure with preserved ejection fraction (H)  (primary encounter diagnosis)  Comment: No significant signs or symptoms currently, in the setting of preserved ejection fraction of 60 to 65%.  Continue current diabetic.  They will watch for fluid accumulation, increased weight, shortness of breath.     (I34.2) Nonrheumatic mitral valve stenosis  Comment: Mean gradient 5 mmHg, in past 6.5 mmHg.  Recheck echo in December 2024, a year out from prior.     (I35.0) Nonrheumatic aortic (valve) stenosis  Comment: Mild, mean gradient of 19 mmHg, recheck echo in December 2024, year out from prior.    (I10) Benign essential hypertension  Comment: Controlled on amlodipine, Imdur, metoprolol.    (I27.20) Pulmonary hypertension (H)  Comment: Based on echo not significant but will recheck in December 2024.      (G47.33) DIANA (obstructive sleep apnea)  Comment: Intolerant of nasal CPAP.     (E66.01) Class 2 severe obesity due to excess calories with serious comorbidity in adult, unspecified BMI (H)  Comment: Work on weight loss.     (E11.00,  Z79.4) Type 2 diabetes mellitus with hyperosmolarity without coma, with long-term current use of insulin (H)  Comment: So noted with hemoglobin A1c of 8.3.     (N18.31) Stage 3a chronic kidney disease (H)  Comment: GFR 29 with a creatinine of 1.82 from February 2024, will recheck today.      (F01.B0) Moderate vascular dementia without behavioral disturbance, psychotic disturbance, mood disturbance, or anxiety (H)  Comment: So noted, on Aricept, living in assisted living.    Plan  1.  Continue to work on weight loss.  2.  Check renal profile today.  3.  Follow-up with repeat echo in December of this year  looking at mitral and aortic stenosis as well as pulmonary hypertension.  4.  Follow-up me 1 year or sooner if needed.    The longitudinal plan of care for the diagnosis(es)/condition(s) as documented were addressed during this visit. Due to the added complexity in care, I will continue to support Indu in the subsequent management and with ongoing continuity of care.     Subjective  CC: 69-year-old white female here for a follow-up, it has been over a year since I seen her, she is now in assisted living following 2 CVAs, her  is still living independently in their house.  She is here with a female friend.  She is forgetful and tells me she goes for walks with her walker and gets along well without a syncope, presyncope, fatigue, chest pains, palpitations, shortness breath, PND, Or peripheral edema.  Her friend corrects her and let her know that she does seem a little fatigued and short of breath after walking.    Medications  Current Outpatient Medications   Medication Sig Dispense Refill     acetaminophen (TYLENOL) 325 MG tablet Take 2 tablets (650 mg) by mouth every 4 hours as needed for mild pain or other (and adjunct with moderate or severe pain or per patient request)       amLODIPine (NORVASC) 5 MG tablet Take 1 tablet (5 mg) by mouth daily 30 tablet 0     aspirin 81 MG EC tablet Take 81 mg by mouth daily       atorvastatin (LIPITOR) 80 MG tablet Take 80 mg by mouth daily       bumetanide (BUMEX) 2 MG tablet Take 1 tablet (2 mg) by mouth daily 180 tablet 1     busPIRone (BUSPAR) 5 MG tablet Take 5 mg by mouth 2 times daily.       cholecalciferol 25 MCG (1000 UT) TABS Take 1 tablet by mouth daily       clopidogrel (PLAVIX) 75 MG tablet Take 1 tablet (75 mg) by mouth daily 30 tablet 11     donepezil (ARICEPT) 10 MG tablet Take 1 tablet (10 mg) by mouth at bedtime 30 tablet 11     escitalopram (LEXAPRO) 20 MG tablet Take 20 mg by mouth daily       fish oil-omega-3 fatty acids 1000 MG capsule Take 1,000 mg  by mouth daily       ibuprofen (ADVIL/MOTRIN) 400 MG tablet Take 400 mg by mouth every 4 hours as needed.       insulin glargine (LANTUS SOLOSTAR) 100 UNIT/ML pen Inject 10 Units Subcutaneous every morning       isosorbide mononitrate (IMDUR) 60 MG 24 hr tablet Take 1 tablet (60 mg) by mouth 2 times daily 60 tablet 0     loperamide (IMODIUM) 2 MG capsule Take 1 capsule (2 mg) by mouth 2 times daily as needed for diarrhea       melatonin 3 MG tablet Take 6 mg by mouth nightly as needed for sleep       metFORMIN (GLUCOPHAGE) 500 MG tablet Take 500 mg by mouth 2 times daily (with meals).       metoprolol tartrate (LOPRESSOR) 100 MG tablet Take 100 mg by mouth daily.       multivitamin (ONE-DAILY) tablet Take 1 tablet by mouth daily       omeprazole (PRILOSEC) 20 MG DR capsule 20 mg daily before breakfast       prednisoLONE acetate (PRED FORTE) 1 % ophthalmic suspension Place 1 drop Into the left eye daily.       traZODone (DESYREL) 100 MG tablet Take 100 mg by mouth At Bedtime         Objective  /44 (BP Location: Right arm, Patient Position: Sitting, Cuff Size: Adult Large)   Pulse 66   Resp 16     General Appearance:    Alert, cooperative, no distress, appears stated age   Head:    Normocephalic, without obvious abnormality, atraumatic   Throat:   Lips, mucosa, and tongue normal; teeth and gums normal   Neck:   Supple, symmetrical, trachea midline, no adenopathy;        thyroid:  No enlargement/tenderness/nodules; right carotid bruit, no JVD   Back:     Symmetric, no curvature, ROM normal, no CVA tenderness   Lungs:     Clear to auscultation bilaterally, respirations unlabored   Chest wall:    No tenderness or deformity   Heart:    Regular rate and rhythm, S1 and S2 normal, 1/6 systolic ejection murmur, no rub   or gallop   Abdomen:     Soft, non-tender, bowel sounds active all four quadrants,     no masses, no organomegaly   Extremities:   Normal, atraumatic, no cyanosis or edema   Pulses:   2+ and symmetric  "all extremities   Skin:   Skin color, texture, turgor normal, no rashes or lesions     Results    Lab Results personally reviewed   Lab Results   Component Value Date    CHOL 193 12/06/2023    CHOL 189 04/19/2023     Lab Results   Component Value Date    HDL 39 (L) 12/06/2023    HDL 37 (L) 04/19/2023     No components found for: \"LDLCALC\"  Lab Results   Component Value Date    TRIG 217 (H) 12/06/2023    TRIG 264 (H) 04/19/2023     Lab Results   Component Value Date    WBC 8.4 12/09/2023    HGB 11.1 (L) 12/09/2023    HCT 34.5 (L) 12/09/2023     12/09/2023     Lab Results   Component Value Date    BUN 34.2 (H) 02/14/2024     02/14/2024    CO2 25 02/14/2024               Thank you for allowing me to participate in the care of your patient.      Sincerely,     ZACH MONDRAGON MD     [facility]  cc:   Mirian Mondragon MD  [address], [address]  [place] [address]      "

## 2024-09-12 NOTE — PROGRESS NOTES
[facility]  Heart Care Clinic Follow-up Note    Assessment & Plan        (I50.32) Chronic heart failure with preserved ejection fraction (H)  (primary encounter diagnosis)  Comment: No significant signs or symptoms currently, in the setting of preserved ejection fraction of 60 to 65%.  Continue current diabetic.  They will watch for fluid accumulation, increased weight, shortness of breath.     (I34.2) Nonrheumatic mitral valve stenosis  Comment: Mean gradient 5 mmHg, in past 6.5 mmHg.  Recheck echo in December 2024, a year out from prior.     (I35.0) Nonrheumatic aortic (valve) stenosis  Comment: Mild, mean gradient of 19 mmHg, recheck echo in December 2024, year out from prior.    (I10) Benign essential hypertension  Comment: Controlled on amlodipine, Imdur, metoprolol.    (I27.20) Pulmonary hypertension (H)  Comment: Based on echo not significant but will recheck in December 2024.      (G47.33) DIANA (obstructive sleep apnea)  Comment: Intolerant of nasal CPAP.     (E66.01) Class 2 severe obesity due to excess calories with serious comorbidity in adult, unspecified BMI (H)  Comment: Work on weight loss.     (E11.00,  Z79.4) Type 2 diabetes mellitus with hyperosmolarity without coma, with long-term current use of insulin (H)  Comment: So noted with hemoglobin A1c of 8.3.     (N18.31) Stage 3a chronic kidney disease (H)  Comment: GFR 29 with a creatinine of 1.82 from February 2024, will recheck today.      (F01.B0) Moderate vascular dementia without behavioral disturbance, psychotic disturbance, mood disturbance, or anxiety (H)  Comment: So noted, on Aricept, living in assisted living.    Plan  1.  Continue to work on weight loss.  2.  Check renal profile today.  3.  Follow-up with repeat echo in December of this year looking at mitral and aortic stenosis as well as pulmonary hypertension.  4.  Follow-up me 1 year or sooner if needed.    The longitudinal plan of care for the diagnosis(es)/condition(s) as  documented were addressed during this visit. Due to the added complexity in care, I will continue to support Indu in the subsequent management and with ongoing continuity of care.     Subjective  CC: 69-year-old white female here for a follow-up, it has been over a year since I seen her, she is now in assisted living following 2 CVAs, her  is still living independently in their house.  She is here with a female friend.  She is forgetful and tells me she goes for walks with her walker and gets along well without a syncope, presyncope, fatigue, chest pains, palpitations, shortness breath, PND, Or peripheral edema.  Her friend corrects her and let her know that she does seem a little fatigued and short of breath after walking.    Medications  Current Outpatient Medications   Medication Sig Dispense Refill    acetaminophen (TYLENOL) 325 MG tablet Take 2 tablets (650 mg) by mouth every 4 hours as needed for mild pain or other (and adjunct with moderate or severe pain or per patient request)      amLODIPine (NORVASC) 5 MG tablet Take 1 tablet (5 mg) by mouth daily 30 tablet 0    aspirin 81 MG EC tablet Take 81 mg by mouth daily      atorvastatin (LIPITOR) 80 MG tablet Take 80 mg by mouth daily      bumetanide (BUMEX) 2 MG tablet Take 1 tablet (2 mg) by mouth daily 180 tablet 1    busPIRone (BUSPAR) 5 MG tablet Take 5 mg by mouth 2 times daily.      cholecalciferol 25 MCG (1000 UT) TABS Take 1 tablet by mouth daily      clopidogrel (PLAVIX) 75 MG tablet Take 1 tablet (75 mg) by mouth daily 30 tablet 11    donepezil (ARICEPT) 10 MG tablet Take 1 tablet (10 mg) by mouth at bedtime 30 tablet 11    escitalopram (LEXAPRO) 20 MG tablet Take 20 mg by mouth daily      fish oil-omega-3 fatty acids 1000 MG capsule Take 1,000 mg by mouth daily      ibuprofen (ADVIL/MOTRIN) 400 MG tablet Take 400 mg by mouth every 4 hours as needed.      insulin glargine (LANTUS SOLOSTAR) 100 UNIT/ML pen Inject 10 Units Subcutaneous every  morning      isosorbide mononitrate (IMDUR) 60 MG 24 hr tablet Take 1 tablet (60 mg) by mouth 2 times daily 60 tablet 0    loperamide (IMODIUM) 2 MG capsule Take 1 capsule (2 mg) by mouth 2 times daily as needed for diarrhea      melatonin 3 MG tablet Take 6 mg by mouth nightly as needed for sleep      metFORMIN (GLUCOPHAGE) 500 MG tablet Take 500 mg by mouth 2 times daily (with meals).      metoprolol tartrate (LOPRESSOR) 100 MG tablet Take 100 mg by mouth daily.      multivitamin (ONE-DAILY) tablet Take 1 tablet by mouth daily      omeprazole (PRILOSEC) 20 MG DR capsule 20 mg daily before breakfast      prednisoLONE acetate (PRED FORTE) 1 % ophthalmic suspension Place 1 drop Into the left eye daily.      traZODone (DESYREL) 100 MG tablet Take 100 mg by mouth At Bedtime         Objective  /44 (BP Location: Right arm, Patient Position: Sitting, Cuff Size: Adult Large)   Pulse 66   Resp 16     General Appearance:    Alert, cooperative, no distress, appears stated age   Head:    Normocephalic, without obvious abnormality, atraumatic   Throat:   Lips, mucosa, and tongue normal; teeth and gums normal   Neck:   Supple, symmetrical, trachea midline, no adenopathy;        thyroid:  No enlargement/tenderness/nodules; right carotid bruit, no JVD   Back:     Symmetric, no curvature, ROM normal, no CVA tenderness   Lungs:     Clear to auscultation bilaterally, respirations unlabored   Chest wall:    No tenderness or deformity   Heart:    Regular rate and rhythm, S1 and S2 normal, 1/6 systolic ejection murmur, no rub   or gallop   Abdomen:     Soft, non-tender, bowel sounds active all four quadrants,     no masses, no organomegaly   Extremities:   Normal, atraumatic, no cyanosis or edema   Pulses:   2+ and symmetric all extremities   Skin:   Skin color, texture, turgor normal, no rashes or lesions     Results    Lab Results personally reviewed   Lab Results   Component Value Date    CHOL 193 12/06/2023    CHOL 189  "04/19/2023     Lab Results   Component Value Date    HDL 39 (L) 12/06/2023    HDL 37 (L) 04/19/2023     No components found for: \"LDLCALC\"  Lab Results   Component Value Date    TRIG 217 (H) 12/06/2023    TRIG 264 (H) 04/19/2023     Lab Results   Component Value Date    WBC 8.4 12/09/2023    HGB 11.1 (L) 12/09/2023    HCT 34.5 (L) 12/09/2023     12/09/2023     Lab Results   Component Value Date    BUN 34.2 (H) 02/14/2024     02/14/2024    CO2 25 02/14/2024           "

## 2024-09-13 ENCOUNTER — TELEPHONE (OUTPATIENT)
Dept: CARDIOLOGY | Facility: CLINIC | Age: 69
End: 2024-09-13
Payer: COMMERCIAL

## 2024-09-13 NOTE — TELEPHONE ENCOUNTER
Called Solange - daughter- and updated on results. She was able to provide [facility] number- Abril Velazquez. Called Faith WHITMORE [phone]. Will fax over results to [phone].    RN at [facility] inquired about her Plavix as the pharmacist at the facility was inquiring if it could be stopped during a medication review. Deferred to Neurology with Dr. Guan but did say that his note said to continue DAPT due to significant hx of multiple strokes, intracranial atherosclerosis and stenosis. RN will make sure to let team know at facility that neurology note indicates to remain on Plavix and writer stated that it looks like she has a follow up visit in November scheduled. Pt is fairly new to facility and they do not have all her records. They will obtain. -[facility]

## 2024-09-13 NOTE — TELEPHONE ENCOUNTER
----- Message from Cora SAEZ sent at 9/13/2024 11:02 AM CDT -----  From: Mirian Mondragon MD  Sent: 9/13/2024   7:50 AM CDT  To: Cora Murguia RN    Can you connect with assisted living and let them know k is up and also creat is up, confirm she is not taking any k supplement and have their doc follow up on please.  LF

## 2024-09-21 ENCOUNTER — LAB REQUISITION (OUTPATIENT)
Dept: LAB | Facility: CLINIC | Age: 69
End: 2024-09-21
Payer: COMMERCIAL

## 2024-09-21 DIAGNOSIS — R79.89 OTHER SPECIFIED ABNORMAL FINDINGS OF BLOOD CHEMISTRY: ICD-10-CM

## 2024-09-24 LAB
ANION GAP SERPL CALCULATED.3IONS-SCNC: 10 MMOL/L (ref 7–15)
BUN SERPL-MCNC: 30 MG/DL (ref 8–23)
CALCIUM SERPL-MCNC: 9.1 MG/DL (ref 8.8–10.4)
CHLORIDE SERPL-SCNC: 107 MMOL/L (ref 98–107)
CREAT SERPL-MCNC: 1.81 MG/DL (ref 0.51–0.95)
EGFRCR SERPLBLD CKD-EPI 2021: 30 ML/MIN/1.73M2
GLUCOSE SERPL-MCNC: 95 MG/DL (ref 70–99)
HCO3 SERPL-SCNC: 25 MMOL/L (ref 22–29)
POTASSIUM SERPL-SCNC: 4.5 MMOL/L (ref 3.4–5.3)
SODIUM SERPL-SCNC: 142 MMOL/L (ref 135–145)

## 2024-09-24 PROCEDURE — 36415 COLL VENOUS BLD VENIPUNCTURE: CPT | Mod: ORL

## 2024-09-24 PROCEDURE — P9604 ONE-WAY ALLOW PRORATED TRIP: HCPCS | Mod: ORL

## 2024-09-24 PROCEDURE — 80048 BASIC METABOLIC PNL TOTAL CA: CPT | Mod: ORL

## 2024-09-29 ENCOUNTER — HEALTH MAINTENANCE LETTER (OUTPATIENT)
Age: 69
End: 2024-09-29

## 2024-11-01 NOTE — PROGRESS NOTES
In person evaluation      [facility]  Hospital visit 12/5/2023 1/8/2024, in person follow-up visit  4/15/2024, in person visit  11/4/2024, in person visit      69-year-old being evaluated neurologically for:  CVA x 2, left parietal June 2023/right temporal December 2023  Vascular dementia    Since last seen April 2024  No hospitalizations  Had cataract surgery July 9 and August 13      Follows with cardiology reviewed 9/12/2024 note treated for congestive heart failure/pulmonary hypertension  Complicated by obstructive sleep apnea    Pharmacy wondering if we could switch to monotherapy for stroke prevention  Could switch to Plavix 75 mg monotherapy.  Discussion with patient and daughter  Patient has intracranial atherosclerosis  No GI distress  No decrease in appetite  No blood in the stool  No black tarry stool    Talked about the pros and cons of staying on the dual antiplatelet medication  Patient would not tolerate another stroke  Has no significant motor deficits and can actually enjoy life a little bit  If she had a fairly big stroke that made her bedbound or less mobile this would markedly decrease her quality of life    She does have cognitive fall off and amnestic memory cannot remember what happened maybe even just the day before  There was a family member that passed away and she had forgotten this  She will remember when she is to be reminded      Living in assisted living  They help with cleaning/3 meals per day/monitoring meds  They are supposed to help with showers she has a shower chair but sometimes she refuses help    Ambulates with walker  Needs one-to-one assistance with ambulation and assistance one-to-one with activities of daily living  Very poor recall  Poor discretion  Family feels cognition is worsening  Needs a lot of assistance in day-to-day living      No GERD  No lightheadedness  No diarrhea  No blood in the stool no black tarry stool  No excessive bruising    Tolerating dual antiplatelet and  Aricept    I rediscussed with her intracranial stenosis multifocal strokes with dementia she cannot afford to have another event  Will continue with the dual antiplatelet medication  Will continue with Aricept 10 mg once per day  She will continue in the care center complex medical issues as well as her stroke and dementia    Patient on  Plavix 75 mg once per day  Aspirin 81 mg once per day  Atorvastatin 80 mg once per day    Cardiac event monitor 12/27/2023 - 1/25/2024 no sustained tachyarrhythmias/no atrial fibrillation  Has intracranial atherosclerosis              A.  CVA multiple       Left parietal June 2023       Right parietal 12/6/2023         HEAD MRI: 12/6/2023       1.  Moderate-sized zone of acute to early subacute ischemic infarction centered in the inferomedial right temporal lobe.             This involves the hippocampus and parahippocampal structures and posterior right thalamus.            5.8 x 2.5 x 2.0 cm          Cardiac event monitor 12/27/2023 - 1/25/2024 no sustained tachyarrhythmias/no atrial fibrillation      B.   Vascular risk factors         Hypertension/PRES/intracranial stenosis/CKD/DIANA/CHF         Cardiac event monitor 12/27/2023 - 1/25/2024 no sustained tachyarrhythmias/no atrial fibrillation         Has intracranial atherosclerosis             Patient on         Plavix 75 mg once per day         Aspirin 81 mg once per day         Atorvastatin 80 mg once per day    C.   Vascular dementia         Mother history of dementia    MoCA  12/7/2023,   19 out of 30 (slums)  1/8/2024,     15 out of 30  11/4/2022,    16 out of 30    Past medical history  CVA left parietal (June 2023)  Left intracranial stenosis P3/P4  CVA right temporal (December 2023)  PRES April 2020  Moderate aortic stenosis  Diabetes  CKD  CHF  Hypertension  DIANA    Habits  Non-smoker  Does not drink alcohol    Family history  Father hypertension  Mother diabetes/hypertension/dementia        Workup  CT head 6/12/2023  1.  Acute ischemic stroke of the left parietal lobe involving the P3/P4 segment of the left posterior cerebral artery.   2. No acute intracranial hemorrhage.   3. Large 4.4 cm mass in the right thyroid lobe. Recommend nonemergent dedicated thyroid ultrasound for further characterization.  CTA head and neck 6/12/2023  1.  Left P3/P4 severe stenosis versus occlusion  2.  Carotid arteries no significant stenosis  MRI head 6/12/2023, compared to 6/11/2023 CT head  1. Subacute infarct left parietal lobe.   2.  No signal mass effect or hemorrhage.   Cardiac echo 6/12/2023, ejection fraction greater than 70%, left atrium moderately enlarged  Cardiac event monitor 7/18/2023 30 days see official report  No atrial fibrillation seen.  12/2023 workup  CT scan head 12/5/2023 compared to 6/11/2023  1.  Moderate area of hypodensity medial aspect of right temporal lobe.        This probably represents subacute ischemia.        This is new since prior. Questionable tiny amount of associated petechial hemorrhage.  2.  Moderate area of more pronounced hypodensity left parietal lobe, most consistent with late subacute to chronic infarction.        This has evolved since prior.  3.  Mild presumed chronic small vessel ischemia.  4.  Mild atrophy.  HEAD MRI: 12/6/2023  1.  Moderate-sized zone of acute to early subacute ischemic infarction centered in the inferomedial right temporal lobe.       This involves the hippocampus and parahippocampal structures and posterior right thalamus.       5.8 x 2.5 x 2.0 cm  2.  No space-occupying hemorrhage.   3.  Age-related changes.  HEAD MRA: 12/6/2023  1.  5 mm segment of high-grade stenosis/near occlusion at the right P1-P2 junction. High-grade stenosis or occlusion of the distal right P3 segment.  2.  Moderate atheromatous disease elsewhere.  NECK MRA: 12/6/2023  No measurable stenosis or dissection.  Cardiac echo 12/6/2023, ejection fraction 60-65%, left atrium moderately dilated  EEG 12/6/2023  Impression:  Moderately abnormal awake EEG due to:  Bitemporal theta delta slowing worse on the right than the left.  CT scan head 12/7/2023 follow-up  1.  Chronic left parietal and bilateral cerebellar infarcts.  2.  Subacute or early chronic right temporal-occipital infarcts. No finding for hemorrhagic transformation.  3.  No finding for new infarct.  4.  Moderate volume loss and at least mild presumed sequela of chronic microvascular ischemic change.    Cardiac event monitor 12/27/2023 - 1/25/2024 no sustained tachyarrhythmias/no atrial fibrillation  Slums score 19 out of 30, (12/7/2023)                                6/2023 4/2023 4/2024 9/2024  NA/K                                                               142/4.5  BUN/Cr                                                            30/1.81  GLU                                                                 95  HDL/LDL             32/92  HGBA1C             7.4          8.3  B12                                                  1081  TSH                                                  1.65    MoCA  12/7/2023,   19 out of 30 (slums)  1/8/2024,     15 out of 30      Exam     Review of systems  Pertinent positives and negatives  No headache no chest pain or shortness of breath no nausea vomiting no diarrhea no fever chills  No diplopia dysarthria dysphagia  No focal weakness  Difficulty with cognition  Trouble with ADLs  Trouble with gait and balance    General exam  Blood pressure 149/78, pulse 100  Alert attentive oriented x 3  HEENT normal  Lungs clear  Heart rate regular, (1/6 systolic murmur per cardiology)  Abdomen soft  Symmetrical pulses    Neurologic exam  Alert orient x 3  Prosody speech normal  Naming normal  Comprehension normal  Repetition normal  No aphasia  No neglect    MoCA  12/7/2023,   19 out of 30 (slums)  1/8/2024,     15 out of 30  11/4/2022,    16 out of 30    Patient repeats herself a lot  Could not recall that she is living in  the nursing home instead of at home  She cannot really tell me much about what she watches on TV  She did know that the president was Herman and the  was Garrett    She surprises you but for the most part has difficulty and repeats questions      Cranials 2 through 12  No ophthalmoplegia  No nystagmus  Could not  a visual field cut on confrontation  Face symmetrical  Tongue twisters good    Upper extremities  No drift  No tremor    Lower extremities  Distal proximal strength okay    Gait  Did get up pushing off with both hands slight standby assistance  Walks with the walker actually could back up and sit back down although she plops into the chair slightly better than the last visit for gait            Assessment/plan    1.  Left parietal lobe stroke 6/2023/right temporal stroke 12/2023          Left parietal lobe stroke 6/2023        Large right inferior medial temporal lobe infarct 5.8 x 2.5 x 2.0 cm 12/5/2023        Involves hippocampus parahippocampal structures and posterior right thalamus also.        Presented with confusion     2.   Intracranial atherosclerosis         Left P3/P4 occlusion versus high-grade stenosis         Right P1-P2 high-grade stenosis/right P3 segment occlusion          Moderate atheromatous disease elsewhere.       3.   Cardiac echo with left atrial enlargement        Cardiac event monitor 12/27/2023 - 1/25/2024 no sustained tachyarrhythmias/no atrial fibrillation    4.  Vascular risk factors:       Hypertension/CKD/obstructive sleep apnea/previous CVA/CHF/intracranial stenosis     4.   Above complicated by mild pre-existing dementia        Family history mother with dementia     MoCA  12/7/2023,   19 out of 30 (slums)  1/8/2024,     15 out of 30  11/4/2022,    16 out of 30       Diagnosis  Right PCA stroke large (12/2023)  Intracranial atherosclerosis severe  Confusion/multi-infarct dementia  Left parietal CVA (6/2023)  Moderate aortic stenosis  Chronic heart  failure  Hypertension/diabetes/CKD  Obstructive sleep apnea    Pharmacist at [facility] had question whether we should make changes in dual antiplatelet medication  Tolerating dual antiplatelet and Aricept    I rediscussed with her intracranial stenosis multifocal strokes with dementia she cannot afford to have another event  Will continue with the dual antiplatelet medication  Will continue with Aricept 10 mg once per day  She will continue in the [facility] center complex medical issues as well as her stroke and dementia    Discussed the pros and cons of staying on dual antiplatelet medication  We decided that since she has no side effects and a major stroke would markedly decrease her quality of life she will stay on the dual antiplatelet medication  If in the future she was having problems with medication then we would make adjustments      Plavix 75 mg daily  Aspirin 81 mg daily  Lipitor 80 mg daily     For multi infarct dementia  Aricept 10 mg once per day  No GERD no lightheadedness no diarrhea    Tolerating dual antiplatelet and Aricept  I rediscussed with her intracranial stenosis multifocal strokes with dementia she cannot afford to have another event  Will continue with the dual antiplatelet medication  Will continue with Aricept 10 mg once per day  She will continue in the [facility] center complex medical issues as well as her stroke and dementia    Follow-up June 2025    Multiple issues as above discussed  Total care time today 40 minutes  The longitudinal plan of care for the diagnosis(es)/condition(s) as documented were addressed during this visit. Due to the added complexity in care, I will continue to support Indu in the subsequent management and with ongoing continuity of care.      As part of visit today  Reviewed laboratory data  Reviewed EMR chart  Reviewed cardiology note 9/12/2024

## 2024-11-04 ENCOUNTER — OFFICE VISIT (OUTPATIENT)
Dept: NEUROLOGY | Facility: CLINIC | Age: 69
End: 2024-11-04
Payer: COMMERCIAL

## 2024-11-04 VITALS
BODY MASS INDEX: 35.7 KG/M2 | WEIGHT: 194 LBS | DIASTOLIC BLOOD PRESSURE: 78 MMHG | HEART RATE: 100 BPM | SYSTOLIC BLOOD PRESSURE: 149 MMHG | HEIGHT: 62 IN

## 2024-11-04 DIAGNOSIS — I63.512 CEREBROVASCULAR ACCIDENT (CVA) DUE TO STENOSIS OF LEFT MIDDLE CEREBRAL ARTERY (H): ICD-10-CM

## 2024-11-04 DIAGNOSIS — F01.B0 MODERATE VASCULAR DEMENTIA WITHOUT BEHAVIORAL DISTURBANCE, PSYCHOTIC DISTURBANCE, MOOD DISTURBANCE, OR ANXIETY (H): Primary | ICD-10-CM

## 2024-11-04 DIAGNOSIS — I65.22 STENOSIS OF INTRACRANIAL PORTIONS OF LEFT INTERNAL CAROTID ARTERY: ICD-10-CM

## 2024-11-04 DIAGNOSIS — I63.531 CEREBROVASCULAR ACCIDENT (CVA) DUE TO STENOSIS OF RIGHT POSTERIOR CEREBRAL ARTERY (H): ICD-10-CM

## 2024-11-04 PROCEDURE — G2211 COMPLEX E/M VISIT ADD ON: HCPCS | Performed by: PSYCHIATRY & NEUROLOGY

## 2024-11-04 PROCEDURE — 99215 OFFICE O/P EST HI 40 MIN: CPT | Performed by: PSYCHIATRY & NEUROLOGY

## 2024-11-04 RX ORDER — QUETIAPINE FUMARATE 25 MG/1
25 TABLET, FILM COATED ORAL 2 TIMES DAILY PRN
COMMUNITY
Start: 2024-09-18

## 2024-11-04 ASSESSMENT — MONTREAL COGNITIVE ASSESSMENT (MOCA)
7. [VIGILENCE] TAP WHEN HEARING DESIGNATED LETTER: 1
13. ORIENTATION SUBSCORE: 3
4. NAME EACH OF THE THREE ANIMALS SHOWN: 3
6. READ LIST OF DIGITS [FORWARD/BACKWARD]: 2
10. [FLUENCY] NAME WORDS STARTING WITH DESIGNATED LETTER: 0
VISUOSPATIAL/EXECUTIVE SUBSCORE: 3
9. REPEAT EACH SENTENCE: 2
8. SERIAL SUBTRACTION OF 7S: 1
11. FOR EACH PAIR OF WORDS, WHAT CATEGORY DO THEY BELONG TO (OUT OF 2): 1
12. MEMORY INDEX SCORE: 0
WHAT IS THE TOTAL SCORE (OUT OF 30): 16
WHAT LEVEL OF EDUCATION WAS ATTAINED: 0

## 2024-11-04 NOTE — NURSING NOTE
Chief Complaint   Patient presents with    CVA     7 month follow up for CVA (12/5/23). Pt states she is doing well. Denies any new concerns. Daughter feels short term memory has worsened.     Debra Hayward LPN on 11/4/2024 at 2:49 PM

## 2024-11-04 NOTE — LETTER
11/4/2024      Indu Felix  [address] [place] [address]      Dear Colleague,    Thank you for referring your patient, Indu Felix, to the [facility] [place]. Please see a copy of my visit note below.    In person evaluation      [facility]  Hospital visit 12/5/2023 1/8/2024, in person follow-up visit  4/15/2024, in person visit  11/4/2024, in person visit      69-year-old being evaluated neurologically for:  CVA x 2, left parietal June 2023/right temporal December 2023  Vascular dementia    Since last seen April 2024  No hospitalizations  Had cataract surgery July 9 and August 13      Follows with cardiology reviewed 9/12/2024 note treated for congestive heart failure/pulmonary hypertension  Complicated by obstructive sleep apnea    Pharmacy wondering if we could switch to monotherapy for stroke prevention  Could switch to Plavix 75 mg monotherapy.  Discussion with patient and daughter  Patient has intracranial atherosclerosis  No GI distress  No decrease in appetite  No blood in the stool  No black tarry stool    Talked about the pros and cons of staying on the dual antiplatelet medication  Patient would not tolerate another stroke  Has no significant motor deficits and can actually enjoy life a little bit  If she had a fairly big stroke that made her bedbound or less mobile this would markedly decrease her quality of life    She does have cognitive fall off and amnestic memory cannot remember what happened maybe even just the day before  There was a family member that passed away and she had forgotten this  She will remember when she is to be reminded      Living in assisted living  They help with cleaning/3 meals per day/monitoring meds  They are supposed to help with showers she has a shower chair but sometimes she refuses help    Ambulates with walker  Needs one-to-one assistance with ambulation and assistance one-to-one with activities of daily living  Very poor  recall  Poor discretion  Family feels cognition is worsening  Needs a lot of assistance in day-to-day living      No GERD  No lightheadedness  No diarrhea  No blood in the stool no black tarry stool  No excessive bruising    Tolerating dual antiplatelet and Aricept    I rediscussed with her intracranial stenosis multifocal strokes with dementia she cannot afford to have another event  Will continue with the dual antiplatelet medication  Will continue with Aricept 10 mg once per day  She will continue in the care center complex medical issues as well as her stroke and dementia    Patient on  Plavix 75 mg once per day  Aspirin 81 mg once per day  Atorvastatin 80 mg once per day    Cardiac event monitor 12/27/2023 - 1/25/2024 no sustained tachyarrhythmias/no atrial fibrillation  Has intracranial atherosclerosis              A.  CVA multiple       Left parietal June 2023       Right parietal 12/6/2023         HEAD MRI: 12/6/2023       1.  Moderate-sized zone of acute to early subacute ischemic infarction centered in the inferomedial right temporal lobe.             This involves the hippocampus and parahippocampal structures and posterior right thalamus.            5.8 x 2.5 x 2.0 cm          Cardiac event monitor 12/27/2023 - 1/25/2024 no sustained tachyarrhythmias/no atrial fibrillation      B.   Vascular risk factors         Hypertension/PRES/intracranial stenosis/CKD/DIANA/CHF         Cardiac event monitor 12/27/2023 - 1/25/2024 no sustained tachyarrhythmias/no atrial fibrillation         Has intracranial atherosclerosis             Patient on         Plavix 75 mg once per day         Aspirin 81 mg once per day         Atorvastatin 80 mg once per day    C.   Vascular dementia         Mother history of dementia    MoCA  12/7/2023,   19 out of 30 (slums)  1/8/2024,     15 out of 30  11/4/2022,    16 out of 30    Past medical history  CVA left parietal (June 2023)  Left intracranial stenosis P3/P4  CVA right temporal  (December 2023)  PRES April 2020  Moderate aortic stenosis  Diabetes  CKD  CHF  Hypertension  DIANA    Habits  Non-smoker  Does not drink alcohol    Family history  Father hypertension  Mother diabetes/hypertension/dementia        Workup  CT head 6/12/2023  1. Acute ischemic stroke of the left parietal lobe involving the P3/P4 segment of the left posterior cerebral artery.   2. No acute intracranial hemorrhage.   3. Large 4.4 cm mass in the right thyroid lobe. Recommend nonemergent dedicated thyroid ultrasound for further characterization.  CTA head and neck 6/12/2023  1.  Left P3/P4 severe stenosis versus occlusion  2.  Carotid arteries no significant stenosis  MRI head 6/12/2023, compared to 6/11/2023 CT head  1. Subacute infarct left parietal lobe.   2.  No signal mass effect or hemorrhage.   Cardiac echo 6/12/2023, ejection fraction greater than 70%, left atrium moderately enlarged  Cardiac event monitor 7/18/2023 30 days see official report  No atrial fibrillation seen.  12/2023 workup  CT scan head 12/5/2023 compared to 6/11/2023  1.  Moderate area of hypodensity medial aspect of right temporal lobe.        This probably represents subacute ischemia.        This is new since prior. Questionable tiny amount of associated petechial hemorrhage.  2.  Moderate area of more pronounced hypodensity left parietal lobe, most consistent with late subacute to chronic infarction.        This has evolved since prior.  3.  Mild presumed chronic small vessel ischemia.  4.  Mild atrophy.  HEAD MRI: 12/6/2023  1.  Moderate-sized zone of acute to early subacute ischemic infarction centered in the inferomedial right temporal lobe.       This involves the hippocampus and parahippocampal structures and posterior right thalamus.       5.8 x 2.5 x 2.0 cm  2.  No space-occupying hemorrhage.   3.  Age-related changes.  HEAD MRA: 12/6/2023  1.  5 mm segment of high-grade stenosis/near occlusion at the right P1-P2 junction. High-grade  stenosis or occlusion of the distal right P3 segment.  2.  Moderate atheromatous disease elsewhere.  NECK MRA: 12/6/2023  No measurable stenosis or dissection.  Cardiac echo 12/6/2023, ejection fraction 60-65%, left atrium moderately dilated  EEG 12/6/2023 Impression:  Moderately abnormal awake EEG due to:  Bitemporal theta delta slowing worse on the right than the left.  CT scan head 12/7/2023 follow-up  1.  Chronic left parietal and bilateral cerebellar infarcts.  2.  Subacute or early chronic right temporal-occipital infarcts. No finding for hemorrhagic transformation.  3.  No finding for new infarct.  4.  Moderate volume loss and at least mild presumed sequela of chronic microvascular ischemic change.    Cardiac event monitor 12/27/2023 - 1/25/2024 no sustained tachyarrhythmias/no atrial fibrillation  Slums score 19 out of 30, (12/7/2023)                                6/2023 4/2023 4/2024 9/2024  NA/K                                                               142/4.5  BUN/Cr                                                            30/1.81  GLU                                                                 95  HDL/LDL             32/92  HGBA1C             7.4          8.3  B12                                                  1081  TSH                                                  1.65    MoCA  12/7/2023,   19 out of 30 (slums)  1/8/2024,     15 out of 30      Exam     Review of systems  Pertinent positives and negatives  No headache no chest pain or shortness of breath no nausea vomiting no diarrhea no fever chills  No diplopia dysarthria dysphagia  No focal weakness  Difficulty with cognition  Trouble with ADLs  Trouble with gait and balance    General exam  Blood pressure 149/78, pulse 100  Alert attentive oriented x 3  HEENT normal  Lungs clear  Heart rate regular, (1/6 systolic murmur per cardiology)  Abdomen soft  Symmetrical pulses    Neurologic exam  Alert orient x 3  Prosody speech  normal  Naming normal  Comprehension normal  Repetition normal  No aphasia  No neglect    MoCA  12/7/2023,   19 out of 30 (slums)  1/8/2024,     15 out of 30  11/4/2022,    16 out of 30    Patient repeats herself a lot  Could not recall that she is living in the nursing home instead of at home  She cannot really tell me much about what she watches on TV  She did know that the president was Herman and the  was Garrett    She surprises you but for the most part has difficulty and repeats questions      Cranials 2 through 12  No ophthalmoplegia  No nystagmus  Could not  a visual field cut on confrontation  Face symmetrical  Tongue twisters good    Upper extremities  No drift  No tremor    Lower extremities  Distal proximal strength okay    Gait  Did get up pushing off with both hands slight standby assistance  Walks with the walker actually could back up and sit back down although she plops into the chair slightly better than the last visit for gait            Assessment/plan    1.  Left parietal lobe stroke 6/2023/right temporal stroke 12/2023          Left parietal lobe stroke 6/2023        Large right inferior medial temporal lobe infarct 5.8 x 2.5 x 2.0 cm 12/5/2023        Involves hippocampus parahippocampal structures and posterior right thalamus also.        Presented with confusion     2.   Intracranial atherosclerosis         Left P3/P4 occlusion versus high-grade stenosis         Right P1-P2 high-grade stenosis/right P3 segment occlusion          Moderate atheromatous disease elsewhere.       3.   Cardiac echo with left atrial enlargement        Cardiac event monitor 12/27/2023 - 1/25/2024 no sustained tachyarrhythmias/no atrial fibrillation    4.  Vascular risk factors:       Hypertension/CKD/obstructive sleep apnea/previous CVA/CHF/intracranial stenosis     4.   Above complicated by mild pre-existing dementia        Family history mother with dementia     MoCA  12/7/2023,   19 out of 30  (ums)  1/8/2024,     15 out of 30  11/4/2022,    16 out of 30       Diagnosis  Right PCA stroke large (12/2023)  Intracranial atherosclerosis severe  Confusion/multi-infarct dementia  Left parietal CVA (6/2023)  Moderate aortic stenosis  Chronic heart failure  Hypertension/diabetes/CKD  Obstructive sleep apnea    Pharmacist at [facility] had question whether we should make changes in dual antiplatelet medication  Tolerating dual antiplatelet and Aricept    I rediscussed with her intracranial stenosis multifocal strokes with dementia she cannot afford to have another event  Will continue with the dual antiplatelet medication  Will continue with Aricept 10 mg once per day  She will continue in the care center complex medical issues as well as her stroke and dementia    Discussed the pros and cons of staying on dual antiplatelet medication  We decided that since she has no side effects and a major stroke would markedly decrease her quality of life she will stay on the dual antiplatelet medication  If in the future she was having problems with medication then we would make adjustments      Plavix 75 mg daily  Aspirin 81 mg daily  Lipitor 80 mg daily     For multi infarct dementia  Aricept 10 mg once per day  No GERD no lightheadedness no diarrhea    Tolerating dual antiplatelet and Aricept  I rediscussed with her intracranial stenosis multifocal strokes with dementia she cannot afford to have another event  Will continue with the dual antiplatelet medication  Will continue with Aricept 10 mg once per day  She will continue in the [facility] center complex medical issues as well as her stroke and dementia    Follow-up June 2025    Multiple issues as above discussed  Total care time today 40 minutes  The longitudinal plan of care for the diagnosis(es)/condition(s) as documented were addressed during this visit. Due to the added complexity in care, I will continue to support Indu in the subsequent management and with ongoing  continuity of care.      As part of visit today  Reviewed laboratory data  Reviewed EMR chart  Reviewed cardiology note 9/12/2024                  Again, thank you for allowing me to participate in the care of your patient.        Sincerely,        thanh Guan MD

## 2024-11-04 NOTE — NURSING NOTE
RELL COGNITIVE ASSESSMENT (MOCA)  Version 7.1 Original Version  VISUOSPATIAL/EXECUTIVE               COPY CUBE      [  0  ]                                [ 0   ] DRAW CLOCK (Ten past eleven)  (3 points)    [   1 ]                    [   1 ]               [  1  ]       Contour            Numbers     Hands POINTS                  3 / 5   NAMING    [  1 ]                                                                        [   1 ]                                             [   1 ]  Lilainey Bateman                                Camel                    3 / 3   MEMORY Read list of words, subject must repeat them. Do 2 trials, even if 1st trial is successful. Do a recall after 5 minutes  FACE VELVET Temple BANG RED No Points    1st          2nd         ATTENTION Read list of digits (1 digit/sec) Subject has to repeat in the forward order       [  1  ]   2  1  8  5  4                                [  1  ] 7 4 2                         2 /2   Read list of letters. The subject must tap with his hand at each letter A. No points if > 2 errors.  [   1 ] F B A C M N A A J K L B A F A K D E A A A J A M O F A A B            1  /1   Serial 7 subtraction starting at 100          [   1 ] 93         [  0  ] 86          [   0 ] 79          [ 0   ] 72         [  0  ] 65   4 or 5 correct subtractions: 3 points,  2 or 3 correct: 2 points,  1correct: 1 point,   0 correct: 0 points           1 /3   LANGUAGE Repeat: I only know that Piter is the one to help today. [   1  ]                                      The cat always hid under the couch when dogs were in the room. [1   ]              2 /2   Fluency: Name maximum number of words in one minute that begin with the letter F                                                                                                                    [  0  ] _6__ (N > 11 words)              0 /1   ABSTRACTION Similarity  between e.g. banana-orange=fruit                                                                   [  1  ] train-bicycle                      [  0  ] watch-ruler             1 /2   DELAYED  RECALL Has to recall words  WITH NO CUE FACE  [  0  ] VELVET  [  0  ] Yarsanism  [ 0   ]  BANG  [  0  ] RED  [  0  ] Points for UNCUED recall only           0 /5           OPTIONAL Category cue           Multiple choice cue          ORIENTATION  [  0  ] Date     [   0 ] Month       [  1  ] Year      [   0 ] Day      [ 1   ] Place        [  1  ] City         3 /6   TOTAL  Normal > 26/30 Add 1 point if < 12 years education       16 /30

## 2024-11-18 ENCOUNTER — LAB REQUISITION (OUTPATIENT)
Dept: LAB | Facility: CLINIC | Age: 69
End: 2024-11-18
Payer: COMMERCIAL

## 2024-11-18 DIAGNOSIS — Z00.00 ENCOUNTER FOR GENERAL ADULT MEDICAL EXAMINATION WITHOUT ABNORMAL FINDINGS: ICD-10-CM

## 2024-11-18 DIAGNOSIS — R41.0 DISORIENTATION, UNSPECIFIED: ICD-10-CM

## 2024-11-18 LAB
ALBUMIN UR-MCNC: NEGATIVE MG/DL
APPEARANCE UR: ABNORMAL
BACTERIA #/AREA URNS HPF: ABNORMAL /HPF
BILIRUB UR QL STRIP: NEGATIVE
COLOR UR AUTO: ABNORMAL
GLUCOSE UR STRIP-MCNC: NEGATIVE MG/DL
HGB UR QL STRIP: NEGATIVE
HYALINE CASTS: 1 /LPF
KETONES UR STRIP-MCNC: NEGATIVE MG/DL
LEUKOCYTE ESTERASE UR QL STRIP: ABNORMAL
MUCOUS THREADS #/AREA URNS LPF: PRESENT /LPF
NITRATE UR QL: NEGATIVE
PH UR STRIP: 5 [PH] (ref 5–7)
RBC URINE: <1 /HPF
SP GR UR STRIP: 1.01 (ref 1–1.03)
SQUAMOUS EPITHELIAL: <1 /HPF
TRANSITIONAL EPI: <1 /HPF
UROBILINOGEN UR STRIP-MCNC: NORMAL MG/DL
WBC URINE: 12 /HPF

## 2024-11-18 PROCEDURE — 87186 SC STD MICRODIL/AGAR DIL: CPT | Mod: ORL

## 2024-11-18 PROCEDURE — 81001 URINALYSIS AUTO W/SCOPE: CPT | Mod: ORL

## 2024-11-20 LAB
BACTERIA UR CULT: ABNORMAL
BACTERIA UR CULT: ABNORMAL

## 2024-12-06 ENCOUNTER — MEDICAL CORRESPONDENCE (OUTPATIENT)
Dept: HEALTH INFORMATION MANAGEMENT | Facility: CLINIC | Age: 69
End: 2024-12-06

## 2024-12-08 ENCOUNTER — HEALTH MAINTENANCE LETTER (OUTPATIENT)
Age: 69
End: 2024-12-08

## 2024-12-19 ENCOUNTER — TELEPHONE (OUTPATIENT)
Dept: CARDIOLOGY | Facility: CLINIC | Age: 69
End: 2024-12-19
Payer: COMMERCIAL

## 2025-03-15 ENCOUNTER — HEALTH MAINTENANCE LETTER (OUTPATIENT)
Age: 70
End: 2025-03-15

## 2025-04-02 ENCOUNTER — LAB REQUISITION (OUTPATIENT)
Dept: LAB | Facility: CLINIC | Age: 70
End: 2025-04-02
Payer: COMMERCIAL

## 2025-04-02 DIAGNOSIS — E11.9 TYPE 2 DIABETES MELLITUS WITHOUT COMPLICATIONS (H): ICD-10-CM

## 2025-04-08 LAB
EST. AVERAGE GLUCOSE BLD GHB EST-MCNC: 126 MG/DL
HBA1C MFR BLD: 6 %

## 2025-04-08 PROCEDURE — 36415 COLL VENOUS BLD VENIPUNCTURE: CPT | Mod: ORL

## 2025-04-08 PROCEDURE — 83036 HEMOGLOBIN GLYCOSYLATED A1C: CPT | Mod: ORL

## 2025-04-08 PROCEDURE — P9604 ONE-WAY ALLOW PRORATED TRIP: HCPCS | Mod: ORL

## 2025-04-09 ENCOUNTER — LAB REQUISITION (OUTPATIENT)
Dept: LAB | Facility: CLINIC | Age: 70
End: 2025-04-09
Payer: COMMERCIAL

## 2025-04-09 DIAGNOSIS — I50.32 CHRONIC DIASTOLIC (CONGESTIVE) HEART FAILURE (H): ICD-10-CM

## 2025-04-15 LAB — NT-PROBNP SERPL-MCNC: 736 PG/ML (ref 0–900)

## 2025-05-27 ENCOUNTER — APPOINTMENT (OUTPATIENT)
Dept: CT IMAGING | Facility: HOSPITAL | Age: 70
End: 2025-05-27
Payer: COMMERCIAL

## 2025-05-27 ENCOUNTER — HOSPITAL ENCOUNTER (INPATIENT)
Facility: HOSPITAL | Age: 70
End: 2025-05-27
Attending: STUDENT IN AN ORGANIZED HEALTH CARE EDUCATION/TRAINING PROGRAM
Payer: COMMERCIAL

## 2025-05-27 DIAGNOSIS — K57.20 DIVERTICULITIS OF COLON WITH PERFORATION: ICD-10-CM

## 2025-05-27 DIAGNOSIS — K21.00 GASTROESOPHAGEAL REFLUX DISEASE WITH ESOPHAGITIS, UNSPECIFIED WHETHER HEMORRHAGE: Primary | ICD-10-CM

## 2025-05-27 PROBLEM — A41.9 SEPSIS WITH ACUTE RENAL FAILURE WITHOUT SEPTIC SHOCK (H): Status: ACTIVE | Noted: 2025-05-27

## 2025-05-27 PROBLEM — Z86.73 HISTORY OF CVA (CEREBROVASCULAR ACCIDENT): Status: ACTIVE | Noted: 2023-06-21

## 2025-05-27 PROBLEM — N18.4 STAGE 4 CHRONIC KIDNEY DISEASE (H): Status: ACTIVE | Noted: 2023-06-11

## 2025-05-27 PROBLEM — I50.9 CHRONIC CONGESTIVE HEART FAILURE (H): Status: ACTIVE | Noted: 2023-06-11

## 2025-05-27 PROBLEM — N18.31 ACUTE KIDNEY INJURY SUPERIMPOSED ON STAGE 3A CHRONIC KIDNEY DISEASE (H): Status: ACTIVE | Noted: 2025-05-27

## 2025-05-27 PROBLEM — F33.42 MAJOR DEPRESSIVE DISORDER, RECURRENT EPISODE, IN FULL REMISSION: Status: ACTIVE | Noted: 2025-05-27

## 2025-05-27 PROBLEM — N17.9 SEPSIS WITH ACUTE RENAL FAILURE WITHOUT SEPTIC SHOCK (H): Status: ACTIVE | Noted: 2025-05-27

## 2025-05-27 PROBLEM — R65.20 SEPSIS WITH ACUTE RENAL FAILURE WITHOUT SEPTIC SHOCK (H): Status: ACTIVE | Noted: 2025-05-27

## 2025-05-27 PROBLEM — N18.9 ANEMIA OF CHRONIC RENAL FAILURE: Status: ACTIVE | Noted: 2025-05-27

## 2025-05-27 PROBLEM — N17.9 ACUTE KIDNEY INJURY SUPERIMPOSED ON STAGE 3A CHRONIC KIDNEY DISEASE (H): Status: ACTIVE | Noted: 2025-05-27

## 2025-05-27 PROBLEM — F01.50 VASCULAR DEMENTIA (H): Status: ACTIVE | Noted: 2023-12-10

## 2025-05-27 PROBLEM — D63.1 ANEMIA OF CHRONIC RENAL FAILURE: Status: ACTIVE | Noted: 2025-05-27

## 2025-05-27 PROBLEM — K82.8 PORCELAIN GALLBLADDER: Status: ACTIVE | Noted: 2025-05-27

## 2025-05-27 LAB
ALBUMIN SERPL BCG-MCNC: 3.4 G/DL (ref 3.5–5.2)
ALP SERPL-CCNC: 186 U/L (ref 40–150)
ALT SERPL W P-5'-P-CCNC: 7 U/L (ref 0–50)
ANION GAP SERPL CALCULATED.3IONS-SCNC: 17 MMOL/L (ref 7–15)
AST SERPL W P-5'-P-CCNC: 18 U/L (ref 0–45)
BASOPHILS # BLD MANUAL: 0 10E3/UL (ref 0–0.2)
BASOPHILS NFR BLD MANUAL: 0 %
BILIRUB DIRECT SERPL-MCNC: 0.11 MG/DL (ref 0–0.3)
BILIRUB SERPL-MCNC: 0.3 MG/DL
BUN SERPL-MCNC: 43.5 MG/DL (ref 8–23)
CALCIUM SERPL-MCNC: 8.4 MG/DL (ref 8.8–10.4)
CHLORIDE SERPL-SCNC: 98 MMOL/L (ref 98–107)
CREAT SERPL-MCNC: 3.27 MG/DL (ref 0.51–0.95)
EGFRCR SERPLBLD CKD-EPI 2021: 14 ML/MIN/1.73M2
EOSINOPHIL # BLD MANUAL: 0.5 10E3/UL (ref 0–0.7)
EOSINOPHIL NFR BLD MANUAL: 3 %
ERYTHROCYTE [DISTWIDTH] IN BLOOD BY AUTOMATED COUNT: 13.3 % (ref 10–15)
GLUCOSE SERPL-MCNC: 173 MG/DL (ref 70–99)
HCO3 SERPL-SCNC: 19 MMOL/L (ref 22–29)
HCT VFR BLD AUTO: 31.5 % (ref 35–47)
HGB BLD-MCNC: 10.2 G/DL (ref 11.7–15.7)
HOLD SPECIMEN: NORMAL
LACTATE SERPL-SCNC: 1 MMOL/L (ref 0.7–2)
LIPASE SERPL-CCNC: 19 U/L (ref 13–60)
LYMPHOCYTES # BLD MANUAL: 1.6 10E3/UL (ref 0.8–5.3)
LYMPHOCYTES NFR BLD MANUAL: 10 %
MCH RBC QN AUTO: 27.3 PG (ref 26.5–33)
MCHC RBC AUTO-ENTMCNC: 32.4 G/DL (ref 31.5–36.5)
MCV RBC AUTO: 84 FL (ref 78–100)
MONOCYTES # BLD MANUAL: 1.9 10E3/UL (ref 0–1.3)
MONOCYTES NFR BLD MANUAL: 12 %
NEUTROPHILS # BLD MANUAL: 11.6 10E3/UL (ref 1.6–8.3)
NEUTROPHILS NFR BLD MANUAL: 75 %
PLAT MORPH BLD: ABNORMAL
PLATELET # BLD AUTO: 376 10E3/UL (ref 150–450)
POLYCHROMASIA BLD QL SMEAR: SLIGHT
POTASSIUM SERPL-SCNC: 3.8 MMOL/L (ref 3.4–5.3)
PROT SERPL-MCNC: 7.1 G/DL (ref 6.4–8.3)
RBC # BLD AUTO: 3.74 10E6/UL (ref 3.8–5.2)
RBC MORPH BLD: ABNORMAL
SODIUM SERPL-SCNC: 134 MMOL/L (ref 135–145)
TSH SERPL DL<=0.005 MIU/L-ACNC: 1.62 UIU/ML (ref 0.3–4.2)
WBC # BLD AUTO: 15.5 10E3/UL (ref 4–11)

## 2025-05-27 PROCEDURE — 81003 URINALYSIS AUTO W/O SCOPE: CPT

## 2025-05-27 PROCEDURE — 258N000003 HC RX IP 258 OP 636

## 2025-05-27 PROCEDURE — 99223 1ST HOSP IP/OBS HIGH 75: CPT | Performed by: HOSPITALIST

## 2025-05-27 PROCEDURE — 250N000011 HC RX IP 250 OP 636

## 2025-05-27 PROCEDURE — 83605 ASSAY OF LACTIC ACID: CPT

## 2025-05-27 PROCEDURE — 120N000001 HC R&B MED SURG/OB

## 2025-05-27 PROCEDURE — 82310 ASSAY OF CALCIUM: CPT

## 2025-05-27 PROCEDURE — 87040 BLOOD CULTURE FOR BACTERIA: CPT

## 2025-05-27 PROCEDURE — 74176 CT ABD & PELVIS W/O CONTRAST: CPT

## 2025-05-27 PROCEDURE — 99285 EMERGENCY DEPT VISIT HI MDM: CPT | Mod: 25

## 2025-05-27 PROCEDURE — 85027 COMPLETE CBC AUTOMATED: CPT

## 2025-05-27 PROCEDURE — 80053 COMPREHEN METABOLIC PANEL: CPT

## 2025-05-27 PROCEDURE — 83690 ASSAY OF LIPASE: CPT

## 2025-05-27 PROCEDURE — 96374 THER/PROPH/DIAG INJ IV PUSH: CPT

## 2025-05-27 PROCEDURE — 84443 ASSAY THYROID STIM HORMONE: CPT

## 2025-05-27 PROCEDURE — 36415 COLL VENOUS BLD VENIPUNCTURE: CPT

## 2025-05-27 PROCEDURE — 85007 BL SMEAR W/DIFF WBC COUNT: CPT

## 2025-05-27 PROCEDURE — 96361 HYDRATE IV INFUSION ADD-ON: CPT

## 2025-05-27 PROCEDURE — 87186 SC STD MICRODIL/AGAR DIL: CPT

## 2025-05-27 PROCEDURE — 82570 ASSAY OF URINE CREATININE: CPT | Performed by: HOSPITALIST

## 2025-05-27 PROCEDURE — 82248 BILIRUBIN DIRECT: CPT

## 2025-05-27 RX ORDER — HYDROMORPHONE HCL IN WATER/PF 6 MG/30 ML
0.2 PATIENT CONTROLLED ANALGESIA SYRINGE INTRAVENOUS
Status: DISCONTINUED | OUTPATIENT
Start: 2025-05-27 | End: 2025-06-04 | Stop reason: HOSPADM

## 2025-05-27 RX ORDER — LOPERAMIDE HYDROCHLORIDE 2 MG/1
2 CAPSULE ORAL 2 TIMES DAILY PRN
Status: ON HOLD | COMMUNITY
End: 2025-06-04

## 2025-05-27 RX ORDER — PIPERACILLIN SODIUM, TAZOBACTAM SODIUM 2; .25 G/10ML; G/10ML
2.25 INJECTION, POWDER, LYOPHILIZED, FOR SOLUTION INTRAVENOUS ONCE
Status: DISCONTINUED | OUTPATIENT
Start: 2025-05-27 | End: 2025-05-27

## 2025-05-27 RX ORDER — ONDANSETRON 2 MG/ML
4 INJECTION INTRAMUSCULAR; INTRAVENOUS EVERY 6 HOURS PRN
Status: DISCONTINUED | OUTPATIENT
Start: 2025-05-27 | End: 2025-06-04 | Stop reason: HOSPADM

## 2025-05-27 RX ORDER — ESCITALOPRAM OXALATE 10 MG/1
20 TABLET ORAL DAILY
Status: DISCONTINUED | OUTPATIENT
Start: 2025-05-28 | End: 2025-06-04 | Stop reason: HOSPADM

## 2025-05-27 RX ORDER — CEFTRIAXONE 2 G/1
2 INJECTION, POWDER, FOR SOLUTION INTRAMUSCULAR; INTRAVENOUS EVERY 24 HOURS
Status: DISCONTINUED | OUTPATIENT
Start: 2025-05-27 | End: 2025-06-02

## 2025-05-27 RX ORDER — HYDROMORPHONE HCL IN WATER/PF 6 MG/30 ML
0.4 PATIENT CONTROLLED ANALGESIA SYRINGE INTRAVENOUS
Status: DISCONTINUED | OUTPATIENT
Start: 2025-05-27 | End: 2025-06-04 | Stop reason: HOSPADM

## 2025-05-27 RX ORDER — ONDANSETRON 2 MG/ML
4 INJECTION INTRAMUSCULAR; INTRAVENOUS ONCE
Status: COMPLETED | OUTPATIENT
Start: 2025-05-27 | End: 2025-05-27

## 2025-05-27 RX ORDER — ACETAMINOPHEN 325 MG/1
650 TABLET ORAL EVERY 4 HOURS PRN
Status: DISCONTINUED | OUTPATIENT
Start: 2025-05-27 | End: 2025-06-04 | Stop reason: HOSPADM

## 2025-05-27 RX ORDER — DONEPEZIL HYDROCHLORIDE 5 MG/1
10 TABLET, FILM COATED ORAL AT BEDTIME
Status: DISCONTINUED | OUTPATIENT
Start: 2025-05-28 | End: 2025-06-04 | Stop reason: HOSPADM

## 2025-05-27 RX ORDER — CALCIUM CARBONATE 500 MG/1
1000 TABLET, CHEWABLE ORAL 4 TIMES DAILY PRN
Status: DISCONTINUED | OUTPATIENT
Start: 2025-05-27 | End: 2025-06-04 | Stop reason: HOSPADM

## 2025-05-27 RX ORDER — OMEPRAZOLE 20 MG/1
20 CAPSULE, DELAYED RELEASE ORAL
Status: DISCONTINUED | OUTPATIENT
Start: 2025-05-28 | End: 2025-05-28 | Stop reason: ALTCHOICE

## 2025-05-27 RX ORDER — ISOSORBIDE MONONITRATE 30 MG/1
60 TABLET, EXTENDED RELEASE ORAL 2 TIMES DAILY
Status: DISCONTINUED | OUTPATIENT
Start: 2025-05-28 | End: 2025-06-04 | Stop reason: HOSPADM

## 2025-05-27 RX ORDER — HEPARIN SODIUM 5000 [USP'U]/.5ML
5000 INJECTION, SOLUTION INTRAVENOUS; SUBCUTANEOUS EVERY 8 HOURS
Status: DISCONTINUED | OUTPATIENT
Start: 2025-05-27 | End: 2025-06-04 | Stop reason: HOSPADM

## 2025-05-27 RX ORDER — HYDRALAZINE HYDROCHLORIDE 10 MG/1
10 TABLET, FILM COATED ORAL EVERY 4 HOURS PRN
Status: DISCONTINUED | OUTPATIENT
Start: 2025-05-27 | End: 2025-06-04 | Stop reason: HOSPADM

## 2025-05-27 RX ORDER — BUSPIRONE HYDROCHLORIDE 5 MG/1
5 TABLET ORAL 2 TIMES DAILY
Status: DISCONTINUED | OUTPATIENT
Start: 2025-05-28 | End: 2025-05-28

## 2025-05-27 RX ORDER — METRONIDAZOLE 500 MG/100ML
500 INJECTION, SOLUTION INTRAVENOUS EVERY 12 HOURS
Status: DISCONTINUED | OUTPATIENT
Start: 2025-05-28 | End: 2025-06-02

## 2025-05-27 RX ORDER — LIDOCAINE 40 MG/G
CREAM TOPICAL
Status: DISCONTINUED | OUTPATIENT
Start: 2025-05-27 | End: 2025-06-04 | Stop reason: HOSPADM

## 2025-05-27 RX ORDER — SODIUM CHLORIDE 9 MG/ML
INJECTION, SOLUTION INTRAVENOUS CONTINUOUS
Status: DISCONTINUED | OUTPATIENT
Start: 2025-05-27 | End: 2025-05-30

## 2025-05-27 RX ORDER — VITAMIN B COMPLEX
25 TABLET ORAL DAILY
Status: DISCONTINUED | OUTPATIENT
Start: 2025-05-28 | End: 2025-06-04 | Stop reason: HOSPADM

## 2025-05-27 RX ORDER — HYDRALAZINE HYDROCHLORIDE 20 MG/ML
10 INJECTION INTRAMUSCULAR; INTRAVENOUS EVERY 4 HOURS PRN
Status: DISCONTINUED | OUTPATIENT
Start: 2025-05-27 | End: 2025-06-04 | Stop reason: HOSPADM

## 2025-05-27 RX ORDER — QUETIAPINE FUMARATE 25 MG/1
12.5 TABLET, FILM COATED ORAL 2 TIMES DAILY PRN
COMMUNITY

## 2025-05-27 RX ORDER — AMOXICILLIN 250 MG
2 CAPSULE ORAL 2 TIMES DAILY PRN
Status: DISCONTINUED | OUTPATIENT
Start: 2025-05-27 | End: 2025-05-29

## 2025-05-27 RX ORDER — ATORVASTATIN CALCIUM 40 MG/1
80 TABLET, FILM COATED ORAL AT BEDTIME
Status: DISCONTINUED | OUTPATIENT
Start: 2025-05-28 | End: 2025-06-04 | Stop reason: HOSPADM

## 2025-05-27 RX ORDER — NICOTINE POLACRILEX 4 MG
15-30 LOZENGE BUCCAL
Status: DISCONTINUED | OUTPATIENT
Start: 2025-05-27 | End: 2025-06-04 | Stop reason: HOSPADM

## 2025-05-27 RX ORDER — TRAZODONE HYDROCHLORIDE 50 MG/1
100 TABLET ORAL AT BEDTIME
Status: DISCONTINUED | OUTPATIENT
Start: 2025-05-28 | End: 2025-06-04 | Stop reason: HOSPADM

## 2025-05-27 RX ORDER — DEXTROSE MONOHYDRATE 25 G/50ML
25-50 INJECTION, SOLUTION INTRAVENOUS
Status: DISCONTINUED | OUTPATIENT
Start: 2025-05-27 | End: 2025-06-04 | Stop reason: HOSPADM

## 2025-05-27 RX ORDER — ONDANSETRON 4 MG/1
4 TABLET, ORALLY DISINTEGRATING ORAL EVERY 6 HOURS PRN
Status: DISCONTINUED | OUTPATIENT
Start: 2025-05-27 | End: 2025-06-04 | Stop reason: HOSPADM

## 2025-05-27 RX ORDER — AMLODIPINE BESYLATE 5 MG/1
5 TABLET ORAL DAILY
Status: DISCONTINUED | OUTPATIENT
Start: 2025-05-28 | End: 2025-06-01

## 2025-05-27 RX ORDER — ACETAMINOPHEN 650 MG/1
650 SUPPOSITORY RECTAL EVERY 4 HOURS PRN
Status: DISCONTINUED | OUTPATIENT
Start: 2025-05-27 | End: 2025-06-04 | Stop reason: HOSPADM

## 2025-05-27 RX ORDER — OXYCODONE HYDROCHLORIDE 5 MG/1
5 TABLET ORAL EVERY 4 HOURS PRN
Status: DISCONTINUED | OUTPATIENT
Start: 2025-05-27 | End: 2025-06-04 | Stop reason: HOSPADM

## 2025-05-27 RX ORDER — AMOXICILLIN 250 MG
1 CAPSULE ORAL 2 TIMES DAILY PRN
Status: DISCONTINUED | OUTPATIENT
Start: 2025-05-27 | End: 2025-05-29

## 2025-05-27 RX ORDER — LOPERAMIDE HYDROCHLORIDE 2 MG/1
2 CAPSULE ORAL 2 TIMES DAILY PRN
Status: DISCONTINUED | OUTPATIENT
Start: 2025-05-27 | End: 2025-06-04 | Stop reason: HOSPADM

## 2025-05-27 RX ADMIN — SODIUM CHLORIDE 1000 ML: 0.9 INJECTION, SOLUTION INTRAVENOUS at 20:53

## 2025-05-27 RX ADMIN — ONDANSETRON 4 MG: 2 INJECTION, SOLUTION INTRAMUSCULAR; INTRAVENOUS at 20:57

## 2025-05-27 ASSESSMENT — ACTIVITIES OF DAILY LIVING (ADL)
ADLS_ACUITY_SCORE: 58

## 2025-05-28 LAB
ALBUMIN UR-MCNC: NEGATIVE MG/DL
AMMONIA PLAS-SCNC: 12 UMOL/L (ref 11–51)
ANION GAP SERPL CALCULATED.3IONS-SCNC: 14 MMOL/L (ref 7–15)
APPEARANCE UR: CLEAR
BACTERIA #/AREA URNS HPF: ABNORMAL /HPF
BILIRUB UR QL STRIP: NEGATIVE
BUN SERPL-MCNC: 36.5 MG/DL (ref 8–23)
CALCIUM SERPL-MCNC: 7.7 MG/DL (ref 8.8–10.4)
CHLORIDE SERPL-SCNC: 107 MMOL/L (ref 98–107)
COLOR UR AUTO: ABNORMAL
CREAT SERPL-MCNC: 2.8 MG/DL (ref 0.51–0.95)
CREAT UR-MCNC: 70.4 MG/DL
EGFRCR SERPLBLD CKD-EPI 2021: 17 ML/MIN/1.73M2
ERYTHROCYTE [DISTWIDTH] IN BLOOD BY AUTOMATED COUNT: 13.2 % (ref 10–15)
FOLATE SERPL-MCNC: 7.2 NG/ML (ref 4.6–34.8)
FRACT EXCRET NA UR+SERPL-RTO: 2.6 %
GLUCOSE BLDC GLUCOMTR-MCNC: 106 MG/DL (ref 70–99)
GLUCOSE BLDC GLUCOMTR-MCNC: 73 MG/DL (ref 70–99)
GLUCOSE BLDC GLUCOMTR-MCNC: 90 MG/DL (ref 70–99)
GLUCOSE BLDC GLUCOMTR-MCNC: 97 MG/DL (ref 70–99)
GLUCOSE SERPL-MCNC: 77 MG/DL (ref 70–99)
GLUCOSE UR STRIP-MCNC: NEGATIVE MG/DL
HCO3 SERPL-SCNC: 20 MMOL/L (ref 22–29)
HCT VFR BLD AUTO: 29.4 % (ref 35–47)
HGB BLD-MCNC: 9.2 G/DL (ref 11.7–15.7)
HGB UR QL STRIP: NEGATIVE
HOLD SPECIMEN: NORMAL
HYALINE CASTS: 12 /LPF
KETONES UR STRIP-MCNC: NEGATIVE MG/DL
LEUKOCYTE ESTERASE UR QL STRIP: ABNORMAL
MCH RBC QN AUTO: 26.8 PG (ref 26.5–33)
MCHC RBC AUTO-ENTMCNC: 31.3 G/DL (ref 31.5–36.5)
MCV RBC AUTO: 86 FL (ref 78–100)
MUCOUS THREADS #/AREA URNS LPF: PRESENT /LPF
NITRATE UR QL: NEGATIVE
PH UR STRIP: 5 [PH] (ref 5–7)
PLATELET # BLD AUTO: 340 10E3/UL (ref 150–450)
POTASSIUM SERPL-SCNC: 3.5 MMOL/L (ref 3.4–5.3)
RBC # BLD AUTO: 3.43 10E6/UL (ref 3.8–5.2)
RBC URINE: 1 /HPF
SODIUM SERPL-SCNC: 141 MMOL/L (ref 135–145)
SODIUM UR-SCNC: 75 MMOL/L
SP GR UR STRIP: 1.01 (ref 1–1.03)
SQUAMOUS EPITHELIAL: 1 /HPF
UROBILINOGEN UR STRIP-MCNC: NORMAL MG/DL
VIT B12 SERPL-MCNC: 854 PG/ML (ref 232–1245)
WBC # BLD AUTO: 13.9 10E3/UL (ref 4–11)
WBC URINE: 29 /HPF

## 2025-05-28 PROCEDURE — 82140 ASSAY OF AMMONIA: CPT | Performed by: HOSPITALIST

## 2025-05-28 PROCEDURE — 120N000001 HC R&B MED SURG/OB

## 2025-05-28 PROCEDURE — 80048 BASIC METABOLIC PNL TOTAL CA: CPT | Performed by: HOSPITALIST

## 2025-05-28 PROCEDURE — 82607 VITAMIN B-12: CPT | Performed by: HOSPITALIST

## 2025-05-28 PROCEDURE — 250N000013 HC RX MED GY IP 250 OP 250 PS 637: Performed by: HOSPITALIST

## 2025-05-28 PROCEDURE — 82962 GLUCOSE BLOOD TEST: CPT

## 2025-05-28 PROCEDURE — 250N000011 HC RX IP 250 OP 636: Performed by: HOSPITALIST

## 2025-05-28 PROCEDURE — 99222 1ST HOSP IP/OBS MODERATE 55: CPT | Performed by: SURGERY

## 2025-05-28 PROCEDURE — 99418 PROLNG IP/OBS E/M EA 15 MIN: CPT | Performed by: INTERNAL MEDICINE

## 2025-05-28 PROCEDURE — 82310 ASSAY OF CALCIUM: CPT | Performed by: HOSPITALIST

## 2025-05-28 PROCEDURE — 250N000012 HC RX MED GY IP 250 OP 636 PS 637: Performed by: HOSPITALIST

## 2025-05-28 PROCEDURE — 999N000157 HC STATISTIC RCP TIME EA 10 MIN

## 2025-05-28 PROCEDURE — 258N000003 HC RX IP 258 OP 636: Performed by: HOSPITALIST

## 2025-05-28 PROCEDURE — 82746 ASSAY OF FOLIC ACID SERUM: CPT | Performed by: HOSPITALIST

## 2025-05-28 PROCEDURE — 85027 COMPLETE CBC AUTOMATED: CPT | Performed by: HOSPITALIST

## 2025-05-28 PROCEDURE — 99233 SBSQ HOSP IP/OBS HIGH 50: CPT | Performed by: INTERNAL MEDICINE

## 2025-05-28 PROCEDURE — 36415 COLL VENOUS BLD VENIPUNCTURE: CPT | Performed by: HOSPITALIST

## 2025-05-28 RX ORDER — NALOXONE HYDROCHLORIDE 0.4 MG/ML
0.2 INJECTION, SOLUTION INTRAMUSCULAR; INTRAVENOUS; SUBCUTANEOUS
Status: DISCONTINUED | OUTPATIENT
Start: 2025-05-28 | End: 2025-06-04 | Stop reason: HOSPADM

## 2025-05-28 RX ORDER — NALOXONE HYDROCHLORIDE 0.4 MG/ML
0.4 INJECTION, SOLUTION INTRAMUSCULAR; INTRAVENOUS; SUBCUTANEOUS
Status: DISCONTINUED | OUTPATIENT
Start: 2025-05-28 | End: 2025-06-04 | Stop reason: HOSPADM

## 2025-05-28 RX ORDER — BUSPIRONE HYDROCHLORIDE 7.5 MG/1
7.5 TABLET ORAL 2 TIMES DAILY
Status: DISCONTINUED | OUTPATIENT
Start: 2025-05-28 | End: 2025-06-04 | Stop reason: HOSPADM

## 2025-05-28 RX ORDER — PANTOPRAZOLE SODIUM 40 MG/1
40 TABLET, DELAYED RELEASE ORAL
Status: DISCONTINUED | OUTPATIENT
Start: 2025-05-28 | End: 2025-06-04 | Stop reason: HOSPADM

## 2025-05-28 RX ADMIN — Medication 25 MCG: at 08:31

## 2025-05-28 RX ADMIN — TRAZODONE HYDROCHLORIDE 100 MG: 50 TABLET ORAL at 21:41

## 2025-05-28 RX ADMIN — METRONIDAZOLE 500 MG: 500 INJECTION, SOLUTION INTRAVENOUS at 00:39

## 2025-05-28 RX ADMIN — ISOSORBIDE MONONITRATE 60 MG: 30 TABLET, EXTENDED RELEASE ORAL at 19:55

## 2025-05-28 RX ADMIN — CEFTRIAXONE SODIUM 2 G: 2 INJECTION, POWDER, FOR SOLUTION INTRAMUSCULAR; INTRAVENOUS at 00:00

## 2025-05-28 RX ADMIN — HEPARIN SODIUM 5000 UNITS: 5000 INJECTION, SOLUTION INTRAVENOUS; SUBCUTANEOUS at 16:28

## 2025-05-28 RX ADMIN — METRONIDAZOLE 500 MG: 500 INJECTION, SOLUTION INTRAVENOUS at 12:35

## 2025-05-28 RX ADMIN — SODIUM CHLORIDE: 9 INJECTION, SOLUTION INTRAVENOUS at 20:34

## 2025-05-28 RX ADMIN — DONEPEZIL HYDROCHLORIDE 10 MG: 5 TABLET ORAL at 21:41

## 2025-05-28 RX ADMIN — QUETIAPINE FUMARATE 12.5 MG: 25 TABLET ORAL at 21:41

## 2025-05-28 RX ADMIN — Medication 1 MG: at 21:41

## 2025-05-28 RX ADMIN — QUETIAPINE FUMARATE 12.5 MG: 25 TABLET ORAL at 00:00

## 2025-05-28 RX ADMIN — HEPARIN SODIUM 5000 UNITS: 5000 INJECTION, SOLUTION INTRAVENOUS; SUBCUTANEOUS at 00:00

## 2025-05-28 RX ADMIN — BUSPIRONE HYDROCHLORIDE 7.5 MG: 7.5 TABLET ORAL at 12:30

## 2025-05-28 RX ADMIN — SODIUM CHLORIDE 100 ML/HR: 9 INJECTION, SOLUTION INTRAVENOUS at 06:06

## 2025-05-28 RX ADMIN — ACETAMINOPHEN 650 MG: 325 TABLET ORAL at 21:40

## 2025-05-28 RX ADMIN — SODIUM CHLORIDE: 9 INJECTION, SOLUTION INTRAVENOUS at 00:11

## 2025-05-28 RX ADMIN — ATORVASTATIN CALCIUM 80 MG: 40 TABLET, FILM COATED ORAL at 21:40

## 2025-05-28 RX ADMIN — AMLODIPINE BESYLATE 5 MG: 5 TABLET ORAL at 08:31

## 2025-05-28 RX ADMIN — BUSPIRONE HYDROCHLORIDE 7.5 MG: 7.5 TABLET ORAL at 19:55

## 2025-05-28 RX ADMIN — HEPARIN SODIUM 5000 UNITS: 5000 INJECTION, SOLUTION INTRAVENOUS; SUBCUTANEOUS at 08:32

## 2025-05-28 RX ADMIN — INSULIN GLARGINE 10 UNITS: 100 INJECTION, SOLUTION SUBCUTANEOUS at 21:40

## 2025-05-28 RX ADMIN — ISOSORBIDE MONONITRATE 60 MG: 30 TABLET, EXTENDED RELEASE ORAL at 08:31

## 2025-05-28 RX ADMIN — ESCITALOPRAM OXALATE 20 MG: 20 TABLET ORAL at 12:30

## 2025-05-28 RX ADMIN — PANTOPRAZOLE SODIUM 40 MG: 40 TABLET, DELAYED RELEASE ORAL at 08:31

## 2025-05-28 RX ADMIN — HYDRALAZINE HYDROCHLORIDE 10 MG: 20 INJECTION INTRAMUSCULAR; INTRAVENOUS at 19:55

## 2025-05-28 ASSESSMENT — ACTIVITIES OF DAILY LIVING (ADL)
ADLS_ACUITY_SCORE: 58
ADLS_ACUITY_SCORE: 62
ADLS_ACUITY_SCORE: 58
ADLS_ACUITY_SCORE: 62
ADLS_ACUITY_SCORE: 58
ADLS_ACUITY_SCORE: 62
ADLS_ACUITY_SCORE: 58
ADLS_ACUITY_SCORE: 62
DEPENDENT_IADLS:: INDEPENDENT
ADLS_ACUITY_SCORE: 62

## 2025-05-28 NOTE — PLAN OF CARE
Problem: Adult Inpatient Plan of Care  Goal: Absence of Hospital-Acquired Illness or Injury  Intervention: Identify and Manage Fall Risk  Recent Flowsheet Documentation  Taken 5/28/2025 1448 by Emiliano Caldwell RN  Safety Promotion/Fall Prevention:   clutter free environment maintained   lighting adjusted   activity supervised   nonskid shoes/slippers when out of bed   patient and family education   room near nurse's station  Intervention: Prevent Skin Injury  Recent Flowsheet Documentation  Taken 5/28/2025 1448 by Emiliano Caldwell RN  Body Position: position changed independently  Intervention: Prevent Infection  Recent Flowsheet Documentation  Taken 5/28/2025 1448 by Emiliano Caldwell RN  Infection Prevention: hand hygiene promoted  Goal: Optimal Comfort and Wellbeing  Intervention: Provide Person-Centered Care  Recent Flowsheet Documentation  Taken 5/28/2025 1448 by Emiliano Caldwell RN  Trust Relationship/Rapport:   care explained   choices provided  Goal: Readiness for Transition of Care  Outcome: Progressing   Goal Outcome Evaluation:       Patient admitted from the ED arrived the unit via W/C, was able to ambulate with SBA into the room. On arrival she wanted to be left alone, tam catheter in place with adequate output, makes need known appropriately forgetful at times. Afebrile, VSS.    Emiliano Caldwell RN

## 2025-05-28 NOTE — PROGRESS NOTES
Pt's daughter present and was given status update regarding ir placing drain etc. Dtr updated that pt's son passed away in September and might ask about him. Pt noted to stop cooperating to ambulate with staff and dtr stated pt knows what she wants and what she doesn't want. Pt ate a few bits of breakfast and some ice cream that she preferred.

## 2025-05-28 NOTE — ED NOTES
Bed: [facility]  Expected date: 5/27/25  Expected time:   Means of arrival:   Comments:  MPWD 69F, weakness, unable to ambulate.

## 2025-05-28 NOTE — PROGRESS NOTES
Renal         69 yo female with DM2, HTN, pulm HTN, diastolic heart failure, CVA, demenita, perforated diverticulitis, CKD 4 with baseline creat 1.8 admit with diverticulitis with microperf and pelvic abscess.  Was eval by IR for possible drainage but abscess too small.  Noted to have JEFFREY with creat on admit 3.3 yesterday, today is improved to 2.8.  Normotensive, no IV contrast exposures, was on bumex and metformin PTA  -improving with IVF  -cont to renally dose meds, hold metformin and bumex  -cont IVF with NS at 100 ml/hr while NPO  -repeat bmp in am  -full consult to follow, call with questions.  Magda Bishop MD  [facility]  [phone]    24-Sep-2022 12:23

## 2025-05-28 NOTE — MEDICATION SCRIBE - ADMISSION MEDICATION HISTORY
Medication Scribe Admission Medication History    Admission medication history is complete. The information provided in this note is only as accurate as the sources available at the time of the update.    Information Source(s): Facility ([facility]/) medication list/MAR via Printout    Pertinent Information: PTA med list updated per facility MAR     Changes made to PTA medication list:  Added: Calmoseptine (per facility)  Deleted: Tylenol, Melatonin, Multivitamin (per facility)  Changed: Buspirone from 5 mg bid to 7.5 mg bid, Quetiapine from 25 mg bid prn to 12.5 mg at bedtime and 12.5 mg bid prn, Loperamide 2 mg from 1 bid prn to 1 bid and 1 bid prn (per facility list)    Allergies reviewed with patient and updates made in EHR: yes    Medication History Completed By: Valeria Blakely 5/27/2025 11:27 PM    PTA Med List   Medication Sig Last Dose/Taking    amLODIPine (NORVASC) 5 MG tablet Take 1 tablet (5 mg) by mouth daily 5/27/2025 Morning    aspirin 81 MG EC tablet Take 81 mg by mouth daily 5/27/2025 Morning    atorvastatin (LIPITOR) 80 MG tablet Take 80 mg by mouth at bedtime. 5/27/2025 Bedtime    bumetanide (BUMEX) 2 MG tablet Take 1 tablet (2 mg) by mouth daily 5/27/2025 Morning    busPIRone (BUSPAR) 7.5 MG tablet Take 7.5 mg by mouth 2 times daily. 5/27/2025 Evening    cholecalciferol 25 MCG (1000 UT) TABS Take 1 tablet by mouth daily 5/27/2025 Morning    clopidogrel (PLAVIX) 75 MG tablet Take 1 tablet (75 mg) by mouth daily 5/27/2025 Morning    donepezil (ARICEPT) 10 MG tablet Take 1 tablet (10 mg) by mouth at bedtime 5/27/2025 Bedtime    escitalopram (LEXAPRO) 20 MG tablet Take 20 mg by mouth daily 5/27/2025 Morning    insulin glargine (LANTUS SOLOSTAR) 100 UNIT/ML pen Inject 10 Units Subcutaneous every morning 5/27/2025 Evening    isosorbide mononitrate (IMDUR) 60 MG 24 hr tablet Take 1 tablet (60 mg) by mouth 2 times daily 5/27/2025 Evening    loperamide (IMODIUM) 2 MG capsule Take 2 mg by mouth 2 times  daily as needed for diarrhea. Taking As Needed    loperamide (IMODIUM) 2 MG capsule Take 1 capsule (2 mg) by mouth 2 times daily as needed for diarrhea 5/27/2025 Evening    menthol-zinc oxide (CALMOSEPTINE) 0.44-20.6 % OINT ointment Apply topically 2 times daily. 5/27/2025 Evening    menthol-zinc oxide (CALMOSEPTINE) 0.44-20.6 % OINT ointment Apply topically 2 times daily as needed for skin protection. Taking As Needed    metFORMIN (GLUCOPHAGE) 500 MG tablet Take 500 mg by mouth 2 times daily (with meals). 5/27/2025 Evening    omeprazole (PRILOSEC) 20 MG DR capsule 20 mg daily before breakfast 5/27/2025 Morning    QUEtiapine (SEROQUEL) 25 MG tablet Take 12.5 mg by mouth 2 times daily as needed (agitation). Taking As Needed    QUEtiapine (SEROQUEL) 25 MG tablet Take 12.5 mg by mouth at bedtime. 5/27/2025 Bedtime    traZODone (DESYREL) 100 MG tablet Take 100 mg by mouth At Bedtime 5/27/2025 Bedtime

## 2025-05-28 NOTE — CONSULTS
Imaging reviewed. This left pelvis collection is small in size (estimated at less than 6 mL) and would be very difficult to access from a percutaneous approach--there is a gonadal vein in the potential path of a needle. Given the small size of the collection and risk of the procedure, aspiration or catheter placement are not recommended unless the patient's condition worsens. Please call radiology to discuss if needed.

## 2025-05-28 NOTE — ED PROVIDER NOTES
Emergency Department Encounter   NAME: Indu Felix ; AGE: 70 year old female ; YOB: 1955 ; MRN: [id number] ; PCP: Jon Kohli   ED PROVIDER: Trinidad Freeman PA-C    Evaluation Date & Time:   5/27/2025  8:13 PM    CHIEF COMPLAINT:  Gait Problem      Impression and Plan   MDM: Indu Felix is a 70 year old female who presents to the ED for evaluation of AMS.  Nurses at patient's memory care reported that she has not been eating or drinking today was unable to ambulate, which is not normal for her.  No noted falls.  Daughter states she is more confused than normal, which is consistent with how she appears when she has a TIA or stroke.     Vitals reviewed and patient is afebrile, not tachycardic or tachypneic, and satting well on room air.  Normotensive. On exam patient winces to abdominal palpation, but her abdomen is soft.  No unilateral weakness or sensory deficits noted on exam.  Negative pronator drift test. Differential diagnosis/emergent conditions considered and evaluated for includes but not limited to urinary tract infection, sepsis, peptic ulcer, diverticulitis, small bowel obstruction, CVA, viral illness, electrolyte abnormality, hypothyroidism, pancreatitis.     White blood cell count is elevated at 15.5, but lactic is normal.  Blood cultures drawn.  No concern for sepsis at this time, as vitals are all stable.  CT abdomen pelvis shows sequela of prior perforated diverticulitis of the sigmoid colon with 2 focal areas of presumed contained perforation, one of which demonstrates likely fistulization with the left adnexa. No evidence of disseminated free intraperitoneal gas otherwise.  I spoke with Dr. Aquino with general surgery who recommends drain placement tomorrow for the areas of perforation and IV antibiotics.  I spoke with Dr. Fuentes with hospital medicine who agrees with admission for this patient for IV antibiotics and drain placement tomorrow.  I discussed these findings  with the patient and her daughter, and they are in agreement with this plan.  Of note, BMP is positive for worsening of kidney function with a creatinine of 3.27 and GFR of 14.  GFR was 38 months ago, so difficult to tell if this is an acute or chronic change, but I suspect it may be related to her dehydration.  IV fluids started in the ED.  UA still needs to be collected at the time of admission, but patient requires straight catheterization and will need to move into a room instead of a hallway bed for this.    ED COURSE:  8:35 PM I met and introduced myself to the patient. I gathered initial history and performed my physical exam. We discussed plan for initial workup.   10:18 PM  I have staffed the patient with Dr. John, ED MD, who has evaluated the patient and agrees with all aspects of today's care.   10:30 PM I spoke with Dr. Aquino, general surgery.   10:39 PM I rechecked and updated the patient on plan for admission.   10:49 PM I spoke with Dr. Fuentes, hospitalist, who accepts the patient for admission.        At the conclusion of the encounter I discussed the results of all the tests and the disposition. The questions were answered. The patient or family acknowledged understanding and was agreeable with the care plan.    Medical Decision Making  I considered additional work up, including CTA head or MRI, but deferred as patient is not having any neurological deficits from her baseline today.  Patient also cannot have contrast due to her decreasing kidney function.  This decision was further supported by her increasing white count and findings on CT that would likely explain her symptoms  Admit.    MIPS (CTPE, Dental pain, Holley, Sinusitis, Asthma/COPD, Head Trauma): Not Applicable    SEPSIS: None        FINAL IMPRESSION:    ICD-10-CM    1. Diverticulitis of colon with perforation  K57.20             MEDICATIONS GIVEN IN THE EMERGENCY DEPARTMENT:  Medications   QUEtiapine (SEROquel) half-tab 25 mg (has  no administration in time range)   lidocaine 1 % 0.1-1 mL (has no administration in time range)   lidocaine (LMX4) cream (has no administration in time range)   sodium chloride (PF) 0.9% PF flush 3 mL (has no administration in time range)   sodium chloride (PF) 0.9% PF flush 3 mL (has no administration in time range)   senna-docusate (SENOKOT-S/PERICOLACE) 8.6-50 MG per tablet 1 tablet (has no administration in time range)     Or   senna-docusate (SENOKOT-S/PERICOLACE) 8.6-50 MG per tablet 2 tablet (has no administration in time range)   calcium carbonate (TUMS) chewable tablet 1,000 mg (has no administration in time range)   heparin ANTICOAGULANT injection 5,000 Units (has no administration in time range)   sodium chloride 0.9 % infusion (has no administration in time range)   hydrALAZINE (APRESOLINE) tablet 10 mg (has no administration in time range)     Or   hydrALAZINE (APRESOLINE) injection 10 mg (has no administration in time range)   acetaminophen (TYLENOL) tablet 650 mg (has no administration in time range)     Or   acetaminophen (TYLENOL) Suppository 650 mg (has no administration in time range)   oxyCODONE IR (ROXICODONE) half-tab 2.5 mg (has no administration in time range)   oxyCODONE (ROXICODONE) tablet 5 mg (has no administration in time range)   HYDROmorphone (DILAUDID) injection 0.2 mg (has no administration in time range)   HYDROmorphone (DILAUDID) injection 0.4 mg (has no administration in time range)   melatonin tablet 1 mg (has no administration in time range)   ondansetron (ZOFRAN ODT) ODT tab 4 mg (has no administration in time range)     Or   ondansetron (ZOFRAN) injection 4 mg (has no administration in time range)   benzocaine-menthol (CHLORASEPTIC) 6-10 MG lozenge 1 lozenge (has no administration in time range)   cefTRIAXone (ROCEPHIN) 2 g vial to attach to  ml bag for ADULTS or NS 50 ml bag for PEDS (has no administration in time range)   metroNIDAZOLE (FLAGYL) infusion 500 mg (has no  administration in time range)   sodium chloride 0.9% BOLUS 1,000 mL (0 mLs Intravenous Stopped 5/27/25 2254)   ondansetron (ZOFRAN) injection 4 mg (4 mg Intravenous $Given 5/27/25 2057)         NEW PRESCRIPTIONS STARTED AT TODAY'S ED VISIT:  New Prescriptions    No medications on file         HPI   Use of Intrepreter: N/A     Indu Felix is a 70 year old female with a pertinent history of HTN, DMII, CKDIII, CVA, CHF, and dementia who presents to the ED for evaluation of ambulatory issues. The patient comes from her assisted living/ memory care facility where nursing reported that the patient stopped eating and drinking this evening and would not get up out of her chair. She was unable to stand up and walk like normal. The patient reports that she does not feel hungry which is why she is not eating or drinking. Her daughter reports that the patient seems more confused than normal. Her daughter reports that the patient is slow to ambulate at baseline and that her lake of ambulation today is not normal. Nursing home reported that the patient was acting normal yesterday. There have been no noted falls. The patient denies any abdominal pain but has some left lower quadrant abdominal pain on exam. Her daughter notes that the has presented with confusion and no dysuria in the past when she had UTIs. She denies any chest pain, shortness of breath, headache, recent vision changes, cough, fever, dysuria, nausea, or vomiting.       REVIEW OF SYSTEMS:  Pertinent positive and negative symptoms per HPI.       Medical History     Past Medical History:   Diagnosis Date    Aortic stenosis     Benign essential hypertension     Chronic heart failure with preserved ejection fraction (H) 03/23/2023    Chronic heart failure with preserved ejection fraction (HFpEF) (H)     Chronic kidney disease     CKD (chronic kidney disease) stage 3, GFR 30-59 ml/min (H)     Congestive heart failure (H)     History of CVA (cerebrovascular accident)   "   Mitral stenosis     Obesity     DIANA (obstructive sleep apnea) 03/23/2023    Pulmonary hypertension (H)     Stage 3a chronic kidney disease (H) 02/08/2023    Tricuspid valve disorders, non-rheumatic     Type 2 diabetes mellitus (H)        Past Surgical History:   Procedure Laterality Date    CV LEFT HEART CATH N/A 2/8/2023    Procedure: Left Heart Catheterization;  Surgeon: Yolanda Ramirez MD;  Location: [facility] CATH LAB CV    CV RIGHT HEART CATH MEASUREMENTS RECORDED N/A 2/8/2023    Procedure: Right Heart Catheterization;  Surgeon: Yolanda Ramirez MD;  Location: [facility] CATH LAB CV       No family history on file.    Social History     Tobacco Use    Smoking status: Never    Smokeless tobacco: Never   Substance Use Topics    Alcohol use: Yes     Comment: 4-5 per week    Drug use: Never       acetaminophen (TYLENOL) 325 MG tablet  amLODIPine (NORVASC) 5 MG tablet  aspirin 81 MG EC tablet  atorvastatin (LIPITOR) 80 MG tablet  bumetanide (BUMEX) 2 MG tablet  busPIRone (BUSPAR) 5 MG tablet  cholecalciferol 25 MCG (1000 UT) TABS  clopidogrel (PLAVIX) 75 MG tablet  donepezil (ARICEPT) 10 MG tablet  escitalopram (LEXAPRO) 20 MG tablet  ibuprofen (ADVIL/MOTRIN) 400 MG tablet  insulin glargine (LANTUS SOLOSTAR) 100 UNIT/ML pen  isosorbide mononitrate (IMDUR) 60 MG 24 hr tablet  loperamide (IMODIUM) 2 MG capsule  melatonin 3 MG tablet  metFORMIN (GLUCOPHAGE) 500 MG tablet  multivitamin (ONE-DAILY) tablet  omeprazole (PRILOSEC) 20 MG DR capsule  QUEtiapine (SEROQUEL) 25 MG tablet  traZODone (DESYREL) 100 MG tablet          Physical Exam     First Vitals:  Patient Vitals for the past 24 hrs:   BP Temp Temp src Pulse Resp SpO2 Height Weight   05/27/25 2252 -- -- -- -- -- -- 1.6 m (5' 3\") --   05/27/25 2231 125/60 -- -- 96 -- 97 % -- --   05/27/25 2108 120/59 -- -- 84 -- -- -- --   05/27/25 2019 -- 98.1  F (36.7  C) Oral -- -- -- -- --   05/27/25 2015 (!) 151/64 -- -- 90 16 95 % -- 87.1 kg (192 lb)         PHYSICAL EXAM: "   Physical Exam  Vitals reviewed.   Constitutional:       General: She is not in acute distress.     Appearance: Normal appearance. She is not diaphoretic.   HENT:      Head: Atraumatic.      Mouth/Throat:      Mouth: Mucous membranes are moist.   Eyes:      General: No scleral icterus.     Conjunctiva/sclera: Conjunctivae normal.   Cardiovascular:      Rate and Rhythm: Normal rate.      Heart sounds: Normal heart sounds.   Pulmonary:      Effort: No respiratory distress.      Breath sounds: Normal breath sounds.   Abdominal:      General: Abdomen is flat.      Palpations: Abdomen is soft.      Tenderness: There is abdominal tenderness (Generalized tenderness).   Musculoskeletal:         General: Normal range of motion.      Cervical back: Neck supple.   Skin:     General: Skin is warm.      Findings: No rash.   Neurological:      Mental Status: She is alert.      Sensory: No sensory deficit.      Motor: No weakness.      Comments:  strength equal bilaterally, patient able to plantar and dorsiflex her feet bilaterally.  No sensory deficits in upper or lower extremities.  Negative pronator drift sign.  No facial droop or slurred speech.             Results     LAB:  All pertinent labs reviewed and interpreted  Labs Ordered and Resulted from Time of ED Arrival to Time of ED Departure   BASIC METABOLIC PANEL - Abnormal       Result Value    Sodium 134 (*)     Potassium 3.8      Chloride 98      Carbon Dioxide (CO2) 19 (*)     Anion Gap 17 (*)     Urea Nitrogen 43.5 (*)     Creatinine 3.27 (*)     GFR Estimate 14 (*)     Calcium 8.4 (*)     Glucose 173 (*)    HEPATIC FUNCTION PANEL - Abnormal    Protein Total 7.1      Albumin 3.4 (*)     Bilirubin Total 0.3      Alkaline Phosphatase 186 (*)     AST 18      ALT 7      Bilirubin Direct 0.11     CBC WITH PLATELETS AND DIFFERENTIAL - Abnormal    WBC Count 15.5 (*)     RBC Count 3.74 (*)     Hemoglobin 10.2 (*)     Hematocrit 31.5 (*)     MCV 84      MCH 27.3      MCHC  32.4      RDW 13.3      Platelet Count 376     RBC AND PLATELET MORPHOLOGY - Abnormal    RBC Morphology Confirmed RBC Indices      Platelet Assessment        Value: Automated Count Confirmed. Platelet morphology is normal.    Polychromasia Slight (*)    MANUAL DIFFERENTIAL - Abnormal    % Neutrophils 75      % Lymphocytes 10      % Monocytes 12      % Eosinophils 3      % Basophils 0      Absolute Neutrophils 11.6 (*)     Absolute Lymphocytes 1.6      Absolute Monocytes 1.9 (*)     Absolute Eosinophils 0.5      Absolute Basophils 0.0     LIPASE - Normal    Lipase 19     TSH WITH FREE T4 REFLEX - Normal    TSH 1.62     LACTIC ACID WHOLE BLOOD WITH 1X REPEAT IN 2 HR WHEN >2 - Normal    Lactic Acid, Initial 1.0     ROUTINE UA WITH MICROSCOPIC REFLEX TO CULTURE   BLOOD CULTURE   BLOOD CULTURE       RADIOLOGY:  CT Abdomen Pelvis w/o Contrast   Final Result   IMPRESSION:    1.  Colonic diverticulosis. Sequela of prior perforated diverticulitis of the sigmoid colon with 2 focal areas of presumed contained perforation. The first more inferior collection of gas and fluid measures approximately 4.7 x 3.8 cm and demonstrates    likely fistulization with the left adnexa. A second predominantly gas containing contained perforation collection is noted more superiorly measuring approximately 3.4 x 2.8 cm. No evidence of disseminated free intraperitoneal gas otherwise.    2.  Calcified uterine leiomyomas.   3.  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               ECG:  N/A     PROCEDURES:  N/A       I, Demetria Simms, am serving as a scribe to document services personally performed by Trinidad Freeman PA-C, based on my observation and the provider's statements to me. ITrinidad PA-C attest that Demetria Simms is acting in a scribe capacity, has observed my performance of the services and has documented them in accordance with my direction.       Trinidad Freeman PA-C   Emergency Medicine   [facility]  [facility] EMERGENCY DEPARTMENT      Trinidad Freeman PA-C  05/27/25 0229

## 2025-05-28 NOTE — CONSULTS
General surgery consult         ASSESSMENT     1. Diverticulitis of colon with perforation             PLAN      IV antibiotics  Bowel rest  Consultation from interventional radiology for percutaneous drainage of pelvic abscesses         CHIEF COMPLAINT     Chief Complaint   Patient presents with    Gait Problem         HPI     Indu Felix is a 70 year old female who presents to the emergency department at [facility] from a memory care unit where it was noted that she had stopped eating, stopped drinking and was having difficulty standing and walking like she had previously.  She was evaluated with a CT scan that showed evidence of diverticulitis with microperforation and abscess.    This patient has a past medical history of hypertension, diabetes, chronic kidney disease and dementia.         PAST MEDICAL HISTORY SURGICAL HISTORY     Past Medical History:   Diagnosis Date    Aortic stenosis     Benign essential hypertension     Chronic heart failure with preserved ejection fraction (H) 03/23/2023    Chronic heart failure with preserved ejection fraction (HFpEF) (H)     Chronic kidney disease     CKD (chronic kidney disease) stage 3, GFR 30-59 ml/min (H)     Congestive heart failure (H)     History of CVA (cerebrovascular accident)     Mitral stenosis     Obesity     DIANA (obstructive sleep apnea) 03/23/2023    Pulmonary hypertension (H)     Stage 3a chronic kidney disease (H) 02/08/2023    Tricuspid valve disorders, non-rheumatic     Type 2 diabetes mellitus (H)     Past Surgical History:   Procedure Laterality Date    CV LEFT HEART CATH N/A 2/8/2023    Procedure: Left Heart Catheterization;  Surgeon: Yolanda Ramirez MD;  Location: [facility] CATH [facility] CV    CV RIGHT HEART CATH MEASUREMENTS RECORDED N/A 2/8/2023    Procedure: Right Heart Catheterization;  Surgeon: Yolanda Ramirez MD;  Location: [facility] CATH LAB CV          CURRENT MEDICATIONS ALLERGIES     Current Outpatient Rx   Medication Sig Dispense  Refill    amLODIPine (NORVASC) 5 MG tablet Take 1 tablet (5 mg) by mouth daily 30 tablet 0    aspirin 81 MG EC tablet Take 81 mg by mouth daily      atorvastatin (LIPITOR) 80 MG tablet Take 80 mg by mouth at bedtime.      bumetanide (BUMEX) 2 MG tablet Take 1 tablet (2 mg) by mouth daily 180 tablet 1    busPIRone (BUSPAR) 7.5 MG tablet Take 7.5 mg by mouth 2 times daily.      cholecalciferol 25 MCG (1000 UT) TABS Take 1 tablet by mouth daily      clopidogrel (PLAVIX) 75 MG tablet Take 1 tablet (75 mg) by mouth daily 30 tablet 11    donepezil (ARICEPT) 10 MG tablet Take 1 tablet (10 mg) by mouth at bedtime 30 tablet 11    escitalopram (LEXAPRO) 20 MG tablet Take 20 mg by mouth daily      insulin glargine (LANTUS SOLOSTAR) 100 UNIT/ML pen Inject 10 Units Subcutaneous every morning      isosorbide mononitrate (IMDUR) 60 MG 24 hr tablet Take 1 tablet (60 mg) by mouth 2 times daily 60 tablet 0    loperamide (IMODIUM) 2 MG capsule Take 2 mg by mouth 2 times daily as needed for diarrhea.      loperamide (IMODIUM) 2 MG capsule Take 1 capsule (2 mg) by mouth 2 times daily as needed for diarrhea      menthol-zinc oxide (CALMOSEPTINE) 0.44-20.6 % OINT ointment Apply topically 2 times daily.      menthol-zinc oxide (CALMOSEPTINE) 0.44-20.6 % OINT ointment Apply topically 2 times daily as needed for skin protection.      metFORMIN (GLUCOPHAGE) 500 MG tablet Take 500 mg by mouth 2 times daily (with meals).      omeprazole (PRILOSEC) 20 MG DR capsule 20 mg daily before breakfast      QUEtiapine (SEROQUEL) 25 MG tablet Take 12.5 mg by mouth 2 times daily as needed (agitation).      QUEtiapine (SEROQUEL) 25 MG tablet Take 12.5 mg by mouth at bedtime.      traZODone (DESYREL) 100 MG tablet Take 100 mg by mouth At Bedtime      Allergies   Allergen Reactions    Lisinopril Other (See Comments) and Unknown          FAMILY HISTORY   No family history on file.      SOCIAL HISTORY     Social History     Socioeconomic History    Marital  "status:    Tobacco Use    Smoking status: Never    Smokeless tobacco: Never   Substance and Sexual Activity    Alcohol use: Yes     Comment: 4-5 per week    Drug use: Never    Sexual activity: Not Currently     Social Drivers of Health     Interpersonal Safety: Not At Risk (6/11/2023)    Received from [facility]    [facility] Custom [facility]     Do you feel UNSAFE in any of your personal relationships with your family members or any other acquaintances?: No         REVIEW OF SYSTEMS       12 point review of systems are unremarkable except for the symptoms described in the HPI    PHYSICAL EXAM     VITAL SIGNS: BP (!) 156/70 (BP Location: Left arm)   Pulse 91   Temp 99.4  F (37.4  C) (Oral)   Resp 16   Ht 1.6 m (5' 3\")   Wt 87.1 kg (192 lb)   SpO2 95%   BMI 34.01 kg/m     General : Patient has dementia she is not aware of the reason she is in the hospital, obese  Skin: Skin color, texture, turgor normal, no rashes or lesions   Lymph nodes: No obvious adenopathy   Head: Normocephalic, without obvious abnormality, atraumatic   HEENT:  conjunctiva/corneas clear, EOM's intact, no scleral icterus   Throat: Lips, mucosa, and tongue normal; teeth and gums normal    Neck: Supple, thyroid not enlarged   Back: No CVA tenderness   Lungs: Clear to auscultation bilaterally, respirations unlabored, no rales, rhonchi or wheezes   Chest Wall:No tenderness or deformity   Heart: Regular rate and rhythm, S1, S2 normal, no murmur, rub or gallop   Abdomen: Soft, tenderto palpation in the left lower quadrant, no guarding,   Extremities : No obvious swelling,  Neurologic: Cranial Nerves II-XII normal, moves all extremities equally, no focal findings, dementia          RADIOLOGY      CT Abdomen Pelvis w/o Contrast   Final Result   IMPRESSION:    1.  Colonic diverticulosis. Sequela of prior perforated diverticulitis of the sigmoid colon with 2 focal areas of presumed contained perforation. The first " more inferior collection of gas and fluid measures approximately 4.7 x 3.8 cm and demonstrates    likely fistulization with the left adnexa. A second predominantly gas containing contained perforation collection is noted more superiorly measuring approximately 3.4 x 2.8 cm. No evidence of disseminated free intraperitoneal gas otherwise.    2.  Calcified uterine leiomyomas.   3.  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             EKG     Reviewed        LABS     Results for orders placed or performed during the hospital encounter of 05/27/25   CT Abdomen Pelvis w/o Contrast    Impression    IMPRESSION:   1.  Colonic diverticulosis. Sequela of prior perforated diverticulitis of the sigmoid colon with 2 focal areas of presumed contained perforation. The first more inferior collection of gas and fluid measures approximately 4.7 x 3.8 cm and demonstrates   likely fistulization with the left adnexa. A second predominantly gas containing contained perforation collection is noted more superiorly measuring approximately 3.4 x 2.8 cm. No evidence of disseminated free intraperitoneal gas otherwise.   2.  Calcified uterine leiomyomas.  3.  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     UA with Microscopic reflex to Culture    Specimen: Urine, Holley Catheter   Result Value Ref Range    Color Urine Light Yellow Colorless, Straw, Light Yellow, Yellow    Appearance Urine Clear Clear    Glucose Urine Negative Negative mg/dL    Bilirubin Urine Negative Negative    Ketones Urine Negative Negative mg/dL    Specific Gravity Urine 1.010 1.001 - 1.030    Blood Urine Negative Negative    pH Urine 5.0 5.0 - 7.0    Protein Albumin Urine Negative Negative mg/dL    Urobilinogen Urine Normal Normal mg/dL    Nitrite Urine Negative Negative    Leukocyte Esterase Urine 500 [place]/uL (A) Negative    Bacteria Urine Many (A) None Seen /HPF    Mucus Urine Present (A) None Seen /LPF    RBC Urine 1 <=2 /HPF    WBC Urine 29 (H)  <=5 /HPF    Squamous Epithelials Urine 1 <=1 /HPF    Hyaline Casts Urine 12 (H) <=2 /LPF   Basic metabolic panel   Result Value Ref Range    Sodium 134 (L) 135 - 145 mmol/L    Potassium 3.8 3.4 - 5.3 mmol/L    Chloride 98 98 - 107 mmol/L    Carbon Dioxide (CO2) 19 (L) 22 - 29 mmol/L    Anion Gap 17 (H) 7 - 15 mmol/L    Urea Nitrogen 43.5 (H) 8.0 - 23.0 mg/dL    Creatinine 3.27 (H) 0.51 - 0.95 mg/dL    GFR Estimate 14 (L) >60 mL/min/1.73m2    Calcium 8.4 (L) 8.8 - 10.4 mg/dL    Glucose 173 (H) 70 - 99 mg/dL   Hepatic function panel   Result Value Ref Range    Protein Total 7.1 6.4 - 8.3 g/dL    Albumin 3.4 (L) 3.5 - 5.2 g/dL    Bilirubin Total 0.3 <=1.2 mg/dL    Alkaline Phosphatase 186 (H) 40 - 150 U/L    AST 18 0 - 45 U/L    ALT 7 0 - 50 U/L    Bilirubin Direct 0.11 0.00 - 0.30 mg/dL   Result Value Ref Range    Lipase 19 13 - 60 U/L   TSH with free T4 reflex   Result Value Ref Range    TSH 1.62 0.30 - 4.20 uIU/mL   CBC with platelets and differential   Result Value Ref Range    WBC Count 15.5 (H) 4.0 - 11.0 10e3/uL    RBC Count 3.74 (L) 3.80 - 5.20 10e6/uL    Hemoglobin 10.2 (L) 11.7 - 15.7 g/dL    Hematocrit 31.5 (L) 35.0 - 47.0 %    MCV 84 78 - 100 fL    MCH 27.3 26.5 - 33.0 pg    MCHC 32.4 31.5 - 36.5 g/dL    RDW 13.3 10.0 - 15.0 %    Platelet Count 376 150 - 450 10e3/uL   RBC and Platelet Morphology   Result Value Ref Range    RBC Morphology Confirmed RBC Indices     Platelet Assessment  Automated Count Confirmed. Platelet morphology is normal.     Automated Count Confirmed. Platelet morphology is normal.    Polychromasia Slight (A) None Seen   Manual Differential   Result Value Ref Range    % Neutrophils 75 %    % Lymphocytes 10 %    % Monocytes 12 %    % Eosinophils 3 %    % Basophils 0 %    Absolute Neutrophils 11.6 (H) 1.6 - 8.3 10e3/uL    Absolute Lymphocytes 1.6 0.8 - 5.3 10e3/uL    Absolute Monocytes 1.9 (H) 0.0 - 1.3 10e3/uL    Absolute Eosinophils 0.5 0.0 - 0.7 10e3/uL    Absolute Basophils 0.0 0.0 -  0.2 10e3/uL   Lactic Acid Whole Blood with 1X Repeat in 2 HR when >2   Result Value Ref Range    Lactic Acid, Initial 1.0 0.7 - 2.0 mmol/L   Extra Green Top (Lithium Heparin) Tube   Result Value Ref Range    Hold Specimen JIC    Extra Purple Top Tube   Result Value Ref Range    Hold Specimen JIC    Extra Heparinized Syringe   Result Value Ref Range    Hold Specimen JIC    Fractional Excretion of Sodium   Result Value Ref Range    Creatinine Urine mg/dL 70.4 mg/dL    Sodium Urine mmol/L 75 mmol/L    %FENA 2.6 %   Basic metabolic panel   Result Value Ref Range    Sodium 141 135 - 145 mmol/L    Potassium 3.5 3.4 - 5.3 mmol/L    Chloride 107 98 - 107 mmol/L    Carbon Dioxide (CO2) 20 (L) 22 - 29 mmol/L    Anion Gap 14 7 - 15 mmol/L    Urea Nitrogen 36.5 (H) 8.0 - 23.0 mg/dL    Creatinine 2.80 (H) 0.51 - 0.95 mg/dL    GFR Estimate 17 (L) >60 mL/min/1.73m2    Calcium 7.7 (L) 8.8 - 10.4 mg/dL    Glucose 77 70 - 99 mg/dL   CBC with platelets   Result Value Ref Range    WBC Count 13.9 (H) 4.0 - 11.0 10e3/uL    RBC Count 3.43 (L) 3.80 - 5.20 10e6/uL    Hemoglobin 9.2 (L) 11.7 - 15.7 g/dL    Hematocrit 29.4 (L) 35.0 - 47.0 %    MCV 86 78 - 100 fL    MCH 26.8 26.5 - 33.0 pg    MCHC 31.3 (L) 31.5 - 36.5 g/dL    RDW 13.2 10.0 - 15.0 %    Platelet Count 340 150 - 450 10e3/uL   Result Value Ref Range    Ammonia 12 11 - 51 umol/L   Result Value Ref Range    Vitamin B12 854 232 - 1,245 pg/mL   Result Value Ref Range    Folic Acid 7.2 4.6 - 34.8 ng/mL   Extra Blue Top Tube (LAB USE ONLY)   Result Value Ref Range    Hold Specimen JIC    Glucose by meter   Result Value Ref Range    GLUCOSE BY METER POCT 73 70 - 99 mg/dL           [facility]  [phone]

## 2025-05-28 NOTE — PROGRESS NOTES
Care Management Initial Consult    General Information  Assessment completed with: Patient,    Type of CM/SW Visit: Initial Assessment    Primary Care Provider verified and updated as needed: Yes   Readmission within the last 30 days: no previous admission in last 30 days      Reason for Consult: discharge planning  Advance Care Planning: no concerns      Communication Assessment  Patient's communication style: spoken language (English or Bilingual)       Cognitive  Cognitive/Neuro/Behavioral: .WDL except, orientation  Level of Consciousness: confused  Arousal Level: opens eyes spontaneously  Orientation: disoriented to, place, time, situation (per her baseline)  Mood/Behavior: flat affect, cooperative, calm     Speech: clear, spontaneous, logical    Living Environment:   People in home: spouse     Current living Arrangements: house      Able to return to prior arrangements: yes       Family/Social Support:  Care provided by: self  Provides care for: no one     Support system: , Children          Description of Support System: Involved       Current Resources:   Patient receiving home care services: No     Community Resources: None  Equipment currently used at home:    Supplies currently used at home:      Employment/Financial:  Employment Status: retired        Financial Concerns:   none reported      Does the patient's insurance plan have a 3 day qualifying hospital stay waiver?  No    Lifestyle & Psychosocial Needs:  Social Drivers of Health     Food Insecurity: Not on file   Depression: Not at risk (1/8/2024)    PHQ-2     PHQ-2 Score: 0   Housing Stability: Not on file   Tobacco Use: Low Risk  (11/4/2024)    Patient History     Smoking Tobacco Use: Never     Smokeless Tobacco Use: Never     Passive Exposure: Not on file   Financial Resource Strain: Not on file   Alcohol Use: Unknown (4/6/2020)    Received from [facility]     AUDIT-C     Frequency of Alcohol Consumption: Monthly or less      Average Number of Drinks: Not on file     Frequency of Binge Drinking: Not on file   Transportation Needs: Not on file   Physical Activity: Not on file   Interpersonal Safety: Not At Risk (2023)    [facility]     Do you feel UNSAFE in any of your personal relationships with your family members or any other acquaintances?: No   Stress: Not on file   Social Connections: Not on file   Health Literacy: Not on file       Functional Status:  Prior to admission patient needed assistance:   Dependent ADLs:: Independent  Dependent IADLs:: Independent       Mental Health Status:  Mental Health Status: No Current Concerns       Chemical Dependency Status:  Chemical Dependency Status: No Current Concerns             Values/Beliefs:  Spiritual, Cultural Beliefs, Jehovah's witness Practices, Values that affect care: no               Discussed  Partnership in Safe Discharge Planning  document with patient/family: No    Additional Information:The patient is a 70-year-old female who presented to the Emergency Department  for evaluation of altered mental status. The writer met with the patient for the initial assessment and she reported living at home with her spouse and being independent at baseline. Later, met with the patient and her daughter, Solange. Solange noted that the patient moved to [place] in  of last year and currently requires assistance with ADL/IADLs. She stated that the patient is not in a memory care unit at this time, as there has not been a significant decline in her memory. However, they have a follow-up appointment scheduled with a neurologist.    Solange also noted that the patient s spouse continues to reside at their home. She is the patient s primary support person, as the patient s son -- listed as the emergency contact--has passed away. With Solange s permission, the writer removed the  brother from the patient s contact  information. Solange reported that the patient remains confused her in the ED per her observation. She requested that a medical transport be arranged for the patient upon discharge. They use [facility], but she said we can use also [facility].No additional needs or concerns were noted at this time.     According to [facility], the patient is currently residing in the [facility]. Coordination for her return can be arranged with the [facility] Director of Nursing, Shayy, at [phone]. If the patient requires a TCU, they have requested that a referral be sent to the SNF.    Barry contacts:   Solange/daughter ph: [phone]   LILIANA Lucas at [phone] - [facility]     Next Steps: Coordinate discharge with [facility] facility, and update the daughter regarding discharge plan.     DENIA TempletonSW

## 2025-05-28 NOTE — ED TRIAGE NOTES
Pt brought in by EMS from assisted living/memory care for ambulatory issues.  Pt normally gets around facility with a walker.  Staff called due to pt not ambulating today.       Triage Assessment (Adult)       Row Name 05/27/25 2016          Triage Assessment    Airway WDL WDL        Respiratory WDL    Respiratory WDL WDL

## 2025-05-28 NOTE — ED NOTES
Phlebotomist called to draw blood cx's. Oxygen on at 2 L/NC for O2 sats dropping into 85-88% while falling asleep.

## 2025-05-28 NOTE — PROGRESS NOTES
[facility]    Medicine Progress Note - Hospitalist Service    Date of Admission:  5/27/2025    Assessment & Plan   Indu Felix is a 70-year-old female with history of hypertension, type 2 diabetes mellitus (HbA1c 6.3% on 12/6/23), nonrheumatic mitral stenosis, pulmonary hypertension, chronic diastolic CHF, DIANA on CPAP, prior left parieto-occipital CVA (6/11/23), vascular dementia, chronic kidney disease stage 4, anemia of chronic disease, diverticulitis with perforation, porcelain gallbladder, and depression in remission, admitted on 5/27/2025 from her memory care facility with confusion, poor oral intake, generalized weakness, worsening leukocytosis, JEFFREY on stage III CKD, and suspected diverticulitis with microperforation and possible abscess.     CT imaging showed diverticulitis with microperforation and a small pelvic abscess. She started on IV antibiotics for suspected sepsis secondary to diverticulitis with perforation and possible pelvic abscess complicated by acute kidney injury on stage IV CKD    General Surgery service recommends percutaneous drainage of pelvic abscess by IR.  Percutaneous drainage was deferred due by IR due to small abscess size. Daughter Solange is MAGALIE due to patient's lack of decision making capacity.        Severe Sepsis with JEFFREY due to Diverticulitis with Contained Perforation  Suspected diverticulitis  Microperforation  Possible pelvic abscess.     Continue IV ceftriaxone and metronidazole  Continue IV hydration  N.p.o. for now  Percutaneous drainage was deferred due by IR due to small abscess size.  Follow-up blood culture  Monitor urine output  Surgery [id number] assistance  IR [id number] assistance    Acute Kidney Injury on CKD Stage 4  Prerenal vs. sepsis-related    IV hydration as above  Avoid nephrotoxic medication  Intake and output monitoring  Monitor BMP  Nephrology consult requested      Chronic Heart Failure with Preserved  "Ejection Fraction (HFpEF)  Baseline diastolic dysfunction  Hold diuretics for now  Not decompensated  Hold PTA bumetanide 2 mg daily  Monitor fluid balance closely to avoid volume overload    Hypertension  Amlodipine 5 mg daily      Type 2 Diabetes Mellitus  Well-controlled (HbA1c 6.3%)  Hold metformin during JEFFREY  Continue insulin sliding scale   Monitor for hypoglycemia per protocol    DIANA on CPAP  CPAP at night      History of CVA and Vascular Dementia  Continue PTA atorvastatin  Hold PTA aspirin and clopidogrel in anticipation of possible invasive intervention    Care decision/goals of care  Patient lacks decision-making capacity; daughter Solange is POA    Depression   Continue Lexapro and Trazodone  Continue buspirone    Dementia  Continue quetiapine dose to 12.5 mg BID PRN for agitation  Continue donepezil      Diet: NPO for Medical/Clinical Reasons Except for: Meds, Ice Chips    DVT Prophylaxis: Heparin SQ  Holley Catheter: PRESENT, indication: Acute retention or obstruction  Lines: None     Cardiac Monitoring: ACTIVE order. Indication: QTc prolonging medication (48 hours)  Code Status: Full Code      Clinically Significant Risk Factors Present on Admission         # Hyponatremia: Lowest Na = 134 mmol/L in last 2 days, will monitor as appropriate   # Hypocalcemia: Lowest Ca = 7.7 mg/dL in last 2 days, will monitor and replace as appropriate     # Hypoalbuminemia: Lowest albumin = 3.4 g/dL at 5/27/2025  8:50 PM, will monitor as appropriate   # Drug Induced Platelet Defect: home medication list includes an antiplatelet medication   # Hypertension: Noted on problem list  # Chronic heart failure with preserved ejection fraction: heart failure noted on problem list and last echo with EF >50%    # Dementia: noted on problem list  # Anemia: based on hgb <11       # Obesity: Estimated body mass index is 33.39 kg/m  as calculated from the following:    Height as of this encounter: 1.6 m (5' 3\").    Weight as of this " encounter: 85.5 kg (188 lb 8 oz).       # Financial/Environmental Concerns:           Social Drivers of Health    Alcohol Use: Unknown (4/6/2020)    Received from [facility]     AUDIT-C     Frequency of Alcohol Consumption: Monthly or less          Disposition Plan     Medically Ready for Discharge: Anticipated in 2-4 Days        Pascual Mcdonald MD  Hospitalist Service  [facility]  Securely message with [employer] (more info)  Text page via [employer] Paging/Directory   ______________________________________________________________________    Interval History   Patient is awake, alert, pleasant and cooperative but profoundly forgetful.  She denies any pain at this time    Physical Exam   Vital Signs: Temp: 98.1  F (36.7  C) Temp src: Oral BP: (!) 157/71 Pulse: 92   Resp: 20 SpO2: 95 % O2 Device: Nasal cannula Oxygen Delivery: 2 LPM  Weight: 188 lbs 8 oz    General appearance: Awake, Alert, Cooperative, not in any obvious distress and appears stated age   HEENT: Normocephalic, atraumatic, conjunctiva clear without icterus and ears without discharge  Lungs: Clear to auscultation bilaterally, no wheezing, good air exchange, normal work of breathing  Cardiovascular: Regular Rate and Rythm, normal apical impulse, normal S1 and S2, no lower extremity edema bilaterally  Abdomen: Soft with right lower quadrant tenderness, active bowel sounds  Skin: Skin color, texture normal and bruising or bleeding. No rashes or lesions over face, neck, arms and legs, turgor normal.  Musculoskeletal: No bony deformities or joint tenderness. Normal ROM upon flexion & extension.   Neurologic: Alert & Oriented X 3, Facial symmetry preserved and upper & lower extremities moving well with symmetry  Psychiatric: Unable to assess      Medical Decision Making       65 MINUTES SPENT BY ME on the date of service doing chart review, history, exam, documentation & further activities per the note.      Data     I have  personally reviewed the following data over the past 24 hrs:    13.9 (H)  \   9.2 (L)   / 340     141 107 36.5 (H) /  97   3.5 20 (L) 2.80 (H) \     ALT: 7 AST: 18 AP: 186 (H) TBILI: 0.3   ALB: 3.4 (L) TOT PROTEIN: 7.1 LIPASE: 19     TSH: 1.62 T4: N/A A1C: N/A     Procal: N/A CRP: N/A Lactic Acid: 1.0         Imaging results reviewed over the past 24 hrs:   Recent Results (from the past 24 hours)   CT Abdomen Pelvis w/o Contrast    Narrative    EXAM: CT ABDOMEN PELVIS W/O CONTRAST  LOCATION: [facility]  DATE: 5/27/2025    INDICATION: abdominal pain  COMPARISON: None.  TECHNIQUE: CT scan of the abdomen and pelvis was performed without IV contrast. Multiplanar reformats were obtained. Dose reduction techniques were used.  CONTRAST: None.    FINDINGS:   LOWER CHEST: Coronary artery and cardiac valvular atherosclerotic calcifications. Small sliding-type hiatal hernia.    HEPATOBILIARY: Unremarkable noncontrast CT appearance of the liver. Gallbladder wall calcifications, consistent with porcelain gallbladder.    PANCREAS: Normal.    SPLEEN: Normal.    ADRENAL GLANDS: Adrenal thickening.    KIDNEYS/BLADDER: No nephroureterolithiasis or hydronephrosis. Urinary bladder is mildly diffusely thick-walled, which may represent reactive thickening related to the process below.    BOWEL: Colonic diverticulosis. Sequela of prior perforated diverticulitis of the sigmoid colon with 2 focal areas of presumed contained perforation. The first more inferior collection of gas and fluid measures approximately 4.7 x 3.8 cm and demonstrates   likely fistulization with the left adnexa. A second predominantly gas containing contained perforation collection is noted more superiorly measuring approximately 3.4 x 2.8 cm. No evidence of disseminated free intraperitoneal gas otherwise. No bowel   obstruction.    LYMPH NODES: No lymphadenopathy.    VASCULATURE: Atherosclerotic calcifications of the aortoiliac vessels without  evidence of aneurysmal dilatation.    PELVIC ORGANS: Calcified uterine leiomyomas.    MUSCULOSKELETAL: Tiny fat-containing right inguinal and umbilical hernias. Multilevel degenerative changes of the thoracic and lumbosacral spine. No acute osseous abnormality.      Impression    IMPRESSION:   1.  Colonic diverticulosis. Sequela of prior perforated diverticulitis of the sigmoid colon with 2 focal areas of presumed contained perforation. The first more inferior collection of gas and fluid measures approximately 4.7 x 3.8 cm and demonstrates   likely fistulization with the left adnexa. A second predominantly gas containing contained perforation collection is noted more superiorly measuring approximately 3.4 x 2.8 cm. No evidence of disseminated free intraperitoneal gas otherwise.   2.  Calcified uterine leiomyomas.  3.  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

## 2025-05-28 NOTE — H&P
[facility]    History and Physical - Hospitalist Service       Date of Admission:  5/27/2025    Assessment & Plan      Indu Felix is a 70 year old female admitted on 5/27/2025.    Principal Problem:    Sepsis with acute renal failure without septic shock (H)  Active Problems:    Benign essential hypertension    DM type 2, Hgb A1C 6.3 on 12/6/23    Nonrheumatic mitral valve stenosis    Pulmonary hypertension (H)    Chronic diastolic congestive heart failure (H)    DIANA on CPAP    Hx of Left Parietal-Occipital CVA 6/11/23    Stage 4 chronic kidney disease (H)    Vascular dementia (H)    Diverticulitis of colon with perforation    Acute kidney injury superimposed on stage 3a chronic kidney disease (H)    Porcelain gallbladder    Anemia of chronic renal failure    Major depressive disorder, recurrent episode, in full remission    70 year old wfemale with a pertinent history of HTN, DMII, CKDIII, CVA, CHF, and dementia who presents to the ED for evaluation of ambulatory issues. The patient comes from her assisted living/ memory care facility where nursing reported that the patient stopped eating and drinking this evening and would not get up out of her chair. She was unable to stand up and walk like normal.   Also confused per report now has WBC elevation, with chronic anemia with CKD kidney function baseline around 1.81 and GFR of 35 with CKD 4.  Now 3.27 creatinine with GFR of 90 suggesting acute kidney disease due to sepsis probably.  Anion gap elevation given 1 L bolus fluid, lactate is negative.  CT with contained perforation and abscess, surgery contacted will need admission.  Antibiotic and IV fluids.  Will admit for further evaluation and treatment.  Preoperatively patient will be cleared although has history of stroke on DAPT-that we will hold in anticipation of possible surgical evaluation in the morning, Chronic diastolic CHF and CKD 4 she would be moderate risk preoperatively and no  further intervention is indicated prior to the surgery to optimize this risk, benefit of timely surgical intervention outweigh the risks any associated.  Continue to follow along with our surgical team.  -Pt. lacks capacity currently, Daughter Solange is her POA    Severe sepsis associated with JEFFREY due to diverticulitis of colon with contained perforation with abscess  SIRS  -Pt has two criteria of SIRS- tachypnea (>20 breaths/min), and leukocytosis, normal- Lactic acid, IV fluids/ 1L bolus fluids as tolerated has been provided and no further bolus IV fluids due to risk of progression of CHF with cardiorenal disease, and continue to monitor with IV fluids further  - Obtain Microbiological studies-  Blood cultures and lactates has been obtained  - IV antibiotics- empirically iv Rocephin Flagyl to cover broad-spectrum coverage for gram-positive negatives and anaerobes while  culture and sensitivities are pending  - early goal directed therapy to maintain Adequate UO, CVP and MAP>65 in range  -Signs of end organ damage suggest from- mild confusion with likely septic encephalopathy, JEFFREY/impaired renal functions due to sepsis      Acute kidney injury superimposed on stage 4 chronic kidney disease  Chronic anemia of kidney disease  -CKD at baseline Likely due to DM/HTN combination.  JEFFREY david Prerenal-or due to sepsis, check FENA, Imaging with no hydronephrosis  Hold bumex, IV fluids  - Monitor trend  - Avoid nephrotoxic meds  -tam for I&O      Benign essential hypertension  -resume home medication once reconciled.      DM type 2, Hgb A1C 6.3 on 12/6/23  -well controlled will start finding scale coverage  -Start insulin sliding scale coverage a  -Hold home metformin  -+ home insulin use  -A1c <7        Chronic heart failure with preserved ejection fraction  AS  -Hold any diuretics for dehydration  Hold bumex, IV fluids  Slight murmur with hx of AS      DIANA on CPAP  -home CPAP if patient have otherwise clear some  oxygen      Hx of Left Parietal-Occipital CVA 6/11/23   Depression with Vascular dementia -Pt. lacks capacity, Daughter Solange is her POA  -DAPT-that we will hold in anticipation of possible surgical evaluation in the morning,   C/w Buspirone from 5 mg bid to 7.5 mg bid, Quetiapine from 25 mg bid prn to 12.5 mg at bedtime and 12.5 mg bid prn,   C/w lexapro, trazadone    Unilateral Eye visual dificulty/Blindness  - Being evaluated with retina specialist    Hold outside hospital medication pending a confirmed pharmacy history and further reconciliation.  Initial orders were placed.  Workup and follow-up labs has been placed.   Current problem list also has been updated.  Request consultation to Surgery,Nephrology-appreciated assistance and recommendations.    Anticipated length of stay of more than 2 midnights of hospitalization depending on progression, planning,findings,further testing or treatment needs. Services are medically necessary for evaluation and treatment of the acute & on chronic superimposed conditions if applicable with the treatment plan as outlined above.    More than 50% of time spent in Counselling & coordination of care.  Disposition:   Pending on further clinical progression, further evaluation findings and recommendations.          Diet: Combination Diet Regular Diet Adult    DVT Prophylaxis: Heparin SQ  Holley Catheter: PRESENT, indication:    Lines: None     Cardiac Monitoring: ACTIVE order. Indication: QTc prolonging medication (48 hours)  Code Status: Full Code      Clinically Significant Risk Factors Present on Admission         # Hyponatremia: Lowest Na = 134 mmol/L in last 2 days, will monitor as appropriate   # Hypocalcemia: Lowest Ca = 8.4 mg/dL in last 2 days, will monitor and replace as appropriate     # Hypoalbuminemia: Lowest albumin = 3.4 g/dL at 5/27/2025  8:50 PM, will monitor as appropriate   # Drug Induced Platelet Defect: home medication list includes an antiplatelet medication  "  # Hypertension: Noted on problem list  # Chronic heart failure with preserved ejection fraction: heart failure noted on problem list and last echo with EF >50%    # Dementia: noted on problem list    # Anemia: based on hgb <11       # Obesity: Estimated body mass index is 34.01 kg/m  as calculated from the following:    Height as of this encounter: 1.6 m (5' 3\").    Weight as of this encounter: 87.1 kg (192 lb).       # Financial/Environmental Concerns:           Disposition Plan     Medically Ready for Discharge: Anticipated in 2-4 Days           Oscar Fuentes MD  Hospitalist Service  [facility]  Securely message with [employer] (more info)  Text page via [facility] Paging/Directory     ______________________________________________________________________    Chief Complaint   Lethargy    History is obtained from the patient, electronic health record, and emergency department physician    History of Present Illness   Indu Felix is a 70 year old female HTN, DMII, CKDIII, CVA, CHF, and dementia who presents to the ED for evaluation of ambulatory issues. The patient comes from her assisted living/ memory care facility where nursing reported that the patient stopped eating and drinking this evening and would not get up out of her chair. She was unable to stand up and walk like normal. The patient reports that she does not feel hungry which is why she is not eating or drinking. Her daughter reports that the patient seems more confused than normal. Her daughter reports that the patient is slow to ambulate at baseline and that her lake of ambulation today is not normal. Nursing home reported that the patient was acting normal yesterday. There have been no noted falls. The patient denies any abdominal pain but has some left lower quadrant abdominal pain on exam. Her daughter notes that the has presented with confusion and no dysuria in the past when she had UTIs. She denies any chest pain, shortness " of breath, headache, recent vision changes, cough, fever, dysuria, nausea, or vomiting.          Past Medical History    Past Medical History:   Diagnosis Date    Aortic stenosis     Benign essential hypertension     Chronic heart failure with preserved ejection fraction (H) 03/23/2023    Chronic heart failure with preserved ejection fraction (HFpEF) (H)     Chronic kidney disease     CKD (chronic kidney disease) stage 3, GFR 30-59 ml/min (H)     Congestive heart failure (H)     History of CVA (cerebrovascular accident)     Mitral stenosis     Obesity     DIANA (obstructive sleep apnea) 03/23/2023    Pulmonary hypertension (H)     Stage 3a chronic kidney disease (H) 02/08/2023    Tricuspid valve disorders, non-rheumatic     Type 2 diabetes mellitus (H)        Past Surgical History   Past Surgical History:   Procedure Laterality Date    CV LEFT HEART CATH N/A 2/8/2023    Procedure: Left Heart Catheterization;  Surgeon: Yolanda Ramirez MD;  Location: [facility] CATH LAB CV    CV RIGHT HEART CATH MEASUREMENTS RECORDED N/A 2/8/2023    Procedure: Right Heart Catheterization;  Surgeon: Yolanda Ramirez MD;  Location: [facility] CATH LAB CV       Prior to Admission Medications   Prior to Admission Medications   Prescriptions Last Dose Informant Patient Reported? Taking?   QUEtiapine (SEROQUEL) 25 MG tablet 5/27/2025 Bedtime Nursing Home Yes Yes   Sig: Take 12.5 mg by mouth at bedtime.   QUEtiapine (SEROQUEL) 25 MG tablet  Nursing Home Yes Yes   Sig: Take 12.5 mg by mouth 2 times daily as needed (agitation).   amLODIPine (NORVASC) 5 MG tablet 5/27/2025 Morning Nursing Home No Yes   Sig: Take 1 tablet (5 mg) by mouth daily   aspirin 81 MG EC tablet 5/27/2025 Morning Nursing Home Yes Yes   Sig: Take 81 mg by mouth daily   atorvastatin (LIPITOR) 80 MG tablet 5/27/2025 Bedtime Nursing Home Yes Yes   Sig: Take 80 mg by mouth at bedtime.   bumetanide (BUMEX) 2 MG tablet 5/27/2025 Morning Nursing Home No Yes   Sig: Take 1 tablet (2 mg)  by mouth daily   busPIRone (BUSPAR) 7.5 MG tablet 2025 Evening Nursing Home Yes Yes   Sig: Take 5 mg by mouth 2 times daily.   cholecalciferol 25 MCG (1000 UT) TABS 2025 Morning Nursing Home Yes Yes   Sig: Take 1 tablet by mouth daily   clopidogrel (PLAVIX) 75 MG tablet 2025 Morning Nursing Home No Yes   Sig: Take 1 tablet (75 mg) by mouth daily   donepezil (ARICEPT) 10 MG tablet 2025 Bedtime Nursing Home No Yes   Sig: Take 1 tablet (10 mg) by mouth at bedtime   escitalopram (LEXAPRO) 20 MG tablet 2025 Morning Nursing Home Yes Yes   Sig: Take 20 mg by mouth daily   insulin glargine (LANTUS SOLOSTAR) 100 UNIT/ML pen 2025 Evening Nursing Home No Yes   Sig: Inject 10 Units Subcutaneous every morning   isosorbide mononitrate (IMDUR) 60 MG 24 hr tablet 2025 Evening Nursing Home No Yes   Sig: Take 1 tablet (60 mg) by mouth 2 times daily   loperamide (IMODIUM) 2 MG capsule 2025 Evening Nursing Home No Yes   Sig: Take 1 capsule (2 mg) by mouth 2 times daily as needed for diarrhea   loperamide (IMODIUM) 2 MG capsule  Nursing Home Yes Yes   Sig: Take 2 mg by mouth 2 times daily as needed for diarrhea.   menthol-zinc oxide (CALMOSEPTINE) 0.44-20.6 % OINT ointment 2025 Evening Nursing Home Yes Yes   Sig: Apply topically 2 times daily.   menthol-zinc oxide (CALMOSEPTINE) 0.44-20.6 % OINT ointment  Nursing Home Yes Yes   Sig: Apply topically 2 times daily as needed for skin protection.   metFORMIN (GLUCOPHAGE) 500 MG tablet 2025 Evening Nursing Home Yes Yes   Sig: Take 500 mg by mouth 2 times daily (with meals).   omeprazole (PRILOSEC) 20 MG DR capsule 2025 Morning Nursing Home Yes Yes   Si mg daily before breakfast   traZODone (DESYREL) 100 MG tablet 2025 Bedtime Nursing Home Yes Yes   Sig: Take 100 mg by mouth At Bedtime      Facility-Administered Medications: None        Review of Systems    Review of systems not obtained due to patient factors - mental status  and dementia    Social History   I have reviewed this patient's social history and updated it with pertinent information if needed.  Social History     Tobacco Use    Smoking status: Never    Smokeless tobacco: Never   Substance Use Topics    Alcohol use: Yes     Comment: 4-5 per week    Drug use: Never         Family History     Unable to obtain due to: dementia      Allergies   Allergies   Allergen Reactions    Lisinopril Other (See Comments) and Unknown        Physical Exam   Vital Signs: Temp: 98.1  F (36.7  C) Temp src: Oral BP: 125/60 Pulse: 96   Resp: 16 SpO2: 97 % O2 Device: None (Room air)    Weight: 192 lbs 0 oz    Alert awake confused, dehydrated  Vision Baseline  Neck supple  Oral mucosa moist  bilateral air entry heard,  S1-S2 normal  Abdomen is soft no tenderness  Extremities are fully mobile and there is no visible swelling noted  No skin cyanosis  Neurologically- no new Gross deficits from baseline  Psych-defered      Medical Decision Making       75 MINUTES SPENT BY ME on the date of service doing chart review, history, exam, documentation & further activities per the note.      Data     I have personally reviewed the following data over the past 24 hrs:    15.5 (H)  \   10.2 (L)   / 376     134 (L) 98 43.5 (H) /  173 (H)   3.8 19 (L) 3.27 (H) \     ALT: 7 AST: 18 AP: 186 (H) TBILI: 0.3   ALB: 3.4 (L) TOT PROTEIN: 7.1 LIPASE: 19     TSH: 1.62 T4: N/A A1C: N/A     Procal: N/A CRP: N/A Lactic Acid: 1.0         Imaging results reviewed over the past 24 hrs:   Recent Results (from the past 24 hours)   CT Abdomen Pelvis w/o Contrast    Narrative    EXAM: CT ABDOMEN PELVIS W/O CONTRAST  LOCATION: [facility]  DATE: 5/27/2025    INDICATION: abdominal pain  COMPARISON: None.  TECHNIQUE: CT scan of the abdomen and pelvis was performed without IV contrast. Multiplanar reformats were obtained. Dose reduction techniques were used.  CONTRAST: None.    FINDINGS:   LOWER CHEST: Coronary  artery and cardiac valvular atherosclerotic calcifications. Small sliding-type hiatal hernia.    HEPATOBILIARY: Unremarkable noncontrast CT appearance of the liver. Gallbladder wall calcifications, consistent with porcelain gallbladder.    PANCREAS: Normal.    SPLEEN: Normal.    ADRENAL GLANDS: Adrenal thickening.    KIDNEYS/BLADDER: No nephroureterolithiasis or hydronephrosis. Urinary bladder is mildly diffusely thick-walled, which may represent reactive thickening related to the process below.    BOWEL: Colonic diverticulosis. Sequela of prior perforated diverticulitis of the sigmoid colon with 2 focal areas of presumed contained perforation. The first more inferior collection of gas and fluid measures approximately 4.7 x 3.8 cm and demonstrates   likely fistulization with the left adnexa. A second predominantly gas containing contained perforation collection is noted more superiorly measuring approximately 3.4 x 2.8 cm. No evidence of disseminated free intraperitoneal gas otherwise. No bowel   obstruction.    LYMPH NODES: No lymphadenopathy.    VASCULATURE: Atherosclerotic calcifications of the aortoiliac vessels without evidence of aneurysmal dilatation.    PELVIC ORGANS: Calcified uterine leiomyomas.    MUSCULOSKELETAL: Tiny fat-containing right inguinal and umbilical hernias. Multilevel degenerative changes of the thoracic and lumbosacral spine. No acute osseous abnormality.      Impression    IMPRESSION:   1.  Colonic diverticulosis. Sequela of prior perforated diverticulitis of the sigmoid colon with 2 focal areas of presumed contained perforation. The first more inferior collection of gas and fluid measures approximately 4.7 x 3.8 cm and demonstrates   likely fistulization with the left adnexa. A second predominantly gas containing contained perforation collection is noted more superiorly measuring approximately 3.4 x 2.8 cm. No evidence of disseminated free intraperitoneal gas otherwise.   2.  Calcified  uterine leiomyomas.  3.  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

## 2025-05-28 NOTE — ED PROVIDER NOTES
"Emergency Department Midlevel Supervisory Note     I had a face to face encounter with this patient seen by the Advanced Practice Provider (RADHA). I personally made/approved the management plan and take responsibility for the patient management. I personally saw patient and performed a substantive portion of the visit including all aspects of the medical decision making.     ED Course:  10:39 PM Trinidad Freeman PA-C staffed patient with me. I agree with their assessment and plan of management, and I will see the patient.  10:45 I met with the patient to introduce myself, gather additional history, perform my initial exam, and discuss the plan.     Brief HPI:     Indu Felix is a 70 year old female who presents for evaluation of ambulatory issues. [facility] reports that reports stopping eating and drinking 5/27 evening and was unable to ambulate and get out of chair. Daughter reports this lack of ambulation as abnormal and reports that patient seems more confused. Patient denies abdominal pain, chest pain, shortness of breath, headache, vision changes, cough, fever, dysuria, nausea, vomiting, or recent falls. There were no other concerns/complaints at this time.     I, Aliza Berry, am serving as a scribe to document services personally performed by Fredi John MD, based on my observations and the provider's statements to me.   I, Fredi John MD attest that Aliza Berry was acting in a scribe capacity, has observed my performance of the services and has documented them in accordance with my direction.    Brief Physical Exam: /60   Pulse 96   Temp 98.1  F (36.7  C) (Oral)   Resp 16   Ht 1.6 m (5' 3\")   Wt 87.1 kg (192 lb)   SpO2 97%   BMI 34.01 kg/m    Constitutional:  Alert, in no acute distress  EYES: Conjunctivae clear  HENT:  Atraumatic  Respiratory:  Respirations even, unlabored, in no acute respiratory distress  Cardiovascular:  Regular rate and rhythm, good peripheral " perfusion  GI: Soft, mild lower abdominal tenderness  Musculoskeletal:  Moves all 4 extremities equally, grossly symmetrical strength  Integument: Warm & dry. No appreciable rash, erythema.  Neurologic:  Alert & oriented, speech clear and fluent, no focal deficits noted  Psych: Normal mood and affect       MDM:  70-year-old female presents for evaluation of difficulty with ambulation, feeling generally weak, difficulty bearing weight in lower extremities.  No falls, trauma, or other injuries reported.  ED workup ultimately returned with a white count of 15, CT abdomen pelvis showing signs of prior perforated diverticulitis with at least 2 focal areas of contained perforation with subsequent abscess formation which may be affecting her ability to get up and move comfortably and may also be contributing to her weakness.  Findings discussed with surgery, they recommended antibiotics and admission for likely drain placement.  Labs also showing moderate JEFFREY.  Will be admitted for further management.    1. Diverticulitis of colon with perforation        Labs and Imaging:  Results for orders placed or performed during the hospital encounter of 05/27/25   CT Abdomen Pelvis w/o Contrast    Impression    IMPRESSION:   1.  Colonic diverticulosis. Sequela of prior perforated diverticulitis of the sigmoid colon with 2 focal areas of presumed contained perforation. The first more inferior collection of gas and fluid measures approximately 4.7 x 3.8 cm and demonstrates   likely fistulization with the left adnexa. A second predominantly gas containing contained perforation collection is noted more superiorly measuring approximately 3.4 x 2.8 cm. No evidence of disseminated free intraperitoneal gas otherwise.   2.  Calcified uterine leiomyomas.  3.  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     Basic metabolic panel   Result Value Ref Range    Sodium 134 (L) 135 - 145 mmol/L    Potassium 3.8 3.4 - 5.3 mmol/L    Chloride  98 98 - 107 mmol/L    Carbon Dioxide (CO2) 19 (L) 22 - 29 mmol/L    Anion Gap 17 (H) 7 - 15 mmol/L    Urea Nitrogen 43.5 (H) 8.0 - 23.0 mg/dL    Creatinine 3.27 (H) 0.51 - 0.95 mg/dL    GFR Estimate 14 (L) >60 mL/min/1.73m2    Calcium 8.4 (L) 8.8 - 10.4 mg/dL    Glucose 173 (H) 70 - 99 mg/dL   Hepatic function panel   Result Value Ref Range    Protein Total 7.1 6.4 - 8.3 g/dL    Albumin 3.4 (L) 3.5 - 5.2 g/dL    Bilirubin Total 0.3 <=1.2 mg/dL    Alkaline Phosphatase 186 (H) 40 - 150 U/L    AST 18 0 - 45 U/L    ALT 7 0 - 50 U/L    Bilirubin Direct 0.11 0.00 - 0.30 mg/dL   Result Value Ref Range    Lipase 19 13 - 60 U/L   TSH with free T4 reflex   Result Value Ref Range    TSH 1.62 0.30 - 4.20 uIU/mL   CBC with platelets and differential   Result Value Ref Range    WBC Count 15.5 (H) 4.0 - 11.0 10e3/uL    RBC Count 3.74 (L) 3.80 - 5.20 10e6/uL    Hemoglobin 10.2 (L) 11.7 - 15.7 g/dL    Hematocrit 31.5 (L) 35.0 - 47.0 %    MCV 84 78 - 100 fL    MCH 27.3 26.5 - 33.0 pg    MCHC 32.4 31.5 - 36.5 g/dL    RDW 13.3 10.0 - 15.0 %    Platelet Count 376 150 - 450 10e3/uL   RBC and Platelet Morphology   Result Value Ref Range    RBC Morphology Confirmed RBC Indices     Platelet Assessment  Automated Count Confirmed. Platelet morphology is normal.     Automated Count Confirmed. Platelet morphology is normal.    Polychromasia Slight (A) None Seen   Manual Differential   Result Value Ref Range    % Neutrophils 75 %    % Lymphocytes 10 %    % Monocytes 12 %    % Eosinophils 3 %    % Basophils 0 %    Absolute Neutrophils 11.6 (H) 1.6 - 8.3 10e3/uL    Absolute Lymphocytes 1.6 0.8 - 5.3 10e3/uL    Absolute Monocytes 1.9 (H) 0.0 - 1.3 10e3/uL    Absolute Eosinophils 0.5 0.0 - 0.7 10e3/uL    Absolute Basophils 0.0 0.0 - 0.2 10e3/uL   Lactic Acid Whole Blood with 1X Repeat in 2 HR when >2   Result Value Ref Range    Lactic Acid, Initial 1.0 0.7 - 2.0 mmol/L   Extra Heparinized Syringe   Result Value Ref Range    Hold Specimen JIC         I have reviewed the relevant laboratory studies above.      Procedures:  I was present for the key portions of procedures documented in RADHA/midlevel note, see midlevel note for further details.    Fredi John MD  [facility] EMERGENCY DEPARTMENT  [address] [place] [id number] [phone]       Fredi John MD  05/27/25 [id number]

## 2025-05-29 ENCOUNTER — APPOINTMENT (OUTPATIENT)
Dept: OCCUPATIONAL THERAPY | Facility: HOSPITAL | Age: 70
End: 2025-05-29
Attending: NURSE PRACTITIONER
Payer: COMMERCIAL

## 2025-05-29 VITALS
HEIGHT: 63 IN | DIASTOLIC BLOOD PRESSURE: 64 MMHG | SYSTOLIC BLOOD PRESSURE: 140 MMHG | TEMPERATURE: 98.1 F | HEART RATE: 98 BPM | BODY MASS INDEX: 33.4 KG/M2 | OXYGEN SATURATION: 92 % | RESPIRATION RATE: 20 BRPM | WEIGHT: 188.5 LBS

## 2025-05-29 LAB
ANION GAP SERPL CALCULATED.3IONS-SCNC: 11 MMOL/L (ref 7–15)
BACTERIA SPEC CULT: NORMAL
BACTERIA SPEC CULT: NORMAL
BACTERIA UR CULT: ABNORMAL
BUN SERPL-MCNC: 29.5 MG/DL (ref 8–23)
CALCIUM SERPL-MCNC: 7.9 MG/DL (ref 8.8–10.4)
CHLORIDE SERPL-SCNC: 108 MMOL/L (ref 98–107)
CREAT SERPL-MCNC: 2.44 MG/DL (ref 0.51–0.95)
EGFRCR SERPLBLD CKD-EPI 2021: 21 ML/MIN/1.73M2
ERYTHROCYTE [DISTWIDTH] IN BLOOD BY AUTOMATED COUNT: 13.5 % (ref 10–15)
GLUCOSE BLDC GLUCOMTR-MCNC: 108 MG/DL (ref 70–99)
GLUCOSE BLDC GLUCOMTR-MCNC: 114 MG/DL (ref 70–99)
GLUCOSE BLDC GLUCOMTR-MCNC: 67 MG/DL (ref 70–99)
GLUCOSE BLDC GLUCOMTR-MCNC: 68 MG/DL (ref 70–99)
GLUCOSE BLDC GLUCOMTR-MCNC: 70 MG/DL (ref 70–99)
GLUCOSE BLDC GLUCOMTR-MCNC: 85 MG/DL (ref 70–99)
GLUCOSE SERPL-MCNC: 79 MG/DL (ref 70–99)
HCO3 SERPL-SCNC: 23 MMOL/L (ref 22–29)
HCT VFR BLD AUTO: 31.1 % (ref 35–47)
HGB BLD-MCNC: 9.6 G/DL (ref 11.7–15.7)
HOLD SPECIMEN: NORMAL
MAGNESIUM SERPL-MCNC: 1 MG/DL (ref 1.7–2.3)
MCH RBC QN AUTO: 27.2 PG (ref 26.5–33)
MCHC RBC AUTO-ENTMCNC: 30.9 G/DL (ref 31.5–36.5)
MCV RBC AUTO: 88 FL (ref 78–100)
PLATELET # BLD AUTO: 355 10E3/UL (ref 150–450)
POTASSIUM SERPL-SCNC: 3.8 MMOL/L (ref 3.4–5.3)
RBC # BLD AUTO: 3.53 10E6/UL (ref 3.8–5.2)
SODIUM SERPL-SCNC: 142 MMOL/L (ref 135–145)
WBC # BLD AUTO: 17 10E3/UL (ref 4–11)

## 2025-05-29 PROCEDURE — 80048 BASIC METABOLIC PNL TOTAL CA: CPT | Performed by: INTERNAL MEDICINE

## 2025-05-29 PROCEDURE — 250N000011 HC RX IP 250 OP 636: Mod: JZ | Performed by: HOSPITALIST

## 2025-05-29 PROCEDURE — 80051 ELECTROLYTE PANEL: CPT | Performed by: INTERNAL MEDICINE

## 2025-05-29 PROCEDURE — 83735 ASSAY OF MAGNESIUM: CPT | Performed by: INTERNAL MEDICINE

## 2025-05-29 PROCEDURE — 258N000001 HC RX 258: Performed by: HOSPITALIST

## 2025-05-29 PROCEDURE — 36415 COLL VENOUS BLD VENIPUNCTURE: CPT | Performed by: INTERNAL MEDICINE

## 2025-05-29 PROCEDURE — 99207 PR APP CREDIT; MD BILLING SHARED VISIT: CPT | Performed by: NURSE PRACTITIONER

## 2025-05-29 PROCEDURE — 97535 SELF CARE MNGMENT TRAINING: CPT | Mod: GO

## 2025-05-29 PROCEDURE — 97165 OT EVAL LOW COMPLEX 30 MIN: CPT | Mod: GO

## 2025-05-29 PROCEDURE — 250N000011 HC RX IP 250 OP 636: Performed by: STUDENT IN AN ORGANIZED HEALTH CARE EDUCATION/TRAINING PROGRAM

## 2025-05-29 PROCEDURE — 258N000003 HC RX IP 258 OP 636: Performed by: INTERNAL MEDICINE

## 2025-05-29 PROCEDURE — 258N000003 HC RX IP 258 OP 636: Performed by: HOSPITALIST

## 2025-05-29 PROCEDURE — 250N000013 HC RX MED GY IP 250 OP 250 PS 637: Performed by: HOSPITALIST

## 2025-05-29 PROCEDURE — 120N000001 HC R&B MED SURG/OB

## 2025-05-29 PROCEDURE — 99232 SBSQ HOSP IP/OBS MODERATE 35: CPT | Performed by: INTERNAL MEDICINE

## 2025-05-29 PROCEDURE — 85018 HEMOGLOBIN: CPT | Performed by: NURSE PRACTITIONER

## 2025-05-29 RX ORDER — MAGNESIUM SULFATE 4 G/50ML
4 INJECTION INTRAVENOUS ONCE
Status: COMPLETED | OUTPATIENT
Start: 2025-05-29 | End: 2025-05-29

## 2025-05-29 RX ADMIN — HEPARIN SODIUM 5000 UNITS: 5000 INJECTION, SOLUTION INTRAVENOUS; SUBCUTANEOUS at 08:06

## 2025-05-29 RX ADMIN — BUSPIRONE HYDROCHLORIDE 7.5 MG: 7.5 TABLET ORAL at 08:06

## 2025-05-29 RX ADMIN — AMLODIPINE BESYLATE 5 MG: 5 TABLET ORAL at 08:06

## 2025-05-29 RX ADMIN — Medication 25 MCG: at 08:06

## 2025-05-29 RX ADMIN — ACETAMINOPHEN 650 MG: 325 TABLET ORAL at 19:06

## 2025-05-29 RX ADMIN — BUSPIRONE HYDROCHLORIDE 7.5 MG: 7.5 TABLET ORAL at 19:57

## 2025-05-29 RX ADMIN — TRAZODONE HYDROCHLORIDE 100 MG: 50 TABLET ORAL at 21:36

## 2025-05-29 RX ADMIN — SODIUM CHLORIDE: 9 INJECTION, SOLUTION INTRAVENOUS at 06:46

## 2025-05-29 RX ADMIN — ISOSORBIDE MONONITRATE 60 MG: 30 TABLET, EXTENDED RELEASE ORAL at 19:57

## 2025-05-29 RX ADMIN — HYDRALAZINE HYDROCHLORIDE 10 MG: 20 INJECTION INTRAMUSCULAR; INTRAVENOUS at 08:23

## 2025-05-29 RX ADMIN — DEXTROSE MONOHYDRATE 25 ML: 25 INJECTION, SOLUTION INTRAVENOUS at 12:22

## 2025-05-29 RX ADMIN — METRONIDAZOLE 500 MG: 500 INJECTION, SOLUTION INTRAVENOUS at 00:58

## 2025-05-29 RX ADMIN — QUETIAPINE FUMARATE 12.5 MG: 25 TABLET ORAL at 21:36

## 2025-05-29 RX ADMIN — CEFTRIAXONE SODIUM 2 G: 2 INJECTION, POWDER, FOR SOLUTION INTRAMUSCULAR; INTRAVENOUS at 00:03

## 2025-05-29 RX ADMIN — PANTOPRAZOLE SODIUM 40 MG: 40 TABLET, DELAYED RELEASE ORAL at 08:06

## 2025-05-29 RX ADMIN — METRONIDAZOLE 500 MG: 500 INJECTION, SOLUTION INTRAVENOUS at 12:23

## 2025-05-29 RX ADMIN — HEPARIN SODIUM 5000 UNITS: 5000 INJECTION, SOLUTION INTRAVENOUS; SUBCUTANEOUS at 00:06

## 2025-05-29 RX ADMIN — ISOSORBIDE MONONITRATE 60 MG: 30 TABLET, EXTENDED RELEASE ORAL at 08:06

## 2025-05-29 RX ADMIN — CEFTRIAXONE SODIUM 2 G: 2 INJECTION, POWDER, FOR SOLUTION INTRAMUSCULAR; INTRAVENOUS at 22:15

## 2025-05-29 RX ADMIN — ATORVASTATIN CALCIUM 80 MG: 40 TABLET, FILM COATED ORAL at 21:36

## 2025-05-29 RX ADMIN — DEXTROSE MONOHYDRATE 25 ML: 25 INJECTION, SOLUTION INTRAVENOUS at 21:04

## 2025-05-29 RX ADMIN — HEPARIN SODIUM 5000 UNITS: 5000 INJECTION, SOLUTION INTRAVENOUS; SUBCUTANEOUS at 15:41

## 2025-05-29 RX ADMIN — SODIUM CHLORIDE: 9 INJECTION, SOLUTION INTRAVENOUS at 17:29

## 2025-05-29 RX ADMIN — HEPARIN SODIUM 5000 UNITS: 5000 INJECTION, SOLUTION INTRAVENOUS; SUBCUTANEOUS at 22:15

## 2025-05-29 RX ADMIN — MAGNESIUM SULFATE HEPTAHYDRATE 4 G: 80 INJECTION, SOLUTION INTRAVENOUS at 17:28

## 2025-05-29 RX ADMIN — DONEPEZIL HYDROCHLORIDE 10 MG: 5 TABLET ORAL at 21:36

## 2025-05-29 RX ADMIN — ESCITALOPRAM OXALATE 20 MG: 20 TABLET ORAL at 08:06

## 2025-05-29 ASSESSMENT — ACTIVITIES OF DAILY LIVING (ADL)
ADLS_ACUITY_SCORE: 51
ADLS_ACUITY_SCORE: 64
ADLS_ACUITY_SCORE: 64
ADLS_ACUITY_SCORE: 51
ADLS_ACUITY_SCORE: 51
ADLS_ACUITY_SCORE: 64
ADLS_ACUITY_SCORE: 51
ADLS_ACUITY_SCORE: 64
ADLS_ACUITY_SCORE: 51
ADLS_ACUITY_SCORE: 51
ADLS_ACUITY_SCORE: 64
ADLS_ACUITY_SCORE: 64
ADLS_ACUITY_SCORE: 51
ADLS_ACUITY_SCORE: 64
ADLS_ACUITY_SCORE: 51
ADLS_ACUITY_SCORE: 64
ADLS_ACUITY_SCORE: 64
ADLS_ACUITY_SCORE: 62
ADLS_ACUITY_SCORE: 51

## 2025-05-29 NOTE — CONSULTS
RENAL CONSULTATION:     Date of Consultation:  5/29/2025    Requesting Physician: Dr. Fuentes  Reason for Consult: JEFFREY on CKD    Assessment/ Recommendations:  71 yo female with DM2, HTN, pulm HTN, diastolic heart failure, CVA, demenita, perforated diverticulitis, CKD 4 with baseline creat 1.8 admit with sepsis 2/2 diverticulitis with microperf and pelvic abscess.  Was eval by IR for possible drainage but abscess too small.  Noted to have JEFFREY with creat on admit 3.3. Normotensive, no IV contrast exposures, was on bumex and metformin PTA.     JEFFREY on CKD 4: BL Cr ~1.8s. Admitted with Cr 3.27. Improving with IVF and holding home metformin and bumex. UA infected. Kidneys normal on CT.   -Cr improved to 2.44 today   -cont to renally dose meds, hold metformin and bumex  -cont IVF with NS at 100 ml/hr while NPO  -Daily labs  -Strict IS/Os  -Daily weights   -Avoid nephrotoxins  -Will need follow-up arranged prior to discharge     HTN with CKD and HFpEF: BP 180s/70s this AM, now improved to 120s-130/60.  -Continue amlodipine and PRN hydral    -Hold home bumex     Electrolytes/acid base: Na, K, bicarb WNL today. Mag 1, do not see that it was repleted.   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 On cholecalciferol.   -Check Phos    Anemia: Hgb 9s, stable.   -Transfuse per primary     Ana Selby MD  [facility]  [phone] (off)    History of present illness: 71 yo female with DM2, HTN, pulm HTN, diastolic heart failure, CVA, demenita, perforated diverticulitis, CKD 4 with baseline creat 1.8 admit with sepsis 2/2 diverticulitis with microperf and pelvic abscess.  Was eval by IR for possible drainage but abscess too small.  Noted to have JEFFREY with creat on admit 3.3. Normotensive, no IV contrast exposures, was on bumex and metformin PTA. History limited due to patient's mental status. She says she is doing fine. Denies abdominal pain, N/V/D. Says she doesn't know if she is  urinating. No JIMY. Still NPO.     Past Medical History:   Diagnosis Date    Aortic stenosis     Benign essential hypertension     Chronic heart failure with preserved ejection fraction (H) 03/23/2023    Chronic heart failure with preserved ejection fraction (HFpEF) (H)     Chronic kidney disease     CKD (chronic kidney disease) stage 3, GFR 30-59 ml/min (H)     Congestive heart failure (H)     History of CVA (cerebrovascular accident)     Mitral stenosis     Obesity     DIANA (obstructive sleep apnea) 03/23/2023    Pulmonary hypertension (H)     Stage 3a chronic kidney disease (H) 02/08/2023    Tricuspid valve disorders, non-rheumatic     Type 2 diabetes mellitus (H)        Current Facility-Administered Medications:     acetaminophen (TYLENOL) tablet 650 mg, 650 mg, Oral, Q4H PRN, 650 mg at 05/28/25 2140 **OR** acetaminophen (TYLENOL) Suppository 650 mg, 650 mg, Rectal, Q4H PRN, Oscar Fuentes MD    amLODIPine (NORVASC) tablet 5 mg, 5 mg, Oral, Daily, Oscar Fuentes MD, 5 mg at 05/29/25 0806    atorvastatin (LIPITOR) tablet 80 mg, 80 mg, Oral, At Bedtime, Oscar Fuentes MD, 80 mg at 05/28/25 2140    benzocaine-menthol (CHLORASEPTIC) 6-10 MG lozenge 1 lozenge, 1 lozenge, Buccal, Q1H PRN, Oscar Fuentes MD    busPIRone (BUSPAR) tablet 7.5 mg, 7.5 mg, Oral, BID, Oscar Fuentes MD, 7.5 mg at 05/29/25 0806    calcium carbonate (TUMS) chewable tablet 1,000 mg, 1,000 mg, Oral, 4x Daily PRN, Oscar Fuentes MD    cefTRIAXone (ROCEPHIN) 2 g vial to attach to  ml bag for ADULTS or NS 50 ml bag for PEDS, 2 g, Intravenous, Q24H, Oscar Fuentes MD, Last Rate: 0 mL/hr at 05/28/25 0038, 2 g at 05/29/25 0003    glucose gel 15-30 g, 15-30 g, Oral, Q15 Min PRN **OR** dextrose 50 % injection 25-50 mL, 25-50 mL, Intravenous, Q15 Min PRN, 50 mL at 05/29/25 1222 **OR** glucagon injection 1 mg, 1 mg, Subcutaneous, Q15 Min PRN, Oscar Fuentes MD    donepezil (ARICEPT) tablet 10 mg, 10 mg, Oral, At Bedtime, Oscar Fuentes MD, 10  mg at 05/28/25 2141    escitalopram (LEXAPRO) tablet 20 mg, 20 mg, Oral, Daily, Oscar Fuentes MD, 20 mg at 05/29/25 0806    heparin ANTICOAGULANT injection 5,000 Units, 5,000 Units, Subcutaneous, Q8H, Oscar Fuentes MD, 5,000 Units at 05/29/25 0806    hydrALAZINE (APRESOLINE) tablet 10 mg, 10 mg, Oral, Q4H PRN **OR** hydrALAZINE (APRESOLINE) injection 10 mg, 10 mg, Intravenous, Q4H PRN, Oscar Fuentes MD, 10 mg at 05/29/25 0823    HYDROmorphone (DILAUDID) injection 0.2 mg, 0.2 mg, Intravenous, Q2H PRN, Oscar Fuentes MD    HYDROmorphone (DILAUDID) injection 0.4 mg, 0.4 mg, Intravenous, Q2H PRN, Oscar Fuentes MD    insulin aspart (NovoLOG) injection (RAPID ACTING), 1-7 Units, Subcutaneous, TID AC, Oscar Fuentes MD    insulin aspart (NovoLOG) injection (RAPID ACTING), 1-5 Units, Subcutaneous, At Bedtime, Oscar Fuentes MD    insulin glargine (LANTUS PEN) injection 10 Units, 10 Units, Subcutaneous, QPM, Oscar Fuentes MD, 10 Units at 05/28/25 2140    isosorbide mononitrate (IMDUR) 24 hr tablet 60 mg, 60 mg, Oral, BID, Oscar Fuentes MD, 60 mg at 05/29/25 0806    lidocaine (LMX4) cream, , Topical, Q1H PRN, Oscar Fuentes MD    lidocaine 1 % 0.1-1 mL, 0.1-1 mL, Other, Q1H PRN, Oscar Fuentes MD    loperamide (IMODIUM) capsule 2 mg, 2 mg, Oral, BID PRN, Oscar Fuentes MD    melatonin tablet 1 mg, 1 mg, Oral, At Bedtime PRN, Oscar Fuentes MD, 1 mg at 05/28/25 2141    metroNIDAZOLE (FLAGYL) infusion 500 mg, 500 mg, Intravenous, Q12H, Oscar Fuentes MD, Last Rate: 0 mL/hr at 05/28/25 0232, 500 mg at 05/29/25 1223    naloxone (NARCAN) injection 0.2 mg, 0.2 mg, Intravenous, Q2 Min PRN **OR** naloxone (NARCAN) injection 0.4 mg, 0.4 mg, Intravenous, Q2 Min PRN **OR** naloxone (NARCAN) injection 0.2 mg, 0.2 mg, Intramuscular, Q2 Min PRN **OR** naloxone (NARCAN) injection 0.4 mg, 0.4 mg, Intramuscular, Q2 Min PRN, Pascual Mcdonald MD    ondansetron (ZOFRAN ODT) ODT tab 4 mg, 4 mg, Oral, Q6H PRN **OR**  "ondansetron (ZOFRAN) injection 4 mg, 4 mg, Intravenous, Q6H PRN, Oscar Fuentes MD    oxyCODONE (ROXICODONE) tablet 5 mg, 5 mg, Oral, Q4H PRN, Oscar Fuentes MD    oxyCODONE IR (ROXICODONE) half-tab 2.5 mg, 2.5 mg, Oral, Q4H PRN, Oscar Fuentes MD    pantoprazole (PROTONIX) EC tablet 40 mg, 40 mg, Oral, QAM AC, Oscar Fuentes MD, 40 mg at 05/29/25 0806    QUEtiapine (SEROquel) half-tab 12.5 mg, 12.5 mg, Oral, BID PRN, Oscar Fuentes MD, 12.5 mg at 05/28/25 0000    QUEtiapine (SEROquel) half-tab 12.5 mg, 12.5 mg, Oral, At Bedtime, Oscar Fuentes MD, 12.5 mg at 05/28/25 2141    sodium chloride (PF) 0.9% PF flush 3 mL, 3 mL, Intracatheter, Q8H ISAI, Oscar Fuentes MD, 3 mL at 05/28/25 1956    sodium chloride (PF) 0.9% PF flush 3 mL, 3 mL, Intracatheter, q1 min prn, Oscar Fuentes MD    sodium chloride 0.9 % infusion, , Intravenous, Continuous, Terri Rankin MD, Last Rate: 100 mL/hr at 05/29/25 0646, New Bag at 05/29/25 0646    traZODone (DESYREL) tablet 100 mg, 100 mg, Oral, At Bedtime, Oscar Fuentes MD, 100 mg at 05/28/25 2141    Vitamin D3 (CHOLECALCIFEROL) tablet 25 mcg, 25 mcg, Oral, Daily, Oscar Fuentes MD, 25 mcg at 05/29/25 0806    Allergies   Allergen Reactions    Lisinopril Other (See Comments) and Unknown     Social History     Socioeconomic History    Marital status:    Tobacco Use    Smoking status: Never    Smokeless tobacco: Never   Substance and Sexual Activity    Alcohol use: Yes     Comment: 4-5 per week    Drug use: Never    Sexual activity: Not Currently     Social Drivers of Health     Interpersonal Safety: Not At Risk (6/11/2023)    Received from [facility]     IP Custom IPV     Do you feel UNSAFE in any of your personal relationships with your family members or any other acquaintances?: No     No family history on file.    BP (!) 186/74 (BP Location: Left arm)   Pulse 97   Temp 99.3  F (37.4  C) (Oral)   Resp 20   Ht 1.6 m (5' 3\")  "  Wt 85.5 kg (188 lb 8 oz)   SpO2 (!) 90%   BMI 33.39 kg/m      Intake/Output Summary (Last 24 hours) at 5/29/2025 1245  Last data filed at 5/29/2025 0646  Gross per 24 hour   Intake 1203 ml   Output 1050 ml   Net 153 ml     Physical Exam:   GENERAL: calm and comfortable, alert, sitting up in bed   EYES: No scleral icterus, conjunctiva clear  ENT: Hearing normal, Oral mucosa dry  RESP: On 2L NC, no respiratory distress, normal effort.  CV: RR, no leg edema.    GI: Soft, NT/ND, no masses or HSM  : Holley in place with small amount of yellow UOP   Musculoskeletal: Normal muscle bulk/ tone; No gross joint abnormalities  SKIN: No rash, warm/ dry  PSYCH: Flat/restricted affect, poor insight     LABS:  Recent Labs   Lab 05/29/25 0530 05/28/25 0703 05/27/25 2050    141 134*   POTASSIUM 3.8 3.5 3.8   CHLORIDE 108* 107 98   CO2 23 20* 19*   BUN 29.5* 36.5* 43.5*   CR 2.44* 2.80* 3.27*   GFRESTIMATED 21* 17* 14*   JARETT 7.9* 7.7* 8.4*   MAG 1.0*  --   --    ALBUMIN  --   --  3.4*     Recent Labs   Lab 05/29/25 0530 05/28/25 0703 05/27/25 2050   WBC 17.0* 13.9* 15.5*   HGB 9.6* 9.2* 10.2*   HCT 31.1* 29.4* 31.5*   MCV 88 86 84    340 376     All lab data was reviewed at 12:45 PM.

## 2025-05-29 NOTE — PLAN OF CARE
"Goal Outcome Evaluation:      Plan of Care Reviewed With: patient    Overall Patient Progress: improvingOverall Patient Progress: improving    Outcome Evaluation: Pt with intermittent confusion. Poor historian. Denied pain. IVF's and antibiotics running per order. Tele: NSR. Slept throughout the night. Holley draining yellow urine. Pt forgets that she is NPO. Transfers with an assist of one.    BP (!) 163/72 (BP Location: Left arm)   Pulse 82   Temp 98.9  F (37.2  C) (Oral)   Resp 20   Ht 1.6 m (5' 3\")   Wt 85.5 kg (188 lb 8 oz)   SpO2 94%   BMI 33.39 kg/m      Problem: Comorbidity Management  Goal: Blood Glucose Levels Within Targeted Range  Outcome: Progressing  Intervention: Monitor and Manage Glycemia  Recent Flowsheet Documentation  Taken 5/28/2025 1955 by Lorie Yun RN  Medication Review/Management: medications reviewed     Problem: Delirium  Goal: Improved Sleep  Outcome: Progressing  Intervention: Promote Sleep  Recent Flowsheet Documentation  Taken 5/28/2025 1955 by Lorie Yun RN  Sleep/Rest Enhancement: awakenings minimized     Problem: Adult Inpatient Plan of Care  Goal: Optimal Comfort and Wellbeing  Outcome: Progressing  Intervention: Provide Person-Centered Care  Recent Flowsheet Documentation  Taken 5/28/2025 1955 by Lorie Yun RN  Trust Relationship/Rapport:   care explained   emotional support provided     Problem: Adult Inpatient Plan of Care  Goal: Absence of Hospital-Acquired Illness or Injury  Intervention: Prevent Infection  Recent Flowsheet Documentation  Taken 5/28/2025 1955 by Lorie Yun RN  Infection Prevention: hand hygiene promoted         "

## 2025-05-29 NOTE — PROGRESS NOTES
[facility]    Medicine Progress Note - Hospitalist Service    Date of Admission:  5/27/2025    Assessment & Plan   Indu Felix is a 70-year-old female with history of hypertension, type 2 diabetes mellitus (HbA1c 6.3% on 12/6/23), nonrheumatic mitral stenosis, pulmonary hypertension, chronic diastolic CHF, DIANA on CPAP, prior left parieto-occipital CVA (6/11/23), vascular dementia, chronic kidney disease stage 4, anemia of chronic disease, diverticulitis with perforation, porcelain gallbladder, and depression in remission, admitted on 5/27/2025 from her memory care facility with confusion, poor oral intake, generalized weakness, worsening leukocytosis, JEFFREY on stage III CKD, and suspected diverticulitis with microperforation and possible abscess.     General Surgery service recommends percutaneous drainage of pelvic abscess by IR.  Percutaneous drainage was deferred due by IR due to small abscess size. Daughter Solange is POA due to patient's lack of decision making capacity.    Severe Sepsis with JEFFREY due to Diverticulitis with Contained Perforation  Suspected diverticulitis  Microperforation  Possible pelvic abscess.     Continue IV ceftriaxone and metronidazole.  IVF.  Strict NPO.  Percutaneous drainage was deferred due by IR due to small abscess size.  Follow-up blood culture  Monitor urine output  Surgery following    Acute Kidney Injury on CKD Stage 4. Prerenal vs. sepsis-related. S/p IVF.  Monitor BMP. Nephrology consult requested.     Chronic Heart Failure with Preserved Ejection Fraction (HFpEF)  Baseline diastolic dysfunction  Hold diuretics for now  Not decompensated  Hold PTA bumetanide 2 mg daily  Monitor fluid balance closely to avoid volume overload    Hypertension  Amlodipine 5 mg daily    Type 2 Diabetes Mellitus  Well-controlled (HbA1c 6.3%)  Hold metformin during JEFFREY  Continue insulin sliding scale   Monitor for hypoglycemia per protocol    DIANA on CPAPCPAP at night    History  "of CVA and Vascular Dementia.  On atorvastatin.  Aspirin clopidogrel  held in anticipation of possible surgical intervention.    Care decision/goals of care. Patient lacks decision-making capacity; daughter Solange is POA    Depression.  On Lexapro, trazodone, BuSpar.     Dementia.  On Seroquel and donepezil.    Diet: NPO for Medical/Clinical Reasons Except for: Meds, Ice Chips    DVT Prophylaxis: Heparin SQ  Holley Catheter: PRESENT, indication: Acute retention or obstruction  Lines: None     Cardiac Monitoring: None  Code Status: Full Code      Clinically Significant Risk Factors      # Hyponatremia: Lowest Na = 134 mmol/L in last 2 days, will monitor as appropriate  # Hyperchloremia: Highest Cl = 108 mmol/L in last 2 days, will monitor as appropriate  # Hypocalcemia: Lowest Ca = 7.7 mg/dL in last 2 days, will monitor and replace as appropriate   # Hypomagnesemia: Lowest Mg = 1 mg/dL in last 2 days, will replace as needed   # Hypoalbuminemia: Lowest albumin = 3.4 g/dL at 5/27/2025  8:50 PM, will monitor as appropriate     # Hypertension: Noted on problem list  # Chronic heart failure with preserved ejection fraction: heart failure noted on problem list and last echo with EF >50%    # Dementia: noted on problem list   # Obesity: Estimated body mass index is 33.39 kg/m  as calculated from the following:    Height as of this encounter: 1.6 m (5' 3\").    Weight as of this encounter: 85.5 kg (188 lb 8 oz)., PRESENT ON ADMISSION     # Financial/Environmental Concerns:        Social Drivers of Health    Alcohol Use: Unknown (4/6/2020)    Received from [facility]     AUDIT-C     Frequency of Alcohol Consumption: Monthly or less   Interpersonal Safety: Unknown (5/29/2025)    Interpersonal Safety     Do you feel physically and emotionally safe where you currently live?: Patient unable to answer     Within the past 12 months, have you been hit, slapped, kicked or otherwise physically hurt by someone?: Patient unable " to answer     Within the past 12 months, have you been humiliated or emotionally abused in other ways by your partner or ex-partner?: Patient unable to answer      Disposition Plan     Medically Ready for Discharge: Anticipated in 2-4 Days    Terri Rankin MD  Hospitalist Service  [facility]  Securely message with [employer] (more info)  Text page via [employer] Paging/Directory   ______________________________________________________________________    Interval History   Patient is awake, alert, pleasant and cooperative but profoundly forgetful.  She denies any pain at this time    Physical Exam   Vital Signs: Temp: 99.9  F (37.7  C) Temp src: Oral BP: 130/60 Pulse: 91   Resp: 20 SpO2: (!) 91 % O2 Device: Nasal cannula Oxygen Delivery: 2 LPM  Weight: 188 lbs 8 oz    General appearance: Awake, Alert, Cooperative, not in any obvious distress and appears stated age   HEENT: Normocephalic, atraumatic, conjunctiva clear without icterus and ears without discharge  Lungs: Clear to auscultation bilaterally, no wheezing, good air exchange, normal work of breathing  Cardiovascular: Regular Rate and Rythm, normal apical impulse, normal S1 and S2, no lower extremity edema bilaterally  Abdomen: Soft with right lower quadrant tenderness, active bowel sounds  Skin: Skin color, texture normal and bruising or bleeding. No rashes or lesions over face, neck, arms and legs, turgor normal.  Musculoskeletal: No bony deformities or joint tenderness. Normal ROM upon flexion & extension.   Neurologic: Alert & Oriented X 3, Facial symmetry preserved and upper & lower extremities moving well with symmetry  Psychiatric: Unable to assess    Medical Decision Making       45 MINUTES SPENT BY ME on the date of service doing chart review, history, exam, documentation & further activities per the note.      Data     I have personally reviewed the following data over the past 24 hrs:    17.0 (H)  \   9.6 (L)   / 355     142 108  (H) 29.5 (H) /  108 (H)   3.8 23 2.44 (H) \       Imaging results reviewed over the past 24 hrs:   No results found for this or any previous visit (from the past 24 hours).

## 2025-05-29 NOTE — PROGRESS NOTES
General Surgery Progress Note:    Hospital Day # 2    ASSESSMENT:   1. Diverticulitis of colon with perforation        Indu Felix is a 70 year old female admitted with diverticulitis with microperforation.  Dimensional radiology consulted for small abscess but states this is not amendable to drainage.  Leukocytosis increasing raising concern for an adequate source control of infection.  If white count does not stabilize or improve tomorrow, will repeat CT of abdomen to assess for enlarging abscess or increased perforation    PLAN:   N.p.o. except ice chips and sips of water meds  Continue IV antibiotics  Repeat CBC in a.m.  Repeat CT tomorrow if leukocytosis persists      SUBJECTIVE:   Indu Felix is very confused and wants to leave the hospital.  Patient reports she feels fine.  Patient states she feels like she has to have a bowel movement.  Patient states that she is hungry and wants to eat or drink.  Daughter-in-law is in the room with patient and continues to reorient patient regularly.    Patient Vitals for the past 24 hrs:   BP Temp Temp src Pulse Resp SpO2   05/29/25 0744 (!) 186/74 99.3  F (37.4  C) Oral 97 20 (!) 90 %   05/28/25 2327 (!) 163/72 98.9  F (37.2  C) Oral 82 20 94 %   05/28/25 1955 -- -- -- 80 18 --   05/28/25 1945 (!) 189/75 99.2  F (37.3  C) Oral 89 20 97 %   05/28/25 1513 (!) 157/71 98.1  F (36.7  C) Oral 92 20 95 %       Physical Exam:  General: NAD, pleasant  CV:RRR  LUNGS:CTA bilaterally  ABD: Soft, exquisitely tender in the left lower quadrant in the suprapubic region; no involuntary guarding or rebound tenderness  EXT:no CCE    Admission on 05/27/2025   Component Date Value    Color Urine 05/27/2025 Light Yellow     Appearance Urine 05/27/2025 Clear     Glucose Urine 05/27/2025 Negative     Bilirubin Urine 05/27/2025 Negative     Ketones Urine 05/27/2025 Negative     Specific Gravity Urine 05/27/2025 1.010     Blood Urine 05/27/2025 Negative     pH Urine 05/27/2025 5.0     Protein  Albumin Urine 05/27/2025 Negative     Urobilinogen Urine 05/27/2025 Normal     Nitrite Urine 05/27/2025 Negative     Leukocyte Esterase Urine 05/27/2025 500 [place]ney/uL (A)     Bacteria Urine 05/27/2025 Many (A)     Mucus Urine 05/27/2025 Present (A)     RBC Urine 05/27/2025 1     WBC Urine 05/27/2025 29 (H)     Squamous Epithelials Uri* 05/27/2025 1     Hyaline Casts Urine 05/27/2025 12 (H)     Sodium 05/27/2025 134 (L)     Potassium 05/27/2025 3.8     Chloride 05/27/2025 98     Carbon Dioxide (CO2) 05/27/2025 19 (L)     Anion Gap 05/27/2025 17 (H)     Urea Nitrogen 05/27/2025 43.5 (H)     Creatinine 05/27/2025 3.27 (H)     GFR Estimate 05/27/2025 14 (L)     Calcium 05/27/2025 8.4 (L)     Glucose 05/27/2025 173 (H)     Protein Total 05/27/2025 7.1     Albumin 05/27/2025 3.4 (L)     Bilirubin Total 05/27/2025 0.3     Alkaline Phosphatase 05/27/2025 186 (H)     AST 05/27/2025 18     ALT 05/27/2025 7     Bilirubin Direct 05/27/2025 0.11     Lipase 05/27/2025 19     TSH 05/27/2025 1.62     WBC Count 05/27/2025 15.5 (H)     RBC Count 05/27/2025 3.74 (L)     Hemoglobin 05/27/2025 10.2 (L)     Hematocrit 05/27/2025 31.5 (L)     MCV 05/27/2025 84     MCH 05/27/2025 27.3     MCHC 05/27/2025 32.4     RDW 05/27/2025 13.3     Platelet Count 05/27/2025 376     RBC Morphology 05/27/2025 Confirmed RBC Indices     Platelet Assessment 05/27/2025 Automated Count Confirmed. Platelet morphology is normal.     Polychromasia 05/27/2025 Slight (A)     % Neutrophils 05/27/2025 75     % Lymphocytes 05/27/2025 10     % Monocytes 05/27/2025 12     % Eosinophils 05/27/2025 3     % Basophils 05/27/2025 0     Absolute Neutrophils 05/27/2025 11.6 (H)     Absolute Lymphocytes 05/27/2025 1.6     Absolute Monocytes 05/27/2025 1.9 (H)     Absolute Eosinophils 05/27/2025 0.5     Absolute Basophils 05/27/2025 0.0     Culture 05/27/2025 No growth after 1 day     Culture 05/27/2025 No growth after 1 day     Lactic Acid, Initial 05/27/2025 1.0     Hold  Specimen 05/27/2025 JI     Hold Specimen 05/27/2025 JIC     Hold Specimen 05/27/2025 [facility]     Creatinine Urine mg/dL 05/27/2025 70.4     Sodium Urine mmol/L 05/27/2025 75     %FENA 05/27/2025 2.6     Sodium 05/28/2025 141     Potassium 05/28/2025 3.5     Chloride 05/28/2025 107     Carbon Dioxide (CO2) 05/28/2025 20 (L)     Anion Gap 05/28/2025 14     Urea Nitrogen 05/28/2025 36.5 (H)     Creatinine 05/28/2025 2.80 (H)     GFR Estimate 05/28/2025 17 (L)     Calcium 05/28/2025 7.7 (L)     Glucose 05/28/2025 77     WBC Count 05/28/2025 13.9 (H)     RBC Count 05/28/2025 3.43 (L)     Hemoglobin 05/28/2025 9.2 (L)     Hematocrit 05/28/2025 29.4 (L)     MCV 05/28/2025 86     MCH 05/28/2025 26.8     MCHC 05/28/2025 31.3 (L)     RDW 05/28/2025 13.2     Platelet Count 05/28/2025 340     Culture 05/27/2025 >100,000 CFU/mL Klebsiella pneumoniae (A)     Ammonia 05/28/2025 12     Vitamin B12 05/28/2025 854     Folic Acid 05/28/2025 7.2     Hold Specimen 05/28/2025 [facility]     GLUCOSE BY METER POCT 05/28/2025 73     GLUCOSE BY METER POCT 05/28/2025 97     GLUCOSE BY METER POCT 05/28/2025 90     GLUCOSE BY METER POCT 05/28/2025 106 (H)     Sodium 05/29/2025 142     Potassium 05/29/2025 3.8     Chloride 05/29/2025 108 (H)     Carbon Dioxide (CO2) 05/29/2025 23     Anion Gap 05/29/2025 11     Urea Nitrogen 05/29/2025 29.5 (H)     Creatinine 05/29/2025 2.44 (H)     GFR Estimate 05/29/2025 21 (L)     Calcium 05/29/2025 7.9 (L)     Glucose 05/29/2025 79     Magnesium 05/29/2025 1.0 (L)     Hold Specimen 05/29/2025 JIC     GLUCOSE BY METER POCT 05/29/2025 85     WBC Count 05/29/2025 17.0 (H)     RBC Count 05/29/2025 3.53 (L)     Hemoglobin 05/29/2025 9.6 (L)     Hematocrit 05/29/2025 31.1 (L)     MCV 05/29/2025 88     MCH 05/29/2025 27.2     MCHC 05/29/2025 30.9 (L)     RDW 05/29/2025 13.5     Platelet Count 05/29/2025 355     GLUCOSE BY METER POCT 05/29/2025 68 (L)     GLUCOSE BY METER POCT 05/29/2025 108 (H)         Pat Prieto, APRN  CNP

## 2025-05-29 NOTE — PROGRESS NOTES
"   05/29/25 1335   Appointment Info   Signing Clinician's Name / Credentials (OT) Salma Ramsey OTR/L   Living Environment   Living Environment Comments pt states she lives in a house with her spouse however chart states she is in an [facility]   Self-Care   Activity/Exercise/Self-Care Comment pt poor historian, unable to verify PLOF with pt due to dementia   Instrumental Activities of Daily Living (IADL)   IADL Comments unable to verify with pt due to dementia, however chart states she gets assistance with all ADL's/IADL's however unknown at what level   General Information   Onset of Illness/Injury or Date of Surgery 05/27/25   Referring Physician Dr Rankin   Patient/Family Therapy Goal Statement (OT) none stated   Additional Occupational Profile Info/Pertinent History of Current Problem 70-year-old female with history of hypertension, type 2 diabetes mellitus (HbA1c 6.3% on 12/6/23), nonrheumatic mitral stenosis, pulmonary hypertension, chronic diastolic CHF, DIANA on CPAP, prior left parieto-occipital CVA (6/11/23), vascular dementia, chronic kidney disease stage 4, anemia of chronic disease, diverticulitis with perforation, porcelain gallbladder, and depression in remission, admitted on 5/27/2025 from her memory care facility with confusion, poor oral intake, generalized weakness, worsening leukocytosis, JEFFREY on stage III CKD, and suspected diverticulitis with microperforation and possible abscess.   Existing Precautions/Restrictions fall   Limitations/Impairments safety/cognitive   Cognitive Status Examination   Orientation Status person   Behavioral Issues uncooperative   Affect/Mental Status (Cognitive) confused   Safety Deficit insight into deficits/self-awareness   Memory Deficit severe deficit   Pain Assessment   Patient Currently in Pain   (says \"ow\" during session but when asked about pain states she doesn't have any)   Range of Motion Comprehensive   General Range of Motion no range of motion deficits " identified   Strength Comprehensive (MMT)   General Manual Muscle Testing (MMT) Assessment no strength deficits identified   Bed Mobility   Bed Mobility supine-sit;sit-supine   Supine-Sit [place] (Bed Mobility) minimum assist (75% patient effort)   Sit-Supine [place] (Bed Mobility) minimum assist (75% patient effort)   Assistive Device (Bed Mobility) bed rails   Transfers   Transfer Comments pt refused to complete any trsfs   Lower Body Dressing Assessment/Training   [place] Level (Lower Body Dressing) don;doff;socks;maximum assist (25% patient effort)   Clinical Impression   Criteria for Skilled Therapeutic Interventions Met (OT) Yes, treatment indicated   OT Diagnosis decreased ADL independence and safety   Influenced by the following impairments cognition, weakness, fatigue   OT Problem List-Impairments impacting ADL activity tolerance impaired;cognition;mobility   Assessment of Occupational Performance 3-5 Performance Deficits   Identified Performance Deficits trsfs, mobility, dressing   Planned Therapy Interventions (OT) ADL retraining;transfer training   Clinical Decision Making Complexity (OT) problem focused assessment/low complexity   Risk & Benefits of therapy have been explained evaluation/treatment results reviewed;patient   OT Total Evaluation Time   OT Eval, Low Complexity Minutes (72555) 15   OT Goals   Therapy Frequency (OT) 5 times/week   OT Predicted Duration/Target Date for Goal Attainment 06/05/25   OT: Lower Body Dressing Minimal assist;from wheelchair   OT: Bed Mobility Supervision/stand-by assist;supine to/from sitting   OT: Transfer Supervision/stand-by assist   Self-Care/Home Management   Self-Care/Home Mgmt/ADL, Compensatory, Meal Prep Minutes (95063) 12   Treatment Detail/Skilled Intervention Eval completed, treatment initiated.  Pt very confused during session, repeats questions and doesn't recall answer after short time frame.  Demo only ~10 sec recall at times.  Pt  perseverating on wanting water but had to tell her repeatedly that she couldn't have water.  Supine to sit performed with max vc's for getting to EOB, pt would again ask repeatedly what she was supposed to do, then ask again after ~ 10 sec.  Once at EOB with min assist, pt refused to complete further activity, did not have good understanding of rationale behind getting OOB, just states she wants to go home.  When instructed to scoot forward to stand, pt abruptly stating she was done working with therapy and threw herself back into bed.  Provided redirection and reassurance to pt when she repeatedly asked to go home.  Pt left supine with call light and alarm activated.   OT Discharge Planning   OT Plan trsfs, standing, mobility for ADL's   OT Discharge Recommendation (DC Rec) other (see comments)  (back to long term vs memory care)   OT Rationale for DC Rec Based on pt's cognition today, would recommend that she have 24/7 support if she isn't already receiving that level of assist.  Pt may be more appopriate for memory care if she doesn't receive 24/7 support   OT Brief overview of current status min assist bed mob, refused other activity   OT Total Distance Amb During Session (feet) 0   Total Session Time   Timed Code Treatment Minutes 12   Total Session Time (sum of timed and untimed services) 27

## 2025-05-29 NOTE — PLAN OF CARE
Problem: Comorbidity Management  Goal: Blood Glucose Levels Within Targeted Range  Outcome: Progressing     Problem: Delirium  Goal: Improved Attention and Thought Clarity  Outcome: Progressing   roblem: Adult Inpatient Plan of Care  Goal: Optimal Comfort and Wellbeing  Outcome: Progressing     Problem: Delirium  Goal: Optimal Coping  Outcome: Progressing     Pt daughter called writer this am.  Wanted pt diet advanced to regular diet.  Explained why pt was NPO, for bowel rest and that I would speak to Doctor.    Pt continuously asks to get out of here and for ice water.  Explain to pt why she cannot have water but pt does not comprehend.    Morning blood pressure 186/84.  Morning blood pressure medications and hydralazine given  recheck 128/60.    Pt noon blood sugar was 68.  Gave dextrose 50% solution.  Recheck was 108.

## 2025-05-30 ENCOUNTER — APPOINTMENT (OUTPATIENT)
Dept: CT IMAGING | Facility: HOSPITAL | Age: 70
End: 2025-05-30
Attending: PHYSICIAN ASSISTANT
Payer: COMMERCIAL

## 2025-05-30 ENCOUNTER — TELEPHONE (OUTPATIENT)
Dept: NEUROLOGY | Facility: CLINIC | Age: 70
End: 2025-05-30

## 2025-05-30 ENCOUNTER — APPOINTMENT (OUTPATIENT)
Dept: PHYSICAL THERAPY | Facility: HOSPITAL | Age: 70
End: 2025-05-30
Attending: NURSE PRACTITIONER
Payer: COMMERCIAL

## 2025-05-30 LAB
ANION GAP SERPL CALCULATED.3IONS-SCNC: 11 MMOL/L (ref 7–15)
BUN SERPL-MCNC: 21.9 MG/DL (ref 8–23)
CALCIUM SERPL-MCNC: 8 MG/DL (ref 8.8–10.4)
CHLORIDE SERPL-SCNC: 111 MMOL/L (ref 98–107)
CREAT SERPL-MCNC: 2.05 MG/DL (ref 0.51–0.95)
EGFRCR SERPLBLD CKD-EPI 2021: 25 ML/MIN/1.73M2
ERYTHROCYTE [DISTWIDTH] IN BLOOD BY AUTOMATED COUNT: 13.6 % (ref 10–15)
GLUCOSE BLDC GLUCOMTR-MCNC: 137 MG/DL (ref 70–99)
GLUCOSE BLDC GLUCOMTR-MCNC: 68 MG/DL (ref 70–99)
GLUCOSE BLDC GLUCOMTR-MCNC: 70 MG/DL (ref 70–99)
GLUCOSE BLDC GLUCOMTR-MCNC: 70 MG/DL (ref 70–99)
GLUCOSE BLDC GLUCOMTR-MCNC: 74 MG/DL (ref 70–99)
GLUCOSE BLDC GLUCOMTR-MCNC: 90 MG/DL (ref 70–99)
GLUCOSE BLDC GLUCOMTR-MCNC: 96 MG/DL (ref 70–99)
GLUCOSE SERPL-MCNC: 71 MG/DL (ref 70–99)
HCO3 SERPL-SCNC: 20 MMOL/L (ref 22–29)
HCT VFR BLD AUTO: 30 % (ref 35–47)
HGB BLD-MCNC: 9.2 G/DL (ref 11.7–15.7)
MAGNESIUM SERPL-MCNC: 2.1 MG/DL (ref 1.7–2.3)
MCH RBC QN AUTO: 26.9 PG (ref 26.5–33)
MCHC RBC AUTO-ENTMCNC: 30.7 G/DL (ref 31.5–36.5)
MCV RBC AUTO: 88 FL (ref 78–100)
PHOSPHATE SERPL-MCNC: 3.3 MG/DL (ref 2.5–4.5)
PLATELET # BLD AUTO: 361 10E3/UL (ref 150–450)
POTASSIUM SERPL-SCNC: 3 MMOL/L (ref 3.4–5.3)
RBC # BLD AUTO: 3.42 10E6/UL (ref 3.8–5.2)
SODIUM SERPL-SCNC: 142 MMOL/L (ref 135–145)
WBC # BLD AUTO: 16.4 10E3/UL (ref 4–11)

## 2025-05-30 PROCEDURE — 120N000001 HC R&B MED SURG/OB

## 2025-05-30 PROCEDURE — 250N000013 HC RX MED GY IP 250 OP 250 PS 637: Performed by: INTERNAL MEDICINE

## 2025-05-30 PROCEDURE — 74176 CT ABD & PELVIS W/O CONTRAST: CPT

## 2025-05-30 PROCEDURE — 99232 SBSQ HOSP IP/OBS MODERATE 35: CPT | Performed by: SURGERY

## 2025-05-30 PROCEDURE — 97162 PT EVAL MOD COMPLEX 30 MIN: CPT | Mod: GP

## 2025-05-30 PROCEDURE — 258N000003 HC RX IP 258 OP 636: Performed by: INTERNAL MEDICINE

## 2025-05-30 PROCEDURE — 99232 SBSQ HOSP IP/OBS MODERATE 35: CPT | Performed by: INTERNAL MEDICINE

## 2025-05-30 PROCEDURE — 258N000001 HC RX 258: Performed by: HOSPITALIST

## 2025-05-30 PROCEDURE — 83735 ASSAY OF MAGNESIUM: CPT | Performed by: STUDENT IN AN ORGANIZED HEALTH CARE EDUCATION/TRAINING PROGRAM

## 2025-05-30 PROCEDURE — 97110 THERAPEUTIC EXERCISES: CPT | Mod: GP

## 2025-05-30 PROCEDURE — 85041 AUTOMATED RBC COUNT: CPT | Performed by: INTERNAL MEDICINE

## 2025-05-30 PROCEDURE — 97116 GAIT TRAINING THERAPY: CPT | Mod: GP

## 2025-05-30 PROCEDURE — 85018 HEMOGLOBIN: CPT | Performed by: INTERNAL MEDICINE

## 2025-05-30 PROCEDURE — 80048 BASIC METABOLIC PNL TOTAL CA: CPT | Performed by: INTERNAL MEDICINE

## 2025-05-30 PROCEDURE — 36415 COLL VENOUS BLD VENIPUNCTURE: CPT | Performed by: INTERNAL MEDICINE

## 2025-05-30 PROCEDURE — 82310 ASSAY OF CALCIUM: CPT | Performed by: INTERNAL MEDICINE

## 2025-05-30 PROCEDURE — 250N000011 HC RX IP 250 OP 636: Mod: JZ | Performed by: HOSPITALIST

## 2025-05-30 PROCEDURE — 99231 SBSQ HOSP IP/OBS SF/LOW 25: CPT | Performed by: PHYSICIAN ASSISTANT

## 2025-05-30 PROCEDURE — 250N000013 HC RX MED GY IP 250 OP 250 PS 637: Performed by: HOSPITALIST

## 2025-05-30 PROCEDURE — 84100 ASSAY OF PHOSPHORUS: CPT | Performed by: STUDENT IN AN ORGANIZED HEALTH CARE EDUCATION/TRAINING PROGRAM

## 2025-05-30 RX ORDER — DEXTROSE MONOHYDRATE, SODIUM CHLORIDE, AND POTASSIUM CHLORIDE 50; 1.49; 4.5 G/1000ML; G/1000ML; G/1000ML
INJECTION, SOLUTION INTRAVENOUS CONTINUOUS
Status: DISPENSED | OUTPATIENT
Start: 2025-05-30 | End: 2025-05-31

## 2025-05-30 RX ORDER — POTASSIUM CHLORIDE 1500 MG/1
20 TABLET, EXTENDED RELEASE ORAL ONCE
Status: COMPLETED | OUTPATIENT
Start: 2025-05-30 | End: 2025-05-30

## 2025-05-30 RX ADMIN — DONEPEZIL HYDROCHLORIDE 10 MG: 5 TABLET ORAL at 21:45

## 2025-05-30 RX ADMIN — ATORVASTATIN CALCIUM 80 MG: 40 TABLET, FILM COATED ORAL at 21:46

## 2025-05-30 RX ADMIN — SODIUM CHLORIDE: 9 INJECTION, SOLUTION INTRAVENOUS at 07:25

## 2025-05-30 RX ADMIN — METRONIDAZOLE 500 MG: 500 INJECTION, SOLUTION INTRAVENOUS at 02:53

## 2025-05-30 RX ADMIN — TRAZODONE HYDROCHLORIDE 100 MG: 50 TABLET ORAL at 21:45

## 2025-05-30 RX ADMIN — HEPARIN SODIUM 5000 UNITS: 5000 INJECTION, SOLUTION INTRAVENOUS; SUBCUTANEOUS at 15:02

## 2025-05-30 RX ADMIN — METRONIDAZOLE 500 MG: 500 INJECTION, SOLUTION INTRAVENOUS at 12:31

## 2025-05-30 RX ADMIN — ISOSORBIDE MONONITRATE 60 MG: 30 TABLET, EXTENDED RELEASE ORAL at 07:53

## 2025-05-30 RX ADMIN — BUSPIRONE HYDROCHLORIDE 7.5 MG: 7.5 TABLET ORAL at 07:53

## 2025-05-30 RX ADMIN — PANTOPRAZOLE SODIUM 40 MG: 40 TABLET, DELAYED RELEASE ORAL at 06:31

## 2025-05-30 RX ADMIN — AMLODIPINE BESYLATE 5 MG: 5 TABLET ORAL at 07:53

## 2025-05-30 RX ADMIN — Medication 25 MCG: at 07:53

## 2025-05-30 RX ADMIN — DEXTROSE, SODIUM CHLORIDE, AND POTASSIUM CHLORIDE: 5; .45; .15 INJECTION INTRAVENOUS at 16:51

## 2025-05-30 RX ADMIN — BUSPIRONE HYDROCHLORIDE 7.5 MG: 7.5 TABLET ORAL at 19:03

## 2025-05-30 RX ADMIN — ESCITALOPRAM OXALATE 20 MG: 20 TABLET ORAL at 07:53

## 2025-05-30 RX ADMIN — HEPARIN SODIUM 5000 UNITS: 5000 INJECTION, SOLUTION INTRAVENOUS; SUBCUTANEOUS at 06:31

## 2025-05-30 RX ADMIN — DEXTROSE MONOHYDRATE 25 ML: 25 INJECTION, SOLUTION INTRAVENOUS at 13:07

## 2025-05-30 RX ADMIN — POTASSIUM CHLORIDE 20 MEQ: 1500 TABLET, EXTENDED RELEASE ORAL at 08:51

## 2025-05-30 RX ADMIN — HEPARIN SODIUM 5000 UNITS: 5000 INJECTION, SOLUTION INTRAVENOUS; SUBCUTANEOUS at 22:05

## 2025-05-30 RX ADMIN — CEFTRIAXONE SODIUM 2 G: 2 INJECTION, POWDER, FOR SOLUTION INTRAMUSCULAR; INTRAVENOUS at 21:47

## 2025-05-30 RX ADMIN — QUETIAPINE FUMARATE 12.5 MG: 25 TABLET ORAL at 21:45

## 2025-05-30 RX ADMIN — ISOSORBIDE MONONITRATE 60 MG: 30 TABLET, EXTENDED RELEASE ORAL at 19:03

## 2025-05-30 ASSESSMENT — ACTIVITIES OF DAILY LIVING (ADL)
ADLS_ACUITY_SCORE: 56
ADLS_ACUITY_SCORE: 56
ADLS_ACUITY_SCORE: 51
ADLS_ACUITY_SCORE: 50
ADLS_ACUITY_SCORE: 50
ADLS_ACUITY_SCORE: 51
ADLS_ACUITY_SCORE: 56
ADLS_ACUITY_SCORE: 56
ADLS_ACUITY_SCORE: 50
ADLS_ACUITY_SCORE: 56
ADLS_ACUITY_SCORE: 50
ADLS_ACUITY_SCORE: 56
ADLS_ACUITY_SCORE: 52
ADLS_ACUITY_SCORE: 56
ADLS_ACUITY_SCORE: 50
ADLS_ACUITY_SCORE: 51

## 2025-05-30 NOTE — PLAN OF CARE
"Goal Outcome Evaluation:      Plan of Care Reviewed With: patient    Overall Patient Progress: no changeOverall Patient Progress: no change    Outcome Evaluation: Oriented to person only. Denied pain. IVF's running per order. Slept throughout the night with no complaints. NPO. Microperf abscess too small to drain. Blood sugar at 0200 74 and at 0500 70.    BP (!) 140/64 (BP Location: Left arm)   Pulse 76   Temp 98.1  F (36.7  C) (Oral)   Resp 18   Ht 1.6 m (5' 3\")   Wt 85.5 kg (188 lb 8 oz)   SpO2 93%   BMI 33.39 kg/m      Problem: Delirium  Goal: Improved Sleep  Outcome: Progressing  Intervention: Promote Sleep  Recent Flowsheet Documentation  Taken 5/30/2025 0255 by Lorie Yun RN  Sleep/Rest Enhancement: awakenings minimized     Problem: Adult Inpatient Plan of Care  Goal: Absence of Hospital-Acquired Illness or Injury  Intervention: Prevent Infection  Recent Flowsheet Documentation  Taken 5/30/2025 0255 by Lorie Yun RN  Infection Prevention: hand hygiene promoted     Problem: Adult Inpatient Plan of Care  Goal: Absence of Hospital-Acquired Illness or Injury  Intervention: Prevent Skin Injury  Recent Flowsheet Documentation  Taken 5/30/2025 0255 by Lorie Yun RN  Body Position:   position changed independently   weight shifting         "

## 2025-05-30 NOTE — PROGRESS NOTES
Chart reviewed. Patient does not appear to be ready for discharge (NPO, CT scan today, on IV antibiotics, labs scheduled for a.m.). This writer received call from Shayy at [facility] where patient resides. She states they would not be able to accept patient back over the weekend.    ARLETTE Kearns, LGSW

## 2025-05-30 NOTE — PROGRESS NOTES
[facility]    Medicine Progress Note - Hospitalist Service    Date of Admission:  5/27/2025    Assessment & Plan   Indu Felix is a 70-year-old female with history of hypertension, type 2 diabetes mellitus (HbA1c 6.3% on 12/6/23), nonrheumatic mitral stenosis, pulmonary hypertension, chronic diastolic CHF, DIANA on CPAP, prior left parieto-occipital CVA (6/11/23), vascular dementia, chronic kidney disease stage 4, anemia of chronic disease, diverticulitis with perforation, porcelain gallbladder, and depression in remission, admitted on 5/27/2025 from her memory care facility with confusion, poor oral intake, generalized weakness, worsening leukocytosis, JEFFREY on stage III CKD, and suspected diverticulitis with microperforation and possible abscess.     General Surgery service recommends percutaneous drainage of pelvic abscess by IR.  Percutaneous drainage was deferred due by IR due to small abscess size. Daughter Solange is POA due to patient's lack of decision making capacity.    Severe Sepsis with JEFFREY due to Diverticulitis with Contained Perforation  Suspected diverticulitis  Microperforation  Possible pelvic abscess.   Continue IV ceftriaxone and metronidazole.  IVF.  P.o. except sips with meds.  Percutaneous drainage was deferred due by IR due to small abscess size.  Follow-up blood culture-NGTD  Monitor urine output  Surgery following, plan to repeat CT abdomen today    Acute Kidney Injury on CKD Stage 4. Prerenal vs. sepsis-related. S/p IVF.  Monitor BMP. Nephrology consult requested.     Chronic Heart Failure with Preserved Ejection Fraction (HFpEF)  Baseline diastolic dysfunction  Hold diuretics for now  Not decompensated  Hold PTA bumetanide 2 mg daily  Monitor fluid balance closely to avoid volume overload    Hypertension  Amlodipine 5 mg daily    Type 2 Diabetes Mellitus  Well-controlled (HbA1c 6.3%)  Hold metformin during JEFFREY  Continue insulin sliding scale   Monitor for hypoglycemia  "per protocol    DIANA on CPAPCPAP at night    History of CVA and Vascular Dementia.  On atorvastatin.  Aspirin clopidogrel  held in anticipation of possible surgical intervention.    Care decision/goals of care. Patient lacks decision-making capacity; daughter Solange is POA    Depression.  On Lexapro, trazodone, BuSpar.     Dementia.  On Seroquel and donepezil.    Diet: NPO for Medical/Clinical Reasons Except for: Meds, Ice Chips    DVT Prophylaxis: Heparin SQ  Holley Catheter: PRESENT, indication: Adult: deep sedation/paralysis ONLY with ordered RASS goal from -2 to -5  Lines: None     Cardiac Monitoring: None  Code Status: Full Code      Clinically Significant Risk Factors      # Hyponatremia: Lowest Na = 134 mmol/L in last 2 days, will monitor as appropriate  # Hyperchloremia: Highest Cl = 108 mmol/L in last 2 days, will monitor as appropriate  # Hypocalcemia: Lowest Ca = 7.7 mg/dL in last 2 days, will monitor and replace as appropriate   # Hypomagnesemia: Lowest Mg = 1 mg/dL in last 2 days, will replace as needed   # Hypoalbuminemia: Lowest albumin = 3.4 g/dL at 5/27/2025  8:50 PM, will monitor as appropriate     # Hypertension: Noted on problem list  # Chronic heart failure with preserved ejection fraction: heart failure noted on problem list and last echo with EF >50%    # Dementia: noted on problem list   # Obesity: Estimated body mass index is 33.39 kg/m  as calculated from the following:    Height as of this encounter: 1.6 m (5' 3\").    Weight as of this encounter: 85.5 kg (188 lb 8 oz)., PRESENT ON ADMISSION     # Financial/Environmental Concerns:        Social Drivers of Health    Alcohol Use: Unknown (4/6/2020)    Received from [facility]     AUDIT-C     Frequency of Alcohol Consumption: Monthly or less   Interpersonal Safety: Unknown (5/29/2025)    Interpersonal Safety     Do you feel physically and emotionally safe where you currently live?: Patient unable to answer     Within the past 12 " months, have you been hit, slapped, kicked or otherwise physically hurt by someone?: Patient unable to answer     Within the past 12 months, have you been humiliated or emotionally abused in other ways by your partner or ex-partner?: Patient unable to answer      Disposition Plan     Medically Ready for Discharge: Anticipated in 2-4 Days    Treri Rankin MD  Hospitalist Service  [facility]  Securely message with [employer] (more info)  Text page via [employer] Paging/Directory   ______________________________________________________________________    Interval History   No new complaints today.  Patient is feeling hungry.  Discussed with patient surgery recommendations to repeat abdominal CAT scan and to continue n.p.o.    Physical Exam   Vital Signs: Temp: 97.5  F (36.4  C) Temp src: Oral BP: (!) 144/65 Pulse: 83   Resp: 18 SpO2: 92 % O2 Device: None (Room air) Oxygen Delivery: 2 LPM  Weight: 187 lbs 6.26 oz  General appearance: Awake, Alert, Cooperative, not in any obvious distress and appears stated age   HEENT: Normocephalic, atraumatic, conjunctiva clear without icterus and ears without discharge  Lungs: Clear to auscultation bilaterally, no wheezing, good air exchange, normal work of breathing  Cardiovascular: Regular Rate and Rythm, normal apical impulse, normal S1 and S2, no lower extremity edema bilaterally  Abdomen: Soft with left lower quadrant tenderness, active bowel sounds  Skin: Skin color, texture normal and bruising or bleeding. No rashes or lesions over face, neck, arms and legs, turgor normal.  Musculoskeletal: No bony deformities or joint tenderness. Normal ROM upon flexion & extension.   Neurologic: Alert & Oriented X 3, Facial symmetry preserved and upper & lower extremities moving well with symmetry  Psychiatric: Unable to assess    Medical Decision Making       45 MINUTES SPENT BY ME on the date of service doing chart review, history, exam, documentation & further  activities per the note.      Data     I have personally reviewed the following data over the past 24 hrs:    16.4 (H)  \   9.2 (L)   / 361     142 111 (H) 21.9 /  137 (H)   3.0 (L) 20 (L) 2.05 (H) \       Imaging results reviewed over the past 24 hrs:   No results found for this or any previous visit (from the past 24 hours).

## 2025-05-30 NOTE — PROGRESS NOTES
05/30/25 1436   Appointment Info   Signing Clinician's Name / Credentials (PT) Rea Gutierrez, PT   Living Environment   People in Home spouse   Current Living Arrangements house   Home Accessibility stairs to enter home;stairs within home   Number of Stairs, Main Entrance 3   Stair Railings, Main Entrance railings on both sides of stairs   Living Environment Comments Pt said she lives with her  in a home and the chart does say she lives in an skilled nursing.   Pt can stay on one level.   Self-Care   Current Activity Tolerance moderate   Equipment Currently Used at Home   (none per the pt.)   Fall history within last six months no   Activity/Exercise/Self-Care Comment Indep with all mobility without AD per the pt  (pt poor historian, unable to verify PLOF with pt due to dementia)   General Information   Onset of Illness/Injury or Date of Surgery 05/27/25   Referring Physician Pat Prieto, KETURAH CNP   Patient/Family Therapy Goals Statement (PT) Pt wants out of here.   Pertinent History of Current Problem (include personal factors and/or comorbidities that impact the POC) Per the chart, Indu Felix is a 70-year-old female with history of hypertension, type 2 diabetes mellitus (HbA1c 6.3% on 12/6/23), nonrheumatic mitral stenosis, pulmonary hypertension, chronic diastolic CHF, DIANA on CPAP, prior left parieto-occipital CVA (6/11/23), vascular dementia, chronic kidney disease stage 4, anemia of chronic disease, diverticulitis with perforation, porcelain gallbladder, and depression in remission, admitted on 5/27/2025 from her memory care facility with confusion, poor oral intake, generalized weakness, worsening leukocytosis, JEFFREY on stage III CKD, and suspected diverticulitis with microperforation and possible abscess.      General Surgery service recommends percutaneous drainage of pelvic abscess by IR.  Percutaneous drainage was deferred due by IR due to small abscess size. Daughter Solange is POA due to patient's lack  "of decision making capacity.    Severe Sepsis with JEFFREY due to Diverticulitis with Contained Perforation  Suspected diverticulitis  Microperforation   Existing Precautions/Restrictions fall   Weight-Bearing Status - LLE weight-bearing as tolerated   Weight-Bearing Status - RLE weight-bearing as tolerated   Cognition   Orientation Status (Cognition) oriented to;person;place   Follows Commands (Cognition)   (increased cues for all tasks and dirction.)   Cognitive Status Comments Pt kept asking, \"What are we going to do\".   Pain Assessment   Patient Currently in Pain No   Posture    Posture Comments Some cues for posture   Range of Motion (ROM)   Range of Motion ROM is WNL   ROM Comment bilat LEs   Strength (Manual Muscle Testing)   Strength (Manual Muscle Testing) strength is WNL   Strength Comments bilat LEs   Bed Mobility   Comment, (Bed Mobility) Supine >sit with min A x 1 with cues for technique.   Transfers   Comment, (Transfers) Sit>stand with FWW x 2 reps  with min A x 1 with cues for hand placement.   Gait/Stairs (Locomotion)   [place] Level (Gait) verbal cues;minimum assist (75% patient effort);1 person assist   Assistive Device (Gait) walker, front-wheeled  (FWW)   Distance in Feet (Gait) 3'   Pattern (Gait) step-through;swing-through   Deviations/Abnormal Patterns (Gait) gait speed decreased   Balance   Balance Comments min A x1 with FWW   Sensory Examination   Sensory Perception WNL   Sensory Perception Comments bilat LEs   Clinical Impression   Criteria for Skilled Therapeutic Intervention Yes, treatment indicated   PT Diagnosis (PT) Impaired functional mobility.   Influenced by the following impairments confusion, dec bal, dec endurance.   Functional limitations due to impairments bed mobility, transfers, gait.   Clinical Presentation (PT Evaluation Complexity) stable   Clinical Presentation Rationale Pt presents medically diagnosed.   Clinical Decision Making (Complexity) moderate complexity "   Planned Therapy Interventions (PT) bed mobility training;gait training;strengthening   Risk & Benefits of therapy have been explained evaluation/treatment results reviewed;care plan/treatment goals reviewed;risks/benefits reviewed;patient;participants voiced agreement with care plan   PT Total Evaluation Time   PT Eval, Moderate Complexity Minutes (29223) 13   Physical Therapy Goals   PT Frequency 5x/week   PT Predicted Duration/Target Date for Goal Attainment 06/05/25   PT Goals Bed Mobility;Transfers;Gait   PT: Bed Mobility Supervision/stand-by assist;Supine to/from sit;Rolling   PT: Transfers Supervision/stand-by assist;Sit to/from stand;Bed to/from chair;Assistive device   PT: Gait Supervision/stand-by assist;Rolling walker;Greater than 200 feet   Interventions   Interventions Quick Adds Therapeutic Procedure;Gait Training   Therapeutic Procedure/Exercise   Ther. Procedure: strength, endurance, ROM, flexibillity Minutes (58192) 8   Treatment Detail/Skilled Intervention bilat LE ex x 10 reps with cues for technique.   Gait Training   Gait Training Minutes (55085) 10   Symptoms Noted During/After Treatment (Gait Training) fatigue   Treatment Detail/Skilled Intervention Pt walked with the FWW with min A x 1 with cues for direction and walker safety.   Distance in Feet 60'   [place] Level (Gait Training) minimum assist (75% patient effort)   Physical Assistance Level (Gait Training) verbal cues;1 person assist   Weight Bearing (Gait Training) weight-bearing as tolerated   Assistive Device (Gait Training) rolling walker  (FWW)   Pattern Analysis (Gait Training) swing-through gait   Gait Analysis Deviations decreased petey;decreased step length   Impairments (Gait Analysis/Training) balance impaired;strength decreased   PT Discharge Planning   PT Plan bed mobilility, transfers and gait w FWW, LE ex.   PT Discharge Recommendation (DC Rec) other (see comments)   PT Rationale for DC Rec The pt does is not  oriented to place or date and is confused.  She may need 24/7 assist.  Possible [facility] if she can have 24hr/7 assist if not she may need memory care.  Pt needed continued cues for tasks and increased assist with mobility.   PT Brief overview of current status PT eval, bed mobility, transfers and gait with min A x 1 and she did use the FWW, LE ex.   PT Total Distance Amb During Session (feet) 63   PT Equipment Needed at Discharge gait belt;walker, rolling  (FWW)   Physical Therapy Time and Intention   Timed Code Treatment Minutes 18   Total Session Time (sum of timed and untimed services) 31

## 2025-05-30 NOTE — PROGRESS NOTES
General Surgery Progress Note:    Hospital Day # 3    ASSESSMENT:   1. Diverticulitis of colon with perforation        Indu Felix is a 70 year old female admitted with diverticulitis with microperforation. WBC mostly stagnant over the last 24 hrs    PLAN:   -Will repeat CT today for evaluate for any potential drainable fluid collections  -N.p.o. except ice chips and sips of water meds  -Continue IV antibiotics  -Repeat CBC in a.m.        SUBJECTIVE:   Says she feels about the same, pain unchanged    Patient Vitals for the past 24 hrs:   BP Temp Temp src Pulse Resp SpO2   05/30/25 0905 -- -- -- -- -- 92 %   05/30/25 0900 -- -- -- -- -- 92 %   05/30/25 0850 -- -- -- -- -- 94 %   05/30/25 0739 (!) 168/73 97.8  F (36.6  C) Oral 85 18 92 %   05/30/25 0255 -- -- -- 76 18 --   05/30/25 0014 (!) 140/64 -- -- 88 18 93 %   05/29/25 2257 -- 98.1  F (36.7  C) Oral -- -- --   05/29/25 1904 (!) 140/64 100.4  F (38  C) Oral 98 20 92 %   05/29/25 1512 130/60 99.9  F (37.7  C) Oral 91 20 (!) 91 %   05/29/25 1100 128/60 98.8  F (37.1  C) Oral 60 20 (!) 91 %       Physical Exam:  General: NAD, pleasant  CV:RRR  LUNGS:CTA bilaterally  ABD: Soft, moderated ttp LLQ and suprapubic, no rebound or guarding   EXT:no CCE    Admission on 05/27/2025   Component Date Value    Color Urine 05/27/2025 Light Yellow     Appearance Urine 05/27/2025 Clear     Glucose Urine 05/27/2025 Negative     Bilirubin Urine 05/27/2025 Negative     Ketones Urine 05/27/2025 Negative     Specific Gravity Urine 05/27/2025 1.010     Blood Urine 05/27/2025 Negative     pH Urine 05/27/2025 5.0     Protein Albumin Urine 05/27/2025 Negative     Urobilinogen Urine 05/27/2025 Normal     Nitrite Urine 05/27/2025 Negative     Leukocyte Esterase Urine 05/27/2025 500 Cortney/uL (A)     Bacteria Urine 05/27/2025 Many (A)     Mucus Urine 05/27/2025 Present (A)     RBC Urine 05/27/2025 1     WBC Urine 05/27/2025 29 (H)     Squamous Epithelials Uri* 05/27/2025 1     Hyaline Casts  Urine 05/27/2025 12 (H)     Sodium 05/27/2025 134 (L)     Potassium 05/27/2025 3.8     Chloride 05/27/2025 98     Carbon Dioxide (CO2) 05/27/2025 19 (L)     Anion Gap 05/27/2025 17 (H)     Urea Nitrogen 05/27/2025 43.5 (H)     Creatinine 05/27/2025 3.27 (H)     GFR Estimate 05/27/2025 14 (L)     Calcium 05/27/2025 8.4 (L)     Glucose 05/27/2025 173 (H)     Protein Total 05/27/2025 7.1     Albumin 05/27/2025 3.4 (L)     Bilirubin Total 05/27/2025 0.3     Alkaline Phosphatase 05/27/2025 186 (H)     AST 05/27/2025 18     ALT 05/27/2025 7     Bilirubin Direct 05/27/2025 0.11     Lipase 05/27/2025 19     TSH 05/27/2025 1.62     WBC Count 05/27/2025 15.5 (H)     RBC Count 05/27/2025 3.74 (L)     Hemoglobin 05/27/2025 10.2 (L)     Hematocrit 05/27/2025 31.5 (L)     MCV 05/27/2025 84     MCH 05/27/2025 27.3     MCHC 05/27/2025 32.4     RDW 05/27/2025 13.3     Platelet Count 05/27/2025 376     RBC Morphology 05/27/2025 Confirmed RBC Indices     Platelet Assessment 05/27/2025 Automated Count Confirmed. Platelet morphology is normal.     Polychromasia 05/27/2025 Slight (A)     % Neutrophils 05/27/2025 75     % Lymphocytes 05/27/2025 10     % Monocytes 05/27/2025 12     % Eosinophils 05/27/2025 3     % Basophils 05/27/2025 0     Absolute Neutrophils 05/27/2025 11.6 (H)     Absolute Lymphocytes 05/27/2025 1.6     Absolute Monocytes 05/27/2025 1.9 (H)     Absolute Eosinophils 05/27/2025 0.5     Absolute Basophils 05/27/2025 0.0     Culture 05/27/2025 No growth after 1 day     Culture 05/27/2025 No growth after 1 day     Lactic Acid, Initial 05/27/2025 1.0     Hold Specimen 05/27/2025 [facility]     Hold Specimen 05/27/2025 [facility]     Hold Specimen 05/27/2025 [facility]     Creatinine Urine mg/dL 05/27/2025 70.4     Sodium Urine mmol/L 05/27/2025 75     %FENA 05/27/2025 2.6     Sodium 05/28/2025 141     Potassium 05/28/2025 3.5     Chloride 05/28/2025 107     Carbon Dioxide (CO2) 05/28/2025 20 (L)     Anion Gap 05/28/2025 14     Urea Nitrogen  05/28/2025 36.5 (H)     Creatinine 05/28/2025 2.80 (H)     GFR Estimate 05/28/2025 17 (L)     Calcium 05/28/2025 7.7 (L)     Glucose 05/28/2025 77     WBC Count 05/28/2025 13.9 (H)     RBC Count 05/28/2025 3.43 (L)     Hemoglobin 05/28/2025 9.2 (L)     Hematocrit 05/28/2025 29.4 (L)     MCV 05/28/2025 86     MCH 05/28/2025 26.8     MCHC 05/28/2025 31.3 (L)     RDW 05/28/2025 13.2     Platelet Count 05/28/2025 340     Culture 05/27/2025 >100,000 CFU/mL Klebsiella pneumoniae (A)     Ammonia 05/28/2025 12     Vitamin B12 05/28/2025 854     Folic Acid 05/28/2025 7.2     Hold Specimen 05/28/2025 [facility]     GLUCOSE BY METER POCT 05/28/2025 73     GLUCOSE BY METER POCT 05/28/2025 97     GLUCOSE BY METER POCT 05/28/2025 90     GLUCOSE BY METER POCT 05/28/2025 106 (H)     Sodium 05/29/2025 142     Potassium 05/29/2025 3.8     Chloride 05/29/2025 108 (H)     Carbon Dioxide (CO2) 05/29/2025 23     Anion Gap 05/29/2025 11     Urea Nitrogen 05/29/2025 29.5 (H)     Creatinine 05/29/2025 2.44 (H)     GFR Estimate 05/29/2025 21 (L)     Calcium 05/29/2025 7.9 (L)     Glucose 05/29/2025 79     Magnesium 05/29/2025 1.0 (L)     Hold Specimen 05/29/2025 [facility]     GLUCOSE BY METER POCT 05/29/2025 85     WBC Count 05/29/2025 17.0 (H)     RBC Count 05/29/2025 3.53 (L)     Hemoglobin 05/29/2025 9.6 (L)     Hematocrit 05/29/2025 31.1 (L)     MCV 05/29/2025 88     MCH 05/29/2025 27.2     MCHC 05/29/2025 30.9 (L)     RDW 05/29/2025 13.5     Platelet Count 05/29/2025 355     GLUCOSE BY METER POCT 05/29/2025 68 (L)     GLUCOSE BY METER POCT 05/29/2025 108 (H)         Rudi Flores PA-C

## 2025-05-30 NOTE — TELEPHONE ENCOUNTER
[facility] Call Center    Phone Message    May a detailed message be left on voicemail: yes     Reason for Call:     Patients daughter filiberto asking to speak to  nurse to reschedule pts appointment. Per Filiberto patient is in the hospital and is unable to attend her appt on 6-2.     Action Taken: Other: genet neurology    Travel Screening: Not Applicable     Date of Service:

## 2025-05-30 NOTE — PLAN OF CARE
Problem: Adult Inpatient Plan of Care  Goal: Plan of Care Review  Description: The Plan of Care Review/Shift note should be completed every shift.  The Outcome Evaluation is a brief statement about your assessment that the patient is improving, declining, or no change.  This information will be displayed automatically on your shift  note.  Outcome: Progressing   Goal Outcome Evaluation:       Pt is alert to self, confused to time and place. Frequent reorientation provided. Pt is npo, taking sips of water and ice chips. Plan to have a ct scan this afternoon. Blood sugars were 70, 68 and 137 after D50. Pt denies abdominal pain.

## 2025-05-30 NOTE — PROGRESS NOTES
General Surgery Progress Note  Hospital Day # 3    Subjective:   Pt is feeling about the same with ongoing abdominal pain    Vitals:    05/30/25 0905 05/30/25 1241 05/30/25 1300 05/30/25 1530   BP:  (!) 144/65  (!) 147/67   BP Location:  Left arm  Left arm   Pulse:  83  78   Resp:  18     Temp:  97.5  F (36.4  C)  98.5  F (36.9  C)   TempSrc:  Oral  Oral   SpO2: 92% 92%     Weight:   85 kg (187 lb 6.3 oz)    Height:           Physical Exam:  Lungs:  CTA  CV:       RRR  Ab:       Soft, + BS,     Recent Labs   Lab 05/30/25  0617   WBC 16.4*   HGB 9.2*   HCT 30.0*          Recent Labs   Lab 05/30/25  0617 05/28/25  0703 05/27/25  2050      < > 134*   CO2 20*   < > 19*   BUN 21.9   < > 43.5*   ALBUMIN  --   --  3.4*   ALKPHOS  --   --  186*   ALT  --   --  7   AST  --   --  18    < > = values in this interval not displayed.       Assessment:  Pt with perforated diverticulitis who has been treated with antibiotics for much of the week.  Her fluid collection is not amenable to drainage.  She was evaluated with a CT scan today which does not show dramatic progression we will see if there is opportunity for drainage but I think that is unlikely.    Plan: Continue with IV antibiotics and slow dietary advance    Fred Oreilly MD  [facility]  [phone]

## 2025-05-30 NOTE — PROGRESS NOTES
"RENAL PROGRESS NOTE - [facility]        CC: follow up JEFFREY    Subjective: stable night, denies sob, nausea, abd pain, diarrhea. Feels thirsty and wants to drink.      Assessment and Plan: 71 yo female with DM2, HTN, CKD 4, diastolic heart failure admit with diverticulitis with microperf and pelvic abscess, JEFFREY  JEFFREY/CKD 4 - due to volume depletion, on bumex PTA, now improving nicely with IVF and approaching baseline  -daily BMP  -avoid nephrotoxins, renally dose meds  -cont IVF while NPO  2. Diverticulitis - with abscess, microperf, surgery following, pain controlled  -cont abx  -plan for repeat ct today noted, would not give IV contrast if able  3. HTN - bp  high today  -if consistently elevated, can increase amlodipine to 10 mg/day  4. Hypokalemia - due to lack of po intake, prior bumex  -replaced      Magda Bishop MD  [facility]  [phone]    Objective    PHYSICAL EXAM  BP (!) 168/73 (BP Location: Left arm)   Pulse 85   Temp 97.8  F (36.6  C) (Oral)   Resp 18   Ht 1.6 m (5' 3\")   Wt 85.5 kg (188 lb 8 oz)   SpO2 92%   BMI 33.39 kg/m    I/O last 3 completed shifts:  In: 0   Out: 1300 [Urine:1300]  Wt Readings from Last 3 Encounters:   05/28/25 85.5 kg (188 lb 8 oz)   11/04/24 88 kg (194 lb)   04/15/24 88 kg (194 lb)       GENERAL: nad  HEENT:normocephalic, atraumatic  CARDIOVASCULAR: rr, no rub,  trace edema  PULMONARY:cta ant. No cyanosis  GASTROINTESTINAL: soft, nt/nd  MSK: diffuse muscle atrophy, warm  NEURO: Alert, no gross focal findings  PSYCHIATRIC: Adequate mood and interaction  SKIN: Pale, no jaundice, no rash.    LABORATORIES  Recent Labs   Lab 05/30/25  0617 05/29/25  0530 05/28/25  0703 05/27/25  2050    142 141 134*   POTASSIUM 3.0* 3.8 3.5 3.8   CHLORIDE 111* 108* 107 98   CO2 20* 23 20* 19*   BUN 21.9 29.5* 36.5* 43.5*   CR 2.05* 2.44* 2.80* 3.27*   GFRESTIMATED 25* 21* 17* 14*   JARETT 8.0* 7.9* 7.7* 8.4*   PHOS 3.3  --   --   --    MAG 2.1 1.0*  --   -- "    ALBUMIN  --   --   --  3.4*       Recent Labs   Lab 05/30/25  0617 05/29/25  0530 05/28/25  0703 05/27/25 2050   WBC 16.4* 17.0* 13.9* 15.5*   HGB 9.2* 9.6* 9.2* 10.2*   HCT 30.0* 31.1* 29.4* 31.5*   MCV 88 88 86 84    355 340 376          MEDICATIONS  Current Facility-Administered Medications   Medication Dose Route Frequency Provider Last Rate Last Admin    amLODIPine (NORVASC) tablet 5 mg  5 mg Oral Daily Oscar Fuentes MD   5 mg at 05/30/25 0753    atorvastatin (LIPITOR) tablet 80 mg  80 mg Oral At Bedtime Oscra Fuentes MD   80 mg at 05/29/25 2136    busPIRone (BUSPAR) tablet 7.5 mg  7.5 mg Oral BID Oscar Fuentes MD   7.5 mg at 05/30/25 0753    cefTRIAXone (ROCEPHIN) 2 g vial to attach to  ml bag for ADULTS or NS 50 ml bag for PEDS  2 g Intravenous Q24H Oscar Fuentes MD 0 mL/hr at 05/28/25 0038 2 g at 05/29/25 2215    donepezil (ARICEPT) tablet 10 mg  10 mg Oral At Bedtime Oscar Fuentes MD   10 mg at 05/29/25 2136    escitalopram (LEXAPRO) tablet 20 mg  20 mg Oral Daily Oscar Fuentes MD   20 mg at 05/30/25 0753    heparin ANTICOAGULANT injection 5,000 Units  5,000 Units Subcutaneous Q8H Oscar Fuentes MD   5,000 Units at 05/30/25 0631    insulin aspart (NovoLOG) injection (RAPID ACTING)  1-7 Units Subcutaneous TID AC Oscar Fuentes MD        insulin aspart (NovoLOG) injection (RAPID ACTING)  1-5 Units Subcutaneous At Bedtime Oscar Fuentes MD        insulin glargine (LANTUS PEN) injection 10 Units  10 Units Subcutaneous QPM Oscar Fuentes MD   10 Units at 05/28/25 2140    isosorbide mononitrate (IMDUR) 24 hr tablet 60 mg  60 mg Oral BID Oscar Fuentes MD   60 mg at 05/30/25 0753    metroNIDAZOLE (FLAGYL) infusion 500 mg  500 mg Intravenous Q12H Oscar Fuentes MD 0 mL/hr at 05/28/25 0232 500 mg at 05/30/25 0253    pantoprazole (PROTONIX) EC tablet 40 mg  40 mg Oral QAM  Oscar Fuentes MD   40 mg at 05/30/25 0631    QUEtiapine (SEROquel) half-tab 12.5 mg  12.5 mg Oral At  Bedtime Oscar Fuentes MD   12.5 mg at 05/29/25 2136    sodium chloride (PF) 0.9% PF flush 3 mL  3 mL Intracatheter Q8H ISAI Oscar Fuentes MD   3 mL at 05/28/25 1956    traZODone (DESYREL) tablet 100 mg  100 mg Oral At Bedtime Oscar Fuentes MD   100 mg at 05/29/25 2136    Vitamin D3 (CHOLECALCIFEROL) tablet 25 mcg  25 mcg Oral Daily Oscar Fuentes MD   25 mcg at 05/30/25 0753         Magda Bishop MD  [facility]  [phone]

## 2025-05-31 PROBLEM — E87.6 HYPOKALEMIA: Status: ACTIVE | Noted: 2025-05-31

## 2025-05-31 LAB
ANION GAP SERPL CALCULATED.3IONS-SCNC: 11 MMOL/L (ref 7–15)
BUN SERPL-MCNC: 16.9 MG/DL (ref 8–23)
CALCIUM SERPL-MCNC: 7.9 MG/DL (ref 8.8–10.4)
CHLORIDE SERPL-SCNC: 112 MMOL/L (ref 98–107)
CREAT SERPL-MCNC: 1.75 MG/DL (ref 0.51–0.95)
EGFRCR SERPLBLD CKD-EPI 2021: 31 ML/MIN/1.73M2
ERYTHROCYTE [DISTWIDTH] IN BLOOD BY AUTOMATED COUNT: 13.6 % (ref 10–15)
GLUCOSE BLDC GLUCOMTR-MCNC: 114 MG/DL (ref 70–99)
GLUCOSE BLDC GLUCOMTR-MCNC: 154 MG/DL (ref 70–99)
GLUCOSE BLDC GLUCOMTR-MCNC: 158 MG/DL (ref 70–99)
GLUCOSE BLDC GLUCOMTR-MCNC: 169 MG/DL (ref 70–99)
GLUCOSE SERPL-MCNC: 141 MG/DL (ref 70–99)
HCO3 SERPL-SCNC: 17 MMOL/L (ref 22–29)
HCT VFR BLD AUTO: 26 % (ref 35–47)
HGB BLD-MCNC: 8.4 G/DL (ref 11.7–15.7)
MAGNESIUM SERPL-MCNC: 1.7 MG/DL (ref 1.7–2.3)
MCH RBC QN AUTO: 27.7 PG (ref 26.5–33)
MCHC RBC AUTO-ENTMCNC: 32.3 G/DL (ref 31.5–36.5)
MCV RBC AUTO: 86 FL (ref 78–100)
PLATELET # BLD AUTO: 330 10E3/UL (ref 150–450)
POTASSIUM SERPL-SCNC: 3.2 MMOL/L (ref 3.4–5.3)
RBC # BLD AUTO: 3.03 10E6/UL (ref 3.8–5.2)
SODIUM SERPL-SCNC: 140 MMOL/L (ref 135–145)
WBC # BLD AUTO: 17.7 10E3/UL (ref 4–11)

## 2025-05-31 PROCEDURE — 258N000003 HC RX IP 258 OP 636: Performed by: INTERNAL MEDICINE

## 2025-05-31 PROCEDURE — 250N000012 HC RX MED GY IP 250 OP 636 PS 637: Performed by: HOSPITALIST

## 2025-05-31 PROCEDURE — 83735 ASSAY OF MAGNESIUM: CPT | Performed by: INTERNAL MEDICINE

## 2025-05-31 PROCEDURE — 99232 SBSQ HOSP IP/OBS MODERATE 35: CPT | Performed by: INTERNAL MEDICINE

## 2025-05-31 PROCEDURE — 120N000001 HC R&B MED SURG/OB

## 2025-05-31 PROCEDURE — 250N000011 HC RX IP 250 OP 636: Performed by: HOSPITALIST

## 2025-05-31 PROCEDURE — 99232 SBSQ HOSP IP/OBS MODERATE 35: CPT | Performed by: NURSE PRACTITIONER

## 2025-05-31 PROCEDURE — 85041 AUTOMATED RBC COUNT: CPT | Performed by: INTERNAL MEDICINE

## 2025-05-31 PROCEDURE — 82310 ASSAY OF CALCIUM: CPT | Performed by: INTERNAL MEDICINE

## 2025-05-31 PROCEDURE — 36415 COLL VENOUS BLD VENIPUNCTURE: CPT | Performed by: INTERNAL MEDICINE

## 2025-05-31 PROCEDURE — 250N000013 HC RX MED GY IP 250 OP 250 PS 637: Performed by: HOSPITALIST

## 2025-05-31 PROCEDURE — 85018 HEMOGLOBIN: CPT | Performed by: INTERNAL MEDICINE

## 2025-05-31 PROCEDURE — 80048 BASIC METABOLIC PNL TOTAL CA: CPT | Performed by: INTERNAL MEDICINE

## 2025-05-31 PROCEDURE — 250N000013 HC RX MED GY IP 250 OP 250 PS 637: Performed by: INTERNAL MEDICINE

## 2025-05-31 RX ORDER — POTASSIUM CHLORIDE 1500 MG/1
40 TABLET, EXTENDED RELEASE ORAL ONCE
Status: COMPLETED | OUTPATIENT
Start: 2025-05-31 | End: 2025-05-31

## 2025-05-31 RX ADMIN — CEFTRIAXONE SODIUM 2 G: 2 INJECTION, POWDER, FOR SOLUTION INTRAMUSCULAR; INTRAVENOUS at 22:11

## 2025-05-31 RX ADMIN — Medication 1 MG: at 22:10

## 2025-05-31 RX ADMIN — TRAZODONE HYDROCHLORIDE 100 MG: 50 TABLET ORAL at 22:08

## 2025-05-31 RX ADMIN — POTASSIUM CHLORIDE 40 MEQ: 1500 TABLET, EXTENDED RELEASE ORAL at 10:28

## 2025-05-31 RX ADMIN — ISOSORBIDE MONONITRATE 60 MG: 30 TABLET, EXTENDED RELEASE ORAL at 07:51

## 2025-05-31 RX ADMIN — ATORVASTATIN CALCIUM 80 MG: 40 TABLET, FILM COATED ORAL at 22:10

## 2025-05-31 RX ADMIN — METRONIDAZOLE 500 MG: 500 INJECTION, SOLUTION INTRAVENOUS at 00:52

## 2025-05-31 RX ADMIN — ACETAMINOPHEN 650 MG: 325 TABLET ORAL at 00:52

## 2025-05-31 RX ADMIN — PANTOPRAZOLE SODIUM 40 MG: 40 TABLET, DELAYED RELEASE ORAL at 07:51

## 2025-05-31 RX ADMIN — HEPARIN SODIUM 5000 UNITS: 5000 INJECTION, SOLUTION INTRAVENOUS; SUBCUTANEOUS at 09:10

## 2025-05-31 RX ADMIN — Medication 1 MG: at 00:52

## 2025-05-31 RX ADMIN — ISOSORBIDE MONONITRATE 60 MG: 30 TABLET, EXTENDED RELEASE ORAL at 19:43

## 2025-05-31 RX ADMIN — QUETIAPINE FUMARATE 12.5 MG: 25 TABLET ORAL at 22:09

## 2025-05-31 RX ADMIN — DONEPEZIL HYDROCHLORIDE 10 MG: 5 TABLET ORAL at 22:09

## 2025-05-31 RX ADMIN — METRONIDAZOLE 500 MG: 500 INJECTION, SOLUTION INTRAVENOUS at 12:18

## 2025-05-31 RX ADMIN — Medication 25 MCG: at 07:52

## 2025-05-31 RX ADMIN — OXYCODONE HYDROCHLORIDE 5 MG: 5 TABLET ORAL at 13:30

## 2025-05-31 RX ADMIN — INSULIN ASPART 1 UNITS: 100 INJECTION, SOLUTION INTRAVENOUS; SUBCUTANEOUS at 12:18

## 2025-05-31 RX ADMIN — ACETAMINOPHEN 650 MG: 325 TABLET ORAL at 10:27

## 2025-05-31 RX ADMIN — HEPARIN SODIUM 5000 UNITS: 5000 INJECTION, SOLUTION INTRAVENOUS; SUBCUTANEOUS at 15:12

## 2025-05-31 RX ADMIN — DEXTROSE, SODIUM CHLORIDE, AND POTASSIUM CHLORIDE: 5; .45; .15 INJECTION INTRAVENOUS at 04:51

## 2025-05-31 RX ADMIN — HEPARIN SODIUM 5000 UNITS: 5000 INJECTION, SOLUTION INTRAVENOUS; SUBCUTANEOUS at 22:11

## 2025-05-31 RX ADMIN — INSULIN ASPART 1 UNITS: 100 INJECTION, SOLUTION INTRAVENOUS; SUBCUTANEOUS at 08:03

## 2025-05-31 RX ADMIN — INSULIN GLARGINE 10 UNITS: 100 INJECTION, SOLUTION SUBCUTANEOUS at 19:44

## 2025-05-31 RX ADMIN — BUSPIRONE HYDROCHLORIDE 7.5 MG: 7.5 TABLET ORAL at 19:42

## 2025-05-31 RX ADMIN — ESCITALOPRAM OXALATE 20 MG: 20 TABLET ORAL at 07:51

## 2025-05-31 RX ADMIN — AMLODIPINE BESYLATE 5 MG: 5 TABLET ORAL at 07:52

## 2025-05-31 RX ADMIN — INSULIN ASPART 1 UNITS: 100 INJECTION, SOLUTION INTRAVENOUS; SUBCUTANEOUS at 17:53

## 2025-05-31 RX ADMIN — BUSPIRONE HYDROCHLORIDE 7.5 MG: 7.5 TABLET ORAL at 08:00

## 2025-05-31 ASSESSMENT — ACTIVITIES OF DAILY LIVING (ADL)
ADLS_ACUITY_SCORE: 60
ADLS_ACUITY_SCORE: 56
ADLS_ACUITY_SCORE: 60
ADLS_ACUITY_SCORE: 56
ADLS_ACUITY_SCORE: 60
ADLS_ACUITY_SCORE: 56
ADLS_ACUITY_SCORE: 60
ADLS_ACUITY_SCORE: 57
ADLS_ACUITY_SCORE: 60
ADLS_ACUITY_SCORE: 60

## 2025-05-31 NOTE — PROGRESS NOTES
[facility]    Medicine Progress Note - Hospitalist Service    Date of Admission:  5/27/2025    Assessment & Plan   Indu Felix is a 70-year-old female with history of hypertension, type 2 diabetes mellitus (HbA1c 6.3% on 12/6/23), nonrheumatic mitral stenosis, pulmonary hypertension, chronic diastolic CHF, DIANA on CPAP, prior left parieto-occipital CVA (6/11/23), vascular dementia, chronic kidney disease stage 4, anemia of chronic disease, diverticulitis with perforation, porcelain gallbladder, and depression in remission, admitted on 5/27/2025 from her memory care facility with confusion, poor oral intake, generalized weakness, worsening leukocytosis, JEFFREY on stage III CKD, and suspected diverticulitis with microperforation and possible abscess.     General Surgery service recommends percutaneous drainage of pelvic abscess by IR.  Percutaneous drainage was deferred due by IR due to small abscess size. Daughter Solange is POA due to patient's lack of decision making capacity.    Severe Sepsis with JEFFREY due to Diverticulitis with Contained Perforation  Possible pelvic abscess.   Continue IV ceftriaxone and metronidazole.  IVF.  P.o. except sips with meds.  Percutaneous drainage was deferred due by IR due to small abscess size.  Follow-up blood culture-NGTD  Monitor urine output  5/30 repeat CT abdomen with minimal improvement.    Acute Kidney Injury on CKD Stage 4. Prerenal vs. sepsis-related. S/p IVF.  Monitor BMP. Nephrology consult requested.     Chronic Heart Failure with Preserved Ejection Fraction (HFpEF), stable.  Baseline diastolic dysfunction  Hold diuretics for now  Hold PTA bumetanide 2 mg daily  Monitor fluid balance closely to avoid volume overload    Hypertension  Amlodipine 5 mg daily    Type 2 Diabetes Mellitus  Well-controlled (HbA1c 6.3%)  Hold metformin during JEFFREY  Continue insulin sliding scale   Monitor for hypoglycemia per protocol    DIANA on CPAPCPAP at night    History of  "CVA and Vascular Dementia.  On atorvastatin.  Aspirin clopidogrel  held in anticipation of possible surgical intervention.    Care decision/goals of care. Patient lacks decision-making capacity; daughter Solange is POA    Depression.  On Lexapro, trazodone, BuSpar.     Dementia.  On Seroquel and donepezil.    Diet: Clear Liquid Diet    DVT Prophylaxis: Heparin SQ  Holley Catheter: Not present  Lines: None     Cardiac Monitoring: None  Code Status: Full Code      Clinically Significant Risk Factors      # Hyponatremia: Lowest Na = 134 mmol/L in last 2 days, will monitor as appropriate  # Hyperchloremia: Highest Cl = 108 mmol/L in last 2 days, will monitor as appropriate  # Hypocalcemia: Lowest Ca = 7.7 mg/dL in last 2 days, will monitor and replace as appropriate   # Hypomagnesemia: Lowest Mg = 1 mg/dL in last 2 days, will replace as needed   # Hypoalbuminemia: Lowest albumin = 3.4 g/dL at 5/27/2025  8:50 PM, will monitor as appropriate     # Hypertension: Noted on problem list  # Chronic heart failure with preserved ejection fraction: heart failure noted on problem list and last echo with EF >50%    # Dementia: noted on problem list   # Obesity: Estimated body mass index is 33.39 kg/m  as calculated from the following:    Height as of this encounter: 1.6 m (5' 3\").    Weight as of this encounter: 85.5 kg (188 lb 8 oz)., PRESENT ON ADMISSION     # Financial/Environmental Concerns:        Social Drivers of Health    Alcohol Use: Unknown (4/6/2020)    Received from [facility]     AUDIT-C     Frequency of Alcohol Consumption: Monthly or less   Interpersonal Safety: Unknown (5/29/2025)    Interpersonal Safety     Do you feel physically and emotionally safe where you currently live?: Patient unable to answer     Within the past 12 months, have you been hit, slapped, kicked or otherwise physically hurt by someone?: Patient unable to answer     Within the past 12 months, have you been humiliated or emotionally " abused in other ways by your partner or ex-partner?: Patient unable to answer      Disposition Plan     Medically Ready for Discharge: Anticipated in 2-4 Days    Terri Rankin MD  Hospitalist Service  [facility]  Securely message with [employer] (more info)  Text page via [employer] Paging/Directory   ______________________________________________________________________    Interval History   No new complaints today.  Repeat CT abdomen yesterday, minimal improvement in diverticuli with abscess.  Diet increased to clear liquid, per surgery.  Patient does not feel hungry today.  She had 2 nonbloody bowel movements yesterday.  Is pleasantly confused, which is her baseline.  Not impulsive.  No new complaints.    Physical Exam   Vital Signs: Temp: 98.4  F (36.9  C) Temp src: Oral BP: (!) 170/77 Pulse: 81   Resp: 20 SpO2: 93 % O2 Device: None (Room air)    Weight: 197 lbs 1.46 oz  General appearance: Awake, Alert, Cooperative, not in any obvious distress and appears stated age   HEENT: Normocephalic, atraumatic, conjunctiva clear without icterus and ears without discharge  Lungs: Clear to auscultation bilaterally, no wheezing, good air exchange, normal work of breathing  Cardiovascular: Regular Rate and Rythm, normal apical impulse, normal S1 and S2, no lower extremity edema bilaterally  Abdomen: Soft with left lower quadrant tenderness, active bowel sounds  Skin: Skin color, texture normal and bruising or bleeding. No rashes or lesions over face, neck, arms and legs, turgor normal.  Musculoskeletal: No bony deformities or joint tenderness. Normal ROM upon flexion & extension.   Neurologic: Alert & Oriented X 3, Facial symmetry preserved and upper & lower extremities moving well with symmetry  Psychiatric: Unable to assess    Medical Decision Making       45 MINUTES SPENT BY ME on the date of service doing chart review, history, exam, documentation & further activities per the note.      Data     I have  personally reviewed the following data over the past 24 hrs:    17.7 (H)  \   8.4 (L)   / 330     140 112 (H) 16.9 /  158 (H)   3.2 (L) 17 (L) 1.75 (H) \       Imaging results reviewed over the past 24 hrs:   Recent Results (from the past 24 hours)   CT Abdomen Pelvis w/o Contrast    Narrative    EXAM: CT ABDOMEN PELVIS W/O CONTRAST  LOCATION: [facility]  DATE: 5/30/2025    INDICATION: diveriticulitis with small abscess, WBC rising  COMPARISON: CT of the abdomen and pelvis without contrast 5/27/2025  TECHNIQUE: CT scan of the abdomen and pelvis was performed without IV contrast. Multiplanar reformats were obtained. Dose reduction techniques were used.  CONTRAST: None.    FINDINGS:   LOWER CHEST: Minimal pleural effusions layer dependently. Respiratory motion artifact limits assessment of the lung parenchyma. There are bands of atelectasis parallel to the pleura in both lower lobes. Mitral annular calcifications are present. Small   hiatal hernia.    HEPATOBILIARY: There is patchy wall calcification of the gallbladder. No gallbladder distention or pericholecystic inflammation. No bile duct enlargement. Normal liver.    PANCREAS: Mild fatty infiltration of the pancreas. No change from prior.    SPLEEN: Normal.    ADRENAL GLANDS: Unchanged thickening of the left adrenal gland. Right adrenal gland is normal.    KIDNEYS/BLADDER: Limited assessment of the parenchyma due to motion-related blurring. No hydronephrosis or hydroureter. Urinary bladder decompressed by indwelling Holley catheter. There is anterior and the bladder lumen.    BOWEL: Multiple sigmoid diverticula are present. There is focal wall thickening and inflammation associated with diverticula in the anterior left lower quadrant including an extraluminal gas and fluid collection posterior to the sigmoid measuring 3.6 x   4.5 cm, essentially unchanged from 3 days earlier. The fluid collection is inseparable from the structures of the  left adnexa. Unchanged thickening of the adjacent peritoneum. A previously described focus of contained perforation superior to the colon is   slightly smaller measuring up to 21 mm (series 3, image 174), previously up to 34 mm and has a clear communication with the bowel lumen. No mechanical bowel obstruction.    LYMPH NODES: No new enlarged lymph nodes in the abdomen or pelvis.    VASCULATURE: Moderate atheromatous calcifications of the abdominal aorta and iliac arteries but no aneurysm.    PELVIC ORGANS: Densely calcified uterine fibroids are present, unchanged.    MUSCULOSKELETAL: Generalized bone demineralization. Minimal thoracolumbar degenerative osteophytes. Vacuum disc phenomenon at T11-T12 and T12-L1. No aggressive or destructive bone lesions. There is a 13 x 16 mm focus in the subcutaneous fat of the left   lower quadrant (series 3, image 176) with a fluid fluid level consistent with the small hematoma.      Impression    IMPRESSION:     1.  Diverticulitis of the sigmoid colon in the left lower quadrant complicated by diverticular perforation. Unchanged gas and fluid collection posterior to the sigmoid inseparable from the left adnexa. A air-containing structures superior to the sigmoid   with communication to the bowel lumen is slightly smaller.  2.  Calcified uterine fibroids.  3.  Chronic cholecystitis.       This document is created with the help of Dragon dictation system. All grammatical errors are unintentional.

## 2025-05-31 NOTE — PLAN OF CARE
Problem: Adult Inpatient Plan of Care  Goal: Optimal Comfort and Wellbeing  Outcome: Progressing  Intervention: Provide Person-Centered Care  Recent Flowsheet Documentation  Taken 5/30/2025 1700 by Renetta Mckinney, RN  Trust Relationship/Rapport:   care explained   emotional support provided   questions answered     Problem: Delirium  Goal: Improved Sleep  Outcome: Progressing     Problem: Comorbidity Management  Goal: Blood Glucose Levels Within Targeted Range  Outcome: Progressing  Intervention: Monitor and Manage Glycemia  Recent Flowsheet Documentation  Taken 5/30/2025 1700 by Renetta Mckinney, RN  Medication Review/Management: medications reviewed   Pt alert to self only, reoriented multiple times. Iv leaking, new piv placed in left ac. D5 1/2 NS running @100. Holley removed @ 2130. Denies pain/nausea. Lantus held d/t low blood sugars today. VSS on RA, continue to monitor.    Renetta Mckinney, RN

## 2025-05-31 NOTE — PROGRESS NOTES
"RENAL PROGRESS NOTE - [facility]        CC: follow up JEFFREY    Subjective: no acute events overnight. + some abdominal pain. Seen by surgery. No cp, sob.       Assessment and Plan:   71 yo female with DM2, HTN, CKD 4, diastolic heart failure admit with diverticulitis with microperf and pelvic abscess, JEFFREY  JEFFREY/CKD 4 - due to volume depletion, on bumex PTA, now improving nicely with IVF and approaching baseline  -daily BMP  -avoid nephrotoxins, renally dose meds  -cont IVF while NPO  2. Diverticulitis - with abscess, microperf, surgery following, pain controlled  -cont abx  -CT on 5/20 showed this to be stable.   3. HTN - bp variable, ?pain contributing.  -if consistently elevated, can increase amlodipine to 10 mg/day  4. Hypokalemia - due to lack of po intake, prior bumex  -replaced today. Trend.     Bola Purcell MD  [facility]  [phone]      Objective    PHYSICAL EXAM  BP (!) 170/77 (BP Location: Right arm)   Pulse 81   Temp 98.4  F (36.9  C) (Oral)   Resp 20   Ht 1.6 m (5' 3\")   Wt 89.4 kg (197 lb 1.5 oz)   SpO2 93%   BMI 34.91 kg/m    I/O last 3 completed shifts:  In: 2246 [P.O.:440; I.V.:1606; IV Piggyback:200]  Out: 550 [Urine:550]  Wt Readings from Last 3 Encounters:   05/31/25 89.4 kg (197 lb 1.5 oz)   11/04/24 88 kg (194 lb)   04/15/24 88 kg (194 lb)       GENERAL: nad  HEENT:normocephalic, atraumatic  CARDIOVASCULAR: rr, no rub,  trace edema  PULMONARY:cta ant. No cyanosis  GASTROINTESTINAL: soft, nt/nd  MSK: diffuse muscle atrophy, warm  NEURO: Alert, no gross focal findings  PSYCHIATRIC: Adequate mood and interaction  SKIN: Pale, no jaundice, no rash.    LABORATORIES  Recent Labs   Lab 05/31/25  0616 05/30/25  0617 05/29/25  0530 05/28/25  0703 05/27/25  2050    142 142 141 134*   POTASSIUM 3.2* 3.0* 3.8 3.5 3.8   CHLORIDE 112* 111* 108* 107 98   CO2 17* 20* 23 20* 19*   BUN 16.9 21.9 29.5* 36.5* 43.5*   CR 1.75* 2.05* 2.44* 2.80* 3.27*   GFRESTIMATED 31* 25* 21* " 17* 14*   JARETT 7.9* 8.0* 7.9* 7.7* 8.4*   PHOS  --  3.3  --   --   --    MAG 1.7 2.1 1.0*  --   --    ALBUMIN  --   --   --   --  3.4*       Recent Labs   Lab 05/31/25  0616 05/30/25  0617 05/29/25  0530 05/28/25  0703 05/27/25 2050   WBC 17.7* 16.4* 17.0* 13.9* 15.5*   HGB 8.4* 9.2* 9.6* 9.2* 10.2*   HCT 26.0* 30.0* 31.1* 29.4* 31.5*   MCV 86 88 88 86 84    361 355 340 376          MEDICATIONS  Current Facility-Administered Medications   Medication Dose Route Frequency Provider Last Rate Last Admin    amLODIPine (NORVASC) tablet 5 mg  5 mg Oral Daily Oscar Fuentes MD   5 mg at 05/31/25 0752    atorvastatin (LIPITOR) tablet 80 mg  80 mg Oral At Bedtime Oscar Fuentes MD   80 mg at 05/30/25 2146    busPIRone (BUSPAR) tablet 7.5 mg  7.5 mg Oral BID Oscar Fuentes MD   7.5 mg at 05/31/25 0800    cefTRIAXone (ROCEPHIN) 2 g vial to attach to  ml bag for ADULTS or NS 50 ml bag for PEDS  2 g Intravenous Q24H Oscar Fuentes MD 0 mL/hr at 05/28/25 0038 2 g at 05/30/25 2147    donepezil (ARICEPT) tablet 10 mg  10 mg Oral At Bedtime Oscar Fuentes MD   10 mg at 05/30/25 2145    escitalopram (LEXAPRO) tablet 20 mg  20 mg Oral Daily Oscar Fuentes MD   20 mg at 05/31/25 0751    heparin ANTICOAGULANT injection 5,000 Units  5,000 Units Subcutaneous Q8H Oscar Fuentes MD   5,000 Units at 05/31/25 0910    insulin aspart (NovoLOG) injection (RAPID ACTING)  1-7 Units Subcutaneous TID AC Oscar Fuentes MD   1 Units at 05/31/25 1218    insulin aspart (NovoLOG) injection (RAPID ACTING)  1-5 Units Subcutaneous At Bedtime Oscar Fuentes MD        insulin glargine (LANTUS PEN) injection 10 Units  10 Units Subcutaneous QPM Oscar Fuentes MD   10 Units at 05/28/25 2140    isosorbide mononitrate (IMDUR) 24 hr tablet 60 mg  60 mg Oral BID Oscar Fuentes MD   60 mg at 05/31/25 0751    metroNIDAZOLE (FLAGYL) infusion 500 mg  500 mg Intravenous Q12H Oscar Fuentes MD 0 mL/hr at 05/28/25 0232 500 mg at 05/31/25 1218     pantoprazole (PROTONIX) EC tablet 40 mg  40 mg Oral QAM AC Oscar Fuentes MD   40 mg at 05/31/25 0751    QUEtiapine (SEROquel) half-tab 12.5 mg  12.5 mg Oral At Bedtime Oscar Fuentes MD   12.5 mg at 05/30/25 2145    sodium chloride (PF) 0.9% PF flush 3 mL  3 mL Intracatheter Q8H ISAI Oscar Feuntes MD   3 mL at 05/31/25 0751    traZODone (DESYREL) tablet 100 mg  100 mg Oral At Bedtime Oscar Fuentes MD   100 mg at 05/30/25 2145    Vitamin D3 (CHOLECALCIFEROL) tablet 25 mcg  25 mcg Oral Daily Oscar Fuentes MD   25 mcg at 05/31/25 0752         Magda Bishop MD  [facility]  [phone]

## 2025-05-31 NOTE — PROGRESS NOTES
General Surgery Progress Note:    Hospital Day # 4    ASSESSMENT:   1. Diverticulitis of colon with perforation        Indu Felix is a 70 year old female admitted with diverticulitis with microperforation.  Persistent leukocytosis but CT reassuring for no worsening abdominal process.  Patient states she is passing gas and she did have 2 nonbloody stool yesterday, so we will start clear liquids today.    PLAN:   Continue IV antibiotics  Start clear liquid diet  Repeat CBC in a.m.  Surgical continue to follow    SUBJECTIVE:   Indu Felix is continuing to complain of hunger and discomfort.  Patient is pleasantly confused at baseline dementia status.    Patient Vitals for the past 24 hrs:   BP Temp Temp src Pulse Resp SpO2 Weight   05/31/25 1227 (!) 170/77 98.4  F (36.9  C) Oral 81 20 93 % --   05/31/25 0800 -- -- -- -- -- -- 89.4 kg (197 lb 1.5 oz)   05/31/25 0732 (!) 149/65 98.5  F (36.9  C) Oral 76 20 93 % --   05/31/25 0052 -- -- -- 76 20 -- --   05/30/25 2343 130/60 98.8  F (37.1  C) Oral 71 18 (!) 88 % --   05/30/25 2315 134/61 -- -- 75 16 92 % --   05/30/25 1530 (!) 147/67 98.5  F (36.9  C) Oral 78 -- -- --       Physical Exam:  General: NAD, pleasant  CV:RRR  LUNGS:CTA bilaterally  ABD: Soft, obese, mild generalized tenderness, no peritoneal signs  EXT:no CCE    Admission on 05/27/2025   Component Date Value    Color Urine 05/27/2025 Light Yellow     Appearance Urine 05/27/2025 Clear     Glucose Urine 05/27/2025 Negative     Bilirubin Urine 05/27/2025 Negative     Ketones Urine 05/27/2025 Negative     Specific Gravity Urine 05/27/2025 1.010     Blood Urine 05/27/2025 Negative     pH Urine 05/27/2025 5.0     Protein Albumin Urine 05/27/2025 Negative     Urobilinogen Urine 05/27/2025 Normal     Nitrite Urine 05/27/2025 Negative     Leukocyte Esterase Urine 05/27/2025 500 [place]ney/uL (A)     Bacteria Urine 05/27/2025 Many (A)     Mucus Urine 05/27/2025 Present (A)     RBC Urine 05/27/2025 1     WBC Urine 05/27/2025  29 (H)     Squamous Epithelials Uri* 05/27/2025 1     Hyaline Casts Urine 05/27/2025 12 (H)     Sodium 05/27/2025 134 (L)     Potassium 05/27/2025 3.8     Chloride 05/27/2025 98     Carbon Dioxide (CO2) 05/27/2025 19 (L)     Anion Gap 05/27/2025 17 (H)     Urea Nitrogen 05/27/2025 43.5 (H)     Creatinine 05/27/2025 3.27 (H)     GFR Estimate 05/27/2025 14 (L)     Calcium 05/27/2025 8.4 (L)     Glucose 05/27/2025 173 (H)     Protein Total 05/27/2025 7.1     Albumin 05/27/2025 3.4 (L)     Bilirubin Total 05/27/2025 0.3     Alkaline Phosphatase 05/27/2025 186 (H)     AST 05/27/2025 18     ALT 05/27/2025 7     Bilirubin Direct 05/27/2025 0.11     Lipase 05/27/2025 19     TSH 05/27/2025 1.62     WBC Count 05/27/2025 15.5 (H)     RBC Count 05/27/2025 3.74 (L)     Hemoglobin 05/27/2025 10.2 (L)     Hematocrit 05/27/2025 31.5 (L)     MCV 05/27/2025 84     MCH 05/27/2025 27.3     MCHC 05/27/2025 32.4     RDW 05/27/2025 13.3     Platelet Count 05/27/2025 376     RBC Morphology 05/27/2025 Confirmed RBC Indices     Platelet Assessment 05/27/2025 Automated Count Confirmed. Platelet morphology is normal.     Polychromasia 05/27/2025 Slight (A)     % Neutrophils 05/27/2025 75     % Lymphocytes 05/27/2025 10     % Monocytes 05/27/2025 12     % Eosinophils 05/27/2025 3     % Basophils 05/27/2025 0     Absolute Neutrophils 05/27/2025 11.6 (H)     Absolute Lymphocytes 05/27/2025 1.6     Absolute Monocytes 05/27/2025 1.9 (H)     Absolute Eosinophils 05/27/2025 0.5     Absolute Basophils 05/27/2025 0.0     Culture 05/27/2025 No growth after 3 days     Culture 05/27/2025 No growth after 3 days     Lactic Acid, Initial 05/27/2025 1.0     Hold Specimen 05/27/2025 [facility]     Hold Specimen 05/27/2025 JIC     Hold Specimen 05/27/2025 [facility]     Creatinine Urine mg/dL 05/27/2025 70.4     Sodium Urine mmol/L 05/27/2025 75     %FENA 05/27/2025 2.6     Sodium 05/28/2025 141     Potassium 05/28/2025 3.5     Chloride 05/28/2025 107     Carbon Dioxide  (CO2) 05/28/2025 20 (L)     Anion Gap 05/28/2025 14     Urea Nitrogen 05/28/2025 36.5 (H)     Creatinine 05/28/2025 2.80 (H)     GFR Estimate 05/28/2025 17 (L)     Calcium 05/28/2025 7.7 (L)     Glucose 05/28/2025 77     WBC Count 05/28/2025 13.9 (H)     RBC Count 05/28/2025 3.43 (L)     Hemoglobin 05/28/2025 9.2 (L)     Hematocrit 05/28/2025 29.4 (L)     MCV 05/28/2025 86     MCH 05/28/2025 26.8     MCHC 05/28/2025 31.3 (L)     RDW 05/28/2025 13.2     Platelet Count 05/28/2025 340     Culture 05/27/2025 >100,000 CFU/mL Klebsiella pneumoniae (A)     Ammonia 05/28/2025 12     Vitamin B12 05/28/2025 854     Folic Acid 05/28/2025 7.2     Hold Specimen 05/28/2025 JIC     GLUCOSE BY METER POCT 05/28/2025 73     GLUCOSE BY METER POCT 05/28/2025 97     GLUCOSE BY METER POCT 05/28/2025 90     GLUCOSE BY METER POCT 05/28/2025 106 (H)     Sodium 05/29/2025 142     Potassium 05/29/2025 3.8     Chloride 05/29/2025 108 (H)     Carbon Dioxide (CO2) 05/29/2025 23     Anion Gap 05/29/2025 11     Urea Nitrogen 05/29/2025 29.5 (H)     Creatinine 05/29/2025 2.44 (H)     GFR Estimate 05/29/2025 21 (L)     Calcium 05/29/2025 7.9 (L)     Glucose 05/29/2025 79     Magnesium 05/29/2025 1.0 (L)     Hold Specimen 05/29/2025 JIC     GLUCOSE BY METER POCT 05/29/2025 85     WBC Count 05/29/2025 17.0 (H)     RBC Count 05/29/2025 3.53 (L)     Hemoglobin 05/29/2025 9.6 (L)     Hematocrit 05/29/2025 31.1 (L)     MCV 05/29/2025 88     MCH 05/29/2025 27.2     MCHC 05/29/2025 30.9 (L)     RDW 05/29/2025 13.5     Platelet Count 05/29/2025 355     GLUCOSE BY METER POCT 05/29/2025 68 (L)     GLUCOSE BY METER POCT 05/29/2025 108 (H)     GLUCOSE BY METER POCT 05/29/2025 70     GLUCOSE BY METER POCT 05/29/2025 67 (L)     GLUCOSE BY METER POCT 05/29/2025 114 (H)     WBC Count 05/30/2025 16.4 (H)     RBC Count 05/30/2025 3.42 (L)     Hemoglobin 05/30/2025 9.2 (L)     Hematocrit 05/30/2025 30.0 (L)     MCV 05/30/2025 88     MCH 05/30/2025 26.9     MCHC  05/30/2025 30.7 (L)     RDW 05/30/2025 13.6     Platelet Count 05/30/2025 361     Sodium 05/30/2025 142     Potassium 05/30/2025 3.0 (L)     Chloride 05/30/2025 111 (H)     Carbon Dioxide (CO2) 05/30/2025 20 (L)     Anion Gap 05/30/2025 11     Urea Nitrogen 05/30/2025 21.9     Creatinine 05/30/2025 2.05 (H)     GFR Estimate 05/30/2025 25 (L)     Calcium 05/30/2025 8.0 (L)     Glucose 05/30/2025 71     Magnesium 05/30/2025 2.1     Phosphorus 05/30/2025 3.3     GLUCOSE BY METER POCT 05/30/2025 74     GLUCOSE BY METER POCT 05/30/2025 70     GLUCOSE BY METER POCT 05/30/2025 70     GLUCOSE BY METER POCT 05/30/2025 68 (L)     GLUCOSE BY METER POCT 05/30/2025 137 (H)     GLUCOSE BY METER POCT 05/30/2025 90     GLUCOSE BY METER POCT 05/30/2025 96     Sodium 05/31/2025 140     Potassium 05/31/2025 3.2 (L)     Chloride 05/31/2025 112 (H)     Carbon Dioxide (CO2) 05/31/2025 17 (L)     Anion Gap 05/31/2025 11     Urea Nitrogen 05/31/2025 16.9     Creatinine 05/31/2025 1.75 (H)     GFR Estimate 05/31/2025 31 (L)     Calcium 05/31/2025 7.9 (L)     Glucose 05/31/2025 141 (H)     Magnesium 05/31/2025 1.7     WBC Count 05/31/2025 17.7 (H)     RBC Count 05/31/2025 3.03 (L)     Hemoglobin 05/31/2025 8.4 (L)     Hematocrit 05/31/2025 26.0 (L)     MCV 05/31/2025 86     MCH 05/31/2025 27.7     MCHC 05/31/2025 32.3     RDW 05/31/2025 13.6     Platelet Count 05/31/2025 330     GLUCOSE BY METER POCT 05/31/2025 114 (H)     GLUCOSE BY METER POCT 05/31/2025 154 (H)     GLUCOSE BY METER POCT 05/31/2025 158 (H)         KETURAH Shields CNP

## 2025-05-31 NOTE — PLAN OF CARE
Problem: Adult Inpatient Plan of Care  Goal: Plan of Care Review  Description: The Plan of Care Review/Shift note should be completed every shift.  The Outcome Evaluation is a brief statement about your assessment that the patient is improving, declining, or no change.  This information will be displayed automatically on your shift  note.  Outcome: Progressing   Goal Outcome Evaluation:       Pt remains confused to time, place and situation, frequently reoriented.Pt has had abdominal pain, medicated with Tylenol and Oxycodone with some relief. Started on clear liquids, pt denies nausea. Blood sugars were 154 and 158, sliding scale insulin as ordered.

## 2025-05-31 NOTE — PLAN OF CARE
"Goal Outcome Evaluation:    Overall Patient Progress: no changeOverall Patient Progress: no change    Outcome Evaluation: Oriented to person only. Holley catheter was removed yesterday evening. Pt impulsively attempted to get out of bed to go to the bathroom. Writer was able to help her after bed alarm set. Pt had an incontinent small amount of loose green/brown stool. Voided. Did not replace the purewick as she has too many lines and it increases her confusion. C/O some back pain and was administered prn oral medication. Blood sugar at 0300 = 114. IVF's and antibiotics running per order.    /60 (BP Location: Right arm)   Pulse 76   Temp 98.8  F (37.1  C) (Oral)   Resp 20   Ht 1.6 m (5' 3\")   Wt 85 kg (187 lb 6.3 oz)   SpO2 (!) 88%   BMI 33.19 kg/m      Problem: Comorbidity Management  Goal: Blood Glucose Levels Within Targeted Range  Outcome: Progressing  Intervention: Monitor and Manage Glycemia  Recent Flowsheet Documentation  Taken 5/31/2025 0052 by Lorie Yun RN  Medication Review/Management: medications reviewed     Problem: Delirium  Goal: Improved Sleep  Intervention: Promote Sleep  Recent Flowsheet Documentation  Taken 5/31/2025 0052 by Lorie Yun RN  Sleep/Rest Enhancement:   awakenings minimized   comfort measures   medication   noise level reduced   room darkened     Problem: Adult Inpatient Plan of Care  Goal: Optimal Comfort and Wellbeing  Outcome: Progressing  Intervention: Monitor Pain and Promote Comfort  Recent Flowsheet Documentation  Taken 5/31/2025 0052 by Lorie Yun RN  Pain Management Interventions:   medication (see MAR)   repositioned   care clustered   emotional support  Intervention: Provide Person-Centered Care  Recent Flowsheet Documentation  Taken 5/31/2025 0052 by Lorie Yun RN  Trust Relationship/Rapport:   care explained   emotional support provided         "

## 2025-06-01 LAB
ANION GAP SERPL CALCULATED.3IONS-SCNC: 10 MMOL/L (ref 7–15)
BUN SERPL-MCNC: 13.5 MG/DL (ref 8–23)
CALCIUM SERPL-MCNC: 8.3 MG/DL (ref 8.8–10.4)
CHLORIDE SERPL-SCNC: 114 MMOL/L (ref 98–107)
CREAT SERPL-MCNC: 1.63 MG/DL (ref 0.51–0.95)
EGFRCR SERPLBLD CKD-EPI 2021: 33 ML/MIN/1.73M2
ERYTHROCYTE [DISTWIDTH] IN BLOOD BY AUTOMATED COUNT: 13.6 % (ref 10–15)
GLUCOSE BLDC GLUCOMTR-MCNC: 100 MG/DL (ref 70–99)
GLUCOSE BLDC GLUCOMTR-MCNC: 107 MG/DL (ref 70–99)
GLUCOSE BLDC GLUCOMTR-MCNC: 156 MG/DL (ref 70–99)
GLUCOSE BLDC GLUCOMTR-MCNC: 157 MG/DL (ref 70–99)
GLUCOSE BLDC GLUCOMTR-MCNC: 170 MG/DL (ref 70–99)
GLUCOSE BLDC GLUCOMTR-MCNC: 96 MG/DL (ref 70–99)
GLUCOSE SERPL-MCNC: 94 MG/DL (ref 70–99)
HCO3 SERPL-SCNC: 19 MMOL/L (ref 22–29)
HCT VFR BLD AUTO: 26.9 % (ref 35–47)
HGB BLD-MCNC: 8.5 G/DL (ref 11.7–15.7)
MAGNESIUM SERPL-MCNC: 1.7 MG/DL (ref 1.7–2.3)
MCH RBC QN AUTO: 27.1 PG (ref 26.5–33)
MCHC RBC AUTO-ENTMCNC: 31.6 G/DL (ref 31.5–36.5)
MCV RBC AUTO: 86 FL (ref 78–100)
PLATELET # BLD AUTO: 361 10E3/UL (ref 150–450)
POTASSIUM SERPL-SCNC: 3.4 MMOL/L (ref 3.4–5.3)
RBC # BLD AUTO: 3.14 10E6/UL (ref 3.8–5.2)
SODIUM SERPL-SCNC: 143 MMOL/L (ref 135–145)
WBC # BLD AUTO: 14.7 10E3/UL (ref 4–11)

## 2025-06-01 PROCEDURE — 250N000011 HC RX IP 250 OP 636: Mod: JZ | Performed by: HOSPITALIST

## 2025-06-01 PROCEDURE — 99207 PR NO BILLABLE SERVICE THIS VISIT: CPT | Performed by: INTERNAL MEDICINE

## 2025-06-01 PROCEDURE — 82947 ASSAY GLUCOSE BLOOD QUANT: CPT | Performed by: INTERNAL MEDICINE

## 2025-06-01 PROCEDURE — 85018 HEMOGLOBIN: CPT | Performed by: INTERNAL MEDICINE

## 2025-06-01 PROCEDURE — 120N000001 HC R&B MED SURG/OB

## 2025-06-01 PROCEDURE — 99232 SBSQ HOSP IP/OBS MODERATE 35: CPT | Performed by: NURSE PRACTITIONER

## 2025-06-01 PROCEDURE — 99232 SBSQ HOSP IP/OBS MODERATE 35: CPT | Performed by: INTERNAL MEDICINE

## 2025-06-01 PROCEDURE — 250N000013 HC RX MED GY IP 250 OP 250 PS 637: Performed by: HOSPITALIST

## 2025-06-01 PROCEDURE — 36415 COLL VENOUS BLD VENIPUNCTURE: CPT | Performed by: INTERNAL MEDICINE

## 2025-06-01 PROCEDURE — 83735 ASSAY OF MAGNESIUM: CPT | Performed by: INTERNAL MEDICINE

## 2025-06-01 PROCEDURE — 250N000013 HC RX MED GY IP 250 OP 250 PS 637: Performed by: INTERNAL MEDICINE

## 2025-06-01 PROCEDURE — 80048 BASIC METABOLIC PNL TOTAL CA: CPT | Performed by: INTERNAL MEDICINE

## 2025-06-01 RX ORDER — MAGNESIUM OXIDE 400 MG/1
400 TABLET ORAL 2 TIMES DAILY
Status: COMPLETED | OUTPATIENT
Start: 2025-06-01 | End: 2025-06-02

## 2025-06-01 RX ORDER — AMLODIPINE BESYLATE 5 MG/1
5 TABLET ORAL ONCE
Status: COMPLETED | OUTPATIENT
Start: 2025-06-01 | End: 2025-06-01

## 2025-06-01 RX ORDER — POTASSIUM CHLORIDE 1500 MG/1
40 TABLET, EXTENDED RELEASE ORAL ONCE
Status: COMPLETED | OUTPATIENT
Start: 2025-06-01 | End: 2025-06-01

## 2025-06-01 RX ORDER — AMLODIPINE BESYLATE 5 MG/1
10 TABLET ORAL DAILY
Status: DISCONTINUED | OUTPATIENT
Start: 2025-06-02 | End: 2025-06-04 | Stop reason: HOSPADM

## 2025-06-01 RX ORDER — LORAZEPAM 1 MG/1
1 TABLET ORAL EVERY 4 HOURS PRN
Status: DISCONTINUED | OUTPATIENT
Start: 2025-06-01 | End: 2025-06-04 | Stop reason: HOSPADM

## 2025-06-01 RX ADMIN — MAGNESIUM OXIDE TAB 400 MG (241.3 MG ELEMENTAL MG) 400 MG: 400 (241.3 MG) TAB at 09:35

## 2025-06-01 RX ADMIN — BUSPIRONE HYDROCHLORIDE 7.5 MG: 7.5 TABLET ORAL at 19:40

## 2025-06-01 RX ADMIN — LORAZEPAM 1 MG: 1 TABLET ORAL at 18:48

## 2025-06-01 RX ADMIN — HEPARIN SODIUM 5000 UNITS: 5000 INJECTION, SOLUTION INTRAVENOUS; SUBCUTANEOUS at 15:42

## 2025-06-01 RX ADMIN — ATORVASTATIN CALCIUM 80 MG: 40 TABLET, FILM COATED ORAL at 21:51

## 2025-06-01 RX ADMIN — ISOSORBIDE MONONITRATE 60 MG: 30 TABLET, EXTENDED RELEASE ORAL at 09:06

## 2025-06-01 RX ADMIN — DONEPEZIL HYDROCHLORIDE 10 MG: 5 TABLET ORAL at 21:52

## 2025-06-01 RX ADMIN — HEPARIN SODIUM 5000 UNITS: 5000 INJECTION, SOLUTION INTRAVENOUS; SUBCUTANEOUS at 09:05

## 2025-06-01 RX ADMIN — ESCITALOPRAM OXALATE 20 MG: 20 TABLET ORAL at 09:07

## 2025-06-01 RX ADMIN — BUSPIRONE HYDROCHLORIDE 7.5 MG: 7.5 TABLET ORAL at 09:08

## 2025-06-01 RX ADMIN — TRAZODONE HYDROCHLORIDE 100 MG: 50 TABLET ORAL at 21:51

## 2025-06-01 RX ADMIN — INSULIN ASPART 1 UNITS: 100 INJECTION, SOLUTION INTRAVENOUS; SUBCUTANEOUS at 16:10

## 2025-06-01 RX ADMIN — POTASSIUM CHLORIDE 40 MEQ: 1500 TABLET, EXTENDED RELEASE ORAL at 09:35

## 2025-06-01 RX ADMIN — PANTOPRAZOLE SODIUM 40 MG: 40 TABLET, DELAYED RELEASE ORAL at 09:06

## 2025-06-01 RX ADMIN — CEFTRIAXONE SODIUM 2 G: 2 INJECTION, POWDER, FOR SOLUTION INTRAMUSCULAR; INTRAVENOUS at 21:53

## 2025-06-01 RX ADMIN — QUETIAPINE FUMARATE 12.5 MG: 25 TABLET ORAL at 15:42

## 2025-06-01 RX ADMIN — AMLODIPINE BESYLATE 5 MG: 5 TABLET ORAL at 09:07

## 2025-06-01 RX ADMIN — ACETAMINOPHEN 650 MG: 325 TABLET ORAL at 15:41

## 2025-06-01 RX ADMIN — INSULIN GLARGINE 10 UNITS: 100 INJECTION, SOLUTION SUBCUTANEOUS at 19:40

## 2025-06-01 RX ADMIN — QUETIAPINE FUMARATE 12.5 MG: 25 TABLET ORAL at 21:52

## 2025-06-01 RX ADMIN — MAGNESIUM OXIDE TAB 400 MG (241.3 MG ELEMENTAL MG) 400 MG: 400 (241.3 MG) TAB at 19:40

## 2025-06-01 RX ADMIN — ISOSORBIDE MONONITRATE 60 MG: 30 TABLET, EXTENDED RELEASE ORAL at 19:39

## 2025-06-01 RX ADMIN — METRONIDAZOLE 500 MG: 500 INJECTION, SOLUTION INTRAVENOUS at 11:56

## 2025-06-01 RX ADMIN — AMLODIPINE BESYLATE 5 MG: 5 TABLET ORAL at 15:42

## 2025-06-01 RX ADMIN — Medication 25 MCG: at 09:06

## 2025-06-01 RX ADMIN — METRONIDAZOLE 500 MG: 500 INJECTION, SOLUTION INTRAVENOUS at 01:20

## 2025-06-01 ASSESSMENT — ACTIVITIES OF DAILY LIVING (ADL)
ADLS_ACUITY_SCORE: 60
ADLS_ACUITY_SCORE: 57
ADLS_ACUITY_SCORE: 60
ADLS_ACUITY_SCORE: 57
ADLS_ACUITY_SCORE: 57
ADLS_ACUITY_SCORE: 60
ADLS_ACUITY_SCORE: 60
ADLS_ACUITY_SCORE: 57
ADLS_ACUITY_SCORE: 60
ADLS_ACUITY_SCORE: 57
ADLS_ACUITY_SCORE: 60
ADLS_ACUITY_SCORE: 57
ADLS_ACUITY_SCORE: 60

## 2025-06-01 NOTE — PLAN OF CARE
Problem: Adult Inpatient Plan of Care  Goal: Plan of Care Review  Description: The Plan of Care Review/Shift note should be completed every shift.  The Outcome Evaluation is a brief statement about your assessment that the patient is improving, declining, or no change.  This information will be displayed automatically on your shift  note.  Outcome: Progressing   Goal Outcome Evaluation:         Pt is alert to self, confused to time, place and situation, frequently reoriented. Diet advanced to low fiber, pt consumed 25 percent of lunch. Up in the bedside chair twice this shift, ambulating with standby assist and a walker. Pt denies pain or nausea.

## 2025-06-01 NOTE — PLAN OF CARE
Shift from 2300 to 0730-      Problem: Delirium  Goal: Optimal Coping  Outcome: Progressing  Intervention: Optimize Psychosocial Adjustment to Delirium  Recent Flowsheet Documentation  Taken 6/1/2025 0038 by Aleshia Avila RN  Family/Support System Care:   involvement promoted   presence promoted  Goal: Improved Behavioral Control  Outcome: Progressing  Intervention: Prevent and Manage Agitation  Recent Flowsheet Documentation  Taken 6/1/2025 0038 by Aleshia Avila RN  Environment Familiarity/Consistency: daily routine followed  Intervention: Minimize Safety Risk  Recent Flowsheet Documentation  Taken 6/1/2025 0038 by Aleshia Avila RN  Enhanced Safety Measures:   pain management   review medications for side effects with activity  Trust Relationship/Rapport: care explained  Goal: Improved Attention and Thought Clarity  Outcome: Progressing  Intervention: Maximize Cognitive Function  Recent Flowsheet Documentation  Taken 6/1/2025 0038 by Aleshia Avila RN  Sensory Stimulation Regulation:   quiet environment promoted   care clustered  Reorientation Measures:   clock in view   reorientation provided  Goal: Improved Sleep  Outcome: Progressing       Goal Outcome Evaluation:    Problem: Delirium  Goal: Optimal Coping  6/1/2025 0341 by Aleshia Avila RN  Outcome: Progressing  6/1/2025 0339 by Aleshia Avila RN  Outcome: Progressing  Intervention: Optimize Psychosocial Adjustment to Delirium  Recent Flowsheet Documentation  Taken 6/1/2025 0038 by Aleshia Avila RN  Family/Support System Care:   involvement promoted   presence promoted  Goal: Improved Behavioral Control  6/1/2025 0341 by Aleshia Avila RN  Outcome: Progressing  6/1/2025 0339 by Aleshia Avila RN  Outcome: Progressing  Intervention: Prevent and Manage Agitation  Recent Flowsheet Documentation  Taken 6/1/2025 0038 by Aleshia Avila RN  Environment Familiarity/Consistency: daily routine followed  Intervention: Minimize Safety Risk  Recent Flowsheet  Documentation  Taken 6/1/2025 0038 by Aleshia Avila, RN  Enhanced Safety Measures:   pain management   review medications for side effects with activity  Trust Relationship/Rapport: care explained  Goal: Improved Attention and Thought Clarity  6/1/2025 0341 by Aleshia Avila RN  Outcome: Progressing  6/1/2025 0339 by Aleshia Avila RN  Outcome: Progressing  Intervention: Maximize Cognitive Function  Recent Flowsheet Documentation  Taken 6/1/2025 0038 by Aleshia Avila RN  Sensory Stimulation Regulation:   quiet environment promoted   care clustered  Reorientation Measures:   clock in view   reorientation provided  Goal: Improved Sleep  6/1/2025 0341 by Aleshia Avila RN  Outcome: Progressing  6/1/2025 0339 by Aleshia Avila RN  Outcome: Progressing     Problem: Infection  Goal: Absence of Infection Signs and Symptoms  Outcome: Progressing    Patient slept most of the night. Pleasantly confused.   Continue on IV abx.   Incontinent of urine.

## 2025-06-01 NOTE — PROGRESS NOTES
"RENAL PROGRESS NOTE - [facility]        CC: follow up JEFFREY    Subjective: There were no acute events overnight. She reports some abdominal pain intermittently. Seen by surgery. No cp, sob.       Assessment and Plan:   71 yo female with DM2, HTN, CKD 4, diastolic heart failure admit with diverticulitis with microperf and pelvic abscess, JEFFREY    1. JEFFREY/CKD 4 - due to volume depletion, on bumex PTA,   -now improving nicely with IVF and  BELOW PRIOR BASELINE OF 1.8-2.2.  -daily BMP  -avoid nephrotoxins, renally dose meds  -cont IVF while NPO    2. Diverticulitis - with abscess, microperf, surgery following, pain controlled  -cont abx, other per surgery    3. HTN - bp variable, acceptable, ?pain contributing.  -if consistently elevated, can increase amlodipine to 10 mg/day    4. Hypokalemia - due to lack of po intake, prior bumex  -replaced and now normal. Trend.     Bola Purcell MD  [facility]  [phone]      Objective    PHYSICAL EXAM  BP (!) 143/63 (BP Location: Left arm)   Pulse 77   Temp 98.2  F (36.8  C) (Oral)   Resp 18   Ht 1.6 m (5' 3\")   Wt 88.5 kg (195 lb 1.7 oz)   SpO2 95%   BMI 34.56 kg/m    I/O last 3 completed shifts:  In: 1735 [P.O.:720; I.V.:815; IV Piggyback:200]  Out: 450 [Urine:450]  Wt Readings from Last 3 Encounters:   06/01/25 88.5 kg (195 lb 1.7 oz)   11/04/24 88 kg (194 lb)   04/15/24 88 kg (194 lb)       GENERAL: nad, calm in bed  HEENT:normocephalic, atraumatic  CARDIOVASCULAR: rr, no rub,  trace edema  PULMONARY:cta ant. No cyanosis  GASTROINTESTINAL: soft, nt/nd  MSK: diffuse muscle atrophy, warm  NEURO: Alert, no gross focal findings  PSYCHIATRIC: Adequate mood and interaction  SKIN: Pale, no jaundice, no rash.    LABORATORIES  Recent Labs   Lab 06/01/25  0613 05/31/25  0616 05/30/25  0617 05/29/25  0530 05/28/25  0703 05/27/25  2050    140 142 142 141 134*   POTASSIUM 3.4 3.2* 3.0* 3.8 3.5 3.8   CHLORIDE 114* 112* 111* 108* 107 98   CO2 19* 17* 20* " 23 20* 19*   BUN 13.5 16.9 21.9 29.5* 36.5* 43.5*   CR 1.63* 1.75* 2.05* 2.44* 2.80* 3.27*   GFRESTIMATED 33* 31* 25* 21* 17* 14*   JARETT 8.3* 7.9* 8.0* 7.9* 7.7* 8.4*   PHOS  --   --  3.3  --   --   --    MAG 1.7 1.7 2.1 1.0*  --   --    ALBUMIN  --   --   --   --   --  3.4*       Recent Labs   Lab 06/01/25  0613 05/31/25  0616 05/30/25  0617 05/29/25  0530 05/28/25  0703 05/27/25 2050   WBC 14.7* 17.7* 16.4* 17.0* 13.9* 15.5*   HGB 8.5* 8.4* 9.2* 9.6* 9.2* 10.2*   HCT 26.9* 26.0* 30.0* 31.1* 29.4* 31.5*   MCV 86 86 88 88 86 84    330 361 355 340 376          MEDICATIONS  Current Facility-Administered Medications   Medication Dose Route Frequency Provider Last Rate Last Admin    amLODIPine (NORVASC) tablet 5 mg  5 mg Oral Daily Oscar Fuentes MD   5 mg at 06/01/25 0907    atorvastatin (LIPITOR) tablet 80 mg  80 mg Oral At Bedtime Oscar Fuentes MD   80 mg at 05/31/25 2210    busPIRone (BUSPAR) tablet 7.5 mg  7.5 mg Oral BID Oscar Fuentes MD   7.5 mg at 06/01/25 0908    cefTRIAXone (ROCEPHIN) 2 g vial to attach to  ml bag for ADULTS or NS 50 ml bag for PEDS  2 g Intravenous Q24H Oscar Fuentes MD 0 mL/hr at 05/28/25 0038 2 g at 05/31/25 2211    donepezil (ARICEPT) tablet 10 mg  10 mg Oral At Bedtime Oscar Fuentes MD   10 mg at 05/31/25 2209    escitalopram (LEXAPRO) tablet 20 mg  20 mg Oral Daily Oscar Fuentes MD   20 mg at 06/01/25 0907    heparin ANTICOAGULANT injection 5,000 Units  5,000 Units Subcutaneous Q8H Oscar Fuentes MD   5,000 Units at 06/01/25 0905    insulin aspart (NovoLOG) injection (RAPID ACTING)  1-7 Units Subcutaneous TID AC Oscar Fuentes MD   1 Units at 05/31/25 1753    insulin aspart (NovoLOG) injection (RAPID ACTING)  1-5 Units Subcutaneous At Bedtime Oscar Fuentes MD        insulin glargine (LANTUS PEN) injection 10 Units  10 Units Subcutaneous QPM Oscar Fuentes MD   10 Units at 05/31/25 1944    isosorbide mononitrate (IMDUR) 24 hr tablet 60 mg  60 mg Oral BID  Oscar Fuentes MD   60 mg at 06/01/25 0906    magnesium oxide (MAG-OX) tablet 400 mg  400 mg Oral BID Terri Rankin MD   400 mg at 06/01/25 0935    metroNIDAZOLE (FLAGYL) infusion 500 mg  500 mg Intravenous Q12H Oscar Fuentes MD 0 mL/hr at 05/28/25 0232 500 mg at 06/01/25 1156    pantoprazole (PROTONIX) EC tablet 40 mg  40 mg Oral QAM AC Oscar Fuentes MD   40 mg at 06/01/25 0906    QUEtiapine (SEROquel) half-tab 12.5 mg  12.5 mg Oral At Bedtime Oscar Fuentes MD   12.5 mg at 05/31/25 2209    sodium chloride (PF) 0.9% PF flush 3 mL  3 mL Intracatheter Q8H ISAI Oscar Fuentes MD   3 mL at 06/01/25 1348    traZODone (DESYREL) tablet 100 mg  100 mg Oral At Bedtime Oscar Fuentes MD   100 mg at 05/31/25 2208    Vitamin D3 (CHOLECALCIFEROL) tablet 25 mcg  25 mcg Oral Daily Oscar Fuentes MD   25 mcg at 06/01/25 0906         Magda Bishop MD  [facility]  [phone]

## 2025-06-01 NOTE — PROGRESS NOTES
Pt getting increasingly more agitated trying to get up and leave consistently. PRN Seroquel given, provider notified, pt placed on 1:1.    Renetta Mckinney RN

## 2025-06-01 NOTE — PROGRESS NOTES
[facility]    Medicine Progress Note - Hospitalist Service    Date of Admission:  5/27/2025    Assessment & Plan   Indu Felix is a 70-year-old female with history of hypertension, type 2 diabetes mellitus (HbA1c 6.3% on 12/6/23), nonrheumatic mitral stenosis, pulmonary hypertension, chronic diastolic CHF, DIANA on CPAP, prior left parieto-occipital CVA (6/11/23), vascular dementia, chronic kidney disease stage 4, anemia of chronic disease, diverticulitis with perforation, porcelain gallbladder, and depression in remission, admitted on 5/27/2025 from her memory care facility with confusion, poor oral intake, generalized weakness, worsening leukocytosis, JEFFREY on stage III CKD, and suspected diverticulitis with microperforation and possible abscess.     General Surgery service recommends percutaneous drainage of pelvic abscess by IR.  Percutaneous drainage was deferred due by IR due to small abscess size. Daughter Solange is POA due to patient's lack of decision making capacity.    Severe Sepsis with JEFFREY due to Diverticulitis with Contained Perforation  Possible pelvic abscess.   Continue IV ceftriaxone and metronidazole.  IVF.  P.o. except sips with meds.  Percutaneous drainage was deferred due by IR due to small abscess size.  Follow-up blood culture-NGTD  Monitor urine output  5/30 repeat CT abdomen with minimal improvement.  Low fiber diet started 6/1, per surgery.    Acute Kidney Injury on CKD Stage 4. Prerenal vs. sepsis-related. S/p IVF and sepsis treatment.  Monitor BMP. Nephrology following, recommendations reviewed.  IVF while NPO.    Chronic Heart Failure with Preserved Ejection Fraction (HFpEF), stable.  Baseline diastolic dysfunction  Hold diuretics for now  Hold PTA bumetanide 2 mg daily  Monitor fluid balance closely to avoid volume overload    Hypertension  Amlodipine 5 mg daily, increased to 10 mg 6-1, given elevated BP.    Hypokalemia, due to poor p.o.  Replacing no  "normal.    Type 2 Diabetes Mellitus  Well-controlled (HbA1c 6.3%)  Hold metformin during JEFFREY  Continue insulin sliding scale   Monitor for hypoglycemia per protocol    DIANA on CPAP    History of CVA and Vascular Dementia.  On atorvastatin.  Aspirin clopidogrel  held in anticipation of possible surgical intervention.    Care decision/goals of care. Patient lacks decision-making capacity; daughter Solange is POA    Depression.  On Lexapro, trazodone, BuSpar.     Dementia.  On Seroquel and donepezil.    Diet: Low Fiber Diet    DVT Prophylaxis: Heparin SQ  Holley Catheter: Not present  Lines: None     Cardiac Monitoring: None  Code Status: Full Code      Clinically Significant Risk Factors      # Hyponatremia: Lowest Na = 134 mmol/L in last 2 days, will monitor as appropriate  # Hyperchloremia: Highest Cl = 108 mmol/L in last 2 days, will monitor as appropriate  # Hypocalcemia: Lowest Ca = 7.7 mg/dL in last 2 days, will monitor and replace as appropriate   # Hypomagnesemia: Lowest Mg = 1 mg/dL in last 2 days, will replace as needed   # Hypoalbuminemia: Lowest albumin = 3.4 g/dL at 5/27/2025  8:50 PM, will monitor as appropriate     # Hypertension: Noted on problem list  # Chronic heart failure with preserved ejection fraction: heart failure noted on problem list and last echo with EF >50%    # Dementia: noted on problem list   # Obesity: Estimated body mass index is 33.39 kg/m  as calculated from the following:    Height as of this encounter: 1.6 m (5' 3\").    Weight as of this encounter: 85.5 kg (188 lb 8 oz).      # Financial/Environmental Concerns:        Social Drivers of Health    Alcohol Use: Unknown (4/6/2020)    Received from [facility]     AUDIT-C     Frequency of Alcohol Consumption: Monthly or less   Interpersonal Safety: Unknown (5/29/2025)    Interpersonal Safety     Do you feel physically and emotionally safe where you currently live?: Patient unable to answer     Within the past 12 months, have you " been hit, slapped, kicked or otherwise physically hurt by someone?: Patient unable to answer     Within the past 12 months, have you been humiliated or emotionally abused in other ways by your partner or ex-partner?: Patient unable to answer      Disposition Plan     Medically Ready for Discharge: Anticipated in 2-4 Days    Terri Rankin MD  Hospitalist Service  [facility]  Securely message with [employer] (more info)  Text page via [employer] Paging/Directory   ______________________________________________________________________    Interval History   No new complaints today.  Appears to be in better spirit.  Sitting in the chair.  Denies abdominal pain, nausea, dysuria.  Surgery recommendations reviewed to increase diet to low fiber.    Physical Exam   Vital Signs: Temp: 98.2  F (36.8  C) Temp src: Oral BP: (!) 143/63 Pulse: 77   Resp: 18 SpO2: 95 % O2 Device: None (Room air)    Weight: 195 lbs 1.71 oz  General appearance: Awake, Alert, Cooperative, not in any obvious distress and appears stated age   HEENT: Normocephalic, atraumatic, conjunctiva clear without icterus and ears without discharge  Lungs: Clear to auscultation bilaterally, no wheezing, good air exchange, normal work of breathing  Cardiovascular: Regular Rate and Rythm, normal apical impulse, normal S1 and S2, no lower extremity edema bilaterally  Abdomen: Soft with left lower quadrant tenderness, active bowel sounds  Skin: Skin color, texture normal and bruising or bleeding. No rashes or lesions over face, neck, arms and legs, turgor normal.  Musculoskeletal: No bony deformities or joint tenderness. Normal ROM upon flexion & extension.   Neurologic: Alert & Oriented X 3, Facial symmetry preserved and upper & lower extremities moving well with symmetry  Psychiatric: Unable to assess    Medical Decision Making       45 MINUTES SPENT BY ME on the date of service doing chart review, history, exam, documentation & further activities  per the note.      Data     I have personally reviewed the following data over the past 24 hrs:    14.7 (H)  \   8.5 (L)   / 361     143 114 (H) 13.5 /  107 (H)   3.4 19 (L) 1.63 (H) \       Imaging results reviewed over the past 24 hrs:   No results found for this or any previous visit (from the past 24 hours).    This document is created with the help of Dragon dictation system. All grammatical errors are unintentional.

## 2025-06-01 NOTE — PROGRESS NOTES
General Surgery Progress Note:    Hospital Day # 5    ASSESSMENT:   1. Diverticulitis of colon with perforation        Indu Felix is a 70 year old female admitted with diverticulitis with microperforation.  Persistent leukocytosis but CT reassuring for no worsening abdominal process.  Patient feeling much better after having bowel movements for the last 2 days.  She has been tolerating clear liquids and her leukocytosis is starting to trend down.       PLAN:   Low fiber diet  Repeat CBC in a.m.  Surgery will continue to follow  Continue IV antibiotics      SUBJECTIVE:   Indu Felix is doing much better today.  She reports no abdominal pain.  She has had multiple bowel movements yesterday.  She states she is hungry and wants something to eat.    Patient Vitals for the past 24 hrs:   BP Temp Temp src Pulse Resp SpO2 Weight   06/01/25 0800 -- -- -- -- -- -- 88.5 kg (195 lb 1.7 oz)   06/01/25 0710 (!) 143/63 98.2  F (36.8  C) Oral 77 18 95 % --   06/01/25 0020 (!) 142/59 98.2  F (36.8  C) Oral 69 20 94 % --   05/31/25 1514 126/58 98.1  F (36.7  C) Oral 68 20 93 % --   05/31/25 1227 (!) 170/77 98.4  F (36.9  C) Oral 81 20 93 % --       Physical Exam:  General: NAD, pleasant  CV:RRR  LUNGS:CTA bilaterally  ABD: Soft, obese, not tender  EXT:no CCE    Admission on 05/27/2025   Component Date Value    Color Urine 05/27/2025 Light Yellow     Appearance Urine 05/27/2025 Clear     Glucose Urine 05/27/2025 Negative     Bilirubin Urine 05/27/2025 Negative     Ketones Urine 05/27/2025 Negative     Specific Gravity Urine 05/27/2025 1.010     Blood Urine 05/27/2025 Negative     pH Urine 05/27/2025 5.0     Protein Albumin Urine 05/27/2025 Negative     Urobilinogen Urine 05/27/2025 Normal     Nitrite Urine 05/27/2025 Negative     Leukocyte Esterase Urine 05/27/2025 500 [place]/uL (A)     Bacteria Urine 05/27/2025 Many (A)     Mucus Urine 05/27/2025 Present (A)     RBC Urine 05/27/2025 1     WBC Urine 05/27/2025 29 (H)     Squamous  Epithelials Uri* 05/27/2025 1     Hyaline Casts Urine 05/27/2025 12 (H)     Sodium 05/27/2025 134 (L)     Potassium 05/27/2025 3.8     Chloride 05/27/2025 98     Carbon Dioxide (CO2) 05/27/2025 19 (L)     Anion Gap 05/27/2025 17 (H)     Urea Nitrogen 05/27/2025 43.5 (H)     Creatinine 05/27/2025 3.27 (H)     GFR Estimate 05/27/2025 14 (L)     Calcium 05/27/2025 8.4 (L)     Glucose 05/27/2025 173 (H)     Protein Total 05/27/2025 7.1     Albumin 05/27/2025 3.4 (L)     Bilirubin Total 05/27/2025 0.3     Alkaline Phosphatase 05/27/2025 186 (H)     AST 05/27/2025 18     ALT 05/27/2025 7     Bilirubin Direct 05/27/2025 0.11     Lipase 05/27/2025 19     TSH 05/27/2025 1.62     WBC Count 05/27/2025 15.5 (H)     RBC Count 05/27/2025 3.74 (L)     Hemoglobin 05/27/2025 10.2 (L)     Hematocrit 05/27/2025 31.5 (L)     MCV 05/27/2025 84     MCH 05/27/2025 27.3     MCHC 05/27/2025 32.4     RDW 05/27/2025 13.3     Platelet Count 05/27/2025 376     RBC Morphology 05/27/2025 Confirmed RBC Indices     Platelet Assessment 05/27/2025 Automated Count Confirmed. Platelet morphology is normal.     Polychromasia 05/27/2025 Slight (A)     % Neutrophils 05/27/2025 75     % Lymphocytes 05/27/2025 10     % Monocytes 05/27/2025 12     % Eosinophils 05/27/2025 3     % Basophils 05/27/2025 0     Absolute Neutrophils 05/27/2025 11.6 (H)     Absolute Lymphocytes 05/27/2025 1.6     Absolute Monocytes 05/27/2025 1.9 (H)     Absolute Eosinophils 05/27/2025 0.5     Absolute Basophils 05/27/2025 0.0     Culture 05/27/2025 No growth after 4 days     Culture 05/27/2025 No growth after 4 days     Lactic Acid, Initial 05/27/2025 1.0     Hold Specimen 05/27/2025 JIC     Hold Specimen 05/27/2025 JIC     Hold Specimen 05/27/2025 [facility]     Creatinine Urine mg/dL 05/27/2025 70.4     Sodium Urine mmol/L 05/27/2025 75     %FENA 05/27/2025 2.6     Sodium 05/28/2025 141     Potassium 05/28/2025 3.5     Chloride 05/28/2025 107     Carbon Dioxide (CO2) 05/28/2025 20 (L)      Anion Gap 05/28/2025 14     Urea Nitrogen 05/28/2025 36.5 (H)     Creatinine 05/28/2025 2.80 (H)     GFR Estimate 05/28/2025 17 (L)     Calcium 05/28/2025 7.7 (L)     Glucose 05/28/2025 77     WBC Count 05/28/2025 13.9 (H)     RBC Count 05/28/2025 3.43 (L)     Hemoglobin 05/28/2025 9.2 (L)     Hematocrit 05/28/2025 29.4 (L)     MCV 05/28/2025 86     MCH 05/28/2025 26.8     MCHC 05/28/2025 31.3 (L)     RDW 05/28/2025 13.2     Platelet Count 05/28/2025 340     Culture 05/27/2025 >100,000 CFU/mL Klebsiella pneumoniae (A)     Ammonia 05/28/2025 12     Vitamin B12 05/28/2025 854     Folic Acid 05/28/2025 7.2     Hold Specimen 05/28/2025 JIC     GLUCOSE BY METER POCT 05/28/2025 73     GLUCOSE BY METER POCT 05/28/2025 97     GLUCOSE BY METER POCT 05/28/2025 90     GLUCOSE BY METER POCT 05/28/2025 106 (H)     Sodium 05/29/2025 142     Potassium 05/29/2025 3.8     Chloride 05/29/2025 108 (H)     Carbon Dioxide (CO2) 05/29/2025 23     Anion Gap 05/29/2025 11     Urea Nitrogen 05/29/2025 29.5 (H)     Creatinine 05/29/2025 2.44 (H)     GFR Estimate 05/29/2025 21 (L)     Calcium 05/29/2025 7.9 (L)     Glucose 05/29/2025 79     Magnesium 05/29/2025 1.0 (L)     Hold Specimen 05/29/2025 JIC     GLUCOSE BY METER POCT 05/29/2025 85     WBC Count 05/29/2025 17.0 (H)     RBC Count 05/29/2025 3.53 (L)     Hemoglobin 05/29/2025 9.6 (L)     Hematocrit 05/29/2025 31.1 (L)     MCV 05/29/2025 88     MCH 05/29/2025 27.2     MCHC 05/29/2025 30.9 (L)     RDW 05/29/2025 13.5     Platelet Count 05/29/2025 355     GLUCOSE BY METER POCT 05/29/2025 68 (L)     GLUCOSE BY METER POCT 05/29/2025 108 (H)     GLUCOSE BY METER POCT 05/29/2025 70     GLUCOSE BY METER POCT 05/29/2025 67 (L)     GLUCOSE BY METER POCT 05/29/2025 114 (H)     WBC Count 05/30/2025 16.4 (H)     RBC Count 05/30/2025 3.42 (L)     Hemoglobin 05/30/2025 9.2 (L)     Hematocrit 05/30/2025 30.0 (L)     MCV 05/30/2025 88     MCH 05/30/2025 26.9     MCHC 05/30/2025 30.7 (L)     RDW  05/30/2025 13.6     Platelet Count 05/30/2025 361     Sodium 05/30/2025 142     Potassium 05/30/2025 3.0 (L)     Chloride 05/30/2025 111 (H)     Carbon Dioxide (CO2) 05/30/2025 20 (L)     Anion Gap 05/30/2025 11     Urea Nitrogen 05/30/2025 21.9     Creatinine 05/30/2025 2.05 (H)     GFR Estimate 05/30/2025 25 (L)     Calcium 05/30/2025 8.0 (L)     Glucose 05/30/2025 71     Magnesium 05/30/2025 2.1     Phosphorus 05/30/2025 3.3     GLUCOSE BY METER POCT 05/30/2025 74     GLUCOSE BY METER POCT 05/30/2025 70     GLUCOSE BY METER POCT 05/30/2025 70     GLUCOSE BY METER POCT 05/30/2025 68 (L)     GLUCOSE BY METER POCT 05/30/2025 137 (H)     GLUCOSE BY METER POCT 05/30/2025 90     GLUCOSE BY METER POCT 05/30/2025 96     Sodium 05/31/2025 140     Potassium 05/31/2025 3.2 (L)     Chloride 05/31/2025 112 (H)     Carbon Dioxide (CO2) 05/31/2025 17 (L)     Anion Gap 05/31/2025 11     Urea Nitrogen 05/31/2025 16.9     Creatinine 05/31/2025 1.75 (H)     GFR Estimate 05/31/2025 31 (L)     Calcium 05/31/2025 7.9 (L)     Glucose 05/31/2025 141 (H)     Magnesium 05/31/2025 1.7     WBC Count 05/31/2025 17.7 (H)     RBC Count 05/31/2025 3.03 (L)     Hemoglobin 05/31/2025 8.4 (L)     Hematocrit 05/31/2025 26.0 (L)     MCV 05/31/2025 86     MCH 05/31/2025 27.7     MCHC 05/31/2025 32.3     RDW 05/31/2025 13.6     Platelet Count 05/31/2025 330     GLUCOSE BY METER POCT 05/31/2025 114 (H)     GLUCOSE BY METER POCT 05/31/2025 154 (H)     GLUCOSE BY METER POCT 05/31/2025 158 (H)     GLUCOSE BY METER POCT 05/31/2025 169 (H)     WBC Count 06/01/2025 14.7 (H)     RBC Count 06/01/2025 3.14 (L)     Hemoglobin 06/01/2025 8.5 (L)     Hematocrit 06/01/2025 26.9 (L)     MCV 06/01/2025 86     MCH 06/01/2025 27.1     MCHC 06/01/2025 31.6     RDW 06/01/2025 13.6     Platelet Count 06/01/2025 361     Magnesium 06/01/2025 1.7     Sodium 06/01/2025 143     Potassium 06/01/2025 3.4     Chloride 06/01/2025 114 (H)     Carbon Dioxide (CO2) 06/01/2025 19 (L)      Anion Gap 06/01/2025 10     Urea Nitrogen 06/01/2025 13.5     Creatinine 06/01/2025 1.63 (H)     GFR Estimate 06/01/2025 33 (L)     Calcium 06/01/2025 8.3 (L)     Glucose 06/01/2025 94     GLUCOSE BY METER POCT 05/31/2025 156 (H)     GLUCOSE BY METER POCT 06/01/2025 100 (H)     GLUCOSE BY METER POCT 06/01/2025 96     GLUCOSE BY METER POCT 06/01/2025 107 (H)         Pat Prieto, APRN CNP

## 2025-06-01 NOTE — PLAN OF CARE
Problem: Adult Inpatient Plan of Care  Goal: Optimal Comfort and Wellbeing  Outcome: Progressing  Intervention: Monitor Pain and Promote Comfort  Recent Flowsheet Documentation  Taken 5/31/2025 1745 by Renetta Mckinney RN  Pain Management Interventions: declines  Intervention: Provide Person-Centered Care  Recent Flowsheet Documentation  Taken 5/31/2025 1745 by Renetta Mckinney RN  Trust Relationship/Rapport: care explained     Problem: Delirium  Goal: Improved Behavioral Control  Outcome: Progressing  Intervention: Minimize Safety Risk  Recent Flowsheet Documentation  Taken 5/31/2025 1745 by Renetta Mckinney RN  Enhanced Safety Measures:   pain management   review medications for side effects with activity  Trust Relationship/Rapport: care explained  Goal: Improved Sleep  Outcome: Progressing     Problem: Comorbidity Management  Goal: Blood Glucose Levels Within Targeted Range  Outcome: Progressing  Intervention: Monitor and Manage Glycemia  Recent Flowsheet Documentation  Taken 5/31/2025 1745 by Renetta Mckinney RN  Medication Review/Management: medications reviewed   Pt denies pain/nausea this evening. Piv  infiltrated, new piv placed by swat. Blood sugars 169 and 156, coverage given per sliding scale. Alert to self, reoriented multiple times. Up in room assist of 1. 2 episodes of incontinence this evening. Tolerating clear liquids. VSS on RA, continue to monitor.    Renetta Mckinney RN'

## 2025-06-02 ENCOUNTER — APPOINTMENT (OUTPATIENT)
Dept: OCCUPATIONAL THERAPY | Facility: HOSPITAL | Age: 70
End: 2025-06-02
Attending: PHYSICIAN ASSISTANT
Payer: COMMERCIAL

## 2025-06-02 LAB
ALBUMIN SERPL BCG-MCNC: 2.7 G/DL (ref 3.5–5.2)
ANION GAP SERPL CALCULATED.3IONS-SCNC: 12 MMOL/L (ref 7–15)
BACTERIA SPEC CULT: NO GROWTH
BACTERIA SPEC CULT: NO GROWTH
BUN SERPL-MCNC: 11.7 MG/DL (ref 8–23)
CALCIUM SERPL-MCNC: 8.6 MG/DL (ref 8.8–10.4)
CHLORIDE SERPL-SCNC: 114 MMOL/L (ref 98–107)
CREAT SERPL-MCNC: 1.49 MG/DL (ref 0.51–0.95)
CRP SERPL-MCNC: 58.7 MG/L
EGFRCR SERPLBLD CKD-EPI 2021: 37 ML/MIN/1.73M2
ERYTHROCYTE [DISTWIDTH] IN BLOOD BY AUTOMATED COUNT: 13.6 % (ref 10–15)
GLUCOSE BLDC GLUCOMTR-MCNC: 129 MG/DL (ref 70–99)
GLUCOSE BLDC GLUCOMTR-MCNC: 154 MG/DL (ref 70–99)
GLUCOSE BLDC GLUCOMTR-MCNC: 154 MG/DL (ref 70–99)
GLUCOSE BLDC GLUCOMTR-MCNC: 169 MG/DL (ref 70–99)
GLUCOSE SERPL-MCNC: 131 MG/DL (ref 70–99)
HCO3 SERPL-SCNC: 17 MMOL/L (ref 22–29)
HCT VFR BLD AUTO: 29 % (ref 35–47)
HGB BLD-MCNC: 9.4 G/DL (ref 11.7–15.7)
MAGNESIUM SERPL-MCNC: 1.6 MG/DL (ref 1.7–2.3)
MCH RBC QN AUTO: 27.5 PG (ref 26.5–33)
MCHC RBC AUTO-ENTMCNC: 32.4 G/DL (ref 31.5–36.5)
MCV RBC AUTO: 85 FL (ref 78–100)
PHOSPHATE SERPL-MCNC: 1.7 MG/DL (ref 2.5–4.5)
PLATELET # BLD AUTO: 403 10E3/UL (ref 150–450)
POTASSIUM SERPL-SCNC: 3.8 MMOL/L (ref 3.4–5.3)
RBC # BLD AUTO: 3.42 10E6/UL (ref 3.8–5.2)
SODIUM SERPL-SCNC: 143 MMOL/L (ref 135–145)
WBC # BLD AUTO: 14.5 10E3/UL (ref 4–11)

## 2025-06-02 PROCEDURE — 85027 COMPLETE CBC AUTOMATED: CPT | Performed by: INTERNAL MEDICINE

## 2025-06-02 PROCEDURE — 99231 SBSQ HOSP IP/OBS SF/LOW 25: CPT | Performed by: SPECIALIST

## 2025-06-02 PROCEDURE — 250N000013 HC RX MED GY IP 250 OP 250 PS 637: Performed by: INTERNAL MEDICINE

## 2025-06-02 PROCEDURE — 80069 RENAL FUNCTION PANEL: CPT | Performed by: INTERNAL MEDICINE

## 2025-06-02 PROCEDURE — 250N000011 HC RX IP 250 OP 636: Mod: JZ | Performed by: HOSPITALIST

## 2025-06-02 PROCEDURE — 97530 THERAPEUTIC ACTIVITIES: CPT | Mod: GO

## 2025-06-02 PROCEDURE — 36415 COLL VENOUS BLD VENIPUNCTURE: CPT | Performed by: INTERNAL MEDICINE

## 2025-06-02 PROCEDURE — 250N000013 HC RX MED GY IP 250 OP 250 PS 637: Performed by: HOSPITALIST

## 2025-06-02 PROCEDURE — 97535 SELF CARE MNGMENT TRAINING: CPT | Mod: GO

## 2025-06-02 PROCEDURE — 250N000013 HC RX MED GY IP 250 OP 250 PS 637: Performed by: STUDENT IN AN ORGANIZED HEALTH CARE EDUCATION/TRAINING PROGRAM

## 2025-06-02 PROCEDURE — 83735 ASSAY OF MAGNESIUM: CPT | Performed by: INTERNAL MEDICINE

## 2025-06-02 PROCEDURE — 120N000001 HC R&B MED SURG/OB

## 2025-06-02 PROCEDURE — 86140 C-REACTIVE PROTEIN: CPT | Performed by: INTERNAL MEDICINE

## 2025-06-02 PROCEDURE — 99232 SBSQ HOSP IP/OBS MODERATE 35: CPT | Performed by: STUDENT IN AN ORGANIZED HEALTH CARE EDUCATION/TRAINING PROGRAM

## 2025-06-02 RX ORDER — METRONIDAZOLE 500 MG/1
500 TABLET ORAL 2 TIMES DAILY
Status: DISCONTINUED | OUTPATIENT
Start: 2025-06-02 | End: 2025-06-04 | Stop reason: HOSPADM

## 2025-06-02 RX ADMIN — INSULIN GLARGINE 10 UNITS: 100 INJECTION, SOLUTION SUBCUTANEOUS at 21:27

## 2025-06-02 RX ADMIN — ISOSORBIDE MONONITRATE 60 MG: 30 TABLET, EXTENDED RELEASE ORAL at 20:18

## 2025-06-02 RX ADMIN — ESCITALOPRAM OXALATE 20 MG: 20 TABLET ORAL at 08:58

## 2025-06-02 RX ADMIN — HEPARIN SODIUM 5000 UNITS: 5000 INJECTION, SOLUTION INTRAVENOUS; SUBCUTANEOUS at 09:08

## 2025-06-02 RX ADMIN — QUETIAPINE FUMARATE 12.5 MG: 25 TABLET ORAL at 21:29

## 2025-06-02 RX ADMIN — ATORVASTATIN CALCIUM 80 MG: 40 TABLET, FILM COATED ORAL at 21:29

## 2025-06-02 RX ADMIN — MAGNESIUM OXIDE TAB 400 MG (241.3 MG ELEMENTAL MG) 400 MG: 400 (241.3 MG) TAB at 20:18

## 2025-06-02 RX ADMIN — Medication 25 MCG: at 09:00

## 2025-06-02 RX ADMIN — PANTOPRAZOLE SODIUM 40 MG: 40 TABLET, DELAYED RELEASE ORAL at 08:56

## 2025-06-02 RX ADMIN — INSULIN ASPART 1 UNITS: 100 INJECTION, SOLUTION INTRAVENOUS; SUBCUTANEOUS at 17:05

## 2025-06-02 RX ADMIN — METRONIDAZOLE 500 MG: 500 TABLET ORAL at 20:19

## 2025-06-02 RX ADMIN — AMOXICILLIN AND CLAVULANATE POTASSIUM 1 TABLET: 875; 125 TABLET, FILM COATED ORAL at 12:05

## 2025-06-02 RX ADMIN — AMLODIPINE BESYLATE 10 MG: 5 TABLET ORAL at 08:59

## 2025-06-02 RX ADMIN — INSULIN ASPART 1 UNITS: 100 INJECTION, SOLUTION INTRAVENOUS; SUBCUTANEOUS at 12:54

## 2025-06-02 RX ADMIN — DONEPEZIL HYDROCHLORIDE 10 MG: 5 TABLET ORAL at 21:29

## 2025-06-02 RX ADMIN — POTASSIUM & SODIUM PHOSPHATES POWDER PACK 280-160-250 MG 2 PACKET: 280-160-250 PACK at 10:25

## 2025-06-02 RX ADMIN — HEPARIN SODIUM 5000 UNITS: 5000 INJECTION, SOLUTION INTRAVENOUS; SUBCUTANEOUS at 17:05

## 2025-06-02 RX ADMIN — POTASSIUM & SODIUM PHOSPHATES POWDER PACK 280-160-250 MG 2 PACKET: 280-160-250 PACK at 20:18

## 2025-06-02 RX ADMIN — AMOXICILLIN AND CLAVULANATE POTASSIUM 1 TABLET: 875; 125 TABLET, FILM COATED ORAL at 20:18

## 2025-06-02 RX ADMIN — ISOSORBIDE MONONITRATE 60 MG: 30 TABLET, EXTENDED RELEASE ORAL at 09:00

## 2025-06-02 RX ADMIN — METRONIDAZOLE 500 MG: 500 TABLET ORAL at 13:18

## 2025-06-02 RX ADMIN — BUSPIRONE HYDROCHLORIDE 7.5 MG: 7.5 TABLET ORAL at 20:18

## 2025-06-02 RX ADMIN — BUSPIRONE HYDROCHLORIDE 7.5 MG: 7.5 TABLET ORAL at 08:59

## 2025-06-02 RX ADMIN — HEPARIN SODIUM 5000 UNITS: 5000 INJECTION, SOLUTION INTRAVENOUS; SUBCUTANEOUS at 01:12

## 2025-06-02 RX ADMIN — TRAZODONE HYDROCHLORIDE 100 MG: 50 TABLET ORAL at 21:29

## 2025-06-02 RX ADMIN — MAGNESIUM OXIDE TAB 400 MG (241.3 MG ELEMENTAL MG) 400 MG: 400 (241.3 MG) TAB at 08:59

## 2025-06-02 RX ADMIN — METRONIDAZOLE 500 MG: 500 INJECTION, SOLUTION INTRAVENOUS at 01:11

## 2025-06-02 ASSESSMENT — ACTIVITIES OF DAILY LIVING (ADL)
ADLS_ACUITY_SCORE: 60
ADLS_ACUITY_SCORE: 60
ADLS_ACUITY_SCORE: 62
ADLS_ACUITY_SCORE: 60
ADLS_ACUITY_SCORE: 62
ADLS_ACUITY_SCORE: 60
ADLS_ACUITY_SCORE: 62
ADLS_ACUITY_SCORE: 60

## 2025-06-02 NOTE — TELEPHONE ENCOUNTER
Spoke to luciana De Los [place]ost scheduled on 7/16/25 at 1230 with Dr. Guan.    Debra Hayward LPN on 6/2/2025 at 4:24 PM

## 2025-06-02 NOTE — PROGRESS NOTES
[facility]    Medicine Progress Note - Hospitalist Service    Date of Admission:  5/27/2025    Assessment & Plan   Indu Felix is a 70-year-old female with history of hypertension, type 2 diabetes mellitus (HbA1c 6.3% on 12/6/23), nonrheumatic mitral stenosis, pulmonary hypertension, chronic diastolic CHF, DIANA on CPAP, prior left parieto-occipital CVA (6/11/23), vascular dementia, chronic kidney disease stage 4, anemia of chronic disease, diverticulitis with perforation, porcelain gallbladder, and depression in remission, admitted on 5/27/2025 from her [facility] care facility with confusion, poor oral intake, generalized weakness, worsening leukocytosis, JEFFREY on stage III CKD, and suspected diverticulitis with microperforation and possible abscess.     General Surgery service recommends percutaneous drainage of pelvic abscess by IR.  Percutaneous drainage was deferred due by IR due to small abscess size. Daughter Solange is POA due to patient's lack of decision making capacity.    #Severe Sepsis due to Diverticulitis with Contained Perforation and Possible pelvic abscess.   - In the ED - WBC 15.5, lactic acid 1.0  - CT A/P 5/27 showing two areas of presumed contained perforation with fluid and gas.  - Blood cultures 5/27 no growth  - Percutaneous drainage deferred by IR due to small abscess size.  - Repeat CT A/P 5/30 unchanged diverticulitis.  Plan  - General Surgery following, appreciate recommendations  - Converting IV antibiotics to PO 6/2  -> Augmentin and Metronidazole  - Low fiber diet started 6/1, per surgery.  - Following WBC    History of CVA and Vascular Dementia.    - On atorvastatin.  Aspirin clopidogrel    Plan  - On 1:1 sitter as of 6/2, will need to be off sitter before returning to [facility].    #Acute Kidney Injury - Resolved  #CKD Stage 4.   - Baseline creatinine appears to be ~1.6-1.7  - Creatinine on admission was 3.27  - Etiology thought to be - Prerenal vs. sepsis-related.  -  "Improved to 1.49 on 6/2/25  Plan  - Nephrology consulted, signed off 6/2/25.  - Follow daily BMP until improvement      #Chronic (HFpEF) - Not in Acute Exacerbation  - Echo 12/2023 showing EF 60-65%.  - PTA HF Meds: Bumex 2mg daily,   Plan  - Restart home Bumex 6/3.  Monitor fluid balance closely to avoid volume overload      Type 2 Diabetes Mellitus  Well-controlled (HbA1c 6.3%)  Hold metformin during JEFFREY  Continue insulin sliding scale   Monitor for hypoglycemia per protocol    Hypertension  Amlodipine 5 mg daily, increased to 10 mg 61, given elevated BP.  DIANA on CPAP  Care decision/goals of care. Patient lacks decision-making capacity; daughter Solange is POA  Depression.  On Lexapro, trazodone, BuSpar.   Dementia.  On Seroquel and donepezil.          Diet: Low Fiber Diet    DVT Prophylaxis: Heparin SQ and Pneumatic Compression Devices  Holley Catheter: Not present  Lines: None     Cardiac Monitoring: None  Code Status: Full Code      Clinically Significant Risk Factors          # Hyperchloremia: Highest Cl = 114 mmol/L in last 2 days, will monitor as appropriate        # Hypomagnesemia: Lowest Mg = 1.6 mg/dL in last 2 days, will replace as needed   # Hypoalbuminemia: Lowest albumin = 2.7 g/dL at 6/2/2025  7:28 AM, will monitor as appropriate     # Hypertension: Noted on problem list  # Chronic heart failure with preserved ejection fraction: heart failure noted on problem list and last echo with EF >50%    # Dementia: noted on problem list        # Obesity: Estimated body mass index is 34.56 kg/m  as calculated from the following:    Height as of this encounter: 1.6 m (5' 3\").    Weight as of this encounter: 88.5 kg (195 lb 1.7 oz).      # Financial/Environmental Concerns:           Social Drivers of Health    Food Insecurity: Unknown (5/30/2025)    Food Insecurity     Within the past 12 months, did you worry that your food would run out before you got money to buy more?: Patient unable to answer     Within the past " 12 months, did the food you bought just not last and you didn t have money to get more?: Patient unable to answer   Housing Stability: Unknown (5/30/2025)    Housing Stability     Do you have housing? : Patient unable to answer     Are you worried about losing your housing?: Patient unable to answer   Financial Resource Strain: Unknown (5/30/2025)    Financial Resource Strain     Within the past 12 months, have you or your family members you live with been unable to get utilities (heat, electricity) when it was really needed?: Patient unable to answer   Alcohol Use: Unknown (4/6/2020)    Received from [facility]     AUDIT-C     Frequency of Alcohol Consumption: Monthly or less   Transportation Needs: Unknown (5/30/2025)    Transportation Needs     Within the past 12 months, has lack of transportation kept you from medical appointments, getting your medicines, non-medical meetings or appointments, work, or from getting things that you need?: Patient unable to answer   Interpersonal Safety: Unknown (5/29/2025)    Interpersonal Safety     Do you feel physically and emotionally safe where you currently live?: Patient unable to answer     Within the past 12 months, have you been hit, slapped, kicked or otherwise physically hurt by someone?: Patient unable to answer     Within the past 12 months, have you been humiliated or emotionally abused in other ways by your partner or ex-partner?: Patient unable to answer          Disposition Plan     Medically Ready for Discharge: Anticipated Tomorrow             John Goins MD  Hospitalist Service  [facility]  Securely message with [employer] (more info)  Text page via [facility] Paging/Directory   ______________________________________________________________________    Interval History   - Patient awake and alert this morning, difficulty with IV placement, switched to oral antibiotics.    Physical Exam   Vital Signs: Temp: 98.5  F (36.9  C) Temp  src: Oral BP: (!) 162/79 (RN notified) Pulse: 95   Resp: 18 SpO2: 93 % O2 Device: None (Room air)    Weight: 195 lbs 1.71 oz    General: Alert and Oriented x2 - person and place. Answers questions appropriately. Follows commands.  Resp: Breath sounds equal throughout. No crackles. No wheezing.  Cardio: Regular rate and rhythm. No murmurs. No friction rub.  Abd: Soft. Non-distended. Non-tender. Bowel sounds normal. No rebound tenderness.  MSK: No areas of ecchymosis or erythema.  Neuro: CN 2-12 grossly intact. Strength 5/5 in all 4 extremities.  Skin:No rashes. No jaundice.       Medical Decision Making       45 MINUTES SPENT BY ME on the date of service doing chart review, history, exam, documentation & further activities per the note.  MANAGEMENT DISCUSSED with the following over the past 24 hours: RN, CM, Family   Tests ORDERED & REVIEWED in the past 24 hours:  - BMP  - CBC  - Magnesium  - Phosphorus  Medical complexity over the past 24 hours:  - Prescription DRUG MANAGEMENT performed  - Treatment limited by SOCIAL DETERMINANTS OF HEALTH      Data     I have personally reviewed the following data over the past 24 hrs:    14.5 (H)  \   9.4 (L)   / 403     143 114 (H) 11.7 /  154 (H)   3.8 17 (L) 1.49 (H) \     ALT: N/A AST: N/A AP: N/A TBILI: N/A   ALB: 2.7 (L) TOT PROTEIN: N/A LIPASE: N/A     Procal: N/A CRP: 58.70 (H) Lactic Acid: N/A         Imaging results reviewed over the past 24 hrs:   No results found for this or any previous visit (from the past 24 hours).

## 2025-06-02 NOTE — PROGRESS NOTES
Patient states that she feels well.  However she has significant dementia so somewhat hard to assess.  Afebrile, vital signs stable.    Physical exam:  Pleasantly demented.  Abdomen is obese.  Soft, nontender.    Laboratories:  White count 14.5, stable  Hemoglobin 9.4    Impression: Diverticulitis with intramural abscess.  Her white count is still elevated but stable.  Her CT scan was improving.  At this point I would continue IV antibiotics at least 1 more day.  Hopefully can avoid any type of surgical intervention in this unfortunate woman with fairly significant dementia.  Plan: Continue IV antibiotics.

## 2025-06-02 NOTE — PROGRESS NOTES
71 yo female with DM2, HTN, CKD 4, diastolic heart failure admit with diverticulitis with microperf and pelvic abscess, JEFFREY.    1. JEFFREY/CKD 4 - pre-renal, resolved. Cr below recent baseline of 1.8-2.2. I have arranged follow-up with us on 7/15 at 10:20am. I have put this information in her discharge paperwork.     2. Diverticulitis - with abscess, microperf, surgery following, on abx.     3. HTN - amlodipine increased. Also on imdur. Can add back PTA bumex if taking good PO and evidence of volume overload.     No further renal recommendations at this time, so we will sign off. Please reach out if further questions/concerns.     Ana Selby MD  [facility]  [phone]

## 2025-06-02 NOTE — PLAN OF CARE
"  Problem: Adult Inpatient Plan of Care  Goal: Optimal Comfort and Wellbeing  Outcome: Progressing  Intervention: Provide Person-Centered Care  Recent Flowsheet Documentation  Taken 6/1/2025 1630 by Renetta Mckinney RN  Trust Relationship/Rapport:   care explained   choices provided     Problem: Delirium  Goal: Improved Behavioral Control  Outcome: Progressing  Intervention: Minimize Safety Risk  Recent Flowsheet Documentation  Taken 6/1/2025 1630 by Renetta Mckinney RN  Enhanced Safety Measures:   pain management   review medications for side effects with activity  Trust Relationship/Rapport:   care explained   choices provided  Goal: Improved Attention and Thought Clarity  Outcome: Progressing  Intervention: Maximize Cognitive Function  Recent Flowsheet Documentation  Taken 6/1/2025 1630 by Renetta Mckinney RN  Sensory Stimulation Regulation:   quiet environment promoted   television on   care clustered  Reorientation Measures:   clock in view   reorientation provided  Goal: Improved Sleep  Outcome: Progressing     Problem: Comorbidity Management  Goal: Blood Pressure in Desired Range  Outcome: Progressing  Intervention: Maintain Blood Pressure Management  Recent Flowsheet Documentation  Taken 6/1/2025 1630 by Renetta Mckinney RN  Medication Review/Management: medications reviewed   Pt placed on 1:1 this evening for increased agitation, Seroquel given with no effect. Pt continuously trying to get up stating she \"hates it here and wants to leave\". MD notified, PRN ativan given x1 with good effect. Tylenol given for abdominal pain. Pt alert to self and reoriented multiple times.  and 170, coverage given per sliding scale. VSS on RA, continue to monitor.    Renetta Mckinney RN                              "

## 2025-06-02 NOTE — PROGRESS NOTES
Care Management Follow Up    Length of Stay (days): 6    Expected Discharge Date: 06/03/2025    Anticipated Discharge Plan:       Transportation: Confirmed [facility] vs [facility] transport     PT Recommendations: other (see comments)  OT Recommendations:  other (see comments) (back to [facility] vs memory care)     Barriers to Discharge: medical stability    Prior Living Situation: house with spouse    Discussed  Partnership in Safe Discharge Planning  document with patient/family: No     Handoff Completed: No, handoff not indicated or clinically appropriate    Patient/Spokesperson Updated: No    Additional Information:  Pt from [facility]. Anticipate pt will return at discharge.     Pt is currently on 1:1 and will need to be off for at least 24 hours prior to return to [facility].     Contacts:   Solange/daughter ph: [phone]   LILIANA Lucas at [phone] - [facility]     Next Steps: coordinate discharge back to [facility]    AUGUSTO Toth

## 2025-06-02 NOTE — DISCHARGE INSTRUCTIONS
Please follow up with [facility]   7/15/25 at 10:20am   [facility]  [address] [place] [id number]  Phone: [phone]

## 2025-06-02 NOTE — PLAN OF CARE
Problem: Adult Inpatient Plan of Care  Goal: Plan of Care Review  Description: The Plan of Care Review/Shift note should be completed every shift.  The Outcome Evaluation is a brief statement about your assessment that the patient is improving, declining, or no change.  This information will be displayed automatically on your shift  note.  Outcome: Not Progressing   Goal Outcome Evaluation:       Pt has been restless and trying to get out of bed and the bedside chair, attempted to take of the one to one but have been unable. Pt is alert to self, does not know where she is or why she is in the hospital. On oral antibiotics and a low fiber diet.

## 2025-06-02 NOTE — PLAN OF CARE
Goal Outcome Evaluation:                            Problem: Adult Inpatient Plan of Care  Goal: Absence of Hospital-Acquired Illness or Injury  Intervention: Identify and Manage Fall Risk  Recent Flowsheet Documentation  Taken 6/2/2025 0111 by Renita Sanders RN  Safety Promotion/Fall Prevention:   activity supervised   assistive device/personal items within reach   clutter free environment maintained   increase visualization of patient   lighting adjusted   nonskid shoes/slippers when out of bed   patient and family education   room door open   room near nurse's station   room organization consistent   safety round/check completed   supervised activity  Intervention: Prevent Skin Injury  Recent Flowsheet Documentation  Taken 6/2/2025 0111 by Renita Sanders RN  Body Position: position changed independently  Skin Protection: adhesive use limited  Intervention: Prevent Infection  Recent Flowsheet Documentation  Taken 6/2/2025 0111 by Renita Sanders RN  Infection Prevention:   hand hygiene promoted   single patient room provided  Goal: Optimal Comfort and Wellbeing  Intervention: Provide Person-Centered Care  Recent Flowsheet Documentation  Taken 6/2/2025 0111 by Renita Sanders RN  Trust Relationship/Rapport:   care explained   choices provided     Problem: Delirium  Goal: Optimal Coping  Intervention: Optimize Psychosocial Adjustment to Delirium  Recent Flowsheet Documentation  Taken 6/2/2025 0111 by Renita Sanders RN  Family/Support System Care:   involvement promoted   presence promoted  Goal: Improved Behavioral Control  Intervention: Prevent and Manage Agitation  Recent Flowsheet Documentation  Taken 6/2/2025 0111 by Renita Sanders RN  Environment Familiarity/Consistency: daily routine followed  Intervention: Minimize Safety Risk  Recent Flowsheet Documentation  Taken 6/2/2025 0111 by Renita Sanders RN  Enhanced Safety Measures:   pain management   review  medications for side effects with activity  Trust Relationship/Rapport:   care explained   choices provided  Goal: Improved Attention and Thought Clarity  Intervention: Maximize Cognitive Function  Recent Flowsheet Documentation  Taken 6/2/2025 0111 by Renita Sanders RN  Sensory Stimulation Regulation:   quiet environment promoted   television on   care clustered  Reorientation Measures:   clock in view   reorientation provided  Goal: Improved Sleep  Intervention: Promote Sleep  Recent Flowsheet Documentation  Taken 6/2/2025 0111 by Renita Sanders RN  Sleep/Rest Enhancement:   awakenings minimized   comfort measures   medication   noise level reduced   room darkened     Problem: Comorbidity Management  Goal: Blood Glucose Levels Within Targeted Range  Intervention: Monitor and Manage Glycemia  Recent Flowsheet Documentation  Taken 6/2/2025 0111 by Renita Sanders RN  Medication Review/Management: medications reviewed  Goal: Maintenance of Behavioral Health Symptom Control  Intervention: Maintain Behavioral Health Symptom Control  Recent Flowsheet Documentation  Taken 6/2/2025 0111 by Renita Sanders RN  Medication Review/Management: medications reviewed  Goal: Blood Pressure in Desired Range  Intervention: Maintain Blood Pressure Management  Recent Flowsheet Documentation  Taken 6/2/2025 0111 by Renita Sanders RN  Medication Review/Management: medications reviewed    Patient is alert, confused denied pain and no agitated occur over night.  Remains on 1;1;on low fiber diet.  IVF Abx infused; up to the toilet with walker and assist of one.    Plan of care ongoing.    Renita NELSON RN.

## 2025-06-03 ENCOUNTER — APPOINTMENT (OUTPATIENT)
Dept: PHYSICAL THERAPY | Facility: HOSPITAL | Age: 70
End: 2025-06-03
Attending: PHYSICIAN ASSISTANT
Payer: COMMERCIAL

## 2025-06-03 LAB
BASOPHILS # BLD AUTO: 0.1 10E3/UL (ref 0–0.2)
BASOPHILS NFR BLD AUTO: 1 %
EOSINOPHIL # BLD AUTO: 0.6 10E3/UL (ref 0–0.7)
EOSINOPHIL NFR BLD AUTO: 5 %
ERYTHROCYTE [DISTWIDTH] IN BLOOD BY AUTOMATED COUNT: 13.8 % (ref 10–15)
GLUCOSE BLDC GLUCOMTR-MCNC: 136 MG/DL (ref 70–99)
GLUCOSE BLDC GLUCOMTR-MCNC: 137 MG/DL (ref 70–99)
GLUCOSE BLDC GLUCOMTR-MCNC: 161 MG/DL (ref 70–99)
GLUCOSE BLDC GLUCOMTR-MCNC: 189 MG/DL (ref 70–99)
GLUCOSE BLDC GLUCOMTR-MCNC: 96 MG/DL (ref 70–99)
HCT VFR BLD AUTO: 31.8 % (ref 35–47)
HGB BLD-MCNC: 10 G/DL (ref 11.7–15.7)
IMM GRANULOCYTES # BLD: 0.1 10E3/UL
IMM GRANULOCYTES NFR BLD: 1 %
LYMPHOCYTES # BLD AUTO: 1.5 10E3/UL (ref 0.8–5.3)
LYMPHOCYTES NFR BLD AUTO: 12 %
MCH RBC QN AUTO: 26.5 PG (ref 26.5–33)
MCHC RBC AUTO-ENTMCNC: 31.4 G/DL (ref 31.5–36.5)
MCV RBC AUTO: 84 FL (ref 78–100)
MONOCYTES # BLD AUTO: 0.9 10E3/UL (ref 0–1.3)
MONOCYTES NFR BLD AUTO: 7 %
NEUTROPHILS # BLD AUTO: 9.6 10E3/UL (ref 1.6–8.3)
NEUTROPHILS NFR BLD AUTO: 75 %
NRBC # BLD AUTO: 0 10E3/UL
NRBC BLD AUTO-RTO: 0 /100
PLATELET # BLD AUTO: 453 10E3/UL (ref 150–450)
RBC # BLD AUTO: 3.77 10E6/UL (ref 3.8–5.2)
WBC # BLD AUTO: 12.9 10E3/UL (ref 4–11)

## 2025-06-03 PROCEDURE — 250N000013 HC RX MED GY IP 250 OP 250 PS 637: Performed by: INTERNAL MEDICINE

## 2025-06-03 PROCEDURE — 97116 GAIT TRAINING THERAPY: CPT | Mod: GP

## 2025-06-03 PROCEDURE — 250N000013 HC RX MED GY IP 250 OP 250 PS 637: Performed by: HOSPITALIST

## 2025-06-03 PROCEDURE — 250N000013 HC RX MED GY IP 250 OP 250 PS 637: Performed by: STUDENT IN AN ORGANIZED HEALTH CARE EDUCATION/TRAINING PROGRAM

## 2025-06-03 PROCEDURE — 36415 COLL VENOUS BLD VENIPUNCTURE: CPT

## 2025-06-03 PROCEDURE — 99232 SBSQ HOSP IP/OBS MODERATE 35: CPT | Performed by: STUDENT IN AN ORGANIZED HEALTH CARE EDUCATION/TRAINING PROGRAM

## 2025-06-03 PROCEDURE — 99207 PR APP CREDIT; MD BILLING SHARED VISIT: CPT | Performed by: NURSE PRACTITIONER

## 2025-06-03 PROCEDURE — 99231 SBSQ HOSP IP/OBS SF/LOW 25: CPT | Mod: FS | Performed by: SPECIALIST

## 2025-06-03 PROCEDURE — 250N000011 HC RX IP 250 OP 636: Performed by: HOSPITALIST

## 2025-06-03 PROCEDURE — 85004 AUTOMATED DIFF WBC COUNT: CPT

## 2025-06-03 PROCEDURE — 120N000001 HC R&B MED SURG/OB

## 2025-06-03 PROCEDURE — 97110 THERAPEUTIC EXERCISES: CPT | Mod: GP

## 2025-06-03 RX ORDER — HYDROXYZINE HYDROCHLORIDE 25 MG/1
25 TABLET, FILM COATED ORAL 3 TIMES DAILY PRN
Status: DISCONTINUED | OUTPATIENT
Start: 2025-06-03 | End: 2025-06-04 | Stop reason: HOSPADM

## 2025-06-03 RX ORDER — BUMETANIDE 2 MG/1
2 TABLET ORAL DAILY
Status: DISCONTINUED | OUTPATIENT
Start: 2025-06-03 | End: 2025-06-04 | Stop reason: HOSPADM

## 2025-06-03 RX ADMIN — PANTOPRAZOLE SODIUM 40 MG: 40 TABLET, DELAYED RELEASE ORAL at 09:24

## 2025-06-03 RX ADMIN — METRONIDAZOLE 500 MG: 500 TABLET ORAL at 09:24

## 2025-06-03 RX ADMIN — ISOSORBIDE MONONITRATE 60 MG: 30 TABLET, EXTENDED RELEASE ORAL at 09:25

## 2025-06-03 RX ADMIN — BUSPIRONE HYDROCHLORIDE 7.5 MG: 7.5 TABLET ORAL at 20:09

## 2025-06-03 RX ADMIN — ISOSORBIDE MONONITRATE 60 MG: 30 TABLET, EXTENDED RELEASE ORAL at 20:08

## 2025-06-03 RX ADMIN — ESCITALOPRAM OXALATE 20 MG: 20 TABLET ORAL at 09:24

## 2025-06-03 RX ADMIN — TRAZODONE HYDROCHLORIDE 100 MG: 50 TABLET ORAL at 21:55

## 2025-06-03 RX ADMIN — Medication 25 MCG: at 09:24

## 2025-06-03 RX ADMIN — HEPARIN SODIUM 5000 UNITS: 5000 INJECTION, SOLUTION INTRAVENOUS; SUBCUTANEOUS at 02:31

## 2025-06-03 RX ADMIN — QUETIAPINE FUMARATE 12.5 MG: 25 TABLET ORAL at 21:55

## 2025-06-03 RX ADMIN — METRONIDAZOLE 500 MG: 500 TABLET ORAL at 20:09

## 2025-06-03 RX ADMIN — LORAZEPAM 1 MG: 1 TABLET ORAL at 16:45

## 2025-06-03 RX ADMIN — DONEPEZIL HYDROCHLORIDE 10 MG: 5 TABLET ORAL at 21:55

## 2025-06-03 RX ADMIN — HEPARIN SODIUM 5000 UNITS: 5000 INJECTION, SOLUTION INTRAVENOUS; SUBCUTANEOUS at 16:45

## 2025-06-03 RX ADMIN — AMOXICILLIN AND CLAVULANATE POTASSIUM 1 TABLET: 875; 125 TABLET, FILM COATED ORAL at 09:24

## 2025-06-03 RX ADMIN — BUMETANIDE 2 MG: 2 TABLET ORAL at 18:01

## 2025-06-03 RX ADMIN — INSULIN GLARGINE 10 UNITS: 100 INJECTION, SOLUTION SUBCUTANEOUS at 21:58

## 2025-06-03 RX ADMIN — LORAZEPAM 1 MG: 1 TABLET ORAL at 12:05

## 2025-06-03 RX ADMIN — AMLODIPINE BESYLATE 10 MG: 5 TABLET ORAL at 09:25

## 2025-06-03 RX ADMIN — HEPARIN SODIUM 5000 UNITS: 5000 INJECTION, SOLUTION INTRAVENOUS; SUBCUTANEOUS at 09:25

## 2025-06-03 RX ADMIN — AMOXICILLIN AND CLAVULANATE POTASSIUM 1 TABLET: 875; 125 TABLET, FILM COATED ORAL at 20:08

## 2025-06-03 RX ADMIN — ATORVASTATIN CALCIUM 80 MG: 40 TABLET, FILM COATED ORAL at 21:55

## 2025-06-03 RX ADMIN — INSULIN ASPART 1 UNITS: 100 INJECTION, SOLUTION INTRAVENOUS; SUBCUTANEOUS at 16:54

## 2025-06-03 RX ADMIN — BUSPIRONE HYDROCHLORIDE 7.5 MG: 7.5 TABLET ORAL at 09:34

## 2025-06-03 ASSESSMENT — ACTIVITIES OF DAILY LIVING (ADL)
ADLS_ACUITY_SCORE: 62
ADLS_ACUITY_SCORE: 58
ADLS_ACUITY_SCORE: 58
ADLS_ACUITY_SCORE: 62
ADLS_ACUITY_SCORE: 58
ADLS_ACUITY_SCORE: 62
ADLS_ACUITY_SCORE: 58
ADLS_ACUITY_SCORE: 62
ADLS_ACUITY_SCORE: 62
ADLS_ACUITY_SCORE: 58
ADLS_ACUITY_SCORE: 58
ADLS_ACUITY_SCORE: 62
ADLS_ACUITY_SCORE: 58
ADLS_ACUITY_SCORE: 62
ADLS_ACUITY_SCORE: 62
ADLS_ACUITY_SCORE: 58

## 2025-06-03 NOTE — PLAN OF CARE
Goal Outcome Evaluation:    Problem: Adult Inpatient Plan of Care  Goal: Optimal Comfort and Wellbeing  Outcome: Progressing     Problem: Delirium  Goal: Improved Behavioral Control  Outcome: Progressing     Problem: Delirium  Goal: Improved Sleep  Outcome: Progressing

## 2025-06-03 NOTE — SIGNIFICANT EVENT
Chair alarm went off, patient was found sitting on her buttocks in front of the chair, was transferred to bed using a ely, no new bruises, able to move extremities per usual, denies pain, no apparent injury. Daughter, MD, and Supervisor updated about fall, vitals stable.

## 2025-06-03 NOTE — PROGRESS NOTES
[facility]    Medicine Progress Note - Hospitalist Service    Date of Admission:  5/27/2025    Assessment & Plan   Indu Felix is a 70-year-old female with history of hypertension, type 2 diabetes mellitus (HbA1c 6.3% on 12/6/23), nonrheumatic mitral stenosis, pulmonary hypertension, chronic diastolic CHF, DIANA on CPAP, prior left parieto-occipital CVA (6/11/23), vascular dementia, chronic kidney disease stage 4, anemia of chronic disease, diverticulitis with perforation, porcelain gallbladder, and depression in remission, admitted on 5/27/2025 from her memory care facility with confusion, poor oral intake, generalized weakness, worsening leukocytosis, JEFFREY on stage III CKD, and suspected diverticulitis with microperforation and possible abscess.     General Surgery service recommends percutaneous drainage of pelvic abscess by IR.  Percutaneous drainage was deferred due by IR due to small abscess size. Daughter Solange is POA due to patient's lack of decision making capacity.    #Severe Sepsis due to Diverticulitis with Contained Perforation and Possible pelvic abscess.   - In the ED - WBC 15.5, lactic acid 1.0  - CT A/P 5/27 showing two areas of presumed contained perforation with fluid and gas.  - Blood cultures 5/27 no growth  - Percutaneous drainage deferred by IR due to small abscess size.  - Repeat CT A/P 5/30 unchanged diverticulitis.  Plan  - General Surgery following, appreciate recommendations  - Converting IV antibiotics to PO 6/2  -> Augmentin and Metronidazole (plan for total 14 days of antibiotic)  - Low fiber diet started 6/1, per surgery (ideally continue for 3 weeks)  - Case Management looking into return to home [facility] vs TCU.    History of CVA and Vascular Dementia.    - On atorvastatin.  Aspirin clopidogrel    - Briefly on 1:1 sitter on 6/2, off later in the evening same night.  Plan  - will need to be off sitter before returning to [facility].  - Case Management looking into return  "to home [facility] vs TCU.    #Acute Kidney Injury - Resolved  #CKD Stage 4.   - Baseline creatinine appears to be ~1.6-1.7  - Creatinine on admission was 3.27  - Etiology thought to be - Prerenal vs. sepsis-related.  - Improved to 1.49 on 6/2/25  Plan  - Nephrology consulted, signed off 6/2/25.  - Follow daily BMP until improvement      #Chronic (HFpEF) - Not in Acute Exacerbation  - Echo 12/2023 showing EF 60-65%.  - PTA HF Meds: Bumex 2mg daily,   Plan  - Restart home Bumex 6/3.  Monitor fluid balance closely to avoid volume overload    Type 2 Diabetes Mellitus  Well-controlled (HbA1c 6.3%)  Hold metformin during JEFFREY  Continue insulin sliding scale   Monitor for hypoglycemia per protocol    Hypertension  Amlodipine 5 mg daily, increased to 10 mg 6/1, given elevated BP.  DIANA on CPAP  Care decision/goals of care. Patient lacks decision-making capacity; daughter Solange is POA  Depression.  On Lexapro, trazodone, BuSpar.   Dementia.  On Seroquel and donepezil.          Diet: Low Fiber Diet    DVT Prophylaxis: Heparin SQ and Pneumatic Compression Devices  Holley Catheter: Not present  Lines: None     Cardiac Monitoring: None  Code Status: Full Code      Clinically Significant Risk Factors          # Hyperchloremia: Highest Cl = 114 mmol/L in last 2 days, will monitor as appropriate        # Hypomagnesemia: Lowest Mg = 1.6 mg/dL in last 2 days, will replace as needed   # Hypoalbuminemia: Lowest albumin = 2.7 g/dL at 6/2/2025  7:28 AM, will monitor as appropriate     # Hypertension: Noted on problem list  # Chronic heart failure with preserved ejection fraction: heart failure noted on problem list and last echo with EF >50%    # Dementia: noted on problem list        # Obesity: Estimated body mass index is 34.56 kg/m  as calculated from the following:    Height as of this encounter: 1.6 m (5' 3\").    Weight as of this encounter: 88.5 kg (195 lb 1.7 oz).      # Financial/Environmental Concerns:           Social Drivers of Health  "   Food Insecurity: Unknown (5/30/2025)    Food Insecurity     Within the past 12 months, did you worry that your food would run out before you got money to buy more?: Patient unable to answer     Within the past 12 months, did the food you bought just not last and you didn t have money to get more?: Patient unable to answer   Housing Stability: Unknown (5/30/2025)    Housing Stability     Do you have housing? : Patient unable to answer     Are you worried about losing your housing?: Patient unable to answer   Financial Resource Strain: Unknown (5/30/2025)    Financial Resource Strain     Within the past 12 months, have you or your family members you live with been unable to get utilities (heat, electricity) when it was really needed?: Patient unable to answer   Alcohol Use: Unknown (4/6/2020)    Received from [facility]     AUDIT-C     Frequency of Alcohol Consumption: Monthly or less   Transportation Needs: Unknown (5/30/2025)    Transportation Needs     Within the past 12 months, has lack of transportation kept you from medical appointments, getting your medicines, non-medical meetings or appointments, work, or from getting things that you need?: Patient unable to answer   Interpersonal Safety: Unknown (5/29/2025)    Interpersonal Safety     Do you feel physically and emotionally safe where you currently live?: Patient unable to answer     Within the past 12 months, have you been hit, slapped, kicked or otherwise physically hurt by someone?: Patient unable to answer     Within the past 12 months, have you been humiliated or emotionally abused in other ways by your partner or ex-partner?: Patient unable to answer          Disposition Plan     Medically Ready for Discharge: Anticipated Tomorrow             John Goins MD  Hospitalist Service  [facility]  Securely message with [employer] (more info)  Text page via [facility] Paging/Directory    ______________________________________________________________________    Interval History   - Patient without complaints overnight, no abdominal pain    Physical Exam   Vital Signs: Temp: 98  F (36.7  C) Temp src: Oral BP: 124/58 Pulse: 85   Resp: 18 SpO2: 96 % O2 Device: None (Room air)    Weight: 195 lbs 1.71 oz    General: Alert and Oriented x2 - person and place. Answers questions appropriately. Follows commands.  Resp: Breath sounds equal throughout. No crackles. No wheezing.  Cardio: Regular rate and rhythm. No murmurs. No friction rub.  Abd: Soft. Non-distended. Non-tender. Bowel sounds normal. No rebound tenderness.  MSK: No areas of ecchymosis or erythema.  Neuro: CN 2-12 grossly intact. Strength 5/5 in all 4 extremities.  Skin:No rashes. No jaundice.     Medical Decision Making       45 MINUTES SPENT BY ME on the date of service doing chart review, history, exam, documentation & further activities per the note.  MANAGEMENT DISCUSSED with the following over the past 24 hours: RN, CM   Tests ORDERED & REVIEWED in the past 24 hours:  - CBC  Medical complexity over the past 24 hours:  - Prescription DRUG MANAGEMENT performed  - Treatment limited by SOCIAL DETERMINANTS OF HEALTH      Data     I have personally reviewed the following data over the past 24 hrs:    12.9 (H)  \   10.0 (L)   / 453 (H)     N/A N/A N/A /  189 (H)   N/A N/A N/A \       Imaging results reviewed over the past 24 hrs:   No results found for this or any previous visit (from the past 24 hours).

## 2025-06-03 NOTE — PROGRESS NOTES
General Surgery Progress Note:    Hospital Day # 7    ASSESSMENT:   1. Diverticulitis of colon with perforation      Indu Felix is a 70 year old female admitted with diverticulitis with microperforation. She clinically looks much better and is tolerating diet. Her WBC is slowly downtrending. She has been on IV abx for 7 days. At this point, she can transition to oral antibiotics.     PLAN:   Continue low fiber diet  PT/OT  Can switch to oral antibiotics for 7 days (this will complete a full 2 week course of antibiotic therapy)  Repeat CBC in AM  Start discharge planning      SUBJECTIVE:   Indu Felix is doing well this morning. No complaint of pain and she is having regular bowel activity. Confused but pleasant    Patient Vitals for the past 24 hrs:   BP Temp Temp src Pulse Resp SpO2   06/03/25 0713 -- 98.4  F (36.9  C) Oral 79 18 94 %   06/03/25 0228 (!) 172/74 98.5  F (36.9  C) Oral 80 20 94 %   06/02/25 2015 (!) 148/68 -- -- 86 -- --   06/02/25 1510 131/63 98.2  F (36.8  C) Oral 72 18 94 %       Physical Exam:  General: NAD, pleasant  CV:RRR  LUNGS:CTA bilaterally  ABD: soft, obese, not tender  EXT:no CCE    No results displayed because visit has over 200 results.           KETURAH Shields CNP

## 2025-06-03 NOTE — PLAN OF CARE
Problem: Adult Inpatient Plan of Care  Goal: Absence of Hospital-Acquired Illness or Injury  Intervention: Identify and Manage Fall Risk  Recent Flowsheet Documentation  Taken 6/3/2025 0900 by Hansa Villagran, RN  Safety Promotion/Fall Prevention:   activity supervised   assistive device/personal items within reach   clutter free environment maintained   nonskid shoes/slippers when out of bed   safety round/check completed     Problem: Infection  Goal: Absence of Infection Signs and Symptoms  Outcome: Progressing     Problem: Comorbidity Management  Goal: Blood Glucose Levels Within Targeted Range  Intervention: Monitor and Manage Glycemia  Recent Flowsheet Documentation  Taken 6/3/2025 0900 by Hansa Villagran, RN  Medication Review/Management: medications reviewed     Problem: Delirium  Goal: Improved Attention and Thought Clarity  Intervention: Maximize Cognitive Function  Recent Flowsheet Documentation  Taken 6/3/2025 0900 by Hansa Villagran RN  Reorientation Measures: clock in view   Goal Outcome Evaluation:         A/Ox1, self only  VSS ex HTN. On RA  Denies pain  Assist x 1 GB/W  Low fiber diet, BGM  No IV access, MD ok   Up to BR, voiding adequately this shift.   Up in chair, geomat in place  Continue oral abx  Nursing to continue to monitor.

## 2025-06-03 NOTE — PROGRESS NOTES
Care Management Follow Up    Length of Stay (days): 7    Expected Discharge Date: 06/04/2025    Anticipated Discharge Plan:       Transportation: Confirmed [facility] vs [facility] transport     PT Recommendations: other (see comments)  OT Recommendations:  other (see comments)     Barriers to Discharge: off 1:1 for 24 hours     Prior Living Situation: house with spouse    Discussed  Partnership in Safe Discharge Planning  document with patient/family: No     Handoff Completed: No, handoff not indicated or clinically appropriate    Patient/Spokesperson Updated: Yes. Who? Dtr Solange and [facility] LILIANA Lucas    Additional Information:  Per MD, pt is ready for discharge. Pt off 1:1 6/2 at 7pm.     CM called and left VM for LILIANA Lucas ([phone]) with update that pt is anticipated to be ready for discharge tomorrow.    11:50 AM  CM received call back from LILIANA Lucas, she asked CM to fax updated notes to Fax: [phone]. Sent. She states they can accept pt back tomorrow.     CM called and spoke to pt's dtr Solange who confirms she'd like to use [facility].     CM called [employer] transportation ([phone]) and set up ride for tomorrow at 11am. Pt will need to borrow a w/c from [facility] and [facility] will return after dropping off pt. CM updated Solange and LILIANA Lucas.     12:08 PM  Pt is on a low fiber diet which the [facility] states they are unable to accommodate this. They state that pt would be responsible for ordering low fiber foods and due to her dementia she would be unable to do so.     CM reached out to [facility] who states they share the same kitchen as the [facility], and that the [facility] should be able to accommodate this diet. [facility] admissions is going [facility] to discuss.     NICA updated dtr who states that she is willing to help however she can so that pt can have the low fiber diet. Per MD, pt needs this diet for 3 weeks.     2:57 PM  Per MD, if [facility] unable to accommodate low fiber  diet then they will order regular diet for pt at discharge. CM left VM for LILIANA Lucas with this information and asked for call back to confirm if they can work with the dtr to find a way to accommodate the low fiber diet.     CM to keep dtr updated on whether or not they can accommodate the diet.     Damaris Wise, [facility]

## 2025-06-04 VITALS
SYSTOLIC BLOOD PRESSURE: 138 MMHG | OXYGEN SATURATION: 94 % | HEART RATE: 81 BPM | TEMPERATURE: 98.7 F | DIASTOLIC BLOOD PRESSURE: 81 MMHG | WEIGHT: 194.89 LBS | HEIGHT: 63 IN | RESPIRATION RATE: 17 BRPM | BODY MASS INDEX: 34.53 KG/M2

## 2025-06-04 LAB
ANION GAP SERPL CALCULATED.3IONS-SCNC: 11 MMOL/L (ref 7–15)
BUN SERPL-MCNC: 8.9 MG/DL (ref 8–23)
CALCIUM SERPL-MCNC: 8.4 MG/DL (ref 8.8–10.4)
CHLORIDE SERPL-SCNC: 110 MMOL/L (ref 98–107)
CREAT SERPL-MCNC: 1.5 MG/DL (ref 0.51–0.95)
EGFRCR SERPLBLD CKD-EPI 2021: 37 ML/MIN/1.73M2
ERYTHROCYTE [DISTWIDTH] IN BLOOD BY AUTOMATED COUNT: 13.8 % (ref 10–15)
GLUCOSE BLDC GLUCOMTR-MCNC: 143 MG/DL (ref 70–99)
GLUCOSE SERPL-MCNC: 164 MG/DL (ref 70–99)
HCO3 SERPL-SCNC: 22 MMOL/L (ref 22–29)
HCT VFR BLD AUTO: 28.3 % (ref 35–47)
HGB BLD-MCNC: 9.4 G/DL (ref 11.7–15.7)
MAGNESIUM SERPL-MCNC: 1.4 MG/DL (ref 1.7–2.3)
MCH RBC QN AUTO: 27.6 PG (ref 26.5–33)
MCHC RBC AUTO-ENTMCNC: 33.2 G/DL (ref 31.5–36.5)
MCV RBC AUTO: 83 FL (ref 78–100)
PHOSPHATE SERPL-MCNC: 2.6 MG/DL (ref 2.5–4.5)
PLATELET # BLD AUTO: 407 10E3/UL (ref 150–450)
POTASSIUM SERPL-SCNC: 3.3 MMOL/L (ref 3.4–5.3)
RBC # BLD AUTO: 3.41 10E6/UL (ref 3.8–5.2)
SODIUM SERPL-SCNC: 143 MMOL/L (ref 135–145)
WBC # BLD AUTO: 11.7 10E3/UL (ref 4–11)

## 2025-06-04 PROCEDURE — 85014 HEMATOCRIT: CPT | Performed by: STUDENT IN AN ORGANIZED HEALTH CARE EDUCATION/TRAINING PROGRAM

## 2025-06-04 PROCEDURE — 83735 ASSAY OF MAGNESIUM: CPT | Performed by: STUDENT IN AN ORGANIZED HEALTH CARE EDUCATION/TRAINING PROGRAM

## 2025-06-04 PROCEDURE — 250N000011 HC RX IP 250 OP 636: Performed by: HOSPITALIST

## 2025-06-04 PROCEDURE — 85041 AUTOMATED RBC COUNT: CPT | Performed by: STUDENT IN AN ORGANIZED HEALTH CARE EDUCATION/TRAINING PROGRAM

## 2025-06-04 PROCEDURE — 99239 HOSP IP/OBS DSCHRG MGMT >30: CPT | Performed by: HOSPITALIST

## 2025-06-04 PROCEDURE — 84100 ASSAY OF PHOSPHORUS: CPT | Performed by: STUDENT IN AN ORGANIZED HEALTH CARE EDUCATION/TRAINING PROGRAM

## 2025-06-04 PROCEDURE — 250N000013 HC RX MED GY IP 250 OP 250 PS 637: Performed by: INTERNAL MEDICINE

## 2025-06-04 PROCEDURE — 80048 BASIC METABOLIC PNL TOTAL CA: CPT | Performed by: STUDENT IN AN ORGANIZED HEALTH CARE EDUCATION/TRAINING PROGRAM

## 2025-06-04 PROCEDURE — 36415 COLL VENOUS BLD VENIPUNCTURE: CPT | Performed by: STUDENT IN AN ORGANIZED HEALTH CARE EDUCATION/TRAINING PROGRAM

## 2025-06-04 PROCEDURE — 250N000013 HC RX MED GY IP 250 OP 250 PS 637: Performed by: STUDENT IN AN ORGANIZED HEALTH CARE EDUCATION/TRAINING PROGRAM

## 2025-06-04 PROCEDURE — 250N000013 HC RX MED GY IP 250 OP 250 PS 637: Performed by: HOSPITALIST

## 2025-06-04 RX ORDER — PANTOPRAZOLE SODIUM 40 MG/1
40 TABLET, DELAYED RELEASE ORAL
Qty: 90 TABLET | Refills: 0 | Status: SHIPPED | OUTPATIENT
Start: 2025-06-04

## 2025-06-04 RX ORDER — AMLODIPINE BESYLATE 10 MG/1
10 TABLET ORAL DAILY
Qty: 30 TABLET | Refills: 0 | Status: CANCELLED | OUTPATIENT
Start: 2025-06-04

## 2025-06-04 RX ORDER — PANTOPRAZOLE SODIUM 40 MG/1
40 TABLET, DELAYED RELEASE ORAL
Qty: 30 TABLET | Refills: 0 | Status: CANCELLED | OUTPATIENT
Start: 2025-06-04 | End: 2025-07-04

## 2025-06-04 RX ORDER — METRONIDAZOLE 500 MG/1
500 TABLET ORAL 2 TIMES DAILY
Qty: 14 TABLET | Refills: 0 | Status: SHIPPED | OUTPATIENT
Start: 2025-06-04 | End: 2025-06-11

## 2025-06-04 RX ADMIN — METRONIDAZOLE 500 MG: 500 TABLET ORAL at 09:14

## 2025-06-04 RX ADMIN — BUSPIRONE HYDROCHLORIDE 7.5 MG: 7.5 TABLET ORAL at 09:14

## 2025-06-04 RX ADMIN — ISOSORBIDE MONONITRATE 60 MG: 30 TABLET, EXTENDED RELEASE ORAL at 09:08

## 2025-06-04 RX ADMIN — HEPARIN SODIUM 5000 UNITS: 5000 INJECTION, SOLUTION INTRAVENOUS; SUBCUTANEOUS at 09:15

## 2025-06-04 RX ADMIN — INSULIN ASPART 1 UNITS: 100 INJECTION, SOLUTION INTRAVENOUS; SUBCUTANEOUS at 09:06

## 2025-06-04 RX ADMIN — AMOXICILLIN AND CLAVULANATE POTASSIUM 1 TABLET: 875; 125 TABLET, FILM COATED ORAL at 09:14

## 2025-06-04 RX ADMIN — PANTOPRAZOLE SODIUM 40 MG: 40 TABLET, DELAYED RELEASE ORAL at 09:15

## 2025-06-04 RX ADMIN — HEPARIN SODIUM 5000 UNITS: 5000 INJECTION, SOLUTION INTRAVENOUS; SUBCUTANEOUS at 00:45

## 2025-06-04 RX ADMIN — ESCITALOPRAM OXALATE 20 MG: 20 TABLET ORAL at 09:08

## 2025-06-04 RX ADMIN — Medication 25 MCG: at 09:08

## 2025-06-04 RX ADMIN — BUMETANIDE 2 MG: 2 TABLET ORAL at 09:08

## 2025-06-04 RX ADMIN — AMLODIPINE BESYLATE 10 MG: 5 TABLET ORAL at 09:08

## 2025-06-04 ASSESSMENT — ACTIVITIES OF DAILY LIVING (ADL)
ADLS_ACUITY_SCORE: 58
ADLS_ACUITY_SCORE: 61
ADLS_ACUITY_SCORE: 58
ADLS_ACUITY_SCORE: 61

## 2025-06-04 NOTE — DISCHARGE SUMMARY
[facility] MEDICINE  DISCHARGE SUMMARY     Primary Care Physician: Jon Kohli  Admission Date: 5/27/2025   Discharge Provider: Martina Sales MD Discharge Date: 6/4/2025   Diet:   Active Diet and Nourishment Order   Procedures    Low Fiber Diet    Diet       Code Status: Full Code   Activity: DCACTIVITY: Activity as tolerated        Condition at Discharge: Stable     REASON FOR PRESENTATION(See Admission Note for Details)   Abd pain    PRINCIPAL & ACTIVE DISCHARGE DIAGNOSES     Principal Problem:    Sepsis with acute renal failure without septic shock (H)  Active Problems:    Benign essential hypertension    DM type 2, Hgb A1C 6.3 on 12/6/23    Nonrheumatic mitral valve stenosis    Pulmonary hypertension (H)    Chronic diastolic congestive heart failure (H)    DIANA on CPAP    Hx of Left Parietal-Occipital CVA 6/11/23    Stage 4 chronic kidney disease (H)    Vascular dementia (H)    Diverticulitis of colon with perforation    Acute kidney injury superimposed on stage 3a chronic kidney disease (H)    Porcelain gallbladder    Anemia of chronic renal failure    Major depressive disorder, recurrent episode, in full remission    Hypokalemia      PENDING LABS     Unresulted Labs Ordered in the Past 30 Days of this Admission       No orders found from 4/27/2025 to 5/28/2025.            PROCEDURES ( this hospitalization only)      none    RECOMMENDATIONS TO OUTPATIENT PROVIDER FOR F/U VISIT     Follow-up Appointments       Follow Up      Follow up with nephrology in 2-4 weeks.        Hospital Follow-up with Existing Primary Care Provider (PCP)          Schedule Primary Care visit within: 7 Days   Recommended labs and Imaging (to be ordered by Primary Care Provider): CBC and BMP in 1 week               DISPOSITION     Assisted Living    SUMMARY OF HOSPITAL COURSE:      Indu Felix is a 70-year-old female with history of vascular dementia, hypertension, controlled type 2 diabetes,  mitral stenosis, pulmonary hypertension, CHF, DIANA on CPAP, prior CVA (6/11/23), CKD 4, porcelain gallbladder admitted on 5/27/2025 from her memory care facility with confusion, poor oral intake, generalized weakness, worsening leukocytosis, JEFFREY found to have diverticulitis with microperforation. Doing well with conservative management, returning to her Assisted Living facility today.         #Severe Sepsis due to Diverticulitis with Contained Microperforation.   - In the ED - WBC 15.5, HR >90. Developed fever on 5/29.  - CT A/P 5/27 showed two areas of contained perforation with fluid and gas.  -Areas too small to drain percutaneously  - Repeat CT A/P 5/30 unchanged diverticulitis.  -General surgery recommending longer course of abx 2 weeks. She can follow up with them on an as-needed basis. No followup colonoscopy felt to be indicated given her comorbid dementia.  -doing well with resolution of sepsis, tolerating regular diet. Surgery team recommends Low Fiber diet, this cannot be managed by the facility internally, but patient's daughter will assist with patient choosing low fiber options (ideally continue for 3 weeks)  -Augmentin and Metronidazole (plan for total 14 days of antibiotic)     Vascular Dementia  - Briefly on 1:1 sitter on 6/2, off later in the evening same night. Continues to do well without 1:1, ok to return to her [facility]  -home PPI changed from omeprazole to pantoprazole due to interaction with her Plavix    #Acute Kidney Injury - Resolved  #CKD Stage 4.   - Baseline creatinine appears to be ~1.6-1.7  - Creatinine on admission was 3.27  - Etiology thought to be Prerenal  - Improved to 1.49 on 6/2/25  - Nephrology saw her here, she should see them in the clinic as outpatient    Hypertension  BP ran a bit high here and home amlodipine was increased, ultimately decision to change back to home dosage at discharge and follow up as outpatient    Porcelain gallbladder  -incidental finding  -seems  asymptomatic  -likely would not pursue prophylactic cholecystectomy given her dementia      Discharge Medications with Med changes:     Current Discharge Medication List        START taking these medications    Details   amoxicillin-clavulanate (AUGMENTIN) 875-125 MG tablet Take 1 tablet by mouth every 12 hours for 7 days.  Qty: 14 tablet, Refills: 0    Associated Diagnoses: Diverticulitis of colon with perforation      metroNIDAZOLE (FLAGYL) 500 MG tablet Take 1 tablet (500 mg) by mouth 2 times daily for 7 days.  Qty: 14 tablet, Refills: 0    Associated Diagnoses: Diverticulitis of colon with perforation      pantoprazole (PROTONIX) 40 MG EC tablet Take 1 tablet (40 mg) by mouth every morning (before breakfast).  Qty: 90 tablet, Refills: 0    Associated Diagnoses: Gastroesophageal reflux disease with esophagitis, unspecified whether hemorrhage           CONTINUE these medications which have NOT CHANGED    Details   amLODIPine (NORVASC) 5 MG tablet Take 1 tablet (5 mg) by mouth daily  Qty: 30 tablet, Refills: 0    Associated Diagnoses: Benign essential hypertension      aspirin 81 MG EC tablet Take 81 mg by mouth daily      atorvastatin (LIPITOR) 80 MG tablet Take 80 mg by mouth at bedtime.      bumetanide (BUMEX) 2 MG tablet Take 1 tablet (2 mg) by mouth daily  Qty: 180 tablet, Refills: 1    Associated Diagnoses: Chronic heart failure with preserved ejection fraction (H)      busPIRone (BUSPAR) 7.5 MG tablet Take 7.5 mg by mouth 2 times daily.      cholecalciferol 25 MCG (1000 UT) TABS Take 1 tablet by mouth daily      clopidogrel (PLAVIX) 75 MG tablet Take 1 tablet (75 mg) by mouth daily  Qty: 30 tablet, Refills: 11    Associated Diagnoses: History of CVA (cerebrovascular accident)      donepezil (ARICEPT) 10 MG tablet Take 1 tablet (10 mg) by mouth at bedtime  Qty: 30 tablet, Refills: 11    Associated Diagnoses: Moderate vascular dementia without behavioral disturbance, psychotic disturbance, mood disturbance, or  anxiety (H); Cerebrovascular accident (CVA) due to stenosis of right posterior cerebral artery (H); Cerebrovascular accident (CVA) due to stenosis of left middle cerebral artery (H); 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    Associated Diagnoses: Type 2 diabetes mellitus with hyperosmolarity without coma, with long-term current use of insulin (H)      isosorbide mononitrate (IMDUR) 60 MG 24 hr tablet Take 1 tablet (60 mg) by mouth 2 times daily  Qty: 60 tablet, Refills: 0    Associated Diagnoses: Benign essential hypertension; Pulmonary hypertension (H)      loperamide (IMODIUM) 2 MG capsule Take 1 capsule (2 mg) by mouth 2 times daily as needed for diarrhea    Associated Diagnoses: Diarrhea, unspecified type      !! menthol-zinc oxide (CALMOSEPTINE) 0.44-20.6 % OINT ointment Apply topically 2 times daily.      !! menthol-zinc oxide (CALMOSEPTINE) 0.44-20.6 % OINT ointment Apply topically 2 times daily as needed for skin protection.      metFORMIN (GLUCOPHAGE) 500 MG tablet Take 500 mg by mouth 2 times daily (with meals).      !! QUEtiapine (SEROQUEL) 25 MG tablet Take 12.5 mg by mouth 2 times daily as needed (agitation).      !! QUEtiapine (SEROQUEL) 25 MG tablet Take 12.5 mg by mouth at bedtime.      traZODone (DESYREL) 100 MG tablet Take 100 mg by mouth At Bedtime       !! - Potential duplicate medications found. Please discuss with provider.        STOP taking these medications       omeprazole (PRILOSEC) 20 MG DR capsule Comments:   Reason for Stopping:                 Consults     NEPHROLOGY IP CONSULT  SURGERY GENERAL IP  CONSULT  CARE MANAGEMENT / SOCIAL WORK IP CONSULT  INTERVENTIONAL RADIOLOGY ADULT/PEDS IP CONSULT  NEPHROLOGY IP CONSULT  PHYSICAL THERAPY ADULT IP CONSULT  OCCUPATIONAL THERAPY ADULT IP CONSULT        SIGNIFICANT IMAGING FINDINGS     Results for orders placed or performed during the hospital encounter of 05/27/25   CT Abdomen Pelvis w/o Contrast    Impression    IMPRESSION:   1.  Colonic diverticulosis. Sequela of prior perforated diverticulitis of the sigmoid colon with 2 focal areas of presumed contained perforation. The first more inferior collection of gas and fluid measures approximately 4.7 x 3.8 cm and demonstrates   likely fistulization with the left adnexa. A second predominantly gas containing contained perforation collection is noted more superiorly measuring approximately 3.4 x 2.8 cm. No evidence of disseminated free intraperitoneal gas otherwise.   2.  Calcified uterine leiomyomas.  3.  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     CT Abdomen Pelvis w/o Contrast    Impression    IMPRESSION:     1.  Diverticulitis of the sigmoid colon in the left lower quadrant complicated by diverticular perforation. Unchanged gas and fluid collection posterior to the sigmoid inseparable from the left adnexa. A air-containing structures superior to the sigmoid   with communication to the bowel lumen is slightly smaller.  2.  Calcified uterine fibroids.  3.  Chronic cholecystitis.         SIGNIFICANT LABORATORY FINDINGS     See emr    Discharge Orders        Reason for your hospital stay    diverticulitis     Activity    Your activity upon discharge: activity as tolerated     When to contact your care team    Call your primary doctor if you have any of the following: chest pain, shortness of breath, fever, chills, fainting, dizziness, vomiting, constipation, dehydration, worsening pain, bleeding, skin or eyes turning yellow, or trouble urinating, or any other symptoms that are new or concerning to you.      Discharge Instructions    Your omeprazole was changed to pantoprazole because omeprazole can interact with your clopidogrel.     Follow Up    Follow up with nephrology in 2-4 weeks.     Diet    Follow this diet upon discharge: resume your regular diet     Hospital Follow-up with Existing Primary Care Provider (PCP)            Examination   Physical Exam   Temp:  [97.6  F (36.4  C)-98.7  F (37.1  C)] 98.7  F (37.1  C)  Pulse:  [80-85] 81  Resp:  [17-18] 17  BP: (124-143)/(58-81) 138/81  SpO2:  [94 %-96 %] 94 %  Wt Readings from Last 1 Encounters:   06/04/25 88.4 kg (194 lb 14.2 oz)       General: in no apparent distress, non-toxic, and alert female sitting in bedside chair oriented to person and place. Repeatedly stating she wants to go home.  HEENT: Head normocephalic atraumatic, oral mucosa moist. Sclerae anicteric  CV: Regular rhythm, normal rate, no murmurs  Resp: No wheezes, no rales or rhonchi, no focal consolidations  GI: Belly soft, nondistended, nontender, bowel sounds present  Skin: No rashes or lesions  Extremities: No peripheral edema  Psych: Normal affect, mood euthymic  Neuro: Grossly normal    I called and updated her daughter Solange.    Please see EMR for more detailed significant labs, imaging, consultant notes etc.    IMartina MD, personally saw the patient today and spent greater than 30 minutes discharging this patient.    Martina Sales MD  [facility]    CC:Jon Kohli

## 2025-06-04 NOTE — PLAN OF CARE
Goal Outcome Evaluation:    10:50.... Pt was discharged to her detention, daughter picked her up.

## 2025-06-04 NOTE — PROGRESS NOTES
Care Management Follow Up    Length of Stay (days): 8    Expected Discharge Date: 06/04/2025     Concerns to be Addressed:       Patient plan of care discussed at interdisciplinary rounds: Yes    Anticipated Discharge Disposition:     Back to [facility]           Anticipated Discharge Services:  na  Anticipated Discharge DME: na     Patient/family educated on Medicare website which has current facility and service quality ratings:na    Education Provided on the Discharge Plan:  yes  Patient/Family in Agreement with the Plan: yes     Referrals Placed by CM/SW:    Private pay costs discussed: transportation costs    Discussed  Partnership in Safe Discharge Planning  document with patient/family: No     Handoff Completed: No, handoff not indicated or clinically appropriate    Additional Information:  Messaged received from Shayy FORD from [facility]. She stated that patients daughter will assist with food ordering or bringing in for patient upon discharge. Asking that a regular diet be ordered.   8:48 AM   Updated provider with diet information  9:11 AM   Faxed orders. Called and left voicemail for LILIANA Lucas confirming discharge timing. Called and updated daughter Brigido. She plans to be at the facility at 1030 and will meet the patient there.  Care Management Discharge Note    Discharge Date: 06/04/2025       Discharge Disposition:  [facility]    Discharge Services:  na    Discharge DME:  na    Discharge Transportation:  [employer] wheelchair    Private pay costs discussed: transportation costs    Does the patient's insurance plan have a 3 day qualifying hospital stay waiver?  No      Education Provided on the Discharge Plan:  yes  Persons Notified of Discharge Plans: family, facility, [facility], charge RN   Patient/Family in Agreement with the Plan:  yes    Handoff Referral Completed: No, handoff not indicated or clinically appropriate    Additional Information:  Patient will discharge back to [facility] via [employer] transport with  time at 11am.    Tereza Dumont, RNCC

## 2025-06-04 NOTE — PLAN OF CARE
Physical Therapy Discharge Summary    Reason for therapy discharge:    Discharged to home.    Progress towards therapy goal(s). See goals on Care Plan in [facility] electronic health record for goal details.  Goals partially met.  Barriers to achieving goals:   discharge from facility.    Therapy recommendation(s):    No further therapy is recommended. Assist as needed at her [facility].

## 2025-06-04 NOTE — PLAN OF CARE
Vitally stable, denies pain, up to bathroom with assist, incontinent and continent of urine and stool.  Oriented to self only, safety precautions and frequent checks.     Goal Outcome Evaluation:    Problem: Delirium  Goal: Improved Sleep  Outcome: Progressing     Problem: Comorbidity Management  Goal: Blood Pressure in Desired Range  Outcome: Progressing  Intervention: Maintain Blood Pressure Management  Recent Flowsheet Documentation  Taken 6/4/2025 0032 by Tonya Shah RN  Medication Review/Management: medications reviewed     Problem: Infection  Goal: Absence of Infection Signs and Symptoms  Outcome: Progressing

## 2025-06-04 NOTE — PLAN OF CARE
Occupational Therapy Discharge Summary    Reason for therapy discharge:    Discharged to home.    Progress towards therapy goal(s). See goals on Care Plan in [facility] electronic health record for goal details.  Goals partially met.  Barriers to achieving goals:   discharge from facility.    Therapy recommendation(s):    Assist as needed at [facility]

## 2025-06-04 NOTE — PLAN OF CARE
Problem: Delirium  Goal: Improved Behavioral Control  Outcome: Not Progressing     Problem: Adult Inpatient Plan of Care  Goal: Optimal Comfort and Wellbeing  Outcome: Progressing   Goal Outcome Evaluation:       Patient confused, having urinary frequency, got out of bed bed multiple times without using call light, slid out of chair(see fall report in chart). Had ativan prn, denies   pain or discomfort, vitals stable, BS were 189 and 161.

## 2025-06-05 ENCOUNTER — PATIENT OUTREACH (OUTPATIENT)
Dept: CARE COORDINATION | Facility: CLINIC | Age: 70
End: 2025-06-05
Payer: COMMERCIAL

## 2025-06-05 NOTE — PROGRESS NOTES
[facility]    Background: Transitional Care Management program identified per system criteria and reviewed by [facility] team for possible outreach.    Assessment: Upon chart review, CCRC Team member will not proceed with patient outreach related to this episode of Transitional Care Management program due to reason below:    Non-[facility] TCU: CCRC team member noted patient discharged to TCU/ARU/LTACH. Patient is not established with a [facility] currently supported by Primary Care-Care Coordination therefore handoff to Primary Care-Care Coordination is not appropriate at this time.    Plan: Transitional Care Management episode addressed appropriately per reason noted above.      KEIKO Kay  Connected Care Resource [place], [facility]    *Connected Care Resource Team does NOT follow patient ongoing. Referrals are identified based on internal discharge reports and the outreach is to ensure patient has an understanding of their discharge instructions.

## 2025-06-11 ENCOUNTER — DOCUMENTATION ONLY (OUTPATIENT)
Dept: OTHER | Facility: CLINIC | Age: 70
End: 2025-06-11
Payer: COMMERCIAL

## 2025-06-12 ENCOUNTER — LAB REQUISITION (OUTPATIENT)
Dept: LAB | Facility: CLINIC | Age: 70
End: 2025-06-12
Payer: COMMERCIAL

## 2025-06-12 DIAGNOSIS — N18.32 CHRONIC KIDNEY DISEASE, STAGE 3B (H): ICD-10-CM

## 2025-06-17 ENCOUNTER — LAB REQUISITION (OUTPATIENT)
Dept: LAB | Facility: CLINIC | Age: 70
End: 2025-06-17
Payer: COMMERCIAL

## 2025-06-17 DIAGNOSIS — N17.8 OTHER ACUTE KIDNEY FAILURE: ICD-10-CM

## 2025-06-17 DIAGNOSIS — D64.9 ANEMIA, UNSPECIFIED: ICD-10-CM

## 2025-06-17 DIAGNOSIS — E83.51 HYPOCALCEMIA: ICD-10-CM

## 2025-06-17 LAB
ANION GAP SERPL CALCULATED.3IONS-SCNC: 11 MMOL/L (ref 7–15)
BUN SERPL-MCNC: 13.3 MG/DL (ref 8–23)
CALCIUM SERPL-MCNC: 7.7 MG/DL (ref 8.8–10.4)
CHLORIDE SERPL-SCNC: 108 MMOL/L (ref 98–107)
CREAT SERPL-MCNC: 1.88 MG/DL (ref 0.51–0.95)
EGFRCR SERPLBLD CKD-EPI 2021: 28 ML/MIN/1.73M2
ERYTHROCYTE [DISTWIDTH] IN BLOOD BY AUTOMATED COUNT: 14.6 % (ref 10–15)
GLUCOSE SERPL-MCNC: 75 MG/DL (ref 70–99)
HCO3 SERPL-SCNC: 24 MMOL/L (ref 22–29)
HCT VFR BLD AUTO: 29 % (ref 35–47)
HGB BLD-MCNC: 9 G/DL (ref 11.7–15.7)
MCH RBC QN AUTO: 27.4 PG (ref 26.5–33)
MCHC RBC AUTO-ENTMCNC: 31 G/DL (ref 31.5–36.5)
MCV RBC AUTO: 88 FL (ref 78–100)
PLATELET # BLD AUTO: 433 10E3/UL (ref 150–450)
POTASSIUM SERPL-SCNC: 3.8 MMOL/L (ref 3.4–5.3)
RBC # BLD AUTO: 3.29 10E6/UL (ref 3.8–5.2)
SODIUM SERPL-SCNC: 143 MMOL/L (ref 135–145)
WBC # BLD AUTO: 9.3 10E3/UL (ref 4–11)

## 2025-06-17 PROCEDURE — 80048 BASIC METABOLIC PNL TOTAL CA: CPT | Mod: ORL

## 2025-06-17 PROCEDURE — 36415 COLL VENOUS BLD VENIPUNCTURE: CPT | Mod: ORL

## 2025-06-17 PROCEDURE — P9604 ONE-WAY ALLOW PRORATED TRIP: HCPCS | Mod: ORL

## 2025-06-17 PROCEDURE — 85027 COMPLETE CBC AUTOMATED: CPT | Mod: ORL

## 2025-06-24 LAB
ANION GAP SERPL CALCULATED.3IONS-SCNC: 15 MMOL/L (ref 7–15)
BUN SERPL-MCNC: 21.2 MG/DL (ref 8–23)
CALCIUM SERPL-MCNC: 7.5 MG/DL (ref 8.8–10.4)
CHLORIDE SERPL-SCNC: 103 MMOL/L (ref 98–107)
CREAT SERPL-MCNC: 1.95 MG/DL (ref 0.51–0.95)
EGFRCR SERPLBLD CKD-EPI 2021: 27 ML/MIN/1.73M2
ERYTHROCYTE [DISTWIDTH] IN BLOOD BY AUTOMATED COUNT: 14.3 % (ref 10–15)
GLUCOSE SERPL-MCNC: 63 MG/DL (ref 70–99)
HCO3 SERPL-SCNC: 23 MMOL/L (ref 22–29)
HCT VFR BLD AUTO: 33.1 % (ref 35–47)
HGB BLD-MCNC: 10.5 G/DL (ref 11.7–15.7)
MCH RBC QN AUTO: 27.1 PG (ref 26.5–33)
MCHC RBC AUTO-ENTMCNC: 31.7 G/DL (ref 31.5–36.5)
MCV RBC AUTO: 85 FL (ref 78–100)
PLATELET # BLD AUTO: 439 10E3/UL (ref 150–450)
POTASSIUM SERPL-SCNC: 3.1 MMOL/L (ref 3.4–5.3)
RBC # BLD AUTO: 3.88 10E6/UL (ref 3.8–5.2)
SODIUM SERPL-SCNC: 141 MMOL/L (ref 135–145)
WBC # BLD AUTO: 12.8 10E3/UL (ref 4–11)

## 2025-06-26 ENCOUNTER — LAB REQUISITION (OUTPATIENT)
Dept: LAB | Facility: CLINIC | Age: 70
End: 2025-06-26
Payer: COMMERCIAL

## 2025-06-26 DIAGNOSIS — E83.51 HYPOCALCEMIA: ICD-10-CM

## 2025-06-26 DIAGNOSIS — E87.6 HYPOKALEMIA: ICD-10-CM

## 2025-07-01 LAB
ANION GAP SERPL CALCULATED.3IONS-SCNC: 13 MMOL/L (ref 7–15)
BUN SERPL-MCNC: 18.6 MG/DL (ref 8–23)
CALCIUM SERPL-MCNC: 8.4 MG/DL (ref 8.8–10.4)
CHLORIDE SERPL-SCNC: 107 MMOL/L (ref 98–107)
CREAT SERPL-MCNC: 1.64 MG/DL (ref 0.51–0.95)
EGFRCR SERPLBLD CKD-EPI 2021: 33 ML/MIN/1.73M2
GLUCOSE SERPL-MCNC: 50 MG/DL (ref 70–99)
HCO3 SERPL-SCNC: 21 MMOL/L (ref 22–29)
POTASSIUM SERPL-SCNC: 3.9 MMOL/L (ref 3.4–5.3)
SODIUM SERPL-SCNC: 141 MMOL/L (ref 135–145)

## 2025-07-15 NOTE — PROGRESS NOTES
In person evaluation      HPI  Hospital visit 12/5/2023 1/8/2024, in person follow-up visit  4/15/2024, in person visit  11/4/2024, in person visit  7/16/2025, in person visit      70-year-old being evaluated neurologically for:  CVA x 2, left parietal June 2023/right temporal December 2023  Vascular dementia    July 2025 visit  Since last seen  Hospitalized 5/27/2025 had some abdominal pain sepsis acute renal failure.  June 2025 in hospital with the severe sepsis due to diverticulitis with a contained perforation and possible pelvic abscess treated by other physicians.  Previous cataract surgery July 9 and August 13, 2024    Patient lives at the care center  Did not come with any paperwork  A family member did come with  Patient has significant falloff and cognitive functioning  Recently in the hospital    Currently though he is eating okay  No GI distress  No decrease in appetite  As far as we know no black tarry stool no blood in the stool  Did have the recent diverticulitis    Patient has intracranial atherosclerosis and discussed this with patient and daughter to be on the dual antiplatelet medication since she is not having side effects  If she was to have another stroke it would markedly decrease her abilities and quality of life    We talked about neurodegenerative conditions and how they march forward  As patient is get farther and farther along in the process any illness will make things worse    She is having more trouble remembering things              Previously  Living in assisted living  They help with cleaning/3 meals per day/monitoring meds  They are supposed to help with showers she has a shower chair but sometimes she refuses help    Ambulates with walker  Needs one-to-one assistance with ambulation and assistance one-to-one with activities of daily living  Very poor recall  Poor discretion  Family feels cognition is worsening  Needs a lot of assistance in day-to-day living                A.  CVA  multiple       Left parietal June 2023       Right parietal 12/6/2023         HEAD MRI: 12/6/2023       1.  Moderate-sized zone of acute to early subacute ischemic infarction centered in the inferomedial right temporal lobe.             This involves the hippocampus and parahippocampal structures and posterior right thalamus.            5.8 x 2.5 x 2.0 cm          Cardiac event monitor 12/27/2023 - 1/25/2024 no sustained tachyarrhythmias/no atrial fibrillation      B.   Vascular risk factors         Hypertension/PRES/intracranial stenosis/CKD/DIANA/CHF         Cardiac event monitor 12/27/2023 - 1/25/2024 no sustained tachyarrhythmias/no atrial fibrillation         Has intracranial atherosclerosis             Patient on         Plavix 75 mg once per day         Aspirin 81 mg once per day         Atorvastatin 80 mg once per day    C.   Vascular dementia         Mother history of dementia      MoCA  12/7/2023,   19 out of 30 (slums)  1/8/2024,     15 out of 30  11/4/2024,    16 out of 30        Past medical history  CVA left parietal (June 2023)  Left intracranial stenosis P3/P4  CVA right temporal (December 2023)  PRES April 2020  Moderate aortic stenosis  Diabetes  CKD  CHF  Hypertension  DIANA    Habits  Non-smoker  Does not drink alcohol    Family history  Father hypertension  Mother diabetes/hypertension/dementia        Workup  CT head 6/12/2023  1. Acute ischemic stroke of the left parietal lobe involving the P3/P4 segment of the left posterior cerebral artery.   2. No acute intracranial hemorrhage.   3. Large 4.4 cm mass in the right thyroid lobe. Recommend nonemergent dedicated thyroid ultrasound for further characterization.  CTA head and neck 6/12/2023  1.  Left P3/P4 severe stenosis versus occlusion  2.  Carotid arteries no significant stenosis  MRI head 6/12/2023, compared to 6/11/2023 CT head  1. Subacute infarct left parietal lobe.   2.  No signal mass effect or hemorrhage.   Cardiac echo 6/12/2023, ejection  fraction greater than 70%, left atrium moderately enlarged  Cardiac event monitor 7/18/2023 30 days see official report  No atrial fibrillation seen.  12/2023 workup  CT scan head 12/5/2023 compared to 6/11/2023  1.  Moderate area of hypodensity medial aspect of right temporal lobe.        This probably represents subacute ischemia.        This is new since prior. Questionable tiny amount of associated petechial hemorrhage.  2.  Moderate area of more pronounced hypodensity left parietal lobe, most consistent with late subacute to chronic infarction.        This has evolved since prior.  3.  Mild presumed chronic small vessel ischemia.  4.  Mild atrophy.  HEAD MRI: 12/6/2023  1.  Moderate-sized zone of acute to early subacute ischemic infarction centered in the inferomedial right temporal lobe.       This involves the hippocampus and parahippocampal structures and posterior right thalamus.       5.8 x 2.5 x 2.0 cm  2.  No space-occupying hemorrhage.   3.  Age-related changes.  HEAD MRA: 12/6/2023  1.  5 mm segment of high-grade stenosis/near occlusion at the right P1-P2 junction. High-grade stenosis or occlusion of the distal right P3 segment.  2.  Moderate atheromatous disease elsewhere.  NECK MRA: 12/6/2023  No measurable stenosis or dissection.  Cardiac echo 12/6/2023, ejection fraction 60-65%, left atrium moderately dilated  EEG 12/6/2023 Impression:  Moderately abnormal awake EEG due to:  Bitemporal theta delta slowing worse on the right than the left.  CT scan head 12/7/2023 follow-up  1.  Chronic left parietal and bilateral cerebellar infarcts.  2.  Subacute or early chronic right temporal-occipital infarcts. No finding for hemorrhagic transformation.  3.  No finding for new infarct.  4.  Moderate volume loss and at least mild presumed sequela of chronic microvascular ischemic change.    Cardiac event monitor 12/27/2023 - 1/25/2024 no sustained tachyarrhythmias/no atrial fibrillation  Slums score 19 out of 30,  (12/7/2023)                                6/2023 4/2023 4/2024 9/2024 6/2025  NA/K                                                               142/4.5       141/3.9  BUN/Cr                                                            30/1.81      18.6/1.64  GLU                                                                 95              50  WBC/HGB                                                                          12.8/10.5  PLTs                                                                                    4 39,000  HDL/LDL             32/92  HGBA1C             7.4          8.3  B12                                                  1081  TSH                                                  1.65      MoCA  12/7/2023,   19 out of 30 (slums)  1/8/2024,     15 out of 30  11/4/2024,    16 out of 30        Exam     Review of systems  Pertinent positives and negatives  No headache no chest pain or shortness of breath no nausea vomiting no diarrhea no fever chills  No diplopia dysarthria dysphagia  No focal weakness  Difficulty with cognition  Trouble with ADLs  Trouble with gait and balance    General exam  Blood pressure 138/88, pulse 80  Alert attentive   HEENT normal  Lungs clear  Heart rate regular, (1/6 systolic murmur per cardiology)  Abdomen soft  Symmetrical pulses    Neurologic exam  Alert orient x 3  Prosody speech normal  Naming normal  Comprehension normal  Repetition normal  No aphasia  No neglect    MoCA  12/7/2023,   19 out of 30 (slums)  1/8/2024,     15 out of 30  11/4/2024,    16 out of 30    Patient repeats herself a lot  Could not recall that she is living in the nursing home instead of at home  She cannot really tell me much about what she watches on TV    She surprises you but for the most part has difficulty and repeats questions      Cranials 2 through 12  No ophthalmoplegia  No nystagmus  Could not  a visual field cut on confrontation  Face symmetrical  Tongue  twisters good    Upper extremities  No drift  No tremor    Lower extremities  Distal proximal strength okay    Gait  Trouble getting up with pushing off with both hands  Needed some assistance from physician  Could march in place while hanging onto the walker  Would need to be assisted though with any ambulation even with the assistive device  High risk for falls  Poor discretion              Assessment/plan    1.  Left parietal lobe stroke 6/2023/right temporal stroke 12/2023          Left parietal lobe stroke 6/2023        Large right inferior medial temporal lobe infarct 5.8 x 2.5 x 2.0 cm 12/5/2023        Involves hippocampus parahippocampal structures and posterior right thalamus also.        Presented with confusion     2.   Intracranial atherosclerosis         Left P3/P4 occlusion versus high-grade stenosis         Right P1-P2 high-grade stenosis/right P3 segment occlusion          Moderate atheromatous disease elsewhere.       3.   Cardiac echo with left atrial enlargement        Cardiac event monitor 12/27/2023 - 1/25/2024 no sustained tachyarrhythmias/no atrial fibrillation    4.  Vascular risk factors:       Hypertension/CKD/obstructive sleep apnea/previous CVA/CHF/intracranial stenosis     4.   Above complicated by mild pre-existing dementia        Family history mother with dementia       MoCA  12/7/2023,   19 out of 30 (slums)  1/8/2024,     15 out of 30  11/4/2024,    16 out of 30       Diagnosis  Right PCA stroke large (12/2023)  Intracranial atherosclerosis severe  Confusion/multi-infarct dementia  Left parietal CVA (6/2023)  Moderate aortic stenosis  Chronic heart failure  Hypertension/diabetes/CKD  Obstructive sleep apnea      Tolerating dual antiplatelet and Aricept    I rediscussed with her intracranial stenosis multifocal strokes with dementia she cannot afford to have another event  Will continue with the dual antiplatelet medication  Will continue with Aricept 10 mg once per day  She will  continue in the care center complex medical issues as well as her stroke and dementia    Patient on  Plavix 75 mg once per day  Aspirin 81 mg once per day  Atorvastatin 80 mg once per day    Cardiac event monitor 12/27/2023 - 1/25/2024 no sustained tachyarrhythmias/no atrial fibrillation  Has intracranial atherosclerosis    Continue Aricept 10 mg once per day  Follow-up in April 2026    Multiple issues as above discussed with patient and family member  Total care time today 31 minutes  The longitudinal plan of care for the diagnosis(es)/condition(s) as documented were addressed during this visit. Due to the added complexity in care, I will continue to support Indu in the subsequent management and with ongoing continuity of care.      As part of visit today   Reviewed laboratory data  Reviewed recent hospitalization

## 2025-07-16 ENCOUNTER — OFFICE VISIT (OUTPATIENT)
Dept: NEUROLOGY | Facility: CLINIC | Age: 70
End: 2025-07-16
Payer: COMMERCIAL

## 2025-07-16 VITALS
WEIGHT: 194 LBS | HEIGHT: 63 IN | DIASTOLIC BLOOD PRESSURE: 88 MMHG | SYSTOLIC BLOOD PRESSURE: 138 MMHG | HEART RATE: 80 BPM | BODY MASS INDEX: 34.38 KG/M2

## 2025-07-16 DIAGNOSIS — F01.B0 MODERATE VASCULAR DEMENTIA WITHOUT BEHAVIORAL DISTURBANCE, PSYCHOTIC DISTURBANCE, MOOD DISTURBANCE, OR ANXIETY (H): Primary | ICD-10-CM

## 2025-07-16 DIAGNOSIS — I65.22 STENOSIS OF INTRACRANIAL PORTIONS OF LEFT INTERNAL CAROTID ARTERY: ICD-10-CM

## 2025-07-16 DIAGNOSIS — I63.512 CEREBROVASCULAR ACCIDENT (CVA) DUE TO STENOSIS OF LEFT MIDDLE CEREBRAL ARTERY (H): ICD-10-CM

## 2025-07-16 DIAGNOSIS — I63.531 CEREBROVASCULAR ACCIDENT (CVA) DUE TO STENOSIS OF RIGHT POSTERIOR CEREBRAL ARTERY (H): ICD-10-CM

## 2025-07-16 RX ORDER — POTASSIUM CHLORIDE 750 MG/1
TABLET, EXTENDED RELEASE ORAL
COMMUNITY
Start: 2025-07-04

## 2025-07-16 NOTE — LETTER
7/16/2025      Indu Felix  [address] [place] [address]      Dear Colleague,    Thank you for referring your patient, Indu Felix, to the [facility] [place]. Please see a copy of my visit note below.    In person evaluation      [facility] [place]  Hospital visit 12/5/2023 1/8/2024, in person follow-up visit  4/15/2024, in person visit  11/4/2024, in person visit  7/16/2025, in person visit      70-year-old being evaluated neurologically for:  CVA x 2, left parietal June 2023/right temporal December 2023  Vascular dementia    July 2025 visit  Since last seen  Hospitalized 5/27/2025 had some abdominal pain sepsis acute renal failure.  June 2025 in hospital with the severe sepsis due to diverticulitis with a contained perforation and possible pelvic abscess treated by other physicians.  Previous cataract surgery July 9 and August 13, 2024    Patient lives at the care center  Did not come with any paperwork  A family member did come with  Patient has significant falloff and cognitive functioning  Recently in the hospital    Currently though he is eating okay  No GI distress  No decrease in appetite  As far as we know no black tarry stool no blood in the stool  Did have the recent diverticulitis    Patient has intracranial atherosclerosis and discussed this with patient and daughter to be on the dual antiplatelet medication since she is not having side effects  If she was to have another stroke it would markedly decrease her abilities and quality of life    We talked about neurodegenerative conditions and how they march forward  As patient is get farther and farther along in the process any illness will make things worse    She is having more trouble remembering things              Previously  Living in assisted living  They help with cleaning/3 meals per day/monitoring meds  They are supposed to help with showers she has a shower chair but sometimes she refuses help    Ambulates with  walker  Needs one-to-one assistance with ambulation and assistance one-to-one with activities of daily living  Very poor recall  Poor discretion  Family feels cognition is worsening  Needs a lot of assistance in day-to-day living                A.  CVA multiple       Left parietal June 2023       Right parietal 12/6/2023         HEAD MRI: 12/6/2023       1.  Moderate-sized zone of acute to early subacute ischemic infarction centered in the inferomedial right temporal lobe.             This involves the hippocampus and parahippocampal structures and posterior right thalamus.            5.8 x 2.5 x 2.0 cm          Cardiac event monitor 12/27/2023 - 1/25/2024 no sustained tachyarrhythmias/no atrial fibrillation      B.   Vascular risk factors         Hypertension/PRES/intracranial stenosis/CKD/DIANA/CHF         Cardiac event monitor 12/27/2023 - 1/25/2024 no sustained tachyarrhythmias/no atrial fibrillation         Has intracranial atherosclerosis             Patient on         Plavix 75 mg once per day         Aspirin 81 mg once per day         Atorvastatin 80 mg once per day    C.   Vascular dementia         Mother history of dementia      MoCA  12/7/2023,   19 out of 30 (slums)  1/8/2024,     15 out of 30  11/4/2024,    16 out of 30        Past medical history  CVA left parietal (June 2023)  Left intracranial stenosis P3/P4  CVA right temporal (December 2023)  PRES April 2020  Moderate aortic stenosis  Diabetes  CKD  CHF  Hypertension  DIANA    Habits  Non-smoker  Does not drink alcohol    Family history  Father hypertension  Mother diabetes/hypertension/dementia        Workup  CT head 6/12/2023  1. Acute ischemic stroke of the left parietal lobe involving the P3/P4 segment of the left posterior cerebral artery.   2. No acute intracranial hemorrhage.   3. Large 4.4 cm mass in the right thyroid lobe. Recommend nonemergent dedicated thyroid ultrasound for further characterization.  CTA head and neck 6/12/2023  1.  Left  P3/P4 severe stenosis versus occlusion  2.  Carotid arteries no significant stenosis  MRI head 6/12/2023, compared to 6/11/2023 CT head  1. Subacute infarct left parietal lobe.   2.  No signal mass effect or hemorrhage.   Cardiac echo 6/12/2023, ejection fraction greater than 70%, left atrium moderately enlarged  Cardiac event monitor 7/18/2023 30 days see official report  No atrial fibrillation seen.  12/2023 workup  CT scan head 12/5/2023 compared to 6/11/2023  1.  Moderate area of hypodensity medial aspect of right temporal lobe.        This probably represents subacute ischemia.        This is new since prior. Questionable tiny amount of associated petechial hemorrhage.  2.  Moderate area of more pronounced hypodensity left parietal lobe, most consistent with late subacute to chronic infarction.        This has evolved since prior.  3.  Mild presumed chronic small vessel ischemia.  4.  Mild atrophy.  HEAD MRI: 12/6/2023  1.  Moderate-sized zone of acute to early subacute ischemic infarction centered in the inferomedial right temporal lobe.       This involves the hippocampus and parahippocampal structures and posterior right thalamus.       5.8 x 2.5 x 2.0 cm  2.  No space-occupying hemorrhage.   3.  Age-related changes.  HEAD MRA: 12/6/2023  1.  5 mm segment of high-grade stenosis/near occlusion at the right P1-P2 junction. High-grade stenosis or occlusion of the distal right P3 segment.  2.  Moderate atheromatous disease elsewhere.  NECK MRA: 12/6/2023  No measurable stenosis or dissection.  Cardiac echo 12/6/2023, ejection fraction 60-65%, left atrium moderately dilated  EEG 12/6/2023 Impression:  Moderately abnormal awake EEG due to:  Bitemporal theta delta slowing worse on the right than the left.  CT scan head 12/7/2023 follow-up  1.  Chronic left parietal and bilateral cerebellar infarcts.  2.  Subacute or early chronic right temporal-occipital infarcts. No finding for hemorrhagic transformation.  3.  No  finding for new infarct.  4.  Moderate volume loss and at least mild presumed sequela of chronic microvascular ischemic change.    Cardiac event monitor 12/27/2023 - 1/25/2024 no sustained tachyarrhythmias/no atrial fibrillation  Slums score 19 out of 30, (12/7/2023)                                6/2023 4/2023 4/2024 9/2024 6/2025  NA/K                                                               142/4.5       141/3.9  BUN/Cr                                                            30/1.81      18.6/1.64  GLU                                                                 95              50  WBC/HGB                                                                          12.8/10.5  PLTs                                                                                    4 39,000  HDL/LDL             32/92  HGBA1C             7.4          8.3  B12                                                  1081  TSH                                                  1.65      MoCA  12/7/2023,   19 out of 30 (slums)  1/8/2024,     15 out of 30  11/4/2024,    16 out of 30        Exam     Review of systems  Pertinent positives and negatives  No headache no chest pain or shortness of breath no nausea vomiting no diarrhea no fever chills  No diplopia dysarthria dysphagia  No focal weakness  Difficulty with cognition  Trouble with ADLs  Trouble with gait and balance    General exam  Blood pressure 138/88, pulse 80  Alert attentive   HEENT normal  Lungs clear  Heart rate regular, (1/6 systolic murmur per cardiology)  Abdomen soft  Symmetrical pulses    Neurologic exam  Alert orient x 3  Prosody speech normal  Naming normal  Comprehension normal  Repetition normal  No aphasia  No neglect    MoCA  12/7/2023,   19 out of 30 (slums)  1/8/2024,     15 out of 30  11/4/2024,    16 out of 30    Patient repeats herself a lot  Could not recall that she is living in the nursing home instead of at home  She cannot really tell me  much about what she watches on TV    She surprises you but for the most part has difficulty and repeats questions      Cranials 2 through 12  No ophthalmoplegia  No nystagmus  Could not  a visual field cut on confrontation  Face symmetrical  Tongue twisters good    Upper extremities  No drift  No tremor    Lower extremities  Distal proximal strength okay    Gait  Trouble getting up with pushing off with both hands  Needed some assistance from physician  Could march in place while hanging onto the walker  Would need to be assisted though with any ambulation even with the assistive device  High risk for falls  Poor discretion              Assessment/plan    1.  Left parietal lobe stroke 6/2023/right temporal stroke 12/2023          Left parietal lobe stroke 6/2023        Large right inferior medial temporal lobe infarct 5.8 x 2.5 x 2.0 cm 12/5/2023        Involves hippocampus parahippocampal structures and posterior right thalamus also.        Presented with confusion     2.   Intracranial atherosclerosis         Left P3/P4 occlusion versus high-grade stenosis         Right P1-P2 high-grade stenosis/right P3 segment occlusion          Moderate atheromatous disease elsewhere.       3.   Cardiac echo with left atrial enlargement        Cardiac event monitor 12/27/2023 - 1/25/2024 no sustained tachyarrhythmias/no atrial fibrillation    4.  Vascular risk factors:       Hypertension/CKD/obstructive sleep apnea/previous CVA/CHF/intracranial stenosis     4.   Above complicated by mild pre-existing dementia        Family history mother with dementia       MoCA  12/7/2023,   19 out of 30 (slums)  1/8/2024,     15 out of 30  11/4/2024,    16 out of 30       Diagnosis  Right PCA stroke large (12/2023)  Intracranial atherosclerosis severe  Confusion/multi-infarct dementia  Left parietal CVA (6/2023)  Moderate aortic stenosis  Chronic heart failure  Hypertension/diabetes/CKD  Obstructive sleep apnea      Tolerating dual  antiplatelet and Aricept    I rediscussed with her intracranial stenosis multifocal strokes with dementia she cannot afford to have another event  Will continue with the dual antiplatelet medication  Will continue with Aricept 10 mg once per day  She will continue in the care center complex medical issues as well as her stroke and dementia    Patient on  Plavix 75 mg once per day  Aspirin 81 mg once per day  Atorvastatin 80 mg once per day    Cardiac event monitor 12/27/2023 - 1/25/2024 no sustained tachyarrhythmias/no atrial fibrillation  Has intracranial atherosclerosis    Continue Aricept 10 mg once per day  Follow-up in April 2026    Multiple issues as above discussed with patient and family member  Total care time today 31 minutes  The longitudinal plan of care for the diagnosis(es)/condition(s) as documented were addressed during this visit. Due to the added complexity in care, I will continue to support Indu in the subsequent management and with ongoing continuity of care.      As part of visit today   Reviewed laboratory data  Reviewed recent hospitalization              Again, thank you for allowing me to participate in the care of your patient.        Sincerely,        thanh Guan MD    Electronically signed

## 2025-07-20 ENCOUNTER — HEALTH MAINTENANCE LETTER (OUTPATIENT)
Age: 70
End: 2025-07-20

## 2025-07-24 ENCOUNTER — LAB REQUISITION (OUTPATIENT)
Dept: LAB | Facility: CLINIC | Age: 70
End: 2025-07-24
Payer: COMMERCIAL

## 2025-07-24 DIAGNOSIS — E87.6 HYPOKALEMIA: ICD-10-CM

## 2025-07-24 DIAGNOSIS — R63.4 ABNORMAL WEIGHT LOSS: ICD-10-CM

## 2025-07-28 ENCOUNTER — LAB REQUISITION (OUTPATIENT)
Dept: LAB | Facility: CLINIC | Age: 70
End: 2025-07-28
Payer: COMMERCIAL

## 2025-07-28 DIAGNOSIS — E87.6 HYPOKALEMIA: ICD-10-CM

## 2025-07-28 DIAGNOSIS — R63.4 ABNORMAL WEIGHT LOSS: ICD-10-CM

## 2025-07-29 LAB
ANION GAP SERPL CALCULATED.3IONS-SCNC: 12 MMOL/L (ref 7–15)
BUN SERPL-MCNC: 46.5 MG/DL (ref 8–23)
CALCIUM SERPL-MCNC: 9.1 MG/DL (ref 8.8–10.4)
CHLORIDE SERPL-SCNC: 102 MMOL/L (ref 98–107)
CREAT SERPL-MCNC: 2.41 MG/DL (ref 0.51–0.95)
EGFRCR SERPLBLD CKD-EPI 2021: 21 ML/MIN/1.73M2
ERYTHROCYTE [DISTWIDTH] IN BLOOD BY AUTOMATED COUNT: 15 % (ref 10–15)
GLUCOSE SERPL-MCNC: 74 MG/DL (ref 70–99)
HCO3 SERPL-SCNC: 21 MMOL/L (ref 22–29)
HCT VFR BLD AUTO: 33.2 % (ref 35–47)
HGB BLD-MCNC: 9.9 G/DL (ref 11.7–15.7)
MCH RBC QN AUTO: 26.7 PG (ref 26.5–33)
MCHC RBC AUTO-ENTMCNC: 29.8 G/DL (ref 31.5–36.5)
MCV RBC AUTO: 90 FL (ref 78–100)
PLATELET # BLD AUTO: 363 10E3/UL (ref 150–450)
POTASSIUM SERPL-SCNC: 5.1 MMOL/L (ref 3.4–5.3)
RBC # BLD AUTO: 3.71 10E6/UL (ref 3.8–5.2)
SODIUM SERPL-SCNC: 135 MMOL/L (ref 135–145)
WBC # BLD AUTO: 12.7 10E3/UL (ref 4–11)

## 2025-08-05 LAB
ANION GAP SERPL CALCULATED.3IONS-SCNC: 11 MMOL/L (ref 7–15)
BUN SERPL-MCNC: 42.8 MG/DL (ref 8–23)
CALCIUM SERPL-MCNC: 8.9 MG/DL (ref 8.8–10.4)
CHLORIDE SERPL-SCNC: 106 MMOL/L (ref 98–107)
CREAT SERPL-MCNC: 2.12 MG/DL (ref 0.51–0.95)
EGFRCR SERPLBLD CKD-EPI 2021: 24 ML/MIN/1.73M2
ERYTHROCYTE [DISTWIDTH] IN BLOOD BY AUTOMATED COUNT: 15.1 % (ref 10–15)
GLUCOSE SERPL-MCNC: 104 MG/DL (ref 70–99)
HCO3 SERPL-SCNC: 22 MMOL/L (ref 22–29)
HCT VFR BLD AUTO: 31.6 % (ref 35–47)
HGB BLD-MCNC: 9.6 G/DL (ref 11.7–15.7)
MCH RBC QN AUTO: 26.9 PG (ref 26.5–33)
MCHC RBC AUTO-ENTMCNC: 30.4 G/DL (ref 31.5–36.5)
MCV RBC AUTO: 89 FL (ref 78–100)
PLATELET # BLD AUTO: 440 10E3/UL (ref 150–450)
POTASSIUM SERPL-SCNC: 4.2 MMOL/L (ref 3.4–5.3)
RBC # BLD AUTO: 3.57 10E6/UL (ref 3.8–5.2)
SODIUM SERPL-SCNC: 139 MMOL/L (ref 135–145)
WBC # BLD AUTO: 9.2 10E3/UL (ref 4–11)

## 2025-08-10 ENCOUNTER — HEALTH MAINTENANCE LETTER (OUTPATIENT)
Age: 70
End: 2025-08-10

## 2025-08-18 ENCOUNTER — LAB REQUISITION (OUTPATIENT)
Dept: LAB | Facility: CLINIC | Age: 70
End: 2025-08-18
Payer: COMMERCIAL

## 2025-08-18 DIAGNOSIS — N18.4 CHRONIC KIDNEY DISEASE, STAGE 4 (SEVERE) (H): ICD-10-CM

## 2025-08-18 DIAGNOSIS — E87.6 HYPOKALEMIA: ICD-10-CM

## 2025-08-19 LAB
ANION GAP SERPL CALCULATED.3IONS-SCNC: 12 MMOL/L (ref 7–15)
BUN SERPL-MCNC: 30.5 MG/DL (ref 8–23)
CALCIUM SERPL-MCNC: 8.6 MG/DL (ref 8.8–10.4)
CHLORIDE SERPL-SCNC: 102 MMOL/L (ref 98–107)
CREAT SERPL-MCNC: 1.78 MG/DL (ref 0.51–0.95)
EGFRCR SERPLBLD CKD-EPI 2021: 30 ML/MIN/1.73M2
GLUCOSE SERPL-MCNC: 76 MG/DL (ref 70–99)
HCO3 SERPL-SCNC: 23 MMOL/L (ref 22–29)
POTASSIUM SERPL-SCNC: 3.9 MMOL/L (ref 3.4–5.3)
SODIUM SERPL-SCNC: 137 MMOL/L (ref 135–145)

## (undated) DEVICE — CATH ANGIO INFINITI JL4 4FRX100CM 538420

## (undated) DEVICE — MANIFOLD KIT ANGIO AUTOMATED 014613

## (undated) DEVICE — CUSTOM PACK CORONARY SAN5BCRHEA

## (undated) DEVICE — KIT HAND CONTROL ACIST 014644 AR-P54

## (undated) DEVICE — CATH DIAG 4FR ANG PIG 538453S

## (undated) DEVICE — 4FR X 10CM SHEATH, MINI-STICK MAX COAXIAL VASCULAR ENTRY KIT, 0.018IN STAINLESS STEEL TIP GUIDEWIRE, 21G X 7CM ECHOGENIC NEEDLE

## (undated) DEVICE — ELECTRODE ADULT PACING MULTI P-211-M1

## (undated) DEVICE — SYR ANGIOGRAPHY MULTIUSE KIT ACIST 014612

## (undated) DEVICE — CATH ANGIO INFINITI JR4 4FRX100CM 538421

## (undated) DEVICE — CATH PULM ART 7FR X 110CM, SWA

## (undated) DEVICE — INTRO SHEATH 7FRX10CM PINNACLE RSS702

## (undated) DEVICE — KIT IN LINE ROOM TEMP INJ 93610

## (undated) DEVICE — EXCHANGE WIRE .035 260 STAR/JFC/035/260/ M001491681

## (undated) DEVICE — INTRO SHEATH 4FRX10CM PINNACLE RSS402

## (undated) RX ORDER — ASPIRIN 325 MG
TABLET ORAL
Status: DISPENSED
Start: 2023-02-08

## (undated) RX ORDER — FENTANYL CITRATE 50 UG/ML
INJECTION, SOLUTION INTRAMUSCULAR; INTRAVENOUS
Status: DISPENSED
Start: 2023-02-08